# Patient Record
Sex: FEMALE | Race: WHITE | NOT HISPANIC OR LATINO | Employment: OTHER | URBAN - METROPOLITAN AREA
[De-identification: names, ages, dates, MRNs, and addresses within clinical notes are randomized per-mention and may not be internally consistent; named-entity substitution may affect disease eponyms.]

---

## 2017-02-07 ENCOUNTER — HOSPITAL ENCOUNTER (OUTPATIENT)
Dept: LAB | Facility: MEDICAL CENTER | Age: 72
End: 2017-02-07
Attending: INTERNAL MEDICINE
Payer: MEDICARE

## 2017-02-07 ENCOUNTER — OFFICE VISIT (OUTPATIENT)
Dept: MEDICAL GROUP | Facility: MEDICAL CENTER | Age: 72
End: 2017-02-07
Payer: MEDICARE

## 2017-02-07 VITALS
OXYGEN SATURATION: 96 % | TEMPERATURE: 98.4 F | HEIGHT: 65 IN | WEIGHT: 128 LBS | HEART RATE: 73 BPM | SYSTOLIC BLOOD PRESSURE: 126 MMHG | DIASTOLIC BLOOD PRESSURE: 72 MMHG | BODY MASS INDEX: 21.33 KG/M2

## 2017-02-07 DIAGNOSIS — E78.5 DYSLIPIDEMIA: Chronic | ICD-10-CM

## 2017-02-07 DIAGNOSIS — E89.2 S/P PARATHYROIDECTOMY (HCC): Chronic | ICD-10-CM

## 2017-02-07 DIAGNOSIS — G25.81 RESTLESS LEG SYNDROME: ICD-10-CM

## 2017-02-07 DIAGNOSIS — Z23 NEED FOR PNEUMOCOCCAL VACCINATION: ICD-10-CM

## 2017-02-07 DIAGNOSIS — E61.1 IRON DEFICIENCY: ICD-10-CM

## 2017-02-07 DIAGNOSIS — M25.559 ARTHRALGIA OF HIP, UNSPECIFIED LATERALITY: ICD-10-CM

## 2017-02-07 DIAGNOSIS — M79.606 PAIN OF LOWER EXTREMITY, UNSPECIFIED LATERALITY: ICD-10-CM

## 2017-02-07 DIAGNOSIS — Z00.00 PREVENTATIVE HEALTH CARE: Chronic | ICD-10-CM

## 2017-02-07 LAB
25(OH)D3 SERPL-MCNC: 65 NG/ML (ref 30–100)
ALBUMIN SERPL BCP-MCNC: 4.8 G/DL (ref 3.2–4.9)
ALBUMIN/GLOB SERPL: 2.1 G/DL
ALP SERPL-CCNC: 52 U/L (ref 30–99)
ALT SERPL-CCNC: 24 U/L (ref 2–50)
ANION GAP SERPL CALC-SCNC: 5 MMOL/L (ref 0–11.9)
AST SERPL-CCNC: 24 U/L (ref 12–45)
BASOPHILS # BLD AUTO: 0.04 K/UL (ref 0–0.12)
BASOPHILS NFR BLD AUTO: 0.9 % (ref 0–1.8)
BILIRUB SERPL-MCNC: 0.6 MG/DL (ref 0.1–1.5)
BUN SERPL-MCNC: 12 MG/DL (ref 8–22)
CALCIUM SERPL-MCNC: 10.5 MG/DL (ref 8.5–10.5)
CHLORIDE SERPL-SCNC: 99 MMOL/L (ref 96–112)
CHOLEST SERPL-MCNC: 203 MG/DL (ref 100–199)
CO2 SERPL-SCNC: 30 MMOL/L (ref 20–33)
CREAT SERPL-MCNC: 0.7 MG/DL (ref 0.5–1.4)
EOSINOPHIL # BLD: 0.08 K/UL (ref 0–0.51)
EOSINOPHIL NFR BLD AUTO: 1.8 % (ref 0–6.9)
ERYTHROCYTE [DISTWIDTH] IN BLOOD BY AUTOMATED COUNT: 45.5 FL (ref 35.9–50)
FERRITIN SERPL-MCNC: 103 NG/ML (ref 10–291)
GLOBULIN SER CALC-MCNC: 2.3 G/DL (ref 1.9–3.5)
GLUCOSE SERPL-MCNC: 98 MG/DL (ref 65–99)
HCT VFR BLD AUTO: 47.4 % (ref 37–47)
HDLC SERPL-MCNC: 105 MG/DL
HGB BLD-MCNC: 16 G/DL (ref 12–16)
IMM GRANULOCYTES # BLD AUTO: 0 K/UL (ref 0–0.11)
IMM GRANULOCYTES NFR BLD AUTO: 0 % (ref 0–0.9)
IRON SATN MFR SERPL: 22 % (ref 15–55)
IRON SERPL-MCNC: 80 UG/DL (ref 40–170)
LDLC SERPL CALC-MCNC: 85 MG/DL
LYMPHOCYTES # BLD: 1.07 K/UL (ref 1–4.8)
LYMPHOCYTES NFR BLD AUTO: 24.3 % (ref 22–41)
MCH RBC QN AUTO: 31.7 PG (ref 27–33)
MCHC RBC AUTO-ENTMCNC: 33.8 G/DL (ref 33.6–35)
MCV RBC AUTO: 94 FL (ref 81.4–97.8)
MONOCYTES # BLD: 0.31 K/UL (ref 0–0.85)
MONOCYTES NFR BLD AUTO: 7 % (ref 0–13.4)
NEUTROPHILS # BLD: 2.91 K/UL (ref 2–7.15)
NEUTROPHILS NFR BLD AUTO: 66 % (ref 44–72)
NRBC # BLD AUTO: 0 K/UL
NRBC BLD-RTO: 0 /100 WBC
PLATELET # BLD AUTO: 270 K/UL (ref 164–446)
PMV BLD AUTO: 10.4 FL (ref 9–12.9)
POTASSIUM SERPL-SCNC: 4.9 MMOL/L (ref 3.6–5.5)
PROT SERPL-MCNC: 7.1 G/DL (ref 6–8.2)
RBC # BLD AUTO: 5.04 M/UL (ref 4.2–5.4)
SODIUM SERPL-SCNC: 134 MMOL/L (ref 135–145)
TIBC SERPL-MCNC: 357 UG/DL (ref 250–450)
TRIGL SERPL-MCNC: 66 MG/DL (ref 0–149)
TSH SERPL DL<=0.005 MIU/L-ACNC: 2.58 UIU/ML (ref 0.3–3.7)
WBC # BLD AUTO: 4.4 K/UL (ref 4.8–10.8)

## 2017-02-07 PROCEDURE — 85025 COMPLETE CBC W/AUTO DIFF WBC: CPT

## 2017-02-07 PROCEDURE — 80053 COMPREHEN METABOLIC PANEL: CPT

## 2017-02-07 PROCEDURE — 80061 LIPID PANEL: CPT

## 2017-02-07 PROCEDURE — 84443 ASSAY THYROID STIM HORMONE: CPT

## 2017-02-07 PROCEDURE — 82728 ASSAY OF FERRITIN: CPT

## 2017-02-07 PROCEDURE — 36415 COLL VENOUS BLD VENIPUNCTURE: CPT

## 2017-02-07 PROCEDURE — 83550 IRON BINDING TEST: CPT

## 2017-02-07 PROCEDURE — 82306 VITAMIN D 25 HYDROXY: CPT

## 2017-02-07 PROCEDURE — 83540 ASSAY OF IRON: CPT

## 2017-02-07 PROCEDURE — 99213 OFFICE O/P EST LOW 20 MIN: CPT | Performed by: INTERNAL MEDICINE

## 2017-02-07 RX ORDER — SIMVASTATIN 20 MG
20 TABLET ORAL EVERY EVENING
Qty: 90 TAB | Refills: 3 | Status: SHIPPED | OUTPATIENT
Start: 2017-02-07 | End: 2018-03-13 | Stop reason: SDUPTHER

## 2017-02-07 ASSESSMENT — PATIENT HEALTH QUESTIONNAIRE - PHQ9: CLINICAL INTERPRETATION OF PHQ2 SCORE: 0

## 2017-02-07 NOTE — MR AVS SNAPSHOT
"        Cristobal Stephan Paul   2017 7:40 AM   Office Visit   MRN: [id number]    Department:  [facility]   Dept Phone:  [phone]    Description:  Female : 1945   Provider:  Eron Flowers M.D.           Allergies as of 2017     Allergen Noted Reactions    Asa [Aspirin] 2009       Epistaxis      Nsaids 2009       Epistaxis        You were diagnosed with     Need for pneumococcal vaccination   [515609]       Arthralgia of hip, unspecified laterality   [195380]       Pain of lower extremity, unspecified laterality   [3542032]       S/P parathyroidectomy (CMS-HCC)   [850153]       Dyslipidemia   [201739]       Restless leg syndrome   [522182]       Iron deficiency   [722968]       Preventative health care   [306527]         Vital Signs     Blood Pressure Pulse Temperature Height Weight Body Mass Index    126/72 mmHg 73 36.9 °C (98.4 °F) 1.651 m (5' 5\") 58.06 kg (128 lb) 21.30 kg/m2    Oxygen Saturation Smoking Status                96% Never Smoker           Basic Information     Date Of Birth Sex Race Ethnicity Preferred Language    1945 Female White Non- English      Problem List              ICD-10-CM Priority Class Noted - Resolved    Dyslipidemia (Chronic) E78.5   2009 - Present    S/P parathyroidectomy (CMS-HCC) (Chronic) E89.2   2009 - Present    Allergic rhinitis (Chronic) J30.9   2009 - Present    Preventative health care (Chronic) Z00.00   2009 - Present    Vitiligo L80   2010 - Present    S/P laparoscopic cholecystectomy Z90.49   2011 - Present    Adenomatous polyp of colon (Chronic) D12.6   2011 - Present    Vasovagal syncope R55   2012 - Present    Wrist fracture S62.109A   2013 - Present      Health Maintenance        Date Due Completion Dates    IMM ZOSTER VACCINE 2005 ---    IMM DTaP/Tdap/Td Vaccine (1 - Tdap) 2012    MAMMOGRAM 2016, 3/28/2014, 3/12/2013, 2011, " 12/11/2006, 5/23/2005    IMM INFLUENZA (1) 9/1/2016 1/19/2016    IMM PNEUMOCOCCAL 65+ (ADULT) LOW/MEDIUM RISK SERIES (2 of 2 - PCV13) 1/19/2017 1/19/2016    BONE DENSITY 4/8/2020 4/8/2015, 3/12/2013, 12/13/2005    COLONOSCOPY 3/16/2021 3/16/2016            Current Immunizations     Influenza Vaccine Adult HD 1/19/2016  9:10 AM    Pneumococcal polysaccharide vaccine (PPSV-23) 1/19/2016  9:11 AM    TD Vaccine 2/7/2012      Below and/or attached are the medications your provider expects you to take. Review all of your home medications and newly ordered medications with your provider and/or pharmacist. Follow medication instructions as directed by your provider and/or pharmacist. Please keep your medication list with you and share with your provider. Update the information when medications are discontinued, doses are changed, or new medications (including over-the-counter products) are added; and carry medication information at all times in the event of emergency situations     Allergies:  ASA - (reactions not documented)     NSAIDS - (reactions not documented)               Medications  Valid as of: February 07, 2017 -  8:29 AM    Generic Name Brand Name Tablet Size Instructions for use    Pneumococcal 13-Alondra Conj Vacc (Suspension) PREVNAR 13  0.5 mL by Intramuscular route Once for 1 dose.        Simvastatin (Tab) ZOCOR 20 MG Take 1 Tab by mouth every evening.        .                 Medicines prescribed today were sent to:     [facility] [address] [address]    Phone: [phone] Fax: [phone]    Open 24 Hours?: No      Medication refill instructions:       If your prescription bottle indicates you have medication refills left, it is not necessary to call your provider’s office. Please contact your pharmacy and they will refill your medication.    If your prescription bottle indicates you do not have any refills left, you may request  refills at any time through one of the following ways: The online [employer] system (except Urgent Care), by calling your provider’s office, or by asking your pharmacy to contact your provider’s office with a refill request. Medication refills are processed only during regular business hours and may not be available until the next business day. Your provider may request additional information or to have a follow-up visit with you prior to refilling your medication.   *Please Note: Medication refills are assigned a new Rx number when refilled electronically. Your pharmacy may indicate that no refills were authorized even though a new prescription for the same medication is available at the pharmacy. Please request the medicine by name with the pharmacy before contacting your provider for a refill.        Your To Do List     Future Labs/Procedures Complete By Expires    CBC WITH DIFFERENTIAL  As directed 2/8/2018    COMP METABOLIC PANEL  As directed 2/8/2018    FERRITIN  As directed 2/8/2018    IRON/TOTAL IRON BIND  As directed 2/8/2018    LIPID PROFILE  As directed 2/8/2018    TSH  As directed 2/8/2018    VITAMIN D,25 HYDROXY  As directed 2/8/2018      Referral     A referral request has been sent to our patient care coordination department. Please allow 3-5 business days for us to process this request and contact you either by phone or mail. If you do not hear from us by the 5th business day, please call us at [phone].        Other Notes About Your Plan     Goes by Cordelia  Needs mammogram,all labs,prevnar,zoster  5/25/16 monitor calcium levels               [employer] Access Code: Activation code not generated  Current [employer] Status: Active

## 2017-02-07 NOTE — PROGRESS NOTES
Subjective:      Cristobal Paul is a 71 y.o. female who presents with follow up chol       HPI      Here for follow up cholesterol on zocor trying to keep active with exercise, does have muscle cramps usually at night will wake up with camps in right thigh and calves bilateral only when laying down, will improve with stretching, mild intensity, no associated sensory changes, no edema, no symptoms during the day, no low back pain, no pain during the day, no knee pain or hip pain.  Not related to stress, no incontinence bowel or bladder, not related to exertion during the day.  Will occur during the week on occasion not every night, has tried tonic water previously.  No difficulty with gait or balance.  Has seen physical therapy previously [facility] for hip pain.  No difficulty with ambulation.  No current sciatica.  No difficulty with balance, does golfing and skiing regularly without falls, uses helmet, uses sunscreen  Dyslipidemia on Zocor no muscle pain or muscle ache usually associated with statin therapy  Eating healthy diet, avoid sweets and candies  No tobacco, occasional wine  Status post parathyroidectomy, no flank pain, no kidney stones, no tremors or tetany  Occasional right hip discomfort, no radiation, typically with activity, has seen physical therapy      Current Outpatient Prescriptions   Medication Sig Dispense Refill   • simvastatin (ZOCOR) 20 MG Tab Take 1 Tab by mouth every evening. 90 Tab 3   • azithromycin (ZITHROMAX) 250 MG Tab Take zpack as directed 6 Tab 0     No current facility-administered medications for this visit.     Patient Active Problem List    Diagnosis Date Noted   • Wrist fracture 02/12/2013   • Vasovagal syncope 05/02/2012   • Adenomatous polyp of colon 02/11/2011   • S/P laparoscopic cholecystectomy 01/20/2011   • Vitiligo 02/08/2010   • Dyslipidemia 06/17/2009   • S/P parathyroidectomy (CMS-[facility]) 06/17/2009   • Allergic rhinitis 06/17/2009   • Preventative  "[facility] care 06/17/2009      Adenoma  3/16/16 colon per [facility] tortuous colon, melanosis in colon, repeat colonoscopy 5 years for surveillance    Allergic rhinitis    Dyslipidemia  11/08 chol 210,trig 45,hdl 95,ldl 97  6/09 chol 271,trig 113,hdl 74,ldl 163 restart on zocor 40 (1/2)  5/05 aorta abd u/s negative  11/08 p.thallium no ischemia EF 87%  2/09 ERIC negative  2/10 chol 205,trig 55,hdl 100,ldl 94  1/11 chol 197,trig 66,hdl 100,ldl 84 on zocor 20 mg  2/12 chol 205,trig 43,hdl 106,ldl 90 on zocor 20 mg  2/12/13 chol 216,trig 58,hdl 114,ldl 90 on zocor 20 mg  2/13/14 chol 195,trig 30,hdl 106,ldl 83 on zocor 20 mg  3/12/15 chol 205,trig 41,hdl 99,ldl 98 on zocor 20 mg  3/15/16 chol 260,trig 77,hdl 102,ldl 143 on zocor 20 mg repeat lipid panel 2 months  5/25/16 chol 243,trig 51,hdl 104,ldl 129 on zocor 20 mg    Preventative health  2/10 pap  2/12 td  2/19/14 FIT neg  4/8/15 mammogram  4/8/15 dexa LS +1.7,hip +0.1  1/19/16 declines shingles vaccine  1/19/16 pneumovax   3/15/16 vit d 55  3/16/16 colon per [facility] tortuous colon, melanosis in colon, repeat colonoscopy 5 years for surveillance    Status post laparoscopic cholecystectomy    Status post parathyroidectomy  2005 no old records  11/08 calcium 10.3  2/10 calcium 9.9  1/11 calcium 10.1  2/12 calcium 10, vit d 64  2/13 calcium 10.0  2/13/14 calcium 9.9  3/12/15 calcium 10.3  3/15/16 calcium 10.7  5/25/16 calcium 10.5,ionized 1.3,PTH 26    Wrist fracture  12/30/12 left wrist fracture had splint for 6 weeks, no surgery    Depression Screening    Little interest or pleasure in doing things?  0 - not at all  Feeling down, depressed , or hopeless? 0 - not at all  Patient Health Questionnaire Score: 0     If depressive symptoms identified deferred to follow up visit unless specifically addressed in assesment and plan.      ROS       Objective:     /72 mmHg  Pulse 73  Temp(Src) 36.9 °C (98.4 °F)  Ht 1.651 m (5' 5\")  Wt 58.06 kg (128 lb)  BMI 21.30 kg/m2  SpO2 96% "     Physical Exam   Constitutional: She appears well-developed and well-nourished. No distress.   HENT:   Head: Normocephalic and atraumatic.   Right Ear: External ear normal.   Left Ear: External ear normal.   Mouth/Throat: Oropharynx is clear and moist.   Eyes: Right eye exhibits no discharge. Left eye exhibits no discharge. No scleral icterus.   Neck: Neck supple. No JVD present. No thyromegaly present.   Cardiovascular: Normal rate, regular rhythm and normal heart sounds.    No murmur heard.  Pulmonary/Chest: Effort normal and breath sounds normal. No respiratory distress. She has no wheezes.   Abdominal: She exhibits no distension.   Musculoskeletal: She exhibits no edema.   Neurological: She is alert.   Skin: Skin is warm. She is not diaphoretic.   Psychiatric: She has a normal mood and affect. Her behavior is normal.   Nursing note and vitals reviewed.    No spinal tenderness  Normal strength lower extremity bilateral  No lower extremity edema            Assessment/Plan:     Assessment  #!  Restless leg syndrome symptoms just at night, do not believe related to sciatica or statin therapy, does not happen on a nightly basis, no history of diabetes mellitus, no history of iron deficiency although those would be in the differential as potential causes of restless leg syndrome    #2 dyslipidemia on Zocor lipid panel has been well controlled, does eat a healthy diet and exercise regularly    #3 preventative health has declined mammogram, shingles vaccine    #4 history of parathyroidectomy stable with normal potassium levels previously on labs    #5 occasional right hip discomfort      Plan  #! Declines mammogram     #2 declines zoster vaccine    #3 labs    #4 prescription pneumococcal 13 vaccination provided to get at pharmacy since we are out of    #5 lifestyle modification, diet, exercise discussed    #6 follow up 6 months    #7 referral to physical therapy for occasional hip pain

## 2017-02-08 ENCOUNTER — TELEPHONE (OUTPATIENT)
Dept: MEDICAL GROUP | Facility: MEDICAL CENTER | Age: 72
End: 2017-02-08

## 2017-02-09 NOTE — TELEPHONE ENCOUNTER
Notified with labs normal iron and blood sugar for possible restless leg  Vitamin D normal  Lipid panel improved significantly, continue good work with nutrition, exercise, no change medications

## 2017-02-20 PROBLEM — M54.50 LOW BACK PAIN: Status: ACTIVE | Noted: 2017-02-20

## 2017-08-04 ENCOUNTER — PATIENT OUTREACH (OUTPATIENT)
Dept: HEALTH INFORMATION MANAGEMENT | Facility: OTHER | Age: 72
End: 2017-08-04

## 2017-08-04 NOTE — PROGRESS NOTES
Outcome: Requested Call Back Later Today     WebIZ Checked & Epic Updated:  yes    HealthConnect Verified: no    Attempt # 1

## 2017-08-04 NOTE — PROGRESS NOTES
Verify PCP: yes    Communication Preference Obtained: yes     Review Care Team: yes    Annual Wellness Visit Scheduling  1. Scheduling Status:Scheduled      Care Gap Scheduling (Attempt to Schedule EACH Overdue Care Gap!)     Health Maintenance Due   Topic Date Due   • IMM ZOSTER VACCINE  Declined   • IMM DTaP/Tdap/Td Vaccine (1 - Tdap) Scheduled   • MAMMOGRAM  Scheduled   • Annual Wellness Visit  Scheduled   • IMM PNEUMOCOCCAL 65+ (ADULT) LOW/MEDIUM RISK SERIES (2 of 2 - PCV13) Updated         Virtual 3-D Display for Smartphones Activation: already active  Virtual 3-D Display for Smartphones Elana: yes  Virtual Visits: yes  Opt In to Text Messages: yes

## 2017-09-01 ENCOUNTER — TELEPHONE (OUTPATIENT)
Dept: MEDICAL GROUP | Facility: MEDICAL CENTER | Age: 72
End: 2017-09-01

## 2017-09-01 RX ORDER — UBIDECARENONE 75 MG
100 CAPSULE ORAL DAILY
COMMUNITY
End: 2019-10-09

## 2017-09-01 NOTE — TELEPHONE ENCOUNTER
ANNUAL WELLNESS VISIT PRE-VISIT PLANNING     1.  Reviewed note from last office visit with PCP: YES    2.  If any orders were placed at last visit, do we have Results/Consult Notes?        •  Labs - Labs were not ordered at last office visit.       •  Imaging - Imaging ordered, NOT completed. Patient advised to complete prior to next appointment.       •  Referrals - No referrals were ordered at last office visit.    3.  Immunizations were updated in [facility] using WebIZ?: Yes       •  WebIZ Recommendations: FLU, TDAP and ZOSTAVAX (Shingles)       •  Is patient due for Tdap? YES. Patient was notified of copay.       •  Is patient due for Shingles? NO     4.  Patient is due for the following Health Maintenance Topics:   Health Maintenance Due   Topic Date Due   • IMM DTaP/Tdap/Td Vaccine (1 - Tdap) 02/08/2012   • MAMMOGRAM  04/08/2016   • Annual Wellness Visit  04/08/2016   • IMM INFLUENZA (1) 09/01/2017       - Patient is up-to-date on all Health Maintenance topics. No records have been requested at this time.    5.  Reviewed/Updated the following with patient:       •   Preferred Pharmacy? YES       •   Preferred Lab? YES       •   Medications? YES. Was Abstract Encounter opened and chart updated? YES       •   Social History? YES. Was Abstract Encounter opened and chart updated? YES       •   Family History? YES. Was Abstract Encounter opened and chart updated? YES    6.  Care Team Updated:       •   DME Company (gait device, O2, CPAP, etc.): YES       •   Other Specialists (eye doctor, derm, GYN, cardiology, endo, etc): YES    7.  Patient has the following Care Path diagnoses on Problem List:  NONE    8.  Specialty Comments was updated with diagnosis information provided by [facility]: YES    9.  Patient was advised: “This is a free wellness visit. The provider will screen for medical conditions to help you stay healthy. If you have other concerns to address you may be asked to discuss these at a separate visit or there may  be an additional fee.”     6.  Patient was informed to arrive 15 min prior to their scheduled appointment and bring in their medication bottles.

## 2017-09-07 ENCOUNTER — OFFICE VISIT (OUTPATIENT)
Dept: MEDICAL GROUP | Facility: MEDICAL CENTER | Age: 72
End: 2017-09-07
Payer: MEDICARE

## 2017-09-07 ENCOUNTER — HOSPITAL ENCOUNTER (OUTPATIENT)
Dept: RADIOLOGY | Facility: MEDICAL CENTER | Age: 72
End: 2017-09-07
Attending: INTERNAL MEDICINE
Payer: MEDICARE

## 2017-09-07 ENCOUNTER — TELEPHONE (OUTPATIENT)
Dept: MEDICAL GROUP | Facility: MEDICAL CENTER | Age: 72
End: 2017-09-07

## 2017-09-07 VITALS
BODY MASS INDEX: 21.92 KG/M2 | HEART RATE: 68 BPM | DIASTOLIC BLOOD PRESSURE: 70 MMHG | SYSTOLIC BLOOD PRESSURE: 110 MMHG | HEIGHT: 65 IN | TEMPERATURE: 97.4 F | WEIGHT: 131.6 LBS | OXYGEN SATURATION: 95 %

## 2017-09-07 DIAGNOSIS — M81.0 POSTMENOPAUSAL BONE LOSS: ICD-10-CM

## 2017-09-07 DIAGNOSIS — E55.9 HYPOVITAMINOSIS D: ICD-10-CM

## 2017-09-07 DIAGNOSIS — Z12.31 VISIT FOR SCREENING MAMMOGRAM: ICD-10-CM

## 2017-09-07 DIAGNOSIS — E89.2 S/P PARATHYROIDECTOMY (HCC): Chronic | ICD-10-CM

## 2017-09-07 DIAGNOSIS — Z23 NEED FOR TDAP VACCINATION: ICD-10-CM

## 2017-09-07 DIAGNOSIS — Z00.00 MEDICARE ANNUAL WELLNESS VISIT, SUBSEQUENT: ICD-10-CM

## 2017-09-07 DIAGNOSIS — E78.5 DYSLIPIDEMIA: Chronic | ICD-10-CM

## 2017-09-07 PROCEDURE — G0439 PPPS, SUBSEQ VISIT: HCPCS | Mod: 25 | Performed by: INTERNAL MEDICINE

## 2017-09-07 PROCEDURE — 90715 TDAP VACCINE 7 YRS/> IM: CPT | Performed by: INTERNAL MEDICINE

## 2017-09-07 PROCEDURE — 90471 IMMUNIZATION ADMIN: CPT | Performed by: INTERNAL MEDICINE

## 2017-09-07 PROCEDURE — G0202 SCR MAMMO BI INCL CAD: HCPCS

## 2017-09-07 RX ORDER — PRAMIPEXOLE DIHYDROCHLORIDE 0.12 MG/1
0.12 TABLET ORAL EVERY EVENING
Qty: 30 TAB | Refills: 1 | Status: SHIPPED | OUTPATIENT
Start: 2017-09-07 | End: 2018-09-10

## 2017-09-07 ASSESSMENT — PATIENT HEALTH QUESTIONNAIRE - PHQ9: CLINICAL INTERPRETATION OF PHQ2 SCORE: 0

## 2017-09-07 NOTE — PROGRESS NOTES
Chief Complaint   Patient presents with   • Annual Wellness Visit         HPI:  Cristobal is a 72 y.o. here for Medicare Annual Wellness Visit  Medications, allergies, medical history, surgical history, social history, family history reviewed and updated  Occasional leg cramping at night, none during the day, golf regularly, no history peripheral vascular disease, no claudication, no low back pain, no sensory changes, no falls  No memory loss, anxiety or depression  Social history , retired, no tobacco         Patient Active Problem List    Diagnosis Date Noted   • Low back pain 02/20/2017   • Wrist fracture 02/12/2013   • Vasovagal syncope 05/02/2012   • Adenomatous polyp of colon 02/11/2011   • S/P laparoscopic cholecystectomy 01/20/2011   • Vitiligo 02/08/2010   • Dyslipidemia 06/17/2009   • S/P parathyroidectomy 06/17/2009   • Allergic rhinitis 06/17/2009   • Preventative health care 06/17/2009       Current Outpatient Prescriptions   Medication Sig Dispense Refill   • Multiple Vitamins-Minerals (MULTI COMPLETE PO) Take  by mouth.     • Ascorbic Acid (JELANI-C PO) Take  by mouth.     • vitamin D (CHOLECALCIFEROL) 1000 UNIT Tab Take 1,000 Units by mouth every day.     • cyanocobalamin (VITAMIN B-12) 100 MCG Tab Take 100 mcg by mouth every day.     • simvastatin (ZOCOR) 20 MG Tab Take 1 Tab by mouth every evening. 90 Tab 3     No current facility-administered medications for this visit.         Patient is taking medications as noted in medication list.  Current supplements as per medication list.     Allergies: Asa [aspirin] and Nsaids    Current social contact/activities: Pt keeps herself busy with daily activities.       Is patient current with immunizations? tdap  She  reports that she has never smoked. She has never used smokeless tobacco. She reports that she drinks alcohol.  Counseling given: Not Answered        DPA/Advanced directive: Patient has Advanced Directive, but it is not on file. Instructed  to bring in a copy to scan into their chart.    ROS:    Gait: Uses no assistive device    Ostomy: no   Other tubes: no   Amputations: no    Chronic oxygen use no    Last eye exam 4/2017    Wears hearing aids: no    : Denies incontinence.           Depression Screening    Little interest or pleasure in doing things?  0 - not at all  Feeling down, depressed, or hopeless? 0 - not at all  Patient Health Questionnaire Score: 0    If depressive symptoms identified deferred to follow up visit unless specifically addressed in assessment and plan.    Interpretation of PHQ-9 Total Score   Score Severity   1-4 No Depression   5-9 Mild Depression   10-14 Moderate Depression   15-19 Moderately Severe Depression   20-27 Severe Depression    Screening for Cognitive Impairment    Three Minute Recall (apple, watch, brian)  3/3    Draw clock face with all 12 numbers set to the hand to show 10 minutes past 11 o'clock  1 5/5  If cognitive concerns identified, deferred for follow up unless specifically addressed in assessment and plan.    Fall Risk Assessment    Has the patient had two or more falls in the last year or any fall with injury in the last year?  No  If fall risk identified, deferred for follow up unless specifically addressed in assessment and plan.    Safety Assessment    Throw rugs on floor.  Yes  Handrails on all stairs.  Yes  Good lighting in all hallways.  Yes  Difficulty hearing.  No  Patient counseled about all safety risks that were identified.    Functional Assessment ADLs    Are there any barriers preventing you from cooking for yourself or meeting nutritional needs?  No.    Are there any barriers preventing you from driving safely or obtaining transportation?  No.    Are there any barriers preventing you from using a telephone or calling for help?  No.    Are there any barriers preventing you from shopping?  No.    Are there any barriers preventing you from taking care of your own finances?  No.    Are there  any barriers preventing you from managing your medications?  No.    Are you currently engaging any exercise or physical activity?  Yes.  Pt plays golf 3-4 times a week and walks all other days.     Health Maintenance Summary                IMM DTaP/Tdap/Td Vaccine Overdue 2/8/2012      Done 2/7/2012 Imm Admin: TD Vaccine    MAMMOGRAM Overdue 4/8/2016      Done 4/8/2015 MA-SCREENING MAMMOGRAM W/ CAD     Patient has more history with this topic...    Annual Wellness Visit Overdue 4/8/2016      Done 4/8/2015 Visit Dx: Medicare annual wellness visit, subsequent     Patient has more history with this topic...    IMM INFLUENZA Overdue 9/1/2017      Done 1/19/2016 Imm Admin: Influenza Vaccine Adult HD    IMM ZOSTER VACCINE Postponed 2/4/2018 Originally 8/11/2005. Patient Refused    BONE DENSITY Next Due 4/8/2020      Done 4/8/2015 DS-BONE DENSITY STUDY (DEXA)     Patient has more history with this topic...    COLONOSCOPY Next Due 3/16/2021      Done 3/16/2016 AMB REFERRAL TO GI FOR COLONOSCOPY          Patient Care Team:  Eron Flowers M.D. as PCP - General  Ramin Meadows M.D. as Consulting Physician (Ophthalmology)    Social History   Substance Use Topics   • Smoking status: Never Smoker   • Smokeless tobacco: Never Used   • Alcohol use 0.0 oz/week      Comment: occ wine      Family History   Problem Relation Age of Onset   • Heart Disease Mother      She  has a past medical history of Allergic rhinitis (6/17/2009); Dyslipidemia (6/17/2009); Preventative health care (6/17/2009); and S/P parathyroidectomy (6/17/2009).   No past surgical history on file.      Adenoma  3/16/16 colon per [facility] tortuous colon, melanosis in colon, repeat colonoscopy 5 years for surveillance     Allergic rhinitis     Dyslipidemia  11/08 chol 210,trig 45,hdl 95,ldl 97  6/09 chol 271,trig 113,hdl 74,ldl 163 restart on zocor 40 (1/2)  5/05 aorta abd u/s negative  11/08 p.thallium no ischemia EF 87%  2/09 ERIC negative  2/10 chol 205,trig 55,hdl  100,ldl 94  1/11 chol 197,trig 66,hdl 100,ldl 84 on zocor 20 mg  2/12 chol 205,trig 43,hdl 106,ldl 90 on zocor 20 mg  2/12/13 chol 216,trig 58,hdl 114,ldl 90 on zocor 20 mg  2/13/14 chol 195,trig 30,hdl 106,ldl 83 on zocor 20 mg  3/12/15 chol 205,trig 41,hdl 99,ldl 98 on zocor 20 mg  3/15/16 chol 260,trig 77,hdl 102,ldl 143 on zocor 20 mg repeat lipid panel 2 months  5/25/16 chol 243,trig 51,hdl 104,ldl 129 on zocor 20 mg  2/7/17 chol 203,trig 66,hdl 105,ldl 85 on zocor 20 mg     Preventative health  2/10 pap  2/12 td  4/8/15 mammogram  4/8/15 dexa LS +1.7,hip +0.1  1/19/16 declines shingles vaccine  1/19/16 pneumovax   3/15/16 vit d 55  3/16/16 colon per [facility] tortuous colon, melanosis in colon, repeat colonoscopy 5 years for surveillance  2/7/17 declines mammogram  2/7/17 declines zoster vaccine  prescription pneumococcal 13 vaccination provided to get at pharmacy      Status post laparoscopic cholecystectomy     Status post parathyroidectomy  2005 no old records  11/08 calcium 10.3  2/10 calcium 9.9  1/11 calcium 10.1  2/12 calcium 10, vit d 64  2/13 calcium 10.0  2/13/14 calcium 9.9  3/12/15 calcium 10.3  3/15/16 calcium 10.7  5/25/16 calcium 10.5,ionized 1.3,PTH 26  2/7/17 calcium 10.5     Wrist fracture  12/30/12 left wrist fracture had splint for 6 weeks, no surgery         Exam:        Hearing fair  Dentition fair  Alert, oriented in no acute distress.  Eye contact is good, speech goal directed, affect calm  Lungs clear  Cardiovascular S1-S2  Extremities normal pulses, no edema       Assessment and Plan. The following treatment and monitoring plan is recommended:     Assessment  #! wellness assessment    #2 Adenoma recent colonoscopy 3/16/16 colon per [facility] tortuous colon, melanosis in colon, repeat colonoscopy 5 years for surveillance     #3 Dyslipidemia most recent lipid panel done 2/7/17 chol 203,trig 66,hdl 105,ldl 85 on zocor 20 mg with no side effects of muscle pain during the day     #4 Preventative  health  2/10 pap  2/12 td  4/8/15 mammogram  4/8/15 dexa LS +1.7,hip +0.1  1/19/16 declines shingles vaccine  1/19/16 pneumovax   3/15/16 vit d 55  3/16/16 colon per [facility] tortuous colon, melanosis in colon, repeat colonoscopy 5 years for surveillance  2/7/17 declines mammogram  2/7/17 declines zoster vaccine  prescription pneumococcal 13 vaccination provided to get at pharmacy      #5 Status post parathyroidectomy 2/7/17 calcium 10.5     #6 question restless leg, no evidence of peripheral vascular disease, do not suspect sciatica or side effect of statin therapy    #7 low vitamin D    Services suggested:    Health Care Screening recommendations as per orders if indicated.  Referrals offered: PT/OT/Nutrition counseling/Behavioral Health/Smoking cessation as per orders if indicated.    Discussion today about general wellness and lifestyle habits:    · Prevent falls and reduce trip hazards; Cautioned about securing or removing rugs.  · Have a working fire alarm and carbon monoxide detector;   · Engage in regular physical activity and social activities       Follow-up:         Plan  #! tdap ABN understands insurance will not cover this    #2 declines influenza vaccine, declines shingles vaccine, I recommend she have both of those    #3 obtain old records of pneumococcal administration    #4 lifestyle modification, diet, exercise discussed    #5 repeat colonoscopy 4 years    #6 diet, exercise discussed    #7 labs    #8 declines mammogram    #9 bone density ordered    #10 follow-up in February

## 2017-09-20 ENCOUNTER — HOSPITAL ENCOUNTER (OUTPATIENT)
Dept: LAB | Facility: MEDICAL CENTER | Age: 72
End: 2017-09-20
Attending: INTERNAL MEDICINE
Payer: MEDICARE

## 2017-09-20 ENCOUNTER — TELEPHONE (OUTPATIENT)
Dept: MEDICAL GROUP | Facility: MEDICAL CENTER | Age: 72
End: 2017-09-20

## 2017-09-20 DIAGNOSIS — E55.9 HYPOVITAMINOSIS D: ICD-10-CM

## 2017-09-20 DIAGNOSIS — E78.5 DYSLIPIDEMIA: Chronic | ICD-10-CM

## 2017-09-20 LAB
25(OH)D3 SERPL-MCNC: 47 NG/ML (ref 30–100)
ALBUMIN SERPL BCP-MCNC: 4.7 G/DL (ref 3.2–4.9)
ALBUMIN/GLOB SERPL: 1.9 G/DL
ALP SERPL-CCNC: 49 U/L (ref 30–99)
ALT SERPL-CCNC: 26 U/L (ref 2–50)
ANION GAP SERPL CALC-SCNC: 8 MMOL/L (ref 0–11.9)
AST SERPL-CCNC: 24 U/L (ref 12–45)
BASOPHILS # BLD AUTO: 0.7 % (ref 0–1.8)
BASOPHILS # BLD: 0.03 K/UL (ref 0–0.12)
BILIRUB SERPL-MCNC: 0.9 MG/DL (ref 0.1–1.5)
BUN SERPL-MCNC: 15 MG/DL (ref 8–22)
CALCIUM SERPL-MCNC: 10.1 MG/DL (ref 8.5–10.5)
CHLORIDE SERPL-SCNC: 99 MMOL/L (ref 96–112)
CHOLEST SERPL-MCNC: 211 MG/DL (ref 100–199)
CO2 SERPL-SCNC: 27 MMOL/L (ref 20–33)
CREAT SERPL-MCNC: 0.86 MG/DL (ref 0.5–1.4)
EOSINOPHIL # BLD AUTO: 0.09 K/UL (ref 0–0.51)
EOSINOPHIL NFR BLD: 2.1 % (ref 0–6.9)
ERYTHROCYTE [DISTWIDTH] IN BLOOD BY AUTOMATED COUNT: 44.6 FL (ref 35.9–50)
GFR SERPL CREATININE-BSD FRML MDRD: >60 ML/MIN/1.73 M 2
GLOBULIN SER CALC-MCNC: 2.5 G/DL (ref 1.9–3.5)
GLUCOSE SERPL-MCNC: 88 MG/DL (ref 65–99)
HCT VFR BLD AUTO: 47.9 % (ref 37–47)
HDLC SERPL-MCNC: 104 MG/DL
HGB BLD-MCNC: 15.9 G/DL (ref 12–16)
IMM GRANULOCYTES # BLD AUTO: 0 K/UL (ref 0–0.11)
IMM GRANULOCYTES NFR BLD AUTO: 0 % (ref 0–0.9)
LDLC SERPL CALC-MCNC: 93 MG/DL
LYMPHOCYTES # BLD AUTO: 1.22 K/UL (ref 1–4.8)
LYMPHOCYTES NFR BLD: 28.4 % (ref 22–41)
MCH RBC QN AUTO: 30.8 PG (ref 27–33)
MCHC RBC AUTO-ENTMCNC: 33.2 G/DL (ref 33.6–35)
MCV RBC AUTO: 92.6 FL (ref 81.4–97.8)
MONOCYTES # BLD AUTO: 0.3 K/UL (ref 0–0.85)
MONOCYTES NFR BLD AUTO: 7 % (ref 0–13.4)
NEUTROPHILS # BLD AUTO: 2.65 K/UL (ref 2–7.15)
NEUTROPHILS NFR BLD: 61.8 % (ref 44–72)
NRBC # BLD AUTO: 0 K/UL
NRBC BLD AUTO-RTO: 0 /100 WBC
PLATELET # BLD AUTO: 257 K/UL (ref 164–446)
PMV BLD AUTO: 10.4 FL (ref 9–12.9)
POTASSIUM SERPL-SCNC: 4.2 MMOL/L (ref 3.6–5.5)
PROT SERPL-MCNC: 7.2 G/DL (ref 6–8.2)
RBC # BLD AUTO: 5.17 M/UL (ref 4.2–5.4)
SODIUM SERPL-SCNC: 134 MMOL/L (ref 135–145)
TRIGL SERPL-MCNC: 71 MG/DL (ref 0–149)
TSH SERPL DL<=0.005 MIU/L-ACNC: 3.77 UIU/ML (ref 0.3–3.7)
WBC # BLD AUTO: 4.3 K/UL (ref 4.8–10.8)

## 2017-09-20 PROCEDURE — 84443 ASSAY THYROID STIM HORMONE: CPT

## 2017-09-20 PROCEDURE — 82306 VITAMIN D 25 HYDROXY: CPT

## 2017-09-20 PROCEDURE — 85025 COMPLETE CBC W/AUTO DIFF WBC: CPT

## 2017-09-20 PROCEDURE — 36415 COLL VENOUS BLD VENIPUNCTURE: CPT

## 2017-09-20 PROCEDURE — 80061 LIPID PANEL: CPT

## 2017-09-20 PROCEDURE — 80053 COMPREHEN METABOLIC PANEL: CPT

## 2018-09-10 ENCOUNTER — TELEPHONE (OUTPATIENT)
Dept: MEDICAL GROUP | Facility: MEDICAL CENTER | Age: 73
End: 2018-09-10

## 2018-09-10 ENCOUNTER — HOSPITAL ENCOUNTER (OUTPATIENT)
Dept: LAB | Facility: MEDICAL CENTER | Age: 73
End: 2018-09-10
Attending: INTERNAL MEDICINE
Payer: MEDICARE

## 2018-09-10 ENCOUNTER — OFFICE VISIT (OUTPATIENT)
Dept: MEDICAL GROUP | Facility: MEDICAL CENTER | Age: 73
End: 2018-09-10
Payer: MEDICARE

## 2018-09-10 VITALS
OXYGEN SATURATION: 97 % | HEIGHT: 65 IN | WEIGHT: 131 LBS | SYSTOLIC BLOOD PRESSURE: 110 MMHG | HEART RATE: 67 BPM | BODY MASS INDEX: 21.83 KG/M2 | TEMPERATURE: 97.2 F | DIASTOLIC BLOOD PRESSURE: 70 MMHG

## 2018-09-10 DIAGNOSIS — E89.2 S/P PARATHYROIDECTOMY (HCC): ICD-10-CM

## 2018-09-10 DIAGNOSIS — G25.81 RESTLESS LEG SYNDROME: ICD-10-CM

## 2018-09-10 DIAGNOSIS — Z00.00 PREVENTATIVE HEALTH CARE: Chronic | ICD-10-CM

## 2018-09-10 DIAGNOSIS — Z00.00 MEDICARE ANNUAL WELLNESS VISIT, SUBSEQUENT: Primary | ICD-10-CM

## 2018-09-10 DIAGNOSIS — M81.0 POSTMENOPAUSAL BONE LOSS: ICD-10-CM

## 2018-09-10 DIAGNOSIS — E78.5 DYSLIPIDEMIA: Chronic | ICD-10-CM

## 2018-09-10 DIAGNOSIS — R79.89 LOW VITAMIN D LEVEL: ICD-10-CM

## 2018-09-10 DIAGNOSIS — Z12.31 ENCOUNTER FOR SCREENING MAMMOGRAM FOR BREAST CANCER: ICD-10-CM

## 2018-09-10 DIAGNOSIS — G25.81 RLS (RESTLESS LEGS SYNDROME): ICD-10-CM

## 2018-09-10 DIAGNOSIS — Z23 NEED FOR ZOSTER VACCINE: ICD-10-CM

## 2018-09-10 LAB
25(OH)D3 SERPL-MCNC: 58 NG/ML (ref 30–100)
ALBUMIN SERPL BCP-MCNC: 4.3 G/DL (ref 3.2–4.9)
ALBUMIN/GLOB SERPL: 1.7 G/DL
ALP SERPL-CCNC: 45 U/L (ref 30–99)
ALT SERPL-CCNC: 24 U/L (ref 2–50)
ANION GAP SERPL CALC-SCNC: 4 MMOL/L (ref 0–11.9)
AST SERPL-CCNC: 21 U/L (ref 12–45)
BASOPHILS # BLD AUTO: 1.2 % (ref 0–1.8)
BASOPHILS # BLD: 0.06 K/UL (ref 0–0.12)
BILIRUB SERPL-MCNC: 0.7 MG/DL (ref 0.1–1.5)
BUN SERPL-MCNC: 13 MG/DL (ref 8–22)
CALCIUM SERPL-MCNC: 9.4 MG/DL (ref 8.5–10.5)
CHLORIDE SERPL-SCNC: 98 MMOL/L (ref 96–112)
CHOLEST SERPL-MCNC: 218 MG/DL (ref 100–199)
CO2 SERPL-SCNC: 29 MMOL/L (ref 20–33)
CREAT SERPL-MCNC: 0.7 MG/DL (ref 0.5–1.4)
EOSINOPHIL # BLD AUTO: 0.03 K/UL (ref 0–0.51)
EOSINOPHIL NFR BLD: 0.6 % (ref 0–6.9)
ERYTHROCYTE [DISTWIDTH] IN BLOOD BY AUTOMATED COUNT: 45.4 FL (ref 35.9–50)
FASTING STATUS PATIENT QL REPORTED: NORMAL
GLOBULIN SER CALC-MCNC: 2.5 G/DL (ref 1.9–3.5)
GLUCOSE SERPL-MCNC: 94 MG/DL (ref 65–99)
HCT VFR BLD AUTO: 46.2 % (ref 37–47)
HDLC SERPL-MCNC: 106 MG/DL
HGB BLD-MCNC: 15.4 G/DL (ref 12–16)
IMM GRANULOCYTES # BLD AUTO: 0.01 K/UL (ref 0–0.11)
IMM GRANULOCYTES NFR BLD AUTO: 0.2 % (ref 0–0.9)
LDLC SERPL CALC-MCNC: 102 MG/DL
LYMPHOCYTES # BLD AUTO: 0.86 K/UL (ref 1–4.8)
LYMPHOCYTES NFR BLD: 17.7 % (ref 22–41)
MCH RBC QN AUTO: 30.9 PG (ref 27–33)
MCHC RBC AUTO-ENTMCNC: 33.3 G/DL (ref 33.6–35)
MCV RBC AUTO: 92.6 FL (ref 81.4–97.8)
MONOCYTES # BLD AUTO: 0.28 K/UL (ref 0–0.85)
MONOCYTES NFR BLD AUTO: 5.8 % (ref 0–13.4)
NEUTROPHILS # BLD AUTO: 3.61 K/UL (ref 2–7.15)
NEUTROPHILS NFR BLD: 74.5 % (ref 44–72)
NRBC # BLD AUTO: 0 K/UL
NRBC BLD-RTO: 0 /100 WBC
PLATELET # BLD AUTO: 233 K/UL (ref 164–446)
PMV BLD AUTO: 10.2 FL (ref 9–12.9)
POTASSIUM SERPL-SCNC: 4.4 MMOL/L (ref 3.6–5.5)
PROT SERPL-MCNC: 6.8 G/DL (ref 6–8.2)
RBC # BLD AUTO: 4.99 M/UL (ref 4.2–5.4)
SODIUM SERPL-SCNC: 131 MMOL/L (ref 135–145)
TRIGL SERPL-MCNC: 48 MG/DL (ref 0–149)
TSH SERPL DL<=0.005 MIU/L-ACNC: 2.18 UIU/ML (ref 0.38–5.33)
WBC # BLD AUTO: 4.9 K/UL (ref 4.8–10.8)

## 2018-09-10 PROCEDURE — 84443 ASSAY THYROID STIM HORMONE: CPT

## 2018-09-10 PROCEDURE — 85025 COMPLETE CBC W/AUTO DIFF WBC: CPT

## 2018-09-10 PROCEDURE — 36415 COLL VENOUS BLD VENIPUNCTURE: CPT

## 2018-09-10 PROCEDURE — 82306 VITAMIN D 25 HYDROXY: CPT

## 2018-09-10 PROCEDURE — 80053 COMPREHEN METABOLIC PANEL: CPT

## 2018-09-10 PROCEDURE — G0439 PPPS, SUBSEQ VISIT: HCPCS | Performed by: INTERNAL MEDICINE

## 2018-09-10 PROCEDURE — 80061 LIPID PANEL: CPT

## 2018-09-10 RX ORDER — SIMVASTATIN 20 MG
20 TABLET ORAL EVERY EVENING
Qty: 90 TAB | Refills: 3 | Status: SHIPPED | OUTPATIENT
Start: 2018-09-10 | End: 2019-09-09 | Stop reason: SDUPTHER

## 2018-09-10 RX ORDER — GABAPENTIN 100 MG/1
100 CAPSULE ORAL EVERY EVENING
Qty: 30 CAP | Refills: 3 | Status: SHIPPED | OUTPATIENT
Start: 2018-09-10 | End: 2019-02-07 | Stop reason: SDUPTHER

## 2018-09-10 ASSESSMENT — ACTIVITIES OF DAILY LIVING (ADL): BATHING_REQUIRES_ASSISTANCE: 0

## 2018-09-10 ASSESSMENT — ENCOUNTER SYMPTOMS
COUGH: 0
CLAUDICATION: 0
GENERAL WELL-BEING: EXCELLENT
BLURRED VISION: 0
HEARTBURN: 0
DOUBLE VISION: 0
INSOMNIA: 0
BACK PAIN: 0
NERVOUS/ANXIOUS: 0
DEPRESSION: 0
SHORTNESS OF BREATH: 0

## 2018-09-10 ASSESSMENT — PATIENT HEALTH QUESTIONNAIRE - PHQ9: CLINICAL INTERPRETATION OF PHQ2 SCORE: 0

## 2018-09-10 NOTE — PROGRESS NOTES
Subjective:      Cristobal Paul is a 73 y.o. female who presents wellness          HPI     Here for wellness  CC:  Health Risk Assessment Exam.    HPI: wellness  Cristobal is a 73 y.o. here for Health Risk Assessment.     PCP: Eron Flowers M.D.  Annual eye exam  Medication, allergies, medical history, surgical history, social history, family history reviewed and updated  Annual eye exam  No hearing changes, no falls  No memory loss, no anxiety or depression    Patient Active Problem List    Diagnosis Date Noted   • Low back pain 02/20/2017   • Wrist fracture 02/12/2013   • Vasovagal syncope 05/02/2012   • Adenomatous polyp of colon 02/11/2011   • S/P laparoscopic cholecystectomy 01/20/2011   • Vitiligo 02/08/2010   • Dyslipidemia 06/17/2009   • S/P parathyroidectomy (HCC) 06/17/2009   • Allergic rhinitis 06/17/2009   • Preventative health care 06/17/2009     Current Outpatient Prescriptions   Medication Sig Dispense Refill   • simvastatin (ZOCOR) 20 MG Tab Take 1 Tab by mouth every evening. 90 Tab 1   • pramipexole (MIRAPEX) 0.125 MG Tab Take 1 Tab by mouth every evening. 30 Tab 1   • Multiple Vitamins-Minerals (MULTI COMPLETE PO) Take  by mouth.     • Ascorbic Acid (JELNAI-C PO) Take  by mouth.     • vitamin D (CHOLECALCIFEROL) 1000 UNIT Tab Take 1,000 Units by mouth every day.     • cyanocobalamin (VITAMIN B-12) 100 MCG Tab Take 100 mcg by mouth every day.       No current facility-administered medications for this visit.       Current supplements: reviewed  Chronic narcotic pain medicines: no  Allergies: Asa [aspirin] and Nsaids    Screening: reviewed  Depressive Symptoms: Denies feeling down, depressed or hopeless. Denies loss of interest or pleasure in doing things   ADLs: Denies needing help with using telephone, transportation, shopping, preparing meals, housework, laundry, or managing medication or money.    Independent with bathing, hygiene, feeding, toileting, dressing   Memory concerns:  Denies difficulty remembering details of conversations, events and upcoming appointments.  Hearing problems: Denies.   Recent falls reviewed    Current social contact/activities: reviewed    Social History   Substance Use Topics   • Smoking status: Never Smoker   • Smokeless tobacco: Never Used   • Alcohol use 0.0 oz/week      Comment: occ wine        Family History   Problem Relation Age of Onset   • Heart Disease Mother      History reviewed. No pertinent surgical history.       Ostomy or other tubes or amputations no  Chronic oxygen use no       Depression Screening    Little interest or pleasure in doing things?  0 - not at all  Feeling down, depressed , or hopeless? 0 - not at all  Patient Health Questionnaire Score: 0     If depressive symptoms identified deferred to follow up visit unless specifically addressed in assessment and plan.    Interpretation of PHQ-9 Total Score   Score Severity   1-4 No Depression   5-9 Mild Depression   10-14 Moderate Depression   15-19 Moderately Severe Depression   20-27 Severe Depression    Screening for Cognitive Impairment    Three Minute Recall (leader, season, table) 3/3    Madi clock face with all 12 numbers and set the hands to show 10 past 11.  No    Cognitive concerns identified deferred for follow up unless specifically addressed in assessment and plan.    Fall Risk Assessment    Has the patient had two or more falls in the last year or any fall with injury in the last year?  No    Safety Assessment    Throw rugs on floor.  No  Handrails on all stairs.  Yes  Good lighting in all hallways.  Yes  Difficulty hearing.     Patient counseled about all safety risks that were identified.    Functional Assessment ADLs    Are there any barriers preventing you from cooking for yourself or meeting nutritional needs?  No.    Are there any barriers preventing you from driving safely or obtaining transportation?  No.    Are there any barriers preventing you from using a telephone or  calling for help?  No.    Are there any barriers preventing you from shopping?  No.    Are there any barriers preventing you from taking care of your own finances?  No.    Are there any barriers preventing you from managing your medications?  No.    Are there any barriers preventing you from showering, bathing or dressing yourself?  No.    Are you currently engaging in any exercise or physical activity?  Yes.     What is your perception of your health?  Excellent.      Health Maintenance Summary                IMM ZOSTER VACCINES Overdue 8/11/1995     IMM INFLUENZA Overdue 9/1/2018      Done 1/19/2016 Imm Admin: Influenza Vaccine Adult HD    MAMMOGRAM Overdue 9/7/2018      Done 9/7/2017 MA-MAMMO SCREENING BILAT W/TOMOSYNTHESIS W/CAD     Patient has more history with this topic...    Annual Wellness Visit Overdue 9/8/2018      Done 9/7/2017 Visit Dx: Medicare annual wellness visit, subsequent     Patient has more history with this topic...    BONE DENSITY Next Due 4/8/2020      Done 4/8/2015 DS-BONE DENSITY STUDY (DEXA)     Patient has more history with this topic...    COLONOSCOPY Next Due 3/16/2021      Done 3/16/2016 AMB REFERRAL TO GI FOR COLONOSCOPY    IMM DTaP/Tdap/Td Vaccine Next Due 9/7/2027      Done 9/7/2017 Imm Admin: Tdap Vaccine     Patient has more history with this topic...          Patient Care Team:  Eron Flowers M.D. as PCP - General  Ramin Meadows M.D. as Consulting Physician (Ophthalmology)      Occasional leg cramping at night, no swelling, no smoking, no leg pain with ambulation, no back pain, no radiation down the legs    Health Maintenance Summary                IMM ZOSTER VACCINES Overdue 8/11/1995     IMM INFLUENZA Overdue 9/1/2018      Done 1/19/2016 Imm Admin: Influenza Vaccine Adult HD    MAMMOGRAM Overdue 9/7/2018      Done 9/7/2017 MA-MAMMO SCREENING BILAT W/TOMOSYNTHESIS W/CAD     Patient has more history with this topic...    Annual Wellness Visit Overdue 9/8/2018      Done  "9/7/2017 Visit Dx: Medicare annual wellness visit, subsequent     Patient has more history with this topic...    BONE DENSITY Next Due 4/8/2020      Done 4/8/2015 DS-BONE DENSITY STUDY (DEXA)     Patient has more history with this topic...    COLONOSCOPY Next Due 3/16/2021      Done 3/16/2016 AMB REFERRAL TO GI FOR COLONOSCOPY    IMM DTaP/Tdap/Td Vaccine Next Due 9/7/2027      Done 9/7/2017 Imm Admin: Tdap Vaccine     Patient has more history with this topic...          Patient Care Team:  Eron Flowers M.D. as PCP - General  Ramin Meadows M.D. as Consulting Physician (Ophthalmology)      /70   Pulse 67   Temp 36.2 °C (97.2 °F)   Ht 1.651 m (5' 5\")   Wt 59.4 kg (131 lb)   SpO2 97%   Breastfeeding? No   BMI 21.80 kg/m²  Body mass index is 21.8 kg/m².       Gait: normal. Uses assistive device :no           Health Care Screening recommendations reviewed with patient today and updated or ordered.  DPA/Advanced directive: Completed/Information provided.    Referrals for PT/OT/Nutrition counseling/Behavioral Health/Smoking cessation as above if indicated  Discussion today about general wellness and lifestyle habits:    · Prevent falls and reduce trip hazards;   · Have a working fire alarm and carbon monoxide detector;   · Engage in regular physical activity and social activities;   · Use sun protection when outdoors.        Current Outpatient Prescriptions   Medication Sig Dispense Refill   • simvastatin (ZOCOR) 20 MG Tab Take 1 Tab by mouth every evening. 90 Tab 1   • pramipexole (MIRAPEX) 0.125 MG Tab Take 1 Tab by mouth every evening. 30 Tab 1   • Multiple Vitamins-Minerals (MULTI COMPLETE PO) Take  by mouth.     • Ascorbic Acid (JELANI-C PO) Take  by mouth.     • vitamin D (CHOLECALCIFEROL) 1000 UNIT Tab Take 1,000 Units by mouth every day.     • cyanocobalamin (VITAMIN B-12) 100 MCG Tab Take 100 mcg by mouth every day.       No current facility-administered medications for this visit.  "       Adenoma  2/11 colon per [facility] adenoma   3/16/16 colon per [facility] tortuous colon, melanosis in colon, repeat colonoscopy 5 years for surveillance     Allergic rhinitis     Dyslipidemia  11/08 chol 210,trig 45,hdl 95,ldl 97  6/09 chol 271,trig 113,hdl 74,ldl 163 restart on zocor 40 (1/2)  5/05 aorta abd u/s negative  11/08 p.thallium no ischemia EF 87%  2/09 ERIC negative  2/10 chol 205,trig 55,hdl 100,ldl 94  1/11 chol 197,trig 66,hdl 100,ldl 84 on zocor 20 mg  2/12 chol 205,trig 43,hdl 106,ldl 90 on zocor 20 mg  2/12/13 chol 216,trig 58,hdl 114,ldl 90 on zocor 20 mg  2/13/14 chol 195,trig 30,hdl 106,ldl 83 on zocor 20 mg  3/12/15 chol 205,trig 41,hdl 99,ldl 98 on zocor 20 mg  3/15/16 chol 260,trig 77,hdl 102,ldl 143 on zocor 20 mg repeat lipid panel 2 months  5/25/16 chol 243,trig 51,hdl 104,ldl 129 on zocor 20 mg  2/7/17 chol 203,trig 66,hdl 105,ldl 85 on zocor 20 mg  9/20/17 chol 211,trig 71,hdl 104,ldl 93 on zocor 20 mg    low back pain  2/14/17 [employer] note      Preventative health  2/10 pap  2/12 td  4/8/15 dexa LS +1.7,hip +0.1  1/19/16 declines shingles vaccine  1/19/16 pneumovax   3/15/16 vit d 55  3/16/16 colon per [facility] tortuous colon, melanosis in colon, repeat colonoscopy 5 years for surveillance  2/7/17 declines zoster vaccine  9/7/17 mammogram  prescription pneumococcal 13 vaccination provided to get at pharmacy      Status post laparoscopic cholecystectomy     Status post parathyroidectomy  2005 no old records  11/08 calcium 10.3  2/10 calcium 9.9  1/11 calcium 10.1  2/12 calcium 10, vit d 64  2/13 calcium 10.0  2/13/14 calcium 9.9  3/12/15 calcium 10.3  3/15/16 calcium 10.7  5/25/16 calcium 10.5,ionized 1.3,PTH 26  2/7/17 calcium 10.5     Wrist fracture  12/30/12 left wrist fracture had splint for 6 weeks, no surgery             Patient Active Problem List   Diagnosis   • Dyslipidemia   • S/P parathyroidectomy (HCC)   • Allergic rhinitis   • Preventative health care   • Vitiligo   • S/P  laparoscopic cholecystectomy   • Adenomatous polyp of colon   • Vasovagal syncope   • Wrist fracture   • Low back pain          Review of Systems   HENT: Negative for hearing loss.    Eyes: Negative for blurred vision and double vision.   Respiratory: Negative for cough and shortness of breath.    Cardiovascular: Negative for chest pain and claudication.   Gastrointestinal: Negative for heartburn.   Genitourinary: Negative for frequency.   Musculoskeletal: Negative for back pain and joint pain.   Skin: Negative for rash.   Psychiatric/Behavioral: Negative for depression. The patient is not nervous/anxious and does not have insomnia.      Occasional leg cramping at night tried Mirapex no benefit     Objective:        Physical Exam   Constitutional: She appears well-developed and well-nourished. No distress.   HENT:   Head: Normocephalic and atraumatic.   Right Ear: External ear normal.   Left Ear: External ear normal.   Mouth/Throat: Oropharynx is clear and moist.   Eyes: Conjunctivae are normal. Right eye exhibits no discharge. Left eye exhibits no discharge. No scleral icterus.   Neck: Neck supple. No JVD present.   Cardiovascular: Normal rate, regular rhythm and normal heart sounds.    Pulmonary/Chest: Effort normal and breath sounds normal. No respiratory distress. She has no wheezes.   Abdominal: She exhibits no distension.   Musculoskeletal: She exhibits no edema.   Neurological: She is alert.   Skin: Skin is warm. She is not diaphoretic.   Psychiatric: She has a normal mood and affect. Her behavior is normal.   Nursing note and vitals reviewed.    No leg swelling, normal peripheral pulses dorsalis pedis bilateral          Assessment/Plan:     Assessment  #!  Wellness assessment     #2 dyslipidemia 9/20/17 chol 211,trig 71,hdl 104,ldl 93 on zocor 20 mg  stable and controlled no muscle pain or muscle aches    #3 status post parathyroidectomy stable, no recurrent symptoms, normal calcium levels, no  medications  2/7/17 calcium 10.5     #4 restless leg tried mirapex 0.125 mg no benefit last year, no pain with ambulation, symptoms occur only at night no evidence of sciatica or peripheral vascular disease    #6 low vitamin D    #5 adenoma 3/16/16 colon per [facility] tortuous colon, melanosis in colon, repeat colonoscopy 5 years for surveillance    #6 preventative health  2/10 pap  2/12 td  4/8/15 dexa LS +1.7,hip +0.1  1/19/16 declines shingles vaccine  1/19/16 pneumovax   3/15/16 vit d 55  3/16/16 colon per [facility] tortuous colon, melanosis in colon, repeat colonoscopy 5 years for surveillance  2/7/17 declines zoster vaccine  9/7/17 mammogram  prescription pneumococcal 13 vaccination provided to get at pharmacy       Plan   #! Flu vaccine declines     #2 mammogram and dexa    #3 shingrix prescription provided to get at pharmacy    #4 Neurontin 100 mg can cause drowsiness, lightheadedness, dizziness, sedation, call us in 1-2 weeks with update, can uptitrate    #5 previously has had 2/10/17 prevnar at [facility] pharmacy    #6 next colon will be due 3 years    #7 labs    #8 has had pneumococcal 13, pneumococcal 23    #9 follow-up 1 year     #10 declines hearing test, declines physical therapy

## 2018-09-26 ENCOUNTER — TELEPHONE (OUTPATIENT)
Dept: MEDICAL GROUP | Facility: MEDICAL CENTER | Age: 73
End: 2018-09-26

## 2018-09-26 ENCOUNTER — HOSPITAL ENCOUNTER (OUTPATIENT)
Dept: RADIOLOGY | Facility: MEDICAL CENTER | Age: 73
End: 2018-09-26
Attending: INTERNAL MEDICINE
Payer: MEDICARE

## 2018-09-26 DIAGNOSIS — M81.0 POSTMENOPAUSAL BONE LOSS: ICD-10-CM

## 2018-09-26 DIAGNOSIS — Z12.31 ENCOUNTER FOR SCREENING MAMMOGRAM FOR BREAST CANCER: ICD-10-CM

## 2018-09-26 PROCEDURE — 77080 DXA BONE DENSITY AXIAL: CPT

## 2018-09-26 PROCEDURE — 77067 SCR MAMMO BI INCL CAD: CPT

## 2018-09-26 NOTE — TELEPHONE ENCOUNTER
(1) Please notify patient that her mammogram is negative, we can repeat that in one year  (2) Please notify patient that her bone density test is normal, have her continue vitamin D, regular weightbearing exercise, repeat bone density test in 2 years

## 2018-09-27 ENCOUNTER — TELEPHONE (OUTPATIENT)
Dept: MEDICAL GROUP | Facility: MEDICAL CENTER | Age: 73
End: 2018-09-27

## 2018-09-27 NOTE — TELEPHONE ENCOUNTER
----- Message from Eron Flowers M.D. sent at 9/26/2018  3:16 PM PDT -----  (1) Please notify patient that her mammogram is negative, we can repeat that in one year  (2) Please notify patient that her bone density test is normal, have her continue vitamin D, regular weightbearing exercise, repeat bone density test in 2 years

## 2019-09-30 ENCOUNTER — OFFICE VISIT (OUTPATIENT)
Dept: URGENT CARE | Facility: MEDICAL CENTER | Age: 74
End: 2019-09-30
Payer: MEDICARE

## 2019-09-30 VITALS
BODY MASS INDEX: 21.8 KG/M2 | TEMPERATURE: 97.3 F | SYSTOLIC BLOOD PRESSURE: 122 MMHG | DIASTOLIC BLOOD PRESSURE: 68 MMHG | HEIGHT: 65 IN | OXYGEN SATURATION: 97 % | HEART RATE: 75 BPM

## 2019-09-30 DIAGNOSIS — J06.9 VIRAL URI: ICD-10-CM

## 2019-09-30 LAB
FLUAV+FLUBV AG SPEC QL IA: NEGATIVE
INT CON NEG: NORMAL
INT CON POS: NORMAL

## 2019-09-30 PROCEDURE — 87804 INFLUENZA ASSAY W/OPTIC: CPT | Performed by: NURSE PRACTITIONER

## 2019-09-30 PROCEDURE — 99214 OFFICE O/P EST MOD 30 MIN: CPT | Performed by: NURSE PRACTITIONER

## 2019-09-30 ASSESSMENT — ENCOUNTER SYMPTOMS
CHILLS: 0
DIZZINESS: 0
SORE THROAT: 0
COUGH: 0
FEVER: 0
BLURRED VISION: 0
PALPITATIONS: 0
ABDOMINAL PAIN: 0
DOUBLE VISION: 0
MUSCULOSKELETAL NEGATIVE: 1
DIARRHEA: 0
HEADACHES: 1
CONSTIPATION: 0
NAUSEA: 1
VOMITING: 0
STRIDOR: 0
WHEEZING: 0

## 2019-09-30 NOTE — PROGRESS NOTES
"Subjective:   Cristobal Paul is a 74 y.o. female who presents for Congestion (head cngestion, nausea, x2days, )        HPI   Patient with new onset headaches, congestion and nausea that started 2 days ago. Symptoms have been intermittent and waxing/waning. Rates symptoms as moderate in severity. Unsure if fevers. Took OTC Tylenol with mild relief. Denies alleviating or aggravating factors.    Review of Systems   Constitutional: Negative for chills and fever.   HENT: Positive for congestion. Negative for ear discharge, ear pain and sore throat.    Eyes: Negative for blurred vision and double vision.   Respiratory: Negative for cough, wheezing and stridor.    Cardiovascular: Negative for chest pain and palpitations.   Gastrointestinal: Positive for nausea. Negative for abdominal pain, constipation, diarrhea and vomiting.   Musculoskeletal: Negative.    Skin: Negative.  Negative for itching and rash.   Neurological: Positive for headaches. Negative for dizziness.   All other systems reviewed and are negative.    Patient's PMH, SocHx, SurgHx, FamHx, Drug allergies and medications reviewed.     Objective:   /68   Pulse 75   Temp 36.3 °C (97.3 °F) (Temporal)   Ht 1.651 m (5' 5\")   SpO2 97%   BMI 21.80 kg/m²   Physical Exam   Constitutional: She is oriented to person, place, and time. She appears well-developed and well-nourished. No distress.   HENT:   Head: Normocephalic.   Right Ear: Hearing, tympanic membrane and ear canal normal. Tympanic membrane is not erythematous. No middle ear effusion.   Left Ear: Hearing and ear canal normal. Tympanic membrane is bulging. Tympanic membrane is not erythematous.  No middle ear effusion.   Nose: Mucosal edema present. No rhinorrhea. Right sinus exhibits no maxillary sinus tenderness and no frontal sinus tenderness. Left sinus exhibits no maxillary sinus tenderness and no frontal sinus tenderness.   Mouth/Throat: Uvula is midline and mucous membranes are " normal. Oropharyngeal exudate (PND) present.   Eyes: Pupils are equal, round, and reactive to light. Conjunctivae, EOM and lids are normal.   Neck: Normal range of motion. No thyromegaly present.   Cardiovascular: Normal rate, regular rhythm and normal heart sounds.   Pulmonary/Chest: Effort normal and breath sounds normal. No accessory muscle usage or stridor. No apnea, no tachypnea and no bradypnea. No respiratory distress. She has no decreased breath sounds. She has no wheezes.   Lymphadenopathy:        Head (right side): Submandibular adenopathy present. No submental and no tonsillar adenopathy present.        Head (left side): Submandibular adenopathy present. No tonsillar adenopathy present.   Neurological: She is alert and oriented to person, place, and time.   Skin: Skin is warm and dry. No rash noted. She is not diaphoretic.   Psychiatric: She has a normal mood and affect. Her speech is normal and behavior is normal. Judgment and thought content normal.   Vitals reviewed.        Assessment/Plan:   Assessment    1. Viral URI  - POCT Influenza A/B    Flu negative  It was explained to the patient today that due to the viral nature of the patient's illness, we will treat symptomatically. Encouraged OTC supportive meds PRN. Humidification and increase fluids.   Use over-the-counter Flonase daily, one spray each nostril in the morning.      Differential diagnosis, natural history, supportive care, and indications for immediate follow-up discussed.     **Please note that all invasive procedures during this visit were performed by myself and/or the Medical Assistant under the supervision of the PA or MD in office**

## 2019-10-02 ENCOUNTER — APPOINTMENT (OUTPATIENT)
Dept: RADIOLOGY | Facility: MEDICAL CENTER | Age: 74
End: 2019-10-02
Attending: EMERGENCY MEDICINE
Payer: MEDICARE

## 2019-10-02 ENCOUNTER — TELEPHONE (OUTPATIENT)
Dept: MEDICAL GROUP | Facility: MEDICAL CENTER | Age: 74
End: 2019-10-02

## 2019-10-02 ENCOUNTER — OFFICE VISIT (OUTPATIENT)
Dept: URGENT CARE | Facility: MEDICAL CENTER | Age: 74
End: 2019-10-02
Payer: MEDICARE

## 2019-10-02 ENCOUNTER — HOSPITAL ENCOUNTER (EMERGENCY)
Facility: MEDICAL CENTER | Age: 74
End: 2019-10-02
Attending: EMERGENCY MEDICINE
Payer: MEDICARE

## 2019-10-02 VITALS
WEIGHT: 136 LBS | DIASTOLIC BLOOD PRESSURE: 80 MMHG | HEART RATE: 72 BPM | OXYGEN SATURATION: 98 % | BODY MASS INDEX: 22.63 KG/M2 | SYSTOLIC BLOOD PRESSURE: 132 MMHG | TEMPERATURE: 97.6 F

## 2019-10-02 VITALS
DIASTOLIC BLOOD PRESSURE: 89 MMHG | TEMPERATURE: 98.6 F | WEIGHT: 136.02 LBS | HEART RATE: 71 BPM | HEIGHT: 65 IN | OXYGEN SATURATION: 98 % | RESPIRATION RATE: 18 BRPM | SYSTOLIC BLOOD PRESSURE: 134 MMHG | BODY MASS INDEX: 22.66 KG/M2

## 2019-10-02 DIAGNOSIS — R25.1 TREMORS OF NERVOUS SYSTEM: ICD-10-CM

## 2019-10-02 DIAGNOSIS — R20.2 PARESTHESIA: ICD-10-CM

## 2019-10-02 DIAGNOSIS — E87.1 HYPONATREMIA: ICD-10-CM

## 2019-10-02 DIAGNOSIS — R68.84 JAW PAIN: ICD-10-CM

## 2019-10-02 DIAGNOSIS — R68.2 DRY MOUTH, UNSPECIFIED: ICD-10-CM

## 2019-10-02 LAB
ALBUMIN SERPL BCP-MCNC: 4.6 G/DL (ref 3.2–4.9)
ALBUMIN/GLOB SERPL: 1.8 G/DL
ALP SERPL-CCNC: 53 U/L (ref 30–99)
ALT SERPL-CCNC: 34 U/L (ref 2–50)
ANION GAP SERPL CALC-SCNC: 14 MMOL/L (ref 0–11.9)
AST SERPL-CCNC: 23 U/L (ref 12–45)
BASOPHILS # BLD AUTO: 0.3 % (ref 0–1.8)
BASOPHILS # BLD: 0.02 K/UL (ref 0–0.12)
BILIRUB SERPL-MCNC: 0.6 MG/DL (ref 0.1–1.5)
BUN SERPL-MCNC: 11 MG/DL (ref 8–22)
CALCIUM SERPL-MCNC: 10.1 MG/DL (ref 8.4–10.2)
CHLORIDE SERPL-SCNC: 91 MMOL/L (ref 96–112)
CO2 SERPL-SCNC: 24 MMOL/L (ref 20–33)
CREAT SERPL-MCNC: 0.67 MG/DL (ref 0.5–1.4)
CRP SERPL HS-MCNC: 0.5 MG/L (ref 0–7.5)
EOSINOPHIL # BLD AUTO: 0.03 K/UL (ref 0–0.51)
EOSINOPHIL NFR BLD: 0.5 % (ref 0–6.9)
ERYTHROCYTE [DISTWIDTH] IN BLOOD BY AUTOMATED COUNT: 42.9 FL (ref 35.9–50)
GLOBULIN SER CALC-MCNC: 2.6 G/DL (ref 1.9–3.5)
GLUCOSE BLD-MCNC: 99 MG/DL (ref 65–99)
GLUCOSE SERPL-MCNC: 106 MG/DL (ref 65–99)
HCT VFR BLD AUTO: 41.3 % (ref 37–47)
HGB BLD-MCNC: 14.2 G/DL (ref 12–16)
IMM GRANULOCYTES # BLD AUTO: 0.01 K/UL (ref 0–0.11)
IMM GRANULOCYTES NFR BLD AUTO: 0.2 % (ref 0–0.9)
LYMPHOCYTES # BLD AUTO: 0.91 K/UL (ref 1–4.8)
LYMPHOCYTES NFR BLD: 15.3 % (ref 22–41)
MCH RBC QN AUTO: 31.3 PG (ref 27–33)
MCHC RBC AUTO-ENTMCNC: 34.4 G/DL (ref 33.6–35)
MCV RBC AUTO: 91.2 FL (ref 81.4–97.8)
MONOCYTES # BLD AUTO: 0.38 K/UL (ref 0–0.85)
MONOCYTES NFR BLD AUTO: 6.4 % (ref 0–13.4)
NEUTROPHILS # BLD AUTO: 4.58 K/UL (ref 2–7.15)
NEUTROPHILS NFR BLD: 77.3 % (ref 44–72)
NRBC # BLD AUTO: 0 K/UL
NRBC BLD-RTO: 0 /100 WBC
PLATELET # BLD AUTO: 221 K/UL (ref 164–446)
PMV BLD AUTO: 9.7 FL (ref 9–12.9)
POTASSIUM SERPL-SCNC: 4.8 MMOL/L (ref 3.6–5.5)
PROT SERPL-MCNC: 7.2 G/DL (ref 6–8.2)
RBC # BLD AUTO: 4.53 M/UL (ref 4.2–5.4)
SODIUM SERPL-SCNC: 129 MMOL/L (ref 135–145)
WBC # BLD AUTO: 5.9 K/UL (ref 4.8–10.8)

## 2019-10-02 PROCEDURE — 700105 HCHG RX REV CODE 258: Performed by: EMERGENCY MEDICINE

## 2019-10-02 PROCEDURE — 80053 COMPREHEN METABOLIC PANEL: CPT

## 2019-10-02 PROCEDURE — 99214 OFFICE O/P EST MOD 30 MIN: CPT | Performed by: PHYSICIAN ASSISTANT

## 2019-10-02 PROCEDURE — 70450 CT HEAD/BRAIN W/O DYE: CPT

## 2019-10-02 PROCEDURE — 36415 COLL VENOUS BLD VENIPUNCTURE: CPT

## 2019-10-02 PROCEDURE — 85025 COMPLETE CBC W/AUTO DIFF WBC: CPT

## 2019-10-02 PROCEDURE — 86141 C-REACTIVE PROTEIN HS: CPT

## 2019-10-02 PROCEDURE — 99284 EMERGENCY DEPT VISIT MOD MDM: CPT

## 2019-10-02 PROCEDURE — 82962 GLUCOSE BLOOD TEST: CPT

## 2019-10-02 RX ORDER — ATORVASTATIN CALCIUM 10 MG/1
TABLET, FILM COATED ORAL
COMMUNITY
Start: 2004-05-17 | End: 2019-10-03

## 2019-10-02 RX ORDER — SODIUM CHLORIDE 9 MG/ML
1000 INJECTION, SOLUTION INTRAVENOUS ONCE
Status: COMPLETED | OUTPATIENT
Start: 2019-10-02 | End: 2019-10-02

## 2019-10-02 RX ADMIN — SODIUM CHLORIDE 1000 ML: 9 INJECTION, SOLUTION INTRAVENOUS at 21:13

## 2019-10-03 ENCOUNTER — OFFICE VISIT (OUTPATIENT)
Dept: MEDICAL GROUP | Facility: MEDICAL CENTER | Age: 74
End: 2019-10-03
Payer: MEDICARE

## 2019-10-03 VITALS
BODY MASS INDEX: 23.22 KG/M2 | SYSTOLIC BLOOD PRESSURE: 116 MMHG | HEIGHT: 64 IN | WEIGHT: 136 LBS | OXYGEN SATURATION: 95 % | DIASTOLIC BLOOD PRESSURE: 74 MMHG | HEART RATE: 68 BPM | TEMPERATURE: 97.4 F

## 2019-10-03 DIAGNOSIS — E89.2 S/P PARATHYROIDECTOMY (HCC): ICD-10-CM

## 2019-10-03 DIAGNOSIS — Z12.31 ENCOUNTER FOR SCREENING MAMMOGRAM FOR BREAST CANCER: ICD-10-CM

## 2019-10-03 DIAGNOSIS — Z00.00 MEDICARE ANNUAL WELLNESS VISIT, SUBSEQUENT: ICD-10-CM

## 2019-10-03 DIAGNOSIS — E78.5 DYSLIPIDEMIA: ICD-10-CM

## 2019-10-03 DIAGNOSIS — Z23 NEED FOR ZOSTER VACCINE: ICD-10-CM

## 2019-10-03 DIAGNOSIS — E87.1 HYPONATREMIA: ICD-10-CM

## 2019-10-03 DIAGNOSIS — R35.1 NOCTURIA: ICD-10-CM

## 2019-10-03 DIAGNOSIS — E55.9 HYPOVITAMINOSIS D: ICD-10-CM

## 2019-10-03 PROCEDURE — G0439 PPPS, SUBSEQ VISIT: HCPCS | Performed by: INTERNAL MEDICINE

## 2019-10-03 ASSESSMENT — ACTIVITIES OF DAILY LIVING (ADL): BATHING_REQUIRES_ASSISTANCE: 0

## 2019-10-03 ASSESSMENT — ENCOUNTER SYMPTOMS
HEADACHES: 0
ABDOMINAL PAIN: 0
SINUS PAIN: 0
TINGLING: 0
GENERAL WELL-BEING: GOOD
MYALGIAS: 0
CHILLS: 0
SHORTNESS OF BREATH: 0
SPUTUM PRODUCTION: 0
FOCAL WEAKNESS: 0
VOMITING: 0
DIZZINESS: 0
LOSS OF CONSCIOUSNESS: 0
NAUSEA: 0
FEVER: 0
COUGH: 0
WEAKNESS: 0
DIARRHEA: 0
TREMORS: 1
WHEEZING: 0
SENSORY CHANGE: 0
PALPITATIONS: 0
SORE THROAT: 0
NERVOUS/ANXIOUS: 0

## 2019-10-03 ASSESSMENT — PATIENT HEALTH QUESTIONNAIRE - PHQ9: CLINICAL INTERPRETATION OF PHQ2 SCORE: 0

## 2019-10-03 NOTE — PROGRESS NOTES
Chief Complaint   Patient presents with   • Annual Wellness Visit         HPI:  Cordelia is a 74 y.o. here for Medicare Annual Wellness Visit  Medication, allergies, medical history, surgical history, social history, family history reviewed and updated  Recent uri seen ER yesterday having been seen in urgent care 9/30/19 and 10/2/19, no antibiotics, sodium 129, CT had negative, hydrated with saline, currently no fevers or chills, no sore throat, no chest congestion, some fatigue  Still keeps active and golfs and skiis with  or goes walking        Patient Active Problem List    Diagnosis Date Noted   • Restless leg syndrome 09/10/2018   • Low back pain 02/20/2017   • History wrist fracture 02/12/2013   • Vasovagal syncope 05/02/2012   • Adenomatous polyp of colon 02/11/2011   • S/P laparoscopic cholecystectomy 01/20/2011   • Dyslipidemia 06/17/2009   • S/P parathyroidectomy (HCC) 06/17/2009   • Allergic rhinitis 06/17/2009   • Preventative health care 06/17/2009       Adenoma  2/11 colon per [facility] adenoma   3/16/16 colon per [facility] tortuous colon, melanosis in colon, repeat colonoscopy 5 years for surveillance     Allergic rhinitis     Dyslipidemia  11/08 chol 210,trig 45,hdl 95,ldl 97  6/09 chol 271,trig 113,hdl 74,ldl 163 restart on zocor 40 (1/2)  5/05 aorta abd u/s negative  11/08 p.thallium no ischemia EF 87%  2/09 ERIC negative  2/10 chol 205,trig 55,hdl 100,ldl 94  1/11 chol 197,trig 66,hdl 100,ldl 84 on zocor 20 mg  2/12 chol 205,trig 43,hdl 106,ldl 90 on zocor 20 mg  2/12/13 chol 216,trig 58,hdl 114,ldl 90 on zocor 20 mg  2/13/14 chol 195,trig 30,hdl 106,ldl 83 on zocor 20 mg  3/12/15 chol 205,trig 41,hdl 99,ldl 98 on zocor 20 mg  3/15/16 chol 260,trig 77,hdl 102,ldl 143 on zocor 20 mg repeat lipid panel 2 months  5/25/16 chol 243,trig 51,hdl 104,ldl 129 on zocor 20 mg  2/7/17 chol 203,trig 66,hdl 105,ldl 85 on zocor 20 mg  9/20/17 chol 211,trig 71,hdl 104,ldl 93 on zocor 20 mg  9/10/18 chol 218,trig 48,hdl  106,ldl 102 on zocor 20 mg    history wrist fracture  12/30/12 left wrist fracture had splint for 6 weeks, no surgery     low back pain  2/14/17 [employer] note      Preventative health  2/10 pap  2/12 td  1/19/16 declines shingles vaccine  1/19/16 pneumovax   3/16/16 colon per [facility] tortuous colon, melanosis in colon, repeat colonoscopy 5 years for surveillance  2/10/17 prevnar at [facility] pharmacy  9/10/18 vit d 58  9/26/18 mammogram  9/26/18 dexa  9/26/18 LS +1.4, hip -0.2    restless leg  9/27/18 restless leg tried mirapex 0.125 mg no benefit  9/11/18 trial neurontin     Status post laparoscopic cholecystectomy     Status post parathyroidectomy  2005 no old records  11/08 calcium 10.3  2/10 calcium 9.9  1/11 calcium 10.1  2/12 calcium 10, vit d 64  2/13 calcium 10.0  2/13/14 calcium 9.9  3/12/15 calcium 10.3  3/15/16 calcium 10.7  5/25/16 calcium 10.5,ionized 1.3,PTH 26  2/7/17 calcium 10.5  9/10/18 calcium 9.4,vit d 58               Current Outpatient Medications   Medication Sig Dispense Refill   • Probiotic Product (PROBIOTIC + TURMERIC EXTRACT) 400 MG Cap      • Omega-3 Fatty Acids (FISH OIL PO)      • atorvastatin (LIPITOR) 10 MG Tab 1 tablet at bedtime for lowering Cholesterol (without regard to meals).     • simvastatin (ZOCOR) 20 MG Tab Take 1 Tab by mouth every evening. 90 Tab 0   • gabapentin (NEURONTIN) 100 MG Cap Take 1 Cap by mouth every evening. 90 Cap 0   • Multiple Vitamins-Minerals (MULTI COMPLETE PO) Take  by mouth.     • Ascorbic Acid (JELANI-C PO) Take  by mouth.     • vitamin D (CHOLECALCIFEROL) 1000 UNIT Tab Take 1,000 Units by mouth every day.     • cyanocobalamin (VITAMIN B-12) 100 MCG Tab Take 100 mcg by mouth every day.       No current facility-administered medications for this visit.         Patient is taking medications as noted in medication list.  Current supplements as per medication list.     Allergies: Asa [aspirin] and Nsaids    Current social contact/activities: Patient  reports skiing, playing golf and pickle ball, visiting with family      Is patient current with immunizations? No, due for FLU and SHINGRIX (Shingles). Patient is interested in receiving NONE today.    She  reports that she has never smoked. She has never used smokeless tobacco. She reports that she drinks alcohol. She reports that she does not use drugs.  Counseling given: Not Answered        DPA/Advanced directive: Patient has Advanced Directive, but it is not on file. Instructed to bring in a copy to scan into their chart.    ROS:    Gait: Uses no assistive device   Ostomy: No *   Other tubes: No    Amputations: No    Chronic oxygen use No    Last eye exam Patient reports March 2019    Wears hearing aids: No    : Denies any urinary leakage during the last 6 months           Depression Screening    Little interest or pleasure in doing things?  0 - not at all  Feeling down, depressed, or hopeless? 0 - not at all  Patient Health Questionnaire Score: 0    If depressive symptoms identified deferred to follow up visit unless specifically addressed in assessment and plan.    Interpretation of PHQ-9 Total Score   Score Severity   1-4 No Depression   5-9 Mild Depression   10-14 Moderate Depression   15-19 Moderately Severe Depression   20-27 Severe Depression    Screening for Cognitive Impairment    Three Minute Recall (village, kitchen, baby)  3/3    Madi clock face with all 12 numbers and set the hands to show 10 past 10.  Yes 5/5  If cognitive concerns identified, deferred for follow up unless specifically addressed in assessment and plan.    Fall Risk Assessment    Has the patient had two or more falls in the last year or any fall with injury in the last year?  No  If fall risk identified, deferred for follow up unless specifically addressed in assessment and plan.    Safety Assessment    Throw rugs on floor.  Yes  Handrails on all stairs.  Yes  Good lighting in all hallways.  Yes  Difficulty hearing.  No  Patient  counseled about all safety risks that were identified.    Functional Assessment ADLs    Are there any barriers preventing you from cooking for yourself or meeting nutritional needs?  No.    Are there any barriers preventing you from driving safely or obtaining transportation?  No.    Are there any barriers preventing you from using a telephone or calling for help?  No.    Are there any barriers preventing you from shopping?  No.    Are there any barriers preventing you from taking care of your own finances?  No.    Are there any barriers preventing you from managing your medications?  No.    Are there any barriers preventing you from showering, bathing or dressing yourself?  No.    Are you currently engaging in any exercise or physical activity?  Yes.  Patient reports playing gold and pickleball and skiis during the winter  What is your perception of your health?  Good.    Health Maintenance Summary                HEPATITIS C SCREENING Overdue 1945     IMM INFLUENZA Overdue 9/1/2019      Done 12/7/2018 Imm Admin: Influenza Vaccine Quad Inj (Preserved)     Patient has more history with this topic...    Annual Wellness Visit Overdue 9/11/2019      Done 9/10/2018 Visit Dx: Medicare annual wellness visit, subsequent     Patient has more history with this topic...    MAMMOGRAM Overdue 9/26/2019      Done 9/26/2018 MA-SCREENING MAMMO BILAT W/TOMOSYNTHESIS W/CAD     Patient has more history with this topic...    IMM ZOSTER VACCINES Postponed 2/27/2020 Originally 8/11/1995. System: vaccine not available, other system reasons    COLONOSCOPY Next Due 3/16/2021      Done 3/16/2016 AMB REFERRAL TO GI FOR COLONOSCOPY    BONE DENSITY Next Due 9/26/2023      Done 9/26/2018 DS-BONE DENSITY STUDY (DEXA)     Patient has more history with this topic...    IMM DTaP/Tdap/Td Vaccine Next Due 9/7/2027      Done 9/7/2017 Imm Admin: Tdap Vaccine     Patient has more history with this topic...          Patient Care Team:  Eron BARNES  "FLORINDA Flowers as PCP - General  Ramin Meadows M.D. as Consulting Physician (Ophthalmology)    Social History     Tobacco Use   • Smoking status: Never Smoker   • Smokeless tobacco: Never Used   Substance Use Topics   • Alcohol use: Yes     Alcohol/week: 0.0 oz     Comment: occ wine    • Drug use: Never     Family History   Problem Relation Age of Onset   • Heart Disease Mother      She  has a past medical history of Allergic rhinitis (6/17/2009), Dyslipidemia (6/17/2009), Preventative health care (6/17/2009), and S/P parathyroidectomy (6/17/2009).   History reviewed. No pertinent surgical history.        Exam:     /74   Pulse 68   Temp 36.3 °C (97.4 °F) (Temporal)   Ht 1.626 m (5' 4\")   Wt 61.7 kg (136 lb)   SpO2 95%  Body mass index is 23.34 kg/m².    Hearing and dentition fair  Alert, oriented in no acute distress.  Eye contact is good, speech goal directed, affect calm  Lungs clear  Cardiovascular S1-S2 regular    Assessment and Plan. The following treatment and monitoring plan is recommended:    Assessment  #1 wellness assessment    #2 Adenoma 3/16/16 colon per [facility] tortuous colon, melanosis in colon, repeat colonoscopy 5 years for surveillance      #3 Dyslipidemia 9/10/18 chol 218,trig 48,hdl 106,ldl 102 on zocor 20 mg stable on statin    #4 Preventative health  2/10 pap  2/12 td  1/19/16 declines shingles vaccine  1/19/16 pneumovax   3/16/16 colon per [facility] tortuous colon, melanosis in colon, repeat colonoscopy 5 years for surveillance  2/10/17 prevnar at [facility] pharmacy  9/10/18 vit d 58  9/26/18 mammogram  9/26/18 dexa  9/26/18 LS +1.4, hip -0.2    #5 restless leg on gabapentin 100 mg    #6 Status post parathyroidectomy 9/10/18 calcium 9.4,vit d 58, calcium levels have been normal     #7 URI seen urgent care and emergency room yesterday with hyponatremia sodium 129 likely related to the acute viral illness, currently afebrile, no evidence of bronchitis, sinusitis, pneumonia on " examination    Services suggested:   Health Care Screening recommendations as per orders if indicated.  Referrals offered: PT/OT/Nutrition counseling/Behavioral Health/Smoking cessation as per orders if indicated.    Discussion today about general wellness and lifestyle habits:    · Prevent falls and reduce trip hazards; Cautioned about securing or removing rugs.  · Have a working fire alarm and carbon monoxide detector;   · Engage in regular physical activity and social activities       Follow-up:    #! Health maintenance reviewed and updated    #2 we are out of shingles and high dose flu, she will check with pharmacy    #3 mammogram     #4 old records  ophthalmology    #5 declines hearing test     #6 labs including work-up for hyponatremia, weight at least 2 weeks before repeating labs, has follow-up appointment on October 14    #7 no need for physical therapy    #8 Next colonoscopy 2021    #9 has had pneumococcal 13, pneumococcal 23, tdap vaccination    #10 repeat bone density next year    #11 no antibiotics necessary for URI, should continue to improve, follow-up test for hyponatremia ordered

## 2019-10-03 NOTE — ED NOTES
Patient verbalized understanding to plan of care and discharge information. Patient in stable condition. No signs of distress. Patient pain free. Patient ambulatory out of ED to personal vehicle with stable gait with family.

## 2019-10-03 NOTE — TELEPHONE ENCOUNTER
Future Appointments       Provider Department Center    10/3/2019 8:20 AM Eron CAMERON Flowers M.D.; [facility]          ANNUAL WELLNESS VISIT PRE-VISIT PLANNING WITHOUT OUTREACH    1.  Reviewed note from last office visit with PCP: YES    2.  If any orders were placed at last visit, do we have Results/Consult Notes?        •  Labs - Labs ordered, completed on 9/10/18 and results are in chart.  Note: If patient appointment is for lab review and patient did not complete labs, check with provider if OK to reschedule patient until labs completed.       •  Imaging - Imaging ordered, completed and results are in chart.       •  Referrals - No referrals were ordered at last office visit.    3.  Immunizations were updated in [employer] using WebIZ?: Yes       •  WebIZ Recommendations: FLU        •  Is patient due for Tdap? NO       •  Is patient due for Shingrix? NO     4.  Patient is due for the following Health Maintenance Topics:   Health Maintenance Due   Topic Date Due   • HEPATITIS C SCREENING  1945   • IMM INFLUENZA (1) 09/01/2019   • Annual Wellness Visit  09/11/2019   • MAMMOGRAM  09/26/2019       - Patient already has appointment scheduled for Annual Wellness Visit (AWV).    5.  Reviewed/Updated the following with patient:       •   Preferred Pharmacy? NO       •   Preferred Lab? NO       •   Preferred Communication? NO       •   Allergies? NO       •   Medications? NO       •   Social History? NO       •   Family History (document living status of immediate family members and if + hx of  cancer, diabetes, hypertension, hyperlipidemia, heart attack, stroke) NO    6.  Care Team Updated:       •   DME Company (gait device, O2, CPAP, etc.): NO       •   Other Specialists (eye doctor, derm, GYN, cardiology, endo, etc): NO    7. Orders for overdue Health Maintenance topics pended in Pre-Charting? NO    8.  Patient has the following Care Path diagnoses on  Problem List:  NONE    9.  Patient was advised: “This is a free wellness visit. The provider will screen for medical conditions to help you stay healthy. If you have other concerns to address you may be asked to discuss these at a separate visit or there may be an additional fee.”     10.  Patient was informed to arrive 15 min prior to their scheduled appointment and bring in their medication bottles.

## 2019-10-03 NOTE — ED PROVIDER NOTES
"ED Provider Note    CHIEF COMPLAINT  Chief Complaint   Patient presents with   • Dry Mouth   • Shaking       HPI  Cristobal Paul is a 74 y.o. female who presents stating that she had a feeling of flushness in the face for about 10 to 15 minutes earlier today about a couple hours prior to arrival.  She says that she felt shaky but denies any unilateral weakness or numbness.  She had a low-grade headache a couple of days ago but this resolved.  She apparently was diagnosed 3 days ago with a URI by urgent care but is never had any cough or congestion.  Denies any difficulty breathing, chest pain, dysuria or frequency and denies any other complaints at this time.  She says that she feels better at this time but still somewhat confused about the way her face feels    REVIEW OF SYSTEMS  See HPI for further details. All other systems are negative.     PAST MEDICAL HISTORY  Past Medical History:   Diagnosis Date   • Allergic rhinitis 6/17/2009   • Dyslipidemia 6/17/2009   • Preventative health care 6/17/2009   • S/P parathyroidectomy (HCC) 6/17/2009       FAMILY HISTORY  Family History   Problem Relation Age of Onset   • Heart Disease Mother        SOCIAL HISTORY   reports that she has never smoked. She has never used smokeless tobacco. She reports that she drinks alcohol.    SURGICAL HISTORY  No past surgical history on file.    CURRENT MEDICATIONS  Home Medications    **Home medications have not yet been reviewed for this encounter**         ALLERGIES  Allergies   Allergen Reactions   • Asa [Aspirin]      Epistaxis     • Nsaids      Epistaxis         PHYSICAL EXAM  VITAL SIGNS: Pulse 70   Temp 36.6 °C (97.8 °F) (Temporal)   Resp 18   Ht 1.651 m (5' 5\")   Wt 61.7 kg (136 lb 0.4 oz)   SpO2 97%   BMI 22.64 kg/m²    Constitutional: Well developed, Well nourished, No acute distress, Non-toxic appearance.   HENT: Normocephalic, Atraumatic, Bilateral external ears normal, Oropharynx is clear mucous membranes " are moist. No oral exudates or nasal discharge.   Eyes: Pupils are equal round and reactive, EOMI, Conjunctiva normal, No discharge.   Neck: Normal range of motion, No tenderness, Supple, No stridor. No meningismus.  Lymphatic: No lymphadenopathy noted.   Cardiovascular: Regular rate and rhythm without murmur rub or gallop.  Thorax & Lungs: Clear breath sounds bilaterally without wheezes, rhonchi or rales. There is no chest wall tenderness.   Abdomen: Soft non-tender non-distended. There is no rebound or guarding. No organomegaly is appreciated. Bowel sounds are normal.  Skin: Normal without rash.   Back: No CVA or spinal tenderness.   Extremities: Intact distal pulses, No edema, No tenderness, No cyanosis, No clubbing. Capillary refill is less than 2 seconds.  Musculoskeletal: Good range of motion in all major joints. No tenderness to palpation or major deformities noted.   Neurologic: Alert & oriented x 3, Normal motor function, Normal sensory function, No focal deficits noted. Reflexes are normal.  Psychiatric: Affect normal, Judgment normal, Mood normal. There is no suicidal ideation or patient reported hallucinations.       RADIOLOGY/PROCEDURES  CT-HEAD W/O   Final Result         1.  No acute intracranial abnormality is identified, there are nonspecific white matter changes, commonly associated with small vessel ischemic disease.  Associated mild cerebral atrophy is noted.   2.  Atherosclerosis.            COURSE & MEDICAL DECISION MAKING  Pertinent Labs & Imaging studies reviewed. (See chart for details)  The patient's NIH stroke score was 0 and I do not think she has a stroke syndrome.  I was concerned about electrolyte derangements such as low sodium or calcium.  Did not seem to have a hyperventilatory history    Laboratory evaluation reveals no leukocytosis, shift, anemia of significance.  There is hyponatremia at 129 with a mildly elevated anion gap but no evidence of significant acidemia.  C-reactive  protein is normal and I doubt an inflammatory process such as multiple sclerosis    CT scan of the head shows no evidence of mass, bleed, hydrocephalus.    Given her low sodium 129 and her symptoms I gave her normal saline x1 L for correction.  She gradually improved in the emergency department and I feel given that she is following up with Dr. Cynthia Capps tomorrow morning that she can be discharged with instructions to salt her food.  She may have early SIADH and may need an endocrine work-up as an outpatient.  She is feeling much better and discharged in stable condition    FINAL IMPRESSION  1. Hyponatremia    2. Paresthesia             Electronically signed by: Abilio Amaro, 10/2/2019 7:43 PM

## 2019-10-03 NOTE — ED TRIAGE NOTES
Patient c/o of sudden dry mouth and shakiness 2 hours ago. Patient was at UC 3 days ago and diagnosed with viral URI. Patient denies SOB, CP, HA, dysuria, abd pain, n/v/d, or any other symptoms. No previous Hx of DM.

## 2019-10-09 ENCOUNTER — TELEPHONE (OUTPATIENT)
Dept: MEDICAL GROUP | Facility: MEDICAL CENTER | Age: 74
End: 2019-10-09

## 2019-10-09 ENCOUNTER — HOSPITAL ENCOUNTER (EMERGENCY)
Facility: MEDICAL CENTER | Age: 74
End: 2019-10-09
Attending: EMERGENCY MEDICINE
Payer: MEDICARE

## 2019-10-09 ENCOUNTER — APPOINTMENT (OUTPATIENT)
Dept: RADIOLOGY | Facility: MEDICAL CENTER | Age: 74
End: 2019-10-09
Attending: EMERGENCY MEDICINE
Payer: MEDICARE

## 2019-10-09 VITALS
WEIGHT: 137.35 LBS | HEIGHT: 65 IN | SYSTOLIC BLOOD PRESSURE: 156 MMHG | TEMPERATURE: 98.5 F | HEART RATE: 60 BPM | OXYGEN SATURATION: 98 % | RESPIRATION RATE: 12 BRPM | DIASTOLIC BLOOD PRESSURE: 79 MMHG | BODY MASS INDEX: 22.88 KG/M2

## 2019-10-09 DIAGNOSIS — R53.83 OTHER FATIGUE: ICD-10-CM

## 2019-10-09 DIAGNOSIS — E87.1 HYPONATREMIA: ICD-10-CM

## 2019-10-09 DIAGNOSIS — R68.2 DRY MOUTH: ICD-10-CM

## 2019-10-09 DIAGNOSIS — R53.1 WEAKNESS: ICD-10-CM

## 2019-10-09 LAB
ALBUMIN SERPL BCP-MCNC: 4.6 G/DL (ref 3.2–4.9)
ALBUMIN/GLOB SERPL: 1.8 G/DL
ALP SERPL-CCNC: 52 U/L (ref 30–99)
ALT SERPL-CCNC: 29 U/L (ref 2–50)
ANION GAP SERPL CALC-SCNC: 14 MMOL/L (ref 0–11.9)
AST SERPL-CCNC: 23 U/L (ref 12–45)
BASOPHILS # BLD AUTO: 0.6 % (ref 0–1.8)
BASOPHILS # BLD: 0.03 K/UL (ref 0–0.12)
BILIRUB SERPL-MCNC: 0.6 MG/DL (ref 0.1–1.5)
BUN SERPL-MCNC: 12 MG/DL (ref 8–22)
CALCIUM SERPL-MCNC: 10.3 MG/DL (ref 8.4–10.2)
CHLORIDE SERPL-SCNC: 97 MMOL/L (ref 96–112)
CO2 SERPL-SCNC: 23 MMOL/L (ref 20–33)
CREAT SERPL-MCNC: 0.67 MG/DL (ref 0.5–1.4)
EKG IMPRESSION: NORMAL
EOSINOPHIL # BLD AUTO: 0.01 K/UL (ref 0–0.51)
EOSINOPHIL NFR BLD: 0.2 % (ref 0–6.9)
ERYTHROCYTE [DISTWIDTH] IN BLOOD BY AUTOMATED COUNT: 42.7 FL (ref 35.9–50)
GLOBULIN SER CALC-MCNC: 2.6 G/DL (ref 1.9–3.5)
GLUCOSE SERPL-MCNC: 104 MG/DL (ref 65–99)
HCT VFR BLD AUTO: 42.2 % (ref 37–47)
HGB BLD-MCNC: 14.5 G/DL (ref 12–16)
IMM GRANULOCYTES # BLD AUTO: 0.01 K/UL (ref 0–0.11)
IMM GRANULOCYTES NFR BLD AUTO: 0.2 % (ref 0–0.9)
LYMPHOCYTES # BLD AUTO: 0.65 K/UL (ref 1–4.8)
LYMPHOCYTES NFR BLD: 13.9 % (ref 22–41)
MCH RBC QN AUTO: 30.7 PG (ref 27–33)
MCHC RBC AUTO-ENTMCNC: 34.4 G/DL (ref 33.6–35)
MCV RBC AUTO: 89.4 FL (ref 81.4–97.8)
MONOCYTES # BLD AUTO: 0.26 K/UL (ref 0–0.85)
MONOCYTES NFR BLD AUTO: 5.5 % (ref 0–13.4)
NEUTROPHILS # BLD AUTO: 3.73 K/UL (ref 2–7.15)
NEUTROPHILS NFR BLD: 79.6 % (ref 44–72)
NRBC # BLD AUTO: 0 K/UL
NRBC BLD-RTO: 0 /100 WBC
PLATELET # BLD AUTO: 222 K/UL (ref 164–446)
PMV BLD AUTO: 9.8 FL (ref 9–12.9)
POTASSIUM SERPL-SCNC: 4.1 MMOL/L (ref 3.6–5.5)
PROT SERPL-MCNC: 7.2 G/DL (ref 6–8.2)
RBC # BLD AUTO: 4.72 M/UL (ref 4.2–5.4)
SODIUM SERPL-SCNC: 134 MMOL/L (ref 135–145)
TROPONIN T SERPL-MCNC: <6 NG/L (ref 6–19)
TSH SERPL DL<=0.005 MIU/L-ACNC: 3.24 UIU/ML (ref 0.38–5.33)
WBC # BLD AUTO: 4.7 K/UL (ref 4.8–10.8)

## 2019-10-09 PROCEDURE — 84484 ASSAY OF TROPONIN QUANT: CPT

## 2019-10-09 PROCEDURE — 71045 X-RAY EXAM CHEST 1 VIEW: CPT

## 2019-10-09 PROCEDURE — 80053 COMPREHEN METABOLIC PANEL: CPT

## 2019-10-09 PROCEDURE — 36415 COLL VENOUS BLD VENIPUNCTURE: CPT

## 2019-10-09 PROCEDURE — 99284 EMERGENCY DEPT VISIT MOD MDM: CPT

## 2019-10-09 PROCEDURE — 85025 COMPLETE CBC W/AUTO DIFF WBC: CPT

## 2019-10-09 PROCEDURE — 700105 HCHG RX REV CODE 258: Performed by: EMERGENCY MEDICINE

## 2019-10-09 PROCEDURE — 93005 ELECTROCARDIOGRAM TRACING: CPT | Performed by: EMERGENCY MEDICINE

## 2019-10-09 PROCEDURE — 84443 ASSAY THYROID STIM HORMONE: CPT

## 2019-10-09 RX ORDER — SODIUM CHLORIDE 9 MG/ML
1000 INJECTION, SOLUTION INTRAVENOUS ONCE
Status: COMPLETED | OUTPATIENT
Start: 2019-10-09 | End: 2019-10-09

## 2019-10-09 RX ORDER — ACETAMINOPHEN 325 MG/1
650 TABLET ORAL EVERY 4 HOURS PRN
Status: SHIPPED | COMMUNITY
End: 2019-11-15

## 2019-10-09 RX ADMIN — SODIUM CHLORIDE 1000 ML: 9 INJECTION, SOLUTION INTRAVENOUS at 15:24

## 2019-10-09 SDOH — HEALTH STABILITY: MENTAL HEALTH: HOW OFTEN DO YOU HAVE A DRINK CONTAINING ALCOHOL?: MONTHLY OR LESS

## 2019-10-09 NOTE — ED NOTES
"1512:  PIV placed in RAC, blood drawn and sent to lab.  1524:  IVF infusing as ordered.  Radiology at bedside.  Pt thanked RN for placing PIV in \"one shot.\"    "

## 2019-10-09 NOTE — ED PROVIDER NOTES
ED Provider Note  CHIEF COMPLAINT  Chief Complaint   Patient presents with   • Weakness     weakness Here one week ago for the same   Dr stated her sodium is low       HPI  Cristobal Paul is a 74 y.o. female who presents with weakness.  Patient was here last week for the same and found to be hyponatremic.  She states over the course of the week she initially felt quite well.  Infection felt so well that she went on a hike.  However after the hike she started feeling weak and fatigued again.  Patient states that she followed up with her primary care doctor after being here in the emergency department.  However he continued to think that she had a viral illness that was causing her weakness and therefore did not want to run any further tests until she recovered from that.  However patient states that she does not think she has a viral illness.  She has not had any runny nose or cough chills or fevers.  She denies any vomiting or diarrhea.  Patient states earlier today she tried Pedialyte to try to help with possible dehydration but not make her feel better.  She complains of a persistent cottonmouth.  She denies any focal weakness or numbness.  She denies any headache.  She denies any leg swelling.  No history of cardiac problems or diabetes.  Patient denies any dysuria or frequency.      REVIEW OF SYSTEMS  See HPI for further details. All other systems are negative.     PAST MEDICAL HISTORY  Past Medical History:   Diagnosis Date   • Dyslipidemia 6/17/2009   • S/P parathyroidectomy 6/17/2009   • Allergic rhinitis 6/17/2009   • Preventative health care 6/17/2009       FAMILY HISTORY  [unfilled]    SOCIAL HISTORY  Social History     Socioeconomic History   • Marital status:      Spouse name: Not on file   • Number of children: Not on file   • Years of education: Not on file   • Highest education level: Not on file   Occupational History   • Not on file   Social Needs   • Financial resource strain: Not  "on file   • Food insecurity:     Worry: Not on file     Inability: Not on file   • Transportation needs:     Medical: Not on file     Non-medical: Not on file   Tobacco Use   • Smoking status: Never Smoker   • Smokeless tobacco: Never Used   Substance and Sexual Activity   • Alcohol use: Yes     Alcohol/week: 0.0 oz     Frequency: Monthly or less     Comment: occ wine    • Drug use: Never   • Sexual activity: Yes     Partners: Male   Lifestyle   • Physical activity:     Days per week: Not on file     Minutes per session: Not on file   • Stress: Not on file   Relationships   • Social connections:     Talks on phone: Not on file     Gets together: Not on file     Attends Yazidism service: Not on file     Active member of club or organization: Not on file     Attends meetings of clubs or organizations: Not on file     Relationship status: Not on file   • Intimate partner violence:     Fear of current or ex partner: Not on file     Emotionally abused: Not on file     Physically abused: Not on file     Forced sexual activity: Not on file   Other Topics Concern   • Not on file   Social History Narrative   • Not on file       SURGICAL HISTORY  No past surgical history on file.    CURRENT MEDICATIONS  Home Medications    **Home medications have not yet been reviewed for this encounter**         ALLERGIES  Allergies   Allergen Reactions   • Asa [Aspirin]      Epistaxis     • Nsaids      Epistaxis         PHYSICAL EXAM  VITAL SIGNS: /85   Pulse 83   Temp 36.9 °C (98.5 °F) (Temporal)   Resp 16   Ht 1.651 m (5' 5\")   Wt 62.3 kg (137 lb 5.6 oz)   SpO2 98%   BMI 22.86 kg/m²  Room air O2: 98    Constitutional: Well developed, No acute distress, Non-toxic appearance.   HENT: Normocephalic, Atraumatic, Bilateral external ears normal, Oropharynx dry, No oral exudates, Nose normal.   Eyes: PERRL, EOMI, Conjunctiva normal   Neck: Normal range of motion, No tenderness, Supple  Cardiovascular: Normal heart rate, Normal " rhythm   Thorax & Lungs: Normal breath sounds, No respiratory distress  Abdomen: Benign abdominal exam, no guarding no rebound, no pulsatile mass, no tenderness, no distention  Skin: Warm, Dry, No erythema, varying pigment changes in skin diffusely  Back: No tenderness, No CVA tenderness.   Extremities: Intact distal pulses, No edema, No tenderness   Neurologic: Alert & oriented x 3, Normal motor function, Normal sensory function, No focal deficits noted.   Psychiatric: appropriate      Labs  Results for orders placed or performed during the hospital encounter of 10/09/19   CBC WITH DIFFERENTIAL   Result Value Ref Range    WBC 4.7 (L) 4.8 - 10.8 K/uL    RBC 4.72 4.20 - 5.40 M/uL    Hemoglobin 14.5 12.0 - 16.0 g/dL    Hematocrit 42.2 37.0 - 47.0 %    MCV 89.4 81.4 - 97.8 fL    MCH 30.7 27.0 - 33.0 pg    MCHC 34.4 33.6 - 35.0 g/dL    RDW 42.7 35.9 - 50.0 fL    Platelet Count 222 164 - 446 K/uL    MPV 9.8 9.0 - 12.9 fL    Neutrophils-Polys 79.60 (H) 44.00 - 72.00 %    Lymphocytes 13.90 (L) 22.00 - 41.00 %    Monocytes 5.50 0.00 - 13.40 %    Eosinophils 0.20 0.00 - 6.90 %    Basophils 0.60 0.00 - 1.80 %    Immature Granulocytes 0.20 0.00 - 0.90 %    Nucleated RBC 0.00 /100 WBC    Neutrophils (Absolute) 3.73 2.00 - 7.15 K/uL    Lymphs (Absolute) 0.65 (L) 1.00 - 4.80 K/uL    Monos (Absolute) 0.26 0.00 - 0.85 K/uL    Eos (Absolute) 0.01 0.00 - 0.51 K/uL    Baso (Absolute) 0.03 0.00 - 0.12 K/uL    Immature Granulocytes (abs) 0.01 0.00 - 0.11 K/uL    NRBC (Absolute) 0.00 K/uL       RADIOLOGY/PROCEDURES  DX-CHEST-PORTABLE (1 VIEW)   Final Result         1. No acute cardiopulmonary abnormalities are identified.          COURSE & MEDICAL DECISION MAKING  Pertinent Labs & Imaging studies reviewed. (See chart for details)  Patient presents with complaints of weakness.  Patient has no focal weakness on exam.  I do not suspect stroke as etiology of her weakness.  She complains of a dry mouth and feeling fatigued.  She has not had  any dizziness or syncope.  She denies any chest pain in fact went on a hike yesterday and did not have any chest pain or shortness of breath.  However after her walk she did feel fatigued.  She has not had any fevers and therefore I doubt an infectious etiology for her symptoms.  Plan is to give fluids, check laboratory studies and monitor closely.  I doubt a cardiac etiology of her symptoms we will obtain a troponin.  EKG shows nonspecific changes but no evidence of acute MI.    Laboratory studies have returned and show a sodium of 134.  Her troponin was normal.  Chest x-ray was unremarkable.  TSH was also normal.  There is no elevated white count and I doubt an infectious etiology for her symptoms.  Patient really felt no change with fluids.  Patient has a appointment scheduled with the primary care doctor tomorrow.  At this point it is unclear to me what is causing her weakness and dry mouth.  This could possibly be an autoimmune disease causing the dry mouth.  Patient was given strict return precautions.  Patient understands the plan.      FINAL IMPRESSION     1. Weakness    2. Dry mouth             Electronically signed by: Karen Hutchinson, 10/9/2019 3:12 PM

## 2019-10-09 NOTE — ED NOTES
"Pt reports has been weak and tired s/p previous ED visit.  Pt reports having \"good and bad\" days since previous visit.  Pt denies all c/o pain or N/V.    "

## 2019-10-10 ENCOUNTER — HOSPITAL ENCOUNTER (OUTPATIENT)
Dept: LAB | Facility: MEDICAL CENTER | Age: 74
End: 2019-10-10
Attending: INTERNAL MEDICINE
Payer: MEDICARE

## 2019-10-10 ENCOUNTER — OFFICE VISIT (OUTPATIENT)
Dept: MEDICAL GROUP | Facility: MEDICAL CENTER | Age: 74
End: 2019-10-10
Payer: MEDICARE

## 2019-10-10 VITALS
BODY MASS INDEX: 22.66 KG/M2 | SYSTOLIC BLOOD PRESSURE: 122 MMHG | TEMPERATURE: 98.9 F | HEIGHT: 65 IN | DIASTOLIC BLOOD PRESSURE: 70 MMHG | OXYGEN SATURATION: 98 % | HEART RATE: 71 BPM | WEIGHT: 136 LBS

## 2019-10-10 DIAGNOSIS — R01.1 MURMUR: ICD-10-CM

## 2019-10-10 DIAGNOSIS — E87.1 HYPONATREMIA: ICD-10-CM

## 2019-10-10 DIAGNOSIS — R68.2 DRY MOUTH: ICD-10-CM

## 2019-10-10 DIAGNOSIS — E61.1 IRON DEFICIENCY: ICD-10-CM

## 2019-10-10 DIAGNOSIS — E60 ZINC DEFICIENCY: ICD-10-CM

## 2019-10-10 DIAGNOSIS — E53.8 B12 DEFICIENCY: ICD-10-CM

## 2019-10-10 DIAGNOSIS — F41.9 ANXIETY: ICD-10-CM

## 2019-10-10 DIAGNOSIS — E55.9 VITAMIN D DEFICIENCY: ICD-10-CM

## 2019-10-10 DIAGNOSIS — R53.83 OTHER FATIGUE: ICD-10-CM

## 2019-10-10 DIAGNOSIS — E89.2 S/P PARATHYROIDECTOMY (HCC): Chronic | ICD-10-CM

## 2019-10-10 LAB
25(OH)D3 SERPL-MCNC: 60 NG/ML (ref 30–100)
BASOPHILS # BLD AUTO: 0.7 % (ref 0–1.8)
BASOPHILS # BLD: 0.04 K/UL (ref 0–0.12)
CORTIS SERPL-MCNC: 9.8 UG/DL (ref 0–23)
EOSINOPHIL # BLD AUTO: 0.01 K/UL (ref 0–0.51)
EOSINOPHIL NFR BLD: 0.2 % (ref 0–6.9)
ERYTHROCYTE [DISTWIDTH] IN BLOOD BY AUTOMATED COUNT: 44.9 FL (ref 35.9–50)
HCT VFR BLD AUTO: 44.7 % (ref 37–47)
HGB BLD-MCNC: 14.5 G/DL (ref 12–16)
HIV 1+2 AB+HIV1 P24 AG SERPL QL IA: NON REACTIVE
IMM GRANULOCYTES # BLD AUTO: 0.01 K/UL (ref 0–0.11)
IMM GRANULOCYTES NFR BLD AUTO: 0.2 % (ref 0–0.9)
IRON SERPL-MCNC: 67 UG/DL (ref 40–170)
LYMPHOCYTES # BLD AUTO: 0.73 K/UL (ref 1–4.8)
LYMPHOCYTES NFR BLD: 12.8 % (ref 22–41)
MCH RBC QN AUTO: 30.2 PG (ref 27–33)
MCHC RBC AUTO-ENTMCNC: 32.4 G/DL (ref 33.6–35)
MCV RBC AUTO: 93.1 FL (ref 81.4–97.8)
MONOCYTES # BLD AUTO: 0.32 K/UL (ref 0–0.85)
MONOCYTES NFR BLD AUTO: 5.6 % (ref 0–13.4)
NEUTROPHILS # BLD AUTO: 4.61 K/UL (ref 2–7.15)
NEUTROPHILS NFR BLD: 80.5 % (ref 44–72)
NRBC # BLD AUTO: 0 K/UL
NRBC BLD-RTO: 0 /100 WBC
OSMOLALITY UR: 128 MOSM/KG H2O (ref 300–900)
PLATELET # BLD AUTO: 235 K/UL (ref 164–446)
PMV BLD AUTO: 10.3 FL (ref 9–12.9)
RBC # BLD AUTO: 4.8 M/UL (ref 4.2–5.4)
SODIUM UR-SCNC: 14 MMOL/L
VIT B12 SERPL-MCNC: 1103 PG/ML (ref 211–911)
WBC # BLD AUTO: 5.7 K/UL (ref 4.8–10.8)

## 2019-10-10 PROCEDURE — 82306 VITAMIN D 25 HYDROXY: CPT

## 2019-10-10 PROCEDURE — 84630 ASSAY OF ZINC: CPT

## 2019-10-10 PROCEDURE — 82533 TOTAL CORTISOL: CPT

## 2019-10-10 PROCEDURE — 85025 COMPLETE CBC W/AUTO DIFF WBC: CPT

## 2019-10-10 PROCEDURE — 36415 COLL VENOUS BLD VENIPUNCTURE: CPT

## 2019-10-10 PROCEDURE — G0475 HIV COMBINATION ASSAY: HCPCS | Mod: GA

## 2019-10-10 PROCEDURE — 82607 VITAMIN B-12: CPT

## 2019-10-10 PROCEDURE — 84300 ASSAY OF URINE SODIUM: CPT

## 2019-10-10 PROCEDURE — 82024 ASSAY OF ACTH: CPT

## 2019-10-10 PROCEDURE — 99214 OFFICE O/P EST MOD 30 MIN: CPT | Performed by: INTERNAL MEDICINE

## 2019-10-10 PROCEDURE — 86235 NUCLEAR ANTIGEN ANTIBODY: CPT | Mod: 91

## 2019-10-10 PROCEDURE — 86038 ANTINUCLEAR ANTIBODIES: CPT

## 2019-10-10 PROCEDURE — 83935 ASSAY OF URINE OSMOLALITY: CPT

## 2019-10-10 PROCEDURE — 83540 ASSAY OF IRON: CPT

## 2019-10-10 PROCEDURE — 83930 ASSAY OF BLOOD OSMOLALITY: CPT

## 2019-10-10 RX ORDER — LORAZEPAM 0.5 MG/1
0.5 TABLET ORAL
Qty: 30 TAB | Refills: 0 | Status: SHIPPED | OUTPATIENT
Start: 2019-10-10 | End: 2019-11-09

## 2019-10-10 NOTE — TELEPHONE ENCOUNTER
----- Message from Jenna Gilbert sent at 10/9/2019 11:43 AM PDT -----  Regarding: FW: Referral Request  Contact: [phone]      ----- Message -----  From: Major Anand  Sent: 10/9/2019  10:46 AM PDT  To: Adeola Masterson Fm Mas  Subject: FW: Referral Request                                 ----- Message -----  From: Cristobal Paul  Sent: 10/9/2019  10:14 AM PDT  To: Leisa All Mas  Subject: Referral Request                                 Cordelia Paul would like to request a referral.  Reason: Hyponatremia  Requested provider: Dr. Flowers  Comment:  Since my office visit with you 10/03, I continue to feel weak and tired.  I had been at ER the night before where Dr. Amaro diagnosed my condition hyponatremia - low sodium - and proceeded to treat me with the saline intraveneously   On 09/30 Urgent Care diagnosed me with Viral Infection but I have never had a fever and it is now 9 days since then with no improvement felt.  Yesterday I took a small walk and then did some clipping in the yard.  I was exhausted after that.  I am drinking pedialyte on a daily basis along with salted water just to feel some stability and it does not always work    Please refer me to an endocrinologist or somewhere for more detailed blood work to find out what is going on in my system.  I am a very energetic person

## 2019-10-10 NOTE — PROGRESS NOTES
Subjective:      Cordelia Paul is a 74 y.o. female who presents with fatigue Follow-Up            HPI   Patient here with   Has had more dry mouth recently and went to ER yesterday, and on October 2, complaints of starting onset body shaking in legs, triggered by dry eyes with no dry mouth, no history of Sjogren's, no dry mouth.  She has had 2 of these episodes of dry mouth and shaking that has been severe, but does notice her mouth is dry most of the time.  More fatigue at times with activity, has been drinking more liquid and pedialyte because sodium has been lower previously, sleeping at night 9 to 10 hours, no interrupted sleep, no difficulty falling asleep, no history of sleep apnea, has been taking naps during the day, no joint pains or muscle aches, no fevers, no sore throat or cough, no diarrhea, abdominal pain, no blood in stools, no burning with urine or blood in urine, no nausea or emesis, no dysphagia, no odynophagia, no facial rash, no skin changes.  No history of autoimmune disorder.  No history of lupus or rheumatoid arthritis, no history of psoriasis.  No anxiety, no depression, no mood changes.  No regular alcohol intake changes recently.  No easy bruising or bleeding.  No weight gain or weight loss.  No fevers, chills, night sweats.  On neurotin and zocor but no new medications otherwise.  On gabapentin for restless leg, states like shaking symptoms do not occur at night  10/9/19 cxr negative   Seen emergency room yesterday, fatigue, sodium 134, calcium 10.3, CMP normal renal function, kidney function, WBC 4.7, 79% neutrophils, 13% lymphs  Seen October 3 for Medicare wellness visit at that time had sodium 129, had been complaining of fatigue at that point, no weight loss  History of parathyroidectomy most recent calcium 10.3  2/12/13 Na 136  2/13/14 Na 131  3/15/16 Na 137  5/25/16 Na 134  9/20/17 Na 131  10/2/19 Na 129  10/9/19 Na 134    Current Outpatient Medications   Medication Sig  Dispense Refill   • Ascorbic Acid (VITAMIN C PO) Take 1 Tab by mouth every day.     • Cyanocobalamin (B-12 PO) Take 1 Tab by mouth every day.     • TURMERIC PO Take 1 Cap by mouth every day.     • Cholecalciferol (VITAMIN D PO) Take 1 Cap by mouth every day.     • CALCIUM PO Take 1 Tab by mouth 2 Times a Day.     • acetaminophen (TYLENOL) 325 MG Tab Take 650 mg by mouth every four hours as needed for Moderate Pain.     • Omega-3 Fatty Acids (FISH OIL PO) Take 2 Caps by mouth every evening.     • simvastatin (ZOCOR) 20 MG Tab Take 1 Tab by mouth every evening. 90 Tab 0   • gabapentin (NEURONTIN) 100 MG Cap Take 1 Cap by mouth every evening. 90 Cap 0   • Multiple Vitamins-Minerals (MULTI COMPLETE PO) Take 1 Tab by mouth every day.     • vitamin D (CHOLECALCIFEROL) 1000 UNIT Tab Take 1,000 Units by mouth every day.       No current facility-administered medications for this visit.      Patient Active Problem List   Diagnosis   • Dyslipidemia   • S/P parathyroidectomy (HCC)   • Allergic rhinitis   • Preventative health care   • S/P laparoscopic cholecystectomy   • Adenomatous polyp of colon   • Vasovagal syncope   • History wrist fracture   • Low back pain   • Restless leg syndrome            Health Maintenance Summary                HEPATITIS C SCREENING Overdue 1945     IMM INFLUENZA Overdue 9/1/2019      Done 12/7/2018 Imm Admin: Influenza Vaccine Quad Inj (Preserved)     Patient has more history with this topic...    MAMMOGRAM Overdue 9/26/2019      Done 9/26/2018 MA-SCREENING MAMMO BILAT W/TOMOSYNTHESIS W/CAD     Patient has more history with this topic...    IMM ZOSTER VACCINES Postponed 2/27/2020 Originally 8/11/1995. System: vaccine not available, other system reasons    Annual Wellness Visit Next Due 10/3/2020      Done 10/3/2019 Visit Dx: Medicare annual wellness visit, subsequent     Patient has more history with this topic...    COLONOSCOPY Next Due 3/16/2021      Done 3/16/2016 AMB REFERRAL TO GI FOR  "COLONOSCOPY    BONE DENSITY Next Due 9/26/2023      Done 9/26/2018 DS-BONE DENSITY STUDY (DEXA)     Patient has more history with this topic...    IMM DTaP/Tdap/Td Vaccine Next Due 9/7/2027      Done 9/7/2017 Imm Admin: Tdap Vaccine     Patient has more history with this topic...          Patient Care Team:  Eron Flowers M.D. as PCP - General  Ramin Meadows M.D. as Consulting Physician (Ophthalmology)      ROS       Objective:     /70 (BP Location: Right arm, Patient Position: Sitting, BP Cuff Size: Adult)   Pulse 71   Temp 37.2 °C (98.9 °F) (Temporal)   Ht 1.651 m (5' 5\")   Wt 61.7 kg (136 lb)   SpO2 98%   BMI 22.63 kg/m²      Physical Exam   Constitutional: No distress.   HENT:   Head: Normocephalic and atraumatic.   Mouth/Throat: Oropharynx is clear and moist. No oropharyngeal exudate.   Eyes: Conjunctivae are normal. Right eye exhibits no discharge. Left eye exhibits no discharge. No scleral icterus.   Neck: Neck supple. No JVD present. No thyromegaly present.   Cardiovascular: Normal rate and regular rhythm.   Pulmonary/Chest: Effort normal and breath sounds normal. No respiratory distress.   Abdominal: Soft. She exhibits no distension and no mass. There is no tenderness. There is no guarding.   Musculoskeletal: She exhibits no edema.   Skin: She is not diaphoretic.   Psychiatric: She has a normal mood and affect. Her behavior is normal.   Nursing note and vitals reviewed.  +1/6 cardiac murmur on auscultation  No hepatosplenomegaly   oropharynx moist mucosa  Skin no petechia purpura  Normal affect, insight, judgment     No tremors on exam     Assessment/Plan:     Assessment  #!  Recent episodes of dry mouth with leg shaking, does have restless leg syndrome on gabapentin but symptoms not occurring at night, no history of Sjogren's, oral mucosa moist on exam consider Sjogren's, autoimmune disease, augmentation with gabapentin, vitamin deficiencies including iron, zinc, B12    #2 fatigue no " obvious infectious etiology, recent CBC normal white blood cell count, no anemia, TSH has been negative, CMP no significant abnormal eyes, chest x-ray negative, no evidence of sleep apnea, anxiety, depression, no history of malignancy, no known history of autoimmune disease    #3 restless leg on gabapentin    #4 dyslipidemia on Zocor no muscle pain or muscle aches    #5 mild hyponatremia most recent sodium 134 no diuretics    #6 cardiac murmur on auscultation    #7 low vitamin D    #8 anxiety with symptomology     Plan  #! Echo     #2 labs ordered to evaluate dry mouth, fatigue    #3 Ativan as needed for panic attacks    #4 continue gabapentin for now consider trial of medication if symptoms persist    #5 Ativan can be habit-forming, only take as needed, do not drive with medication, no alcohol with medication, can cause sedation, drowsiness, memory loss, confusion, depression    #6 no alcohol with medication    #7 follow-up 1 month    #8 endocrinology referral placed yesterday

## 2019-10-10 NOTE — ED NOTES
Reviewed discharge instructions w/ pt, verbalized understanding to information provided.  Pt reports has a follow up appointment tomorrow w/ PCP, denied questions/concerns, reports feeling better after receiving IVF.  Pt ambulated from ED w/ spouse.

## 2019-10-11 ENCOUNTER — TELEPHONE (OUTPATIENT)
Dept: MEDICAL GROUP | Facility: MEDICAL CENTER | Age: 74
End: 2019-10-11

## 2019-10-11 PROBLEM — E87.1 HYPONATREMIA: Status: ACTIVE | Noted: 2019-10-11

## 2019-10-11 LAB — OSMOLALITY SERPL: 277 MOSM/KG H2O (ref 278–298)

## 2019-10-11 NOTE — TELEPHONE ENCOUNTER
We made a referral for endocrinology to see her regarding hyponatremia and fatigue, she has an appointment in January, please see if they can see her sooner for evaluation.

## 2019-10-12 LAB — ACTH PLAS-MCNC: 14.8 PG/ML (ref 7.2–63.3)

## 2019-10-13 LAB — ZINC SERPL-MCNC: 77.2 UG/DL (ref 60–120)

## 2019-10-16 NOTE — TELEPHONE ENCOUNTER
Spoke to Chary at [facility], Patient has earliest appointment and was placed on the cancellation list

## 2019-10-17 LAB
ENA SS-B IGG SER IA-ACNC: 0 AU/ML (ref 0–40)
NUCLEAR IGG SER QL IA: NORMAL
SSA52 R0ENA AB IGG Q0420: 1 AU/ML (ref 0–40)
SSA60 R0ENA AB IGG Q0419: 1 AU/ML (ref 0–40)

## 2019-10-29 ENCOUNTER — OFFICE VISIT (OUTPATIENT)
Dept: MEDICAL GROUP | Facility: MEDICAL CENTER | Age: 74
End: 2019-10-29
Payer: MEDICARE

## 2019-10-29 VITALS
HEIGHT: 65 IN | OXYGEN SATURATION: 97 % | DIASTOLIC BLOOD PRESSURE: 80 MMHG | SYSTOLIC BLOOD PRESSURE: 128 MMHG | WEIGHT: 132.6 LBS | TEMPERATURE: 97 F | HEART RATE: 77 BPM | BODY MASS INDEX: 22.09 KG/M2

## 2019-10-29 DIAGNOSIS — R53.83 OTHER FATIGUE: ICD-10-CM

## 2019-10-29 DIAGNOSIS — E83.52 SERUM CALCIUM ELEVATED: ICD-10-CM

## 2019-10-29 DIAGNOSIS — Z11.59 NEED FOR HEPATITIS C SCREENING TEST: ICD-10-CM

## 2019-10-29 DIAGNOSIS — E78.5 DYSLIPIDEMIA: Chronic | ICD-10-CM

## 2019-10-29 DIAGNOSIS — R07.9 CHEST PAIN, UNSPECIFIED TYPE: ICD-10-CM

## 2019-10-29 PROCEDURE — 99214 OFFICE O/P EST MOD 30 MIN: CPT | Performed by: INTERNAL MEDICINE

## 2019-10-29 NOTE — PROGRESS NOTES
Subjective:      Cordelia Paul is a 74 y.o. female who presents with here for follow up fatigue           HPI   Patient follow-up fatigue  Has still had more fatigue present over the last 4 weeks, will have good days and then have more symptoms of fatigue and then had sunday burning sensation in chest in the middle of the night, no radiation, would come and go, no sob, no cough, no fever or chills associated with that, no nausea or emesis, may also occur during the day. No history of cardiac disease.  No history of reflux.  Symptoms have only happened 2 nights, but not associate with activity, no regular NSAIDs  Has had dry mouth symptoms improved off neurontin x 2 weeks   Still with episodic fatigue  Not taking lorazepam, dry mouth improved off gabapentin, still remains on simvastatin for dyslipidemia.  Still drinks water during the day, previous work-up for dry mouth and fatigue normal WBC but mild neutropenia, no bands, CMP, TSH normal, sodium has been borderline low 134, serum osmoles 277, urine osmoles 128, urine sodium 14, cortisol 9.8, ACTH 15, B12, iron, zinc, HIV, REGGIE, SSA and SSB negative, mild calcium increase 10.3  No joint aches or pains, some days able to perform activity and on monday able to work for two hours and then later in the afternoon felt tired and weak, last week tuesday golfed and felt fine but more weak and tired three days and no energy. Denies depression, no anxiety, has not used lorazepam.  No fevers, chills, night sweats.  Sleeps on average 7 hours per night, no daytime naps   Good social support with       Current Outpatient Medications   Medication Sig Dispense Refill   • LORazepam (ATIVAN) 0.5 MG Tab Take 1 Tab by mouth 1 time daily as needed (leg shaking) for up to 30 days. 30 Tab 0   • Ascorbic Acid (VITAMIN C PO) Take 1 Tab by mouth every day.     • Cyanocobalamin (B-12 PO) Take 1 Tab by mouth every day.     • TURMERIC PO Take 1 Cap by mouth every day.     •  Cholecalciferol (VITAMIN D PO) Take 1 Cap by mouth every day.     • CALCIUM PO Take 1 Tab by mouth 2 Times a Day.     • acetaminophen (TYLENOL) 325 MG Tab Take 650 mg by mouth every four hours as needed for Moderate Pain.     • Omega-3 Fatty Acids (FISH OIL PO) Take 2 Caps by mouth every evening.     • simvastatin (ZOCOR) 20 MG Tab Take 1 Tab by mouth every evening. 90 Tab 0   • gabapentin (NEURONTIN) 100 MG Cap Take 1 Cap by mouth every evening. 90 Cap 0   • Multiple Vitamins-Minerals (MULTI COMPLETE PO) Take 1 Tab by mouth every day.     • vitamin D (CHOLECALCIFEROL) 1000 UNIT Tab Take 1,000 Units by mouth every day.       No current facility-administered medications for this visit.      Patient Active Problem List   Diagnosis   • Dyslipidemia   • S/P parathyroidectomy   • Allergic rhinitis   • Preventative health care   • S/P laparoscopic cholecystectomy   • Adenomatous polyp of colon   • Vasovagal syncope   • History wrist fracture   • Low back pain   • Restless leg syndrome   • Hyponatremia            Health Maintenance Summary                HEPATITIS C SCREENING Overdue 1945     IMM INFLUENZA Overdue 9/1/2019      Done 12/7/2018 Imm Admin: Influenza Vaccine Quad Inj (Preserved)     Patient has more history with this topic...    MAMMOGRAM Overdue 9/26/2019      Done 9/26/2018 MA-SCREENING MAMMO BILAT W/TOMOSYNTHESIS W/CAD     Patient has more history with this topic...    IMM ZOSTER VACCINES Postponed 2/27/2020 Originally 8/11/1995. System: vaccine not available, other system reasons    Annual Wellness Visit Next Due 10/3/2020      Done 10/3/2019 Visit Dx: Medicare annual wellness visit, subsequent     Patient has more history with this topic...    COLONOSCOPY Next Due 3/16/2021      Done 3/16/2016 AMB REFERRAL TO GI FOR COLONOSCOPY    BONE DENSITY Next Due 9/26/2023      Done 9/26/2018 DS-BONE DENSITY STUDY (DEXA)     Patient has more history with this topic...    IMM DTaP/Tdap/Td Vaccine Next Due  "9/7/2027      Done 9/7/2017 Imm Admin: Tdap Vaccine     Patient has more history with this topic...          Patient Care Team:  Eron Flowers M.D. as PCP - General  Ramin Meadows M.D. as Consulting Physician (Ophthalmology)         ROS       Objective:     /80 (BP Location: Right arm, Patient Position: Sitting, BP Cuff Size: Adult)   Pulse 77   Temp 36.1 °C (97 °F) (Temporal)   Ht 1.651 m (5' 5\")   Wt 60.1 kg (132 lb 9.6 oz)   SpO2 97%   BMI 22.07 kg/m²      Physical Exam   Constitutional: No distress.   HENT:   Head: Normocephalic and atraumatic.   Mouth/Throat: Oropharynx is clear and moist.   Eyes: Conjunctivae are normal. Right eye exhibits no discharge. Left eye exhibits no discharge.   Cardiovascular: Normal rate and regular rhythm.   Pulmonary/Chest: Effort normal and breath sounds normal.   Abdominal: She exhibits no distension and no mass. There is no tenderness. There is no rebound and no guarding.   Lymphadenopathy:     She has no cervical adenopathy.   Neurological: She is alert.   Skin: Skin is warm. No rash noted. She is not diaphoretic.   Psychiatric: She has a normal mood and affect. Her behavior is normal.   Nursing note and vitals reviewed.              Assessment/Plan:     Assessment  #!  Chest burning sensation the past few nights consider reflux, consider atypical presentation of cardiac etiology, lungs are clear, normal saturation, no evidence of pneumonia, do not suspect pulmonary embolism, possible atypical GI process such as reflux or esophageal spasm    #2 fatigue present for last 4 weeks, work-up to date negative    #3 dry mouth improved off gabapentin    #4 dyslipidemia on statin Zocor 20 mg    #5 minimal hypercalcemia 10.3 on last lab      Plan  #!  EKG today    #2 EKG reviewed, sinus, no acute changes, given atypical presentation, fatigue, need to evaluate for underlying coronary disease, will schedule Persantine thallium since unable to exercise on treadmill    #3 " continue off gabapentin    #4 discontinue simvastatin to see if this makes a difference with her symptoms    #5 recheck labs, recheck CBC, CMP, also check ESR, CRP, CPK, also hepatitis C screening, repeat lipid panel fasting, she will have these done in 2 weeks off statin    #6 can use over-the-counter Pepcid or Mylanta for burning sensation, avoid eating too close to bedtime    #7 given recent presentation of symptoms, fatigue, atypical chest pain, recent visits to urgent care and emergency room, I recommend she not travel, patient states she has upcoming trip to [place].  Given the symptomology, the need for cardiac stress testing, the change of medications, episodic fatigue, it is my medical recommendation that she not travel to [place] or anywhere at this time until further studies are conducted and the evaluation is complete.    #8 thus I recommend that she cancel her trip to [place]    #9 recommend that she call the airline or travel company and ask if they need forms completed, I can fill those out, because as of today 10/29/19 it is my recommendation that she not travel    #10 I can also write a letter if necessary, she will let me know    #11 follow-up after above-mentioned tests have been completed, notify me for worsening symptoms, continue off gabapentin and discontinue simvastatin as discussed

## 2019-10-30 ENCOUNTER — TELEPHONE (OUTPATIENT)
Dept: MEDICAL GROUP | Facility: MEDICAL CENTER | Age: 74
End: 2019-10-30

## 2019-10-30 ENCOUNTER — HOSPITAL ENCOUNTER (OUTPATIENT)
Dept: CARDIOLOGY | Facility: MEDICAL CENTER | Age: 74
End: 2019-10-30
Attending: INTERNAL MEDICINE
Payer: MEDICARE

## 2019-10-30 DIAGNOSIS — R01.1 MURMUR: ICD-10-CM

## 2019-10-30 LAB
LV EJECT FRACT  99904: 60
LV EJECT FRACT MOD 2C 99903: 60.18
LV EJECT FRACT MOD 4C 99902: 72.92
LV EJECT FRACT MOD BP 99901: 67.22

## 2019-10-30 PROCEDURE — 93306 TTE W/DOPPLER COMPLETE: CPT

## 2019-10-30 PROCEDURE — 93306 TTE W/DOPPLER COMPLETE: CPT | Mod: 26 | Performed by: INTERNAL MEDICINE

## 2019-11-11 ENCOUNTER — HOSPITAL ENCOUNTER (OUTPATIENT)
Dept: RADIOLOGY | Facility: MEDICAL CENTER | Age: 74
End: 2019-11-11
Attending: INTERNAL MEDICINE
Payer: MEDICARE

## 2019-11-11 DIAGNOSIS — R07.9 CHEST PAIN, UNSPECIFIED TYPE: ICD-10-CM

## 2019-11-11 PROCEDURE — 700111 HCHG RX REV CODE 636 W/ 250 OVERRIDE (IP)

## 2019-11-11 PROCEDURE — 93018 CV STRESS TEST I&R ONLY: CPT | Performed by: INTERNAL MEDICINE

## 2019-11-11 PROCEDURE — A9502 TC99M TETROFOSMIN: HCPCS

## 2019-11-11 PROCEDURE — 78452 HT MUSCLE IMAGE SPECT MULT: CPT | Mod: 26 | Performed by: INTERNAL MEDICINE

## 2019-11-11 RX ORDER — REGADENOSON 0.08 MG/ML
INJECTION, SOLUTION INTRAVENOUS
Status: COMPLETED
Start: 2019-11-11 | End: 2019-11-11

## 2019-11-11 RX ADMIN — REGADENOSON 0.4 MG: 0.08 INJECTION, SOLUTION INTRAVENOUS at 09:34

## 2019-11-11 NOTE — PROGRESS NOTES
"          Nursing care plan includes knowledge deficit, potential for discomfort, potential for compromised cardiac output.  POC includes teaching, comfort measures and reassurance, and access to code cart, cardiology stand by and availability of rapid response team.  Pt verbalizes good understanding of benefits and risks of pharmacological cardiac stress test.  Informed consent obtained.  Lexiscan given, pt developed the following signs/symptoms:fatigue.   Fatigue, flushed feels \"weird\" VS stable, symptoms resolved.  To waiting room, fluids and/or snack given, awaiting second scan.  Nursing goals met.   "

## 2019-11-12 ENCOUNTER — TELEPHONE (OUTPATIENT)
Dept: MEDICAL GROUP | Facility: MEDICAL CENTER | Age: 74
End: 2019-11-12

## 2019-11-15 ENCOUNTER — ANESTHESIA EVENT (OUTPATIENT)
Dept: SURGERY | Facility: MEDICAL CENTER | Age: 74
DRG: 486 | End: 2019-11-15
Payer: MEDICARE

## 2019-11-15 ENCOUNTER — ANESTHESIA (OUTPATIENT)
Dept: SURGERY | Facility: MEDICAL CENTER | Age: 74
DRG: 486 | End: 2019-11-15
Payer: MEDICARE

## 2019-11-15 ENCOUNTER — HOSPITAL ENCOUNTER (INPATIENT)
Facility: MEDICAL CENTER | Age: 74
LOS: 7 days | DRG: 486 | End: 2019-11-22
Attending: EMERGENCY MEDICINE | Admitting: INTERNAL MEDICINE
Payer: MEDICARE

## 2019-11-15 ENCOUNTER — APPOINTMENT (OUTPATIENT)
Dept: RADIOLOGY | Facility: MEDICAL CENTER | Age: 74
DRG: 486 | End: 2019-11-15
Attending: EMERGENCY MEDICINE
Payer: MEDICARE

## 2019-11-15 DIAGNOSIS — M00.061 STAPHYLOCOCCAL ARTHRITIS OF RIGHT KNEE (HCC): ICD-10-CM

## 2019-11-15 DIAGNOSIS — D72.829 LEUKOCYTOSIS, UNSPECIFIED TYPE: ICD-10-CM

## 2019-11-15 DIAGNOSIS — E87.1 HYPONATREMIA: ICD-10-CM

## 2019-11-15 DIAGNOSIS — M25.561 ACUTE PAIN OF RIGHT KNEE: ICD-10-CM

## 2019-11-15 PROBLEM — M00.9 PYOGENIC ARTHRITIS OF RIGHT KNEE JOINT (HCC): Status: ACTIVE | Noted: 2019-11-15

## 2019-11-15 LAB
ALBUMIN SERPL BCP-MCNC: 4.1 G/DL (ref 3.2–4.9)
ALBUMIN/GLOB SERPL: 1.3 G/DL
ALP SERPL-CCNC: 57 U/L (ref 30–99)
ALT SERPL-CCNC: 22 U/L (ref 2–50)
ANION GAP SERPL CALC-SCNC: 8 MMOL/L (ref 0–11.9)
AST SERPL-CCNC: 17 U/L (ref 12–45)
BASOPHILS # BLD AUTO: 0.1 % (ref 0–1.8)
BASOPHILS # BLD: 0.02 K/UL (ref 0–0.12)
BILIRUB SERPL-MCNC: 0.6 MG/DL (ref 0.1–1.5)
BUN SERPL-MCNC: 12 MG/DL (ref 8–22)
CALCIUM SERPL-MCNC: 10.2 MG/DL (ref 8.5–10.5)
CHLORIDE SERPL-SCNC: 94 MMOL/L (ref 96–112)
CO2 SERPL-SCNC: 26 MMOL/L (ref 20–33)
CREAT SERPL-MCNC: 0.6 MG/DL (ref 0.5–1.4)
CRP SERPL HS-MCNC: 11.27 MG/DL (ref 0–0.75)
EOSINOPHIL # BLD AUTO: 0 K/UL (ref 0–0.51)
EOSINOPHIL NFR BLD: 0 % (ref 0–6.9)
ERYTHROCYTE [DISTWIDTH] IN BLOOD BY AUTOMATED COUNT: 42.9 FL (ref 35.9–50)
ERYTHROCYTE [SEDIMENTATION RATE] IN BLOOD BY WESTERGREN METHOD: 44 MM/HOUR (ref 0–30)
GLOBULIN SER CALC-MCNC: 3.1 G/DL (ref 1.9–3.5)
GLUCOSE SERPL-MCNC: 119 MG/DL (ref 65–99)
HCT VFR BLD AUTO: 39.6 % (ref 37–47)
HGB BLD-MCNC: 13.7 G/DL (ref 12–16)
IMM GRANULOCYTES # BLD AUTO: 0.09 K/UL (ref 0–0.11)
IMM GRANULOCYTES NFR BLD AUTO: 0.5 % (ref 0–0.9)
LACTATE BLD-SCNC: 1.9 MMOL/L (ref 0.5–2)
LYMPHOCYTES # BLD AUTO: 0.4 K/UL (ref 1–4.8)
LYMPHOCYTES NFR BLD: 2.2 % (ref 22–41)
MCH RBC QN AUTO: 31.5 PG (ref 27–33)
MCHC RBC AUTO-ENTMCNC: 34.6 G/DL (ref 33.6–35)
MCV RBC AUTO: 91 FL (ref 81.4–97.8)
MONOCYTES # BLD AUTO: 0.45 K/UL (ref 0–0.85)
MONOCYTES NFR BLD AUTO: 2.5 % (ref 0–13.4)
NEUTROPHILS # BLD AUTO: 16.86 K/UL (ref 2–7.15)
NEUTROPHILS NFR BLD: 94.7 % (ref 44–72)
NRBC # BLD AUTO: 0 K/UL
NRBC BLD-RTO: 0 /100 WBC
PLATELET # BLD AUTO: 274 K/UL (ref 164–446)
PMV BLD AUTO: 10.1 FL (ref 9–12.9)
POTASSIUM SERPL-SCNC: 4.1 MMOL/L (ref 3.6–5.5)
PROCALCITONIN SERPL-MCNC: <0.05 NG/ML
PROT SERPL-MCNC: 7.2 G/DL (ref 6–8.2)
RBC # BLD AUTO: 4.35 M/UL (ref 4.2–5.4)
SODIUM SERPL-SCNC: 128 MMOL/L (ref 135–145)
WBC # BLD AUTO: 17.8 K/UL (ref 4.8–10.8)

## 2019-11-15 PROCEDURE — 87186 SC STD MICRODIL/AGAR DIL: CPT | Mod: 91

## 2019-11-15 PROCEDURE — 700105 HCHG RX REV CODE 258: Performed by: ORTHOPAEDIC SURGERY

## 2019-11-15 PROCEDURE — 700102 HCHG RX REV CODE 250 W/ 637 OVERRIDE(OP): Performed by: INTERNAL MEDICINE

## 2019-11-15 PROCEDURE — 700111 HCHG RX REV CODE 636 W/ 250 OVERRIDE (IP): Performed by: ANESTHESIOLOGY

## 2019-11-15 PROCEDURE — 160035 HCHG PACU - 1ST 60 MINS PHASE I: Performed by: ORTHOPAEDIC SURGERY

## 2019-11-15 PROCEDURE — 700105 HCHG RX REV CODE 258: Performed by: ANESTHESIOLOGY

## 2019-11-15 PROCEDURE — 700111 HCHG RX REV CODE 636 W/ 250 OVERRIDE (IP): Performed by: INTERNAL MEDICINE

## 2019-11-15 PROCEDURE — 85025 COMPLETE CBC W/AUTO DIFF WBC: CPT

## 2019-11-15 PROCEDURE — 87102 FUNGUS ISOLATION CULTURE: CPT

## 2019-11-15 PROCEDURE — 87040 BLOOD CULTURE FOR BACTERIA: CPT | Mod: 91

## 2019-11-15 PROCEDURE — 160027 HCHG SURGERY MINUTES - 1ST 30 MINS LEVEL 2: Performed by: ORTHOPAEDIC SURGERY

## 2019-11-15 PROCEDURE — A9270 NON-COVERED ITEM OR SERVICE: HCPCS | Performed by: INTERNAL MEDICINE

## 2019-11-15 PROCEDURE — 501838 HCHG SUTURE GENERAL: Performed by: ORTHOPAEDIC SURGERY

## 2019-11-15 PROCEDURE — 99291 CRITICAL CARE FIRST HOUR: CPT

## 2019-11-15 PROCEDURE — 87116 MYCOBACTERIA CULTURE: CPT

## 2019-11-15 PROCEDURE — A9270 NON-COVERED ITEM OR SERVICE: HCPCS | Performed by: ORTHOPAEDIC SURGERY

## 2019-11-15 PROCEDURE — 86140 C-REACTIVE PROTEIN: CPT

## 2019-11-15 PROCEDURE — 160038 HCHG SURGERY MINUTES - EA ADDL 1 MIN LEVEL 2: Performed by: ORTHOPAEDIC SURGERY

## 2019-11-15 PROCEDURE — 160048 HCHG OR STATISTICAL LEVEL 1-5: Performed by: ORTHOPAEDIC SURGERY

## 2019-11-15 PROCEDURE — 87015 SPECIMEN INFECT AGNT CONCNTJ: CPT

## 2019-11-15 PROCEDURE — 500891 HCHG PACK, ORTHO MAJOR: Performed by: ORTHOPAEDIC SURGERY

## 2019-11-15 PROCEDURE — 501445 HCHG STAPLER, SKIN DISP: Performed by: ORTHOPAEDIC SURGERY

## 2019-11-15 PROCEDURE — 99223 1ST HOSP IP/OBS HIGH 75: CPT | Mod: AI | Performed by: INTERNAL MEDICINE

## 2019-11-15 PROCEDURE — 80053 COMPREHEN METABOLIC PANEL: CPT

## 2019-11-15 PROCEDURE — 160009 HCHG ANES TIME/MIN: Performed by: ORTHOPAEDIC SURGERY

## 2019-11-15 PROCEDURE — 83605 ASSAY OF LACTIC ACID: CPT

## 2019-11-15 PROCEDURE — 87205 SMEAR GRAM STAIN: CPT

## 2019-11-15 PROCEDURE — 36415 COLL VENOUS BLD VENIPUNCTURE: CPT

## 2019-11-15 PROCEDURE — 0S9C0ZZ DRAINAGE OF RIGHT KNEE JOINT, OPEN APPROACH: ICD-10-PCS | Performed by: ORTHOPAEDIC SURGERY

## 2019-11-15 PROCEDURE — 87070 CULTURE OTHR SPECIMN AEROBIC: CPT

## 2019-11-15 PROCEDURE — 160036 HCHG PACU - EA ADDL 30 MINS PHASE I: Performed by: ORTHOPAEDIC SURGERY

## 2019-11-15 PROCEDURE — 770006 HCHG ROOM/CARE - MED/SURG/GYN SEMI*

## 2019-11-15 PROCEDURE — 84145 PROCALCITONIN (PCT): CPT

## 2019-11-15 PROCEDURE — 93971 EXTREMITY STUDY: CPT | Mod: RT

## 2019-11-15 PROCEDURE — 700112 HCHG RX REV CODE 229: Performed by: ORTHOPAEDIC SURGERY

## 2019-11-15 PROCEDURE — 87077 CULTURE AEROBIC IDENTIFY: CPT | Mod: 91

## 2019-11-15 PROCEDURE — 700105 HCHG RX REV CODE 258: Performed by: INTERNAL MEDICINE

## 2019-11-15 PROCEDURE — 87206 SMEAR FLUORESCENT/ACID STAI: CPT

## 2019-11-15 PROCEDURE — 700111 HCHG RX REV CODE 636 W/ 250 OVERRIDE (IP): Performed by: ORTHOPAEDIC SURGERY

## 2019-11-15 PROCEDURE — 87075 CULTR BACTERIA EXCEPT BLOOD: CPT

## 2019-11-15 PROCEDURE — 93971 EXTREMITY STUDY: CPT | Mod: 26,RT | Performed by: INTERNAL MEDICINE

## 2019-11-15 PROCEDURE — 87150 DNA/RNA AMPLIFIED PROBE: CPT

## 2019-11-15 PROCEDURE — 160002 HCHG RECOVERY MINUTES (STAT): Performed by: ORTHOPAEDIC SURGERY

## 2019-11-15 PROCEDURE — 85652 RBC SED RATE AUTOMATED: CPT

## 2019-11-15 RX ORDER — ONDANSETRON 4 MG/1
4 TABLET, ORALLY DISINTEGRATING ORAL EVERY 4 HOURS PRN
Status: DISCONTINUED | OUTPATIENT
Start: 2019-11-15 | End: 2019-11-22 | Stop reason: HOSPADM

## 2019-11-15 RX ORDER — OXYCODONE HCL 5 MG/5 ML
5 SOLUTION, ORAL ORAL
Status: DISCONTINUED | OUTPATIENT
Start: 2019-11-15 | End: 2019-11-15 | Stop reason: HOSPADM

## 2019-11-15 RX ORDER — DIPHENHYDRAMINE HYDROCHLORIDE 50 MG/ML
12.5 INJECTION INTRAMUSCULAR; INTRAVENOUS
Status: DISCONTINUED | OUTPATIENT
Start: 2019-11-15 | End: 2019-11-15 | Stop reason: HOSPADM

## 2019-11-15 RX ORDER — DOCUSATE SODIUM 100 MG/1
100 CAPSULE, LIQUID FILLED ORAL 2 TIMES DAILY
Status: DISCONTINUED | OUTPATIENT
Start: 2019-11-15 | End: 2019-11-22 | Stop reason: HOSPADM

## 2019-11-15 RX ORDER — MORPHINE SULFATE 4 MG/ML
2 INJECTION, SOLUTION INTRAMUSCULAR; INTRAVENOUS EVERY 4 HOURS PRN
Status: DISCONTINUED | OUTPATIENT
Start: 2019-11-15 | End: 2019-11-22 | Stop reason: HOSPADM

## 2019-11-15 RX ORDER — SODIUM CHLORIDE, SODIUM LACTATE, POTASSIUM CHLORIDE, CALCIUM CHLORIDE 600; 310; 30; 20 MG/100ML; MG/100ML; MG/100ML; MG/100ML
INJECTION, SOLUTION INTRAVENOUS CONTINUOUS
Status: DISCONTINUED | OUTPATIENT
Start: 2019-11-15 | End: 2019-11-15 | Stop reason: HOSPADM

## 2019-11-15 RX ORDER — SODIUM CHLORIDE, SODIUM LACTATE, POTASSIUM CHLORIDE, CALCIUM CHLORIDE 600; 310; 30; 20 MG/100ML; MG/100ML; MG/100ML; MG/100ML
INJECTION, SOLUTION INTRAVENOUS
Status: DISCONTINUED | OUTPATIENT
Start: 2019-11-15 | End: 2019-11-15 | Stop reason: SURG

## 2019-11-15 RX ORDER — SCOLOPAMINE TRANSDERMAL SYSTEM 1 MG/1
1 PATCH, EXTENDED RELEASE TRANSDERMAL
Status: DISCONTINUED | OUTPATIENT
Start: 2019-11-15 | End: 2019-11-22 | Stop reason: HOSPADM

## 2019-11-15 RX ORDER — ONDANSETRON 2 MG/ML
4 INJECTION INTRAMUSCULAR; INTRAVENOUS EVERY 4 HOURS PRN
Status: DISCONTINUED | OUTPATIENT
Start: 2019-11-15 | End: 2019-11-15

## 2019-11-15 RX ORDER — HYDROMORPHONE HYDROCHLORIDE 1 MG/ML
0.5 INJECTION, SOLUTION INTRAMUSCULAR; INTRAVENOUS; SUBCUTANEOUS
Status: DISCONTINUED | OUTPATIENT
Start: 2019-11-15 | End: 2019-11-22 | Stop reason: HOSPADM

## 2019-11-15 RX ORDER — DEXAMETHASONE SODIUM PHOSPHATE 4 MG/ML
4 INJECTION, SOLUTION INTRA-ARTICULAR; INTRALESIONAL; INTRAMUSCULAR; INTRAVENOUS; SOFT TISSUE
Status: DISCONTINUED | OUTPATIENT
Start: 2019-11-15 | End: 2019-11-22 | Stop reason: HOSPADM

## 2019-11-15 RX ORDER — AMOXICILLIN 250 MG
1 CAPSULE ORAL NIGHTLY
Status: DISCONTINUED | OUTPATIENT
Start: 2019-11-15 | End: 2019-11-15

## 2019-11-15 RX ORDER — KETOROLAC TROMETHAMINE 30 MG/ML
15 INJECTION, SOLUTION INTRAMUSCULAR; INTRAVENOUS EVERY 6 HOURS
Status: COMPLETED | OUTPATIENT
Start: 2019-11-15 | End: 2019-11-18

## 2019-11-15 RX ORDER — BISACODYL 10 MG
10 SUPPOSITORY, RECTAL RECTAL
Status: DISCONTINUED | OUTPATIENT
Start: 2019-11-15 | End: 2019-11-22 | Stop reason: HOSPADM

## 2019-11-15 RX ORDER — ENEMA 19; 7 G/133ML; G/133ML
1 ENEMA RECTAL
Status: DISCONTINUED | OUTPATIENT
Start: 2019-11-15 | End: 2019-11-22 | Stop reason: HOSPADM

## 2019-11-15 RX ORDER — SIMVASTATIN 40 MG
20 TABLET ORAL EVERY EVENING
Status: DISCONTINUED | OUTPATIENT
Start: 2019-11-15 | End: 2019-11-15

## 2019-11-15 RX ORDER — ONDANSETRON 2 MG/ML
4 INJECTION INTRAMUSCULAR; INTRAVENOUS EVERY 4 HOURS PRN
Status: DISCONTINUED | OUTPATIENT
Start: 2019-11-15 | End: 2019-11-22 | Stop reason: HOSPADM

## 2019-11-15 RX ORDER — AMOXICILLIN 250 MG
1 CAPSULE ORAL
Status: DISCONTINUED | OUTPATIENT
Start: 2019-11-15 | End: 2019-11-15

## 2019-11-15 RX ORDER — BISACODYL 10 MG
10 SUPPOSITORY, RECTAL RECTAL
Status: DISCONTINUED | OUTPATIENT
Start: 2019-11-15 | End: 2019-11-15

## 2019-11-15 RX ORDER — OXYCODONE HYDROCHLORIDE 5 MG/1
5 TABLET ORAL
Status: DISCONTINUED | OUTPATIENT
Start: 2019-11-15 | End: 2019-11-22 | Stop reason: HOSPADM

## 2019-11-15 RX ORDER — SODIUM CHLORIDE 9 MG/ML
INJECTION, SOLUTION INTRAVENOUS CONTINUOUS
Status: DISCONTINUED | OUTPATIENT
Start: 2019-11-15 | End: 2019-11-19 | Stop reason: ALTCHOICE

## 2019-11-15 RX ORDER — OXYCODONE HYDROCHLORIDE 10 MG/1
10 TABLET ORAL
Status: DISCONTINUED | OUTPATIENT
Start: 2019-11-15 | End: 2019-11-22 | Stop reason: HOSPADM

## 2019-11-15 RX ORDER — ACETAMINOPHEN 325 MG/1
650 TABLET ORAL EVERY 6 HOURS PRN
Status: DISCONTINUED | OUTPATIENT
Start: 2019-11-15 | End: 2019-11-22 | Stop reason: HOSPADM

## 2019-11-15 RX ORDER — KETOROLAC TROMETHAMINE 30 MG/ML
INJECTION, SOLUTION INTRAMUSCULAR; INTRAVENOUS PRN
Status: DISCONTINUED | OUTPATIENT
Start: 2019-11-15 | End: 2019-11-15 | Stop reason: SURG

## 2019-11-15 RX ORDER — DIPHENHYDRAMINE HYDROCHLORIDE 50 MG/ML
25 INJECTION INTRAMUSCULAR; INTRAVENOUS EVERY 6 HOURS PRN
Status: DISCONTINUED | OUTPATIENT
Start: 2019-11-15 | End: 2019-11-22 | Stop reason: HOSPADM

## 2019-11-15 RX ORDER — POLYETHYLENE GLYCOL 3350 17 G/17G
1 POWDER, FOR SOLUTION ORAL 2 TIMES DAILY PRN
Status: DISCONTINUED | OUTPATIENT
Start: 2019-11-15 | End: 2019-11-22 | Stop reason: HOSPADM

## 2019-11-15 RX ORDER — HEPARIN SODIUM 5000 [USP'U]/ML
5000 INJECTION, SOLUTION INTRAVENOUS; SUBCUTANEOUS EVERY 8 HOURS
Status: DISCONTINUED | OUTPATIENT
Start: 2019-11-15 | End: 2019-11-22 | Stop reason: HOSPADM

## 2019-11-15 RX ORDER — M-VIT,TX,IRON,MINS/CALC/FOLIC 27MG-0.4MG
1 TABLET ORAL DAILY
COMMUNITY
End: 2019-12-13

## 2019-11-15 RX ORDER — OXYCODONE HCL 5 MG/5 ML
10 SOLUTION, ORAL ORAL
Status: DISCONTINUED | OUTPATIENT
Start: 2019-11-15 | End: 2019-11-15 | Stop reason: HOSPADM

## 2019-11-15 RX ORDER — ONDANSETRON 2 MG/ML
4 INJECTION INTRAMUSCULAR; INTRAVENOUS
Status: DISCONTINUED | OUTPATIENT
Start: 2019-11-15 | End: 2019-11-15 | Stop reason: HOSPADM

## 2019-11-15 RX ORDER — HALOPERIDOL 5 MG/ML
1 INJECTION INTRAMUSCULAR EVERY 6 HOURS PRN
Status: DISCONTINUED | OUTPATIENT
Start: 2019-11-15 | End: 2019-11-22 | Stop reason: HOSPADM

## 2019-11-15 RX ORDER — CEFAZOLIN SODIUM 1 G/3ML
INJECTION, POWDER, FOR SOLUTION INTRAMUSCULAR; INTRAVENOUS PRN
Status: DISCONTINUED | OUTPATIENT
Start: 2019-11-15 | End: 2019-11-15 | Stop reason: SURG

## 2019-11-15 RX ORDER — AMOXICILLIN 250 MG
2 CAPSULE ORAL 2 TIMES DAILY
Status: DISCONTINUED | OUTPATIENT
Start: 2019-11-15 | End: 2019-11-22 | Stop reason: HOSPADM

## 2019-11-15 RX ORDER — ACETAMINOPHEN 325 MG/1
650 TABLET ORAL EVERY 6 HOURS
Status: DISPENSED | OUTPATIENT
Start: 2019-11-16 | End: 2019-11-20

## 2019-11-15 RX ORDER — POLYETHYLENE GLYCOL 3350 17 G/17G
1 POWDER, FOR SOLUTION ORAL
Status: DISCONTINUED | OUTPATIENT
Start: 2019-11-15 | End: 2019-11-15

## 2019-11-15 RX ORDER — DEXAMETHASONE SODIUM PHOSPHATE 4 MG/ML
INJECTION, SOLUTION INTRA-ARTICULAR; INTRALESIONAL; INTRAMUSCULAR; INTRAVENOUS; SOFT TISSUE PRN
Status: DISCONTINUED | OUTPATIENT
Start: 2019-11-15 | End: 2019-11-15 | Stop reason: SURG

## 2019-11-15 RX ORDER — HALOPERIDOL 5 MG/ML
1 INJECTION INTRAMUSCULAR
Status: DISCONTINUED | OUTPATIENT
Start: 2019-11-15 | End: 2019-11-15 | Stop reason: HOSPADM

## 2019-11-15 RX ADMIN — PROPOFOL 100 MG: 10 INJECTION, EMULSION INTRAVENOUS at 19:15

## 2019-11-15 RX ADMIN — CEFAZOLIN 2 G: 330 INJECTION, POWDER, FOR SOLUTION INTRAMUSCULAR; INTRAVENOUS at 19:26

## 2019-11-15 RX ADMIN — DEXAMETHASONE SODIUM PHOSPHATE 4 MG: 4 INJECTION, SOLUTION INTRA-ARTICULAR; INTRALESIONAL; INTRAMUSCULAR; INTRAVENOUS; SOFT TISSUE at 19:26

## 2019-11-15 RX ADMIN — FENTANYL CITRATE 150 MCG: 50 INJECTION, SOLUTION INTRAMUSCULAR; INTRAVENOUS at 19:15

## 2019-11-15 RX ADMIN — FENTANYL CITRATE 50 MCG: 50 INJECTION, SOLUTION INTRAMUSCULAR; INTRAVENOUS at 20:23

## 2019-11-15 RX ADMIN — FENTANYL CITRATE 100 MCG: 50 INJECTION, SOLUTION INTRAMUSCULAR; INTRAVENOUS at 19:28

## 2019-11-15 RX ADMIN — ONDANSETRON 4 MG: 2 INJECTION INTRAMUSCULAR; INTRAVENOUS at 19:15

## 2019-11-15 RX ADMIN — SODIUM CHLORIDE, POTASSIUM CHLORIDE, SODIUM LACTATE AND CALCIUM CHLORIDE: 600; 310; 30; 20 INJECTION, SOLUTION INTRAVENOUS at 19:00

## 2019-11-15 RX ADMIN — MIDAZOLAM HYDROCHLORIDE 2 MG: 1 INJECTION, SOLUTION INTRAMUSCULAR; INTRAVENOUS at 19:17

## 2019-11-15 RX ADMIN — KETOROLAC TROMETHAMINE 30 MG: 30 INJECTION, SOLUTION INTRAMUSCULAR at 19:17

## 2019-11-15 RX ADMIN — HEPARIN SODIUM 5000 UNITS: 5000 INJECTION, SOLUTION INTRAVENOUS; SUBCUTANEOUS at 23:13

## 2019-11-15 RX ADMIN — AMPICILLIN SODIUM AND SULBACTAM SODIUM 3 G: 2; 1 INJECTION, POWDER, FOR SOLUTION INTRAMUSCULAR; INTRAVENOUS at 23:13

## 2019-11-15 RX ADMIN — ACETAMINOPHEN 650 MG: 325 TABLET, FILM COATED ORAL at 23:12

## 2019-11-15 RX ADMIN — SODIUM CHLORIDE: 9 INJECTION, SOLUTION INTRAVENOUS at 23:17

## 2019-11-15 RX ADMIN — KETOROLAC TROMETHAMINE 15 MG: 30 INJECTION, SOLUTION INTRAMUSCULAR at 23:13

## 2019-11-15 RX ADMIN — DOCUSATE SODIUM 100 MG: 100 CAPSULE, LIQUID FILLED ORAL at 23:12

## 2019-11-15 ASSESSMENT — LIFESTYLE VARIABLES
EVER HAD A DRINK FIRST THING IN THE MORNING TO STEADY YOUR NERVES TO GET RID OF A HANGOVER: NO
TOTAL SCORE: 0
ON A TYPICAL DAY WHEN YOU DRINK ALCOHOL HOW MANY DRINKS DO YOU HAVE: 0
EVER FELT BAD OR GUILTY ABOUT YOUR DRINKING: NO
CONSUMPTION TOTAL: NEGATIVE
DOES PATIENT WANT TO STOP DRINKING: NO
HAVE YOU EVER FELT YOU SHOULD CUT DOWN ON YOUR DRINKING: NO
TOTAL SCORE: 0
TOTAL SCORE: 0
ALCOHOL_USE: NO
AVERAGE NUMBER OF DAYS PER WEEK YOU HAVE A DRINK CONTAINING ALCOHOL: 0
HOW MANY TIMES IN THE PAST YEAR HAVE YOU HAD 5 OR MORE DRINKS IN A DAY: 0
HAVE PEOPLE ANNOYED YOU BY CRITICIZING YOUR DRINKING: NO

## 2019-11-15 ASSESSMENT — ENCOUNTER SYMPTOMS
COUGH: 0
SPUTUM PRODUCTION: 0
CHILLS: 0
ORTHOPNEA: 0
HEADACHES: 0
HEARTBURN: 0
FEVER: 0
DEPRESSION: 0
EYE DISCHARGE: 0
NAUSEA: 0
SEIZURES: 0
VOMITING: 0
NERVOUS/ANXIOUS: 0
EYE PAIN: 0
DIZZINESS: 0
SHORTNESS OF BREATH: 0
NECK PAIN: 0
INSOMNIA: 0
ABDOMINAL PAIN: 0
STRIDOR: 0
BLURRED VISION: 0
DIARRHEA: 0
WEIGHT LOSS: 0
BACK PAIN: 0
EYE REDNESS: 0
MYALGIAS: 0
PALPITATIONS: 0
FOCAL WEAKNESS: 0

## 2019-11-15 ASSESSMENT — PATIENT HEALTH QUESTIONNAIRE - PHQ9
SUM OF ALL RESPONSES TO PHQ9 QUESTIONS 1 AND 2: 0
2. FEELING DOWN, DEPRESSED, IRRITABLE, OR HOPELESS: NOT AT ALL
1. LITTLE INTEREST OR PLEASURE IN DOING THINGS: NOT AT ALL

## 2019-11-15 ASSESSMENT — PAIN SCALES - GENERAL: PAIN_LEVEL: 0

## 2019-11-15 NOTE — ED NOTES
Assist RN: pt ambulated with mild limp but steady gait to ED room Y55. Pt changed into gown. R knee swelling noted- per pt much improved. No redness, warmth, discoloration to site. Pt denies fevers/chills, no pain at this time. Denies any other s/s. IV/labs done. Pt provided warm blanket, bed in low position, call light within reach.  at bedside. Chart readied, waiting for ERP to see.

## 2019-11-15 NOTE — CONSULTS
11/15/2019    Bernadette Paul is a 74 y.o. female who presents with a septic right knee and is here for operative management. Patient denies numbness, parasthesias, loss of concousness or other trauma    Past Medical History:   Diagnosis Date   • Allergic rhinitis 6/17/2009   • Dyslipidemia 6/17/2009   • Preventative health care 6/17/2009   • S/P parathyroidectomy 6/17/2009       History reviewed. No pertinent surgical history.    Medications  No current facility-administered medications on file prior to encounter.      Current Outpatient Medications on File Prior to Encounter   Medication Sig Dispense Refill   • Ascorbic Acid (VITAMIN C PO) Take 1 Tab by mouth every day.     • Cyanocobalamin (B-12 PO) Take 1 Tab by mouth every day.     • TURMERIC PO Take 1 Cap by mouth every day.     • Cholecalciferol (VITAMIN D PO) Take 1 Cap by mouth every day.     • CALCIUM PO Take 1 Tab by mouth 2 Times a Day.     • acetaminophen (TYLENOL) 325 MG Tab Take 650 mg by mouth every four hours as needed for Moderate Pain.     • Omega-3 Fatty Acids (FISH OIL PO) Take 2 Caps by mouth every evening.     • simvastatin (ZOCOR) 20 MG Tab Take 1 Tab by mouth every evening. 90 Tab 0   • Multiple Vitamins-Minerals (MULTI COMPLETE PO) Take 1 Tab by mouth every day.     • vitamin D (CHOLECALCIFEROL) 1000 UNIT Tab Take 1,000 Units by mouth every day.         Allergies  Asa [aspirin] and Nsaids    ROS  Right septic knee. All other systems were reviewed and found to be negative    Family History   Problem Relation Age of Onset   • Heart Disease Mother        Social History     Socioeconomic History   • Marital status:      Spouse name: Not on file   • Number of children: Not on file   • Years of education: Not on file   • Highest education level: Not on file   Occupational History   • Not on file   Social Needs   • Financial resource strain: Not on file   • Food insecurity:     Worry: Not on file     Inability: Not on file   •  "Transportation needs:     Medical: Not on file     Non-medical: Not on file   Tobacco Use   • Smoking status: Never Smoker   • Smokeless tobacco: Never Used   Substance and Sexual Activity   • Alcohol use: Yes     Alcohol/week: 0.0 oz     Frequency: Monthly or less     Comment: occ wine    • Drug use: Never   • Sexual activity: Yes     Partners: Male   Lifestyle   • Physical activity:     Days per week: Not on file     Minutes per session: Not on file   • Stress: Not on file   Relationships   • Social connections:     Talks on phone: Not on file     Gets together: Not on file     Attends Sikhism service: Not on file     Active member of club or organization: Not on file     Attends meetings of clubs or organizations: Not on file     Relationship status: Not on file   • Intimate partner violence:     Fear of current or ex partner: Not on file     Emotionally abused: Not on file     Physically abused: Not on file     Forced sexual activity: Not on file   Other Topics Concern   • Not on file   Social History Narrative   • Not on file       Physical Exam  Vitals  /77   Pulse 79   Temp 36.7 °C (98 °F) (Temporal)   Resp 16   Ht 1.651 m (5' 5\")   Wt 62 kg (136 lb 11 oz)   SpO2 96%   General: Well Developed, Well Nourished, no acute distress  HEENT: Normocephalic, atraumatic  Eyes: Anicteric, PERRLA, EOMI  Neck: Supple, nontender, no masses  Lungs: CTA, no wheezes or crackles  Heart: RRR, no murmurs, rubs or gallops  Abdomen: Soft, NT, ND  Pelvis: Stable to AP and Lateral Compression  Skin: Intact, no open wounds  Extremities: right knee pain  Neuro: NVI  Vascular: 2+DP/PT, Capillary refill <2 seconds    Radiographs:  No orders to display       Laboratory Values  Recent Labs     11/15/19  1520   WBC 17.8*   RBC 4.35   HEMOGLOBIN 13.7   HEMATOCRIT 39.6   MCV 91.0   MCH 31.5   MCHC 34.6   RDW 42.9   PLATELETCT 274   MPV 10.1     No results for input(s): SODIUM, POTASSIUM, CHLORIDE, CO2, GLUCOSE, BUN, CPKTOTAL in " the last 72 hours.          Impression: Right septic knee    Plan:Operative intervention. Risks and benefits of surgery were discussed which include but are not limited to bleeding, infection, neurovascular damage, malunion, nonunion, instability, limb length discrepancy, DVT, PE, MI, Stroke and death. They understand these risks and wish to proceed.

## 2019-11-15 NOTE — ED TRIAGE NOTES
Chief Complaint   Patient presents with   • Knee Pain     Pt sent from [facility]. pt found to have bacteria in fluid drained from right knee. sent to [facility] for further care.      Explained to pt triage process, made pt aware to tell this RN/staff of any changes/concerns, pt verbalized understanding of process and instructions given. Pt to ER lobby.

## 2019-11-15 NOTE — ED PROVIDER NOTES
ED Provider Note    Scribed for Sesar Jim D.O. by Anabel Carrizales. 11/15/2019  3:29 PM    Primary care provider: Eron Flowers M.D.   History obtained from: Patient and   History limited by: None       CHIEF COMPLAINT  Chief Complaint   Patient presents with   • Knee Pain     Pt sent from [facility]. pt found to have bacteria in fluid drained from right knee. sent to [facility] for further care.         HPI    Bernadette Paul is a 74 y.o. female who presents to the ED for evaluation of right knee pain onset 4 days ago after completing a cardiac stress test at [facility]. The patient states that she noticed swelling to her right knee after the test, but denies any trauma to cause it. The patient then visited Urgent Care 3 days ago and had fluid drained from the knee and sent in for testing. Today the patient had received a call from her Urgent Care, informing her bacteria was discovered in the tested sample, and was referred to the ED.     The patient denies any fever, chills, chest pain, trouble breathing, nausea, or dysuria. The patient notes that she frequently has abnormal bowel movements, however states that it is baseline for her. She denies any previous knee surgeries, knee injuries, or history of blood clots. She notes that, at present, her knee is the most improved it has been since its onset.     REVIEW OF SYSTEMS  Please see HPI for pertinent positives/negatives.  All other systems reviewed and are negative.     PAST MEDICAL HISTORY  Past Medical History:   Diagnosis Date   • Dyslipidemia 6/17/2009   • S/P parathyroidectomy 6/17/2009   • Allergic rhinitis 6/17/2009   • Preventative health care 6/17/2009        SURGICAL HISTORY  History reviewed. No pertinent surgical history.     SOCIAL HISTORY  Social History     Tobacco Use   • Smoking status: Never Smoker   • Smokeless tobacco: Never Used   Substance and Sexual Activity   • Alcohol use: Yes     Alcohol/week: 0.0 oz      "Frequency: Monthly or less     Comment: occ wine    • Drug use: Never   • Sexual activity: Yes     Partners: Male        FAMILY HISTORY  Family History   Problem Relation Age of Onset   • Heart Disease Mother         CURRENT MEDICATIONS  Home Medications     Reviewed by Samuel Hopson (Pharmacy Tech) on 11/15/19 at 1620  Med List Status: Complete   Medication Last Dose Status   Ascorbic Acid (VITAMIN C PO) 11/15/2019 Active   CALCIUM PO 11/15/2019 Active   Cholecalciferol (VITAMIN D PO) 11/15/2019 Active   Omega-3 Fatty Acids (FISH OIL PO) 11/14/2019 Active   therapeutic multivitamin-minerals (THERAGRAN-M) Tab 11/15/2019 Active                 ALLERGIES  Allergies   Allergen Reactions   • Asa [Aspirin]      Epistaxis     • Nsaids      Epistaxis          PHYSICAL EXAM  VITAL SIGNS: /77   Pulse 79   Temp 36.7 °C (98 °F) (Temporal)   Resp 16   Ht 1.651 m (5' 5\")   Wt 62 kg (136 lb 11 oz)   SpO2 96%   BMI 22.75 kg/m²  @MIRELLA[[id number]::@     Pulse ox interpretation: 96% I interpret this pulse ox as normal     Constitutional: Well developed, well nourished, alert in no apparent distress, nontoxic appearance    HENT: No external signs of trauma, normocephalic, oropharynx moist and clear, nose normal    Eyes: PERRL, conjunctiva without erythema, no discharge, no icterus    Neck: Soft and supple, trachea midline, no stridor, no tenderness, no LAD, no JVD, good ROM    Cardiovascular: Regular rate and rhythm, 2/6 SM, strong distal pulses and good perfusion    Thorax & Lungs: No respiratory distress, CTAB   Abdomen: Soft, nontender, nondistended, no guarding, no rebound, normal BS    Back: No CVAT    Extremities: No cyanosis, mild diffuse swelling right lower extremity, no gross deformity, good ROM of right knee without apparent discomfort or restriction, no tenderness/warmth/crepitus/fluctuance/palpable cords, intact distal pulses with brisk cap refill    Skin: Warm, dry, no pallor/cyanosis, no rash noted  "   Lymphatic: No lymphadenopathy noted    Neuro: A/O times 3, no focal deficits noted    Psychiatric: Cooperative, normal mood and affect, normal judgement, appropriate for clinical situation        DIAGNOSTIC STUDIES / PROCEDURES        LABS  All labs reviewed by me.     Results for orders placed or performed during the hospital encounter of 11/15/19   CBC WITH DIFFERENTIAL   Result Value Ref Range    WBC 17.8 (H) 4.8 - 10.8 K/uL    RBC 4.35 4.20 - 5.40 M/uL    Hemoglobin 13.7 12.0 - 16.0 g/dL    Hematocrit 39.6 37.0 - 47.0 %    MCV 91.0 81.4 - 97.8 fL    MCH 31.5 27.0 - 33.0 pg    MCHC 34.6 33.6 - 35.0 g/dL    RDW 42.9 35.9 - 50.0 fL    Platelet Count 274 164 - 446 K/uL    MPV 10.1 9.0 - 12.9 fL    Neutrophils-Polys 94.70 (H) 44.00 - 72.00 %    Lymphocytes 2.20 (L) 22.00 - 41.00 %    Monocytes 2.50 0.00 - 13.40 %    Eosinophils 0.00 0.00 - 6.90 %    Basophils 0.10 0.00 - 1.80 %    Immature Granulocytes 0.50 0.00 - 0.90 %    Nucleated RBC 0.00 /100 WBC    Neutrophils (Absolute) 16.86 (H) 2.00 - 7.15 K/uL    Lymphs (Absolute) 0.40 (L) 1.00 - 4.80 K/uL    Monos (Absolute) 0.45 0.00 - 0.85 K/uL    Eos (Absolute) 0.00 0.00 - 0.51 K/uL    Baso (Absolute) 0.02 0.00 - 0.12 K/uL    Immature Granulocytes (abs) 0.09 0.00 - 0.11 K/uL    NRBC (Absolute) 0.00 K/uL   COMP METABOLIC PANEL   Result Value Ref Range    Sodium 128 (L) 135 - 145 mmol/L    Potassium 4.1 3.6 - 5.5 mmol/L    Chloride 94 (L) 96 - 112 mmol/L    Co2 26 20 - 33 mmol/L    Anion Gap 8.0 0.0 - 11.9    Glucose 119 (H) 65 - 99 mg/dL    Bun 12 8 - 22 mg/dL    Creatinine 0.60 0.50 - 1.40 mg/dL    Calcium 10.2 8.5 - 10.5 mg/dL    AST(SGOT) 17 12 - 45 U/L    ALT(SGPT) 22 2 - 50 U/L    Alkaline Phosphatase 57 30 - 99 U/L    Total Bilirubin 0.6 0.1 - 1.5 mg/dL    Albumin 4.1 3.2 - 4.9 g/dL    Total Protein 7.2 6.0 - 8.2 g/dL    Globulin 3.1 1.9 - 3.5 g/dL    A-G Ratio 1.3 g/dL   LACTIC ACID   Result Value Ref Range    Lactic Acid 1.9 0.5 - 2.0 mmol/L   CRP QUANTITIVE  (NON-CARDIAC)   Result Value Ref Range    Stat C-Reactive Protein 11.27 (H) 0.00 - 0.75 mg/dL   WESTERGREN SED RATE   Result Value Ref Range    Sed Rate Westergren 44 (H) 0 - 30 mm/hour   PROCALCITONIN   Result Value Ref Range    Procalcitonin <0.05 <0.25 ng/mL   ESTIMATED GFR   Result Value Ref Range    GFR If African American >60 >60 mL/min/1.73 m 2    GFR If Non African American >60 >60 mL/min/1.73 m 2        RADIOLOGY  The radiologist's interpretation of all radiological studies have been reviewed by me.     US-EXTREMITY VENOUS LOWER UNILAT RIGHT   Final Result             COURSE & MEDICAL DECISION MAKING  Nursing notes, VS, PMSFHx reviewed in chart.       Differential diagnoses considered include but are not limited to: Arthritis, bursitis, cellulitis, tendonitis, DVT/vascular occlusion, strain/sprain       3:48 PM I discussed the patient's case and the above findings with Dr. Navas (orthopedics) who advises to admit the patient to the hospitalist.     0400: D/W Dr. Gonsalez, hospitalist, who will admit patient      History and physical exam as above.  Patient had arthrocentesis of her right knee yesterday and reportedly has positive bacteria and sent to the ED in anticipation for going to the OR.  Labs showed leukocytosis along with elevated CRP and sed rate.  Findings are suspicious for right knee septic joint.  Right lower extremity ultrasound without evidence of DVT.  Dr. Navas was notified and plan to take patient to the OR today.  Discussed with Dr. Gonsalez who graciously agreed to admit patient for further care.  Patient updated on the plan and she is agreeable.      FINAL IMPRESSION  1. Acute pain of right knee Acute   2. Leukocytosis, unspecified type Active   3. Hyponatremia Active          DISPOSITION  Patient will be admitted by hospitalist for further care       IAnabel (Ari), am scribing for, and in the presence of, Sesar Jim D.O..      Electronically signed by: Anabel Carrizales  (Scribe), 11/15/2019    Sesar FLORENTINO D.O. personally performed the services described in this documentation, as scribed by Anabel Carrizales in my presence, and it is both accurate and complete. C      Portions of this record were made with voice recognition software and by scribes.  Despite my review, spelling/grammar/context errors may still remain.  Interpretation of this chart should be taken in this context.

## 2019-11-16 PROBLEM — R53.82 CHRONIC FATIGUE: Status: ACTIVE | Noted: 2019-11-16

## 2019-11-16 LAB
ALBUMIN SERPL BCP-MCNC: 3.5 G/DL (ref 3.2–4.9)
ALBUMIN/GLOB SERPL: 1.5 G/DL
ALP SERPL-CCNC: 102 U/L (ref 30–99)
ALT SERPL-CCNC: 201 U/L (ref 2–50)
ANION GAP SERPL CALC-SCNC: 6 MMOL/L (ref 0–11.9)
AST SERPL-CCNC: 159 U/L (ref 12–45)
BILIRUB SERPL-MCNC: 0.6 MG/DL (ref 0.1–1.5)
BUN SERPL-MCNC: 14 MG/DL (ref 8–22)
CALCIUM SERPL-MCNC: 9.1 MG/DL (ref 8.5–10.5)
CHLORIDE SERPL-SCNC: 99 MMOL/L (ref 96–112)
CO2 SERPL-SCNC: 24 MMOL/L (ref 20–33)
CREAT SERPL-MCNC: 0.64 MG/DL (ref 0.5–1.4)
ERYTHROCYTE [DISTWIDTH] IN BLOOD BY AUTOMATED COUNT: 43.4 FL (ref 35.9–50)
GLOBULIN SER CALC-MCNC: 2.4 G/DL (ref 1.9–3.5)
GLUCOSE SERPL-MCNC: 123 MG/DL (ref 65–99)
GRAM STN SPEC: NORMAL
HCT VFR BLD AUTO: 35.3 % (ref 37–47)
HGB BLD-MCNC: 12 G/DL (ref 12–16)
MCH RBC QN AUTO: 31 PG (ref 27–33)
MCHC RBC AUTO-ENTMCNC: 34 G/DL (ref 33.6–35)
MCV RBC AUTO: 91.2 FL (ref 81.4–97.8)
PLATELET # BLD AUTO: 245 K/UL (ref 164–446)
PMV BLD AUTO: 9.9 FL (ref 9–12.9)
POTASSIUM SERPL-SCNC: 4.5 MMOL/L (ref 3.6–5.5)
PROT SERPL-MCNC: 5.9 G/DL (ref 6–8.2)
RBC # BLD AUTO: 3.87 M/UL (ref 4.2–5.4)
SIGNIFICANT IND 70042: NORMAL
SITE SITE: NORMAL
SODIUM SERPL-SCNC: 129 MMOL/L (ref 135–145)
SOURCE SOURCE: NORMAL
WBC # BLD AUTO: 12.1 K/UL (ref 4.8–10.8)

## 2019-11-16 PROCEDURE — 700105 HCHG RX REV CODE 258: Performed by: ORTHOPAEDIC SURGERY

## 2019-11-16 PROCEDURE — 770006 HCHG ROOM/CARE - MED/SURG/GYN SEMI*

## 2019-11-16 PROCEDURE — 85027 COMPLETE CBC AUTOMATED: CPT

## 2019-11-16 PROCEDURE — 80053 COMPREHEN METABOLIC PANEL: CPT

## 2019-11-16 PROCEDURE — 700111 HCHG RX REV CODE 636 W/ 250 OVERRIDE (IP): Performed by: ORTHOPAEDIC SURGERY

## 2019-11-16 PROCEDURE — 99358 PROLONG SERVICE W/O CONTACT: CPT | Performed by: HOSPITALIST

## 2019-11-16 PROCEDURE — 700102 HCHG RX REV CODE 250 W/ 637 OVERRIDE(OP): Performed by: ORTHOPAEDIC SURGERY

## 2019-11-16 PROCEDURE — 36415 COLL VENOUS BLD VENIPUNCTURE: CPT

## 2019-11-16 PROCEDURE — A9270 NON-COVERED ITEM OR SERVICE: HCPCS | Performed by: ORTHOPAEDIC SURGERY

## 2019-11-16 PROCEDURE — 700111 HCHG RX REV CODE 636 W/ 250 OVERRIDE (IP): Performed by: INTERNAL MEDICINE

## 2019-11-16 PROCEDURE — A9270 NON-COVERED ITEM OR SERVICE: HCPCS | Performed by: INTERNAL MEDICINE

## 2019-11-16 PROCEDURE — 700112 HCHG RX REV CODE 229: Performed by: ORTHOPAEDIC SURGERY

## 2019-11-16 PROCEDURE — 700105 HCHG RX REV CODE 258: Performed by: INTERNAL MEDICINE

## 2019-11-16 PROCEDURE — 700102 HCHG RX REV CODE 250 W/ 637 OVERRIDE(OP): Performed by: INTERNAL MEDICINE

## 2019-11-16 PROCEDURE — 97161 PT EVAL LOW COMPLEX 20 MIN: CPT

## 2019-11-16 PROCEDURE — 99233 SBSQ HOSP IP/OBS HIGH 50: CPT | Performed by: HOSPITALIST

## 2019-11-16 RX ORDER — CEFAZOLIN SODIUM 2 G/100ML
2 INJECTION, SOLUTION INTRAVENOUS EVERY 8 HOURS
Status: DISCONTINUED | OUTPATIENT
Start: 2019-11-16 | End: 2019-11-21

## 2019-11-16 RX ADMIN — ACETAMINOPHEN 650 MG: 325 TABLET, FILM COATED ORAL at 05:43

## 2019-11-16 RX ADMIN — KETOROLAC TROMETHAMINE 15 MG: 30 INJECTION, SOLUTION INTRAMUSCULAR at 05:43

## 2019-11-16 RX ADMIN — KETOROLAC TROMETHAMINE 15 MG: 30 INJECTION, SOLUTION INTRAMUSCULAR at 12:56

## 2019-11-16 RX ADMIN — DOCUSATE SODIUM 100 MG: 100 CAPSULE, LIQUID FILLED ORAL at 05:43

## 2019-11-16 RX ADMIN — DOCUSATE SODIUM 100 MG: 100 CAPSULE, LIQUID FILLED ORAL at 17:58

## 2019-11-16 RX ADMIN — SODIUM CHLORIDE: 9 INJECTION, SOLUTION INTRAVENOUS at 13:01

## 2019-11-16 RX ADMIN — CEFAZOLIN SODIUM 2 G: 2 INJECTION, SOLUTION INTRAVENOUS at 19:45

## 2019-11-16 RX ADMIN — KETOROLAC TROMETHAMINE 15 MG: 30 INJECTION, SOLUTION INTRAMUSCULAR at 23:21

## 2019-11-16 RX ADMIN — KETOROLAC TROMETHAMINE 15 MG: 30 INJECTION, SOLUTION INTRAMUSCULAR at 17:58

## 2019-11-16 RX ADMIN — VANCOMYCIN HYDROCHLORIDE 1600 MG: 500 INJECTION, POWDER, LYOPHILIZED, FOR SOLUTION INTRAVENOUS at 01:00

## 2019-11-16 RX ADMIN — SENNOSIDES AND DOCUSATE SODIUM 2 TABLET: 8.6; 5 TABLET ORAL at 17:58

## 2019-11-16 RX ADMIN — HEPARIN SODIUM 5000 UNITS: 5000 INJECTION, SOLUTION INTRAVENOUS; SUBCUTANEOUS at 05:44

## 2019-11-16 RX ADMIN — HEPARIN SODIUM 5000 UNITS: 5000 INJECTION, SOLUTION INTRAVENOUS; SUBCUTANEOUS at 12:56

## 2019-11-16 RX ADMIN — HEPARIN SODIUM 5000 UNITS: 5000 INJECTION, SOLUTION INTRAVENOUS; SUBCUTANEOUS at 23:21

## 2019-11-16 ASSESSMENT — GAIT ASSESSMENTS
DISTANCE (FEET): 150
ASSISTIVE DEVICE: FRONT WHEEL WALKER
GAIT LEVEL OF ASSIST: CONTACT GUARD ASSIST

## 2019-11-16 ASSESSMENT — COGNITIVE AND FUNCTIONAL STATUS - GENERAL
TURNING FROM BACK TO SIDE WHILE IN FLAT BAD: A LITTLE
STANDING UP FROM CHAIR USING ARMS: A LITTLE
MOVING TO AND FROM BED TO CHAIR: A LITTLE
HELP NEEDED FOR BATHING: A LITTLE
DRESSING REGULAR LOWER BODY CLOTHING: A LITTLE
TURNING FROM BACK TO SIDE WHILE IN FLAT BAD: A LITTLE
MOVING FROM LYING ON BACK TO SITTING ON SIDE OF FLAT BED: A LITTLE
WALKING IN HOSPITAL ROOM: A LITTLE
STANDING UP FROM CHAIR USING ARMS: A LITTLE
MOVING FROM LYING ON BACK TO SITTING ON SIDE OF FLAT BED: A LITTLE
SUGGESTED CMS G CODE MODIFIER MOBILITY: CK
TOILETING: A LITTLE
WALKING IN HOSPITAL ROOM: A LOT
MOBILITY SCORE: 17
CLIMB 3 TO 5 STEPS WITH RAILING: A LITTLE
SUGGESTED CMS G CODE MODIFIER DAILY ACTIVITY: CJ
DAILY ACTIVITIY SCORE: 21
MOBILITY SCORE: 18
CLIMB 3 TO 5 STEPS WITH RAILING: A LITTLE
MOVING TO AND FROM BED TO CHAIR: A LITTLE
SUGGESTED CMS G CODE MODIFIER MOBILITY: CK

## 2019-11-16 ASSESSMENT — LIFESTYLE VARIABLES: EVER_SMOKED: NEVER

## 2019-11-16 NOTE — OP REPORT
DATE OF SERVICE:  11/15/2019    PREOPERATIVE DIAGNOSIS:  Right septic knee.    POSTOPERATIVE DIAGNOSIS:  Right septic knee.    PROCEDURE:  Irrigation and debridement, right septic knee.    SURGEON:  Ramsey Asbhy MD    ASSISTANT:  None.    ESTIMATED BLOOD LOSS:  None.    INDICATIONS:  A 74-year-old female who has had a history of fatigue for the   last several weeks and a knee aspirate returned positive today for Staph   aureus.  As a result, she consented for irrigation and debridement.  Risks and   benefits were discussed at length, which include but not limited to bleeding,   infection, neurovascular damage, pain, stiffness, and need for the surgery.    She understands all these risks and wishes to proceed.    DESCRIPTION OF PROCEDURE:  The patient was sedated with LMA anesthesia and   perioperative antibiotics were held until cultures.  Her right leg was prepped   and draped in usual sterile fashion.  Standard anterior approach to the knee   was performed.  A medial parapatellar incision was made.  A large amount of   purulent fluid was encountered.  This was sent for culture and sensitivity.    The knee was then irrigated with 3 liters of pulsatile lavage and closed in   layers.  Sterile dressings were applied.  Patient tolerated the procedure   well.    POSTOPERATIVE PLAN:  The patient to be weightbearing as tolerated, on   perioperative antibiotics, DVT prophylaxis, and pain control.       ____________________________________     RAMSEY ASHBY MD    [facility] / N[facility]    DD:  11/15/2019 19:39:26  DT:  11/15/2019 21:18:33    D#:  [id number]  Job#:  [id number]

## 2019-11-16 NOTE — ANESTHESIA TIME REPORT
Anesthesia Start and Stop Event Times     Date Time Event    11/15/2019 1854 Ready for Procedure     1911 Anesthesia Start     1945 Anesthesia Stop        Responsible Staff  11/15/19    Name Role Begin End    Diogenes Hansen M.D. Anesth 1911 1945        Preop Diagnosis (Free Text):  Pre-op Diagnosis     septic right knee        Preop Diagnosis (Codes):    Post op Diagnosis  Septic joint of right knee joint (HCC)      Premium Reason  A. 3PM - 7AM    Comments:

## 2019-11-16 NOTE — OR NURSING
Attempted to contact family member using contact number provided.  No answer.  Called out to [facility] reception/waiting area.  No family or friends available for update at this time.

## 2019-11-16 NOTE — ASSESSMENT & PLAN NOTE
I&D 11/15/2019  Staph aureus on aspirate, MSSA  Was on vancomycin, switch to cefazolin  Positive blood cultures  TTE neg  ID following appreciate rec.

## 2019-11-16 NOTE — ANESTHESIA QCDR
2019 [facility] Clinical Data Registry (for Quality Improvement)     Postoperative nausea/vomiting risk protocol (Adult = 18 yrs and Pediatric 3-17 yrs)- (430 and 463)  General inhalation anesthetic (NOT TIVA) with PONV risk factors: Yes  Provision of anti-emetic therapy with at least 2 different classes of agents: Yes   Patient DID NOT receive anti-emetic therapy and reason is documented in Medical Record:  N/A    Multimodal Pain Management- (AQI59)  Patient undergoing Elective Surgery (i.e. Outpatient, or ASC, or Prescheduled Surgery prior to Hospital Admission): Yes  Use of Multimodal Pain Management, two or more drugs and/or interventions, NOT including systemic opioids: Yes   Exception: Documented allergy to multiple classes of analgesics:  N/A    PACU assessment of acute postoperative pain prior to Anesthesia Care End- Applies to Patients Age = 18- (ABG7)  Initial PACU pain score is which of the following: < 7/10  Patient unable to report pain score: N/A    Post-anesthetic transfer of care checklist/protocol to PACU/ICU- (426 and 427)  Upon conclusion of case, patient transferred to which of the following locations: PACU/Non-ICU  Use of transfer checklist/protocol: Yes  Exclusion: Service Performed in Patient Hospital Room (and thus did not require transfer): N/A    PACU Reintubation- (AQI31)  General anesthesia requiring endotracheal intubation (ETT) along with subsequent extubation in OR or PACU: No  Required reintubation in the PACU: N/A  Extubation was a planned trial documented in the medical record prior to removal of the original airway device: N/A    Unplanned admission to ICU related to anesthesia service up through end of PACU care- (MD51)  Unplanned admission to ICU (not initially anticipated at anesthesia start time): No

## 2019-11-16 NOTE — PROGRESS NOTES
HOSPITAL MEDICINE PROGRESS NOTE    Date of service  11/16/2019    Chief Complaint  74 y.o. year old female here with Knee Pain (Pt sent from [facility]. pt found to have bacteria in fluid drained from right knee. sent to [facility] for further care. )      Assessment/Plan  Interval History   Active Hospital Problems    Diagnosis   • Pyogenic arthritis of right knee joint (HCC) [M00.9]     Priority: High   • Chronic fatigue [R53.82]   • Leukocytosis [D72.829]   • Hyponatremia [E87.1]     2/12/13 Na 136  2/13/14 Na 131  3/15/16 Na 137  5/25/16 Na 134  9/20/17 Na 131  10/2/19 Na 129  10/9/19 Na 134  10/15/19 serum osm 277 (278-298), urine osm 128 (300-900), urine Na 14, cortisol 9.8, ACTH 15       Seen and examined.  Admitted with septic right knee, successful I&D on 11/15/2019.  Aspirate cultures positive for staph aureus.  Awaiting final cultures.    Feeling good today, no abdominal pain.  No nausea or vomiting.  Unexpected elevation in transaminases, patient denies history of liver disease, denies taking any medications at home, received 4 Tylenol yesterday.  Sodium 129, potassium normal.  Renal function intact.     , alk phos 102    Leukocytosis improving, 17k --> 12k    Complains of chronic fatigue over the last few weeks, affecting her golf game.  Has had extensive work-up as an outpatient, all labs so far unremarkable.  Recently had cortisol checked a.m. and p.m. and work-up for [place]'s, cortisol normal, ACTH normal, REGGIE negative, Sjogren's work-up negative, vitamin B12 normal 1100, vitamin D normal, zinc normal, iron normal.    Disposition  Inpatient  Likely needs rehab and long-term antibiotics     Pyogenic arthritis of right knee joint (HCC)  I&D 11/15/2019  Staph aureus on aspirate  Await final cultures  ID consult  On vancomycin  Monitoring closely for signs of toxicity    Leukocytosis  Related to above    Hyponatremia  On IV fluid, improving  Follow CMP in the morning    Chronic fatigue  Has  vitiligo, outpatient providers thought may be autoimmune  Reviewed chart, all autoimmune work-up so far negative  Cortisol normal, ACTH normal no need to pursue [place]'s work-up  Follow a.m. labs      Consultants/Specialty  Orthopaedics    I discussed this patient's plan of care with Ulices URBINA    Hospital Course:    Admitted with septic right knee, successful I&D on 11/15/2019.  Aspirate cultures positive for staph aureus.  Awaiting final cultures.          Review of Systems  Physical Exam   ROS  Vitals:    11/15/19 2315 11/16/19 0040 11/16/19 0449 11/16/19 0808   BP: 128/78 114/66 128/70 132/74   Pulse: 61 (!) 58 60 65   Resp: 16 16 16 16   Temp: 36.8 °C (98.2 °F) 36 °C (96.8 °F) 36 °C (96.8 °F) 36.9 °C (98.4 °F)   TempSrc: Temporal Temporal Temporal Temporal   SpO2: 99% 99% 99% 96%   Weight:       Height:         Physical Exam     Hematology Chemistry   Lab Results   Component Value Date/Time    WBC 12.1 (H) 11/16/2019 04:13 AM    HEMOGLOBIN 12.0 11/16/2019 04:13 AM    HEMATOCRIT 35.3 (L) 11/16/2019 04:13 AM    PLATELETCT 245 11/16/2019 04:13 AM     Lab Results   Component Value Date/Time    SODIUM 129 (L) 11/16/2019 04:13 AM    POTASSIUM 4.5 11/16/2019 04:13 AM    CHLORIDE 99 11/16/2019 04:13 AM    CO2 24 11/16/2019 04:13 AM    GLUCOSE 123 (H) 11/16/2019 04:13 AM    BUN 14 11/16/2019 04:13 AM    CREATININE 0.64 11/16/2019 04:13 AM    CREATININE 0.9 06/01/2005 08:44 AM         Labs not explicitly included in this progress note were reviewed by the author.   Radiology/imaging not explicitly included in this progress note was reviewed by the author.     Medications:  • [START ON 11/17/2019] vancomycin  1,000 mg Q24HR   • senna-docusate  2 Tab BID    And   • magnesium hydroxide  30 mL QDAY PRN    And   • bisacodyl  10 mg QDAY PRN   • NS   Continuous   • heparin  5,000 Units Q8HRS   • acetaminophen  650 mg Q6HRS PRN   • ondansetron  4 mg Q4HRS PRN   • morphine injection  2 mg Q4HRS PRN   • Pharmacy Consult Request   1 Each PHARMACY TO DOSE   • ondansetron  4 mg Q4HRS PRN   • dexamethasone  4 mg Once PRN   • diphenhydrAMINE  25 mg Q6HRS PRN   • haloperidol lactate  1 mg Q6HRS PRN   • scopolamine  1 Patch Q72HRS PRN   • docusate sodium  100 mg BID   • polyethylene glycol/lytes  1 Packet BID PRN   • fleet  1 Each Once PRN   • acetaminophen  650 mg Q6HRS   • ketorolac  15 mg Q6HR   • oxyCODONE immediate-release  5 mg Q3HRS PRN   • oxyCODONE immediate release  10 mg Q3HRS PRN   • HYDROmorphone  0.5 mg Q3HRS PRN       Full Code    I have performed a physical exam and reviewed and updated ROS as of today, 11/16/2019.  In review of yesterday's note dated, 11/15/2019, there are no changes except as documented above.    <<[id number]>>  I spent a total of 35 minutes of non face to face time performing additional research, reviewing medical records and labs and/or discussing plan of care with other healthcare providers.  Extensive chart review from previous providers in work-up of chronic fatigue.    Start time: 9:55 AM  End time: 10:30 AM

## 2019-11-16 NOTE — H&P
Hospital Medicine History & Physical Note    Date of Service  11/15/2019    Primary Care Physician  Eron Flowers M.D.    Consultants  Dr. Navas    Code Status  full    Chief Complaint  Right knee pain    History of Presenting Illness  74 y.o. female with no significant past medical history who presented 11/15/2019 with right knee pain.  The patient stated that it started since this past Tuesday.  She denies any trauma to her knee.  She had stress test done this past Monday.  Because of worsening swelling in her right knee she went to doctor office where she had arthrocentesis done and today she got a call from her doctor office and advised her to go to ER since there is infection in her knee joint.  Orthopedic was consulted in ER and plan to take her to surgery later today.  She will be admitted to orthopedic floor.  The patient denies any fever, chills or complain about pain and swelling of the right knee.  minimal limited range of motion with her right knee.    Review of Systems  Review of Systems   Constitutional: Negative for chills, fever and weight loss.   HENT: Negative for congestion and nosebleeds.    Eyes: Negative for blurred vision, pain, discharge and redness.   Respiratory: Negative for cough, sputum production, shortness of breath and stridor.    Cardiovascular: Negative for chest pain, palpitations and orthopnea.   Gastrointestinal: Negative for abdominal pain, diarrhea, heartburn, nausea and vomiting.   Genitourinary: Negative for dysuria, frequency and urgency.   Musculoskeletal: Positive for joint pain. Negative for back pain, myalgias and neck pain.   Skin: Negative for itching and rash.   Neurological: Negative for dizziness, focal weakness, seizures and headaches.   Psychiatric/Behavioral: Negative for depression. The patient is not nervous/anxious and does not have insomnia.        Past Medical History   has a past medical history of Allergic rhinitis (6/17/2009), Dyslipidemia  (6/17/2009), [facility] health care (6/17/2009), and S/P parathyroidectomy (6/17/2009).    Surgical History  Arthrocentesis    Family History  family history includes Heart Disease in her mother.     Social History   reports that she has never smoked. She has never used smokeless tobacco. She reports current alcohol use. She reports that she does not use drugs.    Allergies  Allergies   Allergen Reactions   • Asa [Aspirin]      Epistaxis     • Nsaids      Epistaxis         Medications  Prior to Admission Medications   Prescriptions Last Dose Informant Patient Reported? Taking?   Ascorbic Acid (VITAMIN C PO)  Patient Yes No   Sig: Take 1 Tab by mouth every day.   CALCIUM PO  Patient Yes No   Sig: Take 1 Tab by mouth 2 Times a Day.   Cholecalciferol (VITAMIN D PO)  Patient Yes No   Sig: Take 1 Cap by mouth every day.   Cyanocobalamin (B-12 PO)  Patient Yes No   Sig: Take 1 Tab by mouth every day.   Multiple Vitamins-Minerals (MULTI COMPLETE PO)  Patient Yes No   Sig: Take 1 Tab by mouth every day.   Omega-3 Fatty Acids (FISH OIL PO)  Patient Yes No   Sig: Take 2 Caps by mouth every evening.   TURMERIC PO  Patient Yes No   Sig: Take 1 Cap by mouth every day.   acetaminophen (TYLENOL) 325 MG Tab  Patient Yes No   Sig: Take 650 mg by mouth every four hours as needed for Moderate Pain.   simvastatin (ZOCOR) 20 MG Tab  Patient No No   Sig: Take 1 Tab by mouth every evening.   vitamin D (CHOLECALCIFEROL) 1000 UNIT Tab  Patient Yes No   Sig: Take 1,000 Units by mouth every day.      Facility-Administered Medications: None       Physical Exam  Temp:  [36.7 °C (98 °F)-36.7 °C (98.1 °F)] 36.7 °C (98 °F)  Pulse:  [75-79] 79  Resp:  [16] 16  BP: (134-137)/(77-85) 134/77  SpO2:  [95 %-96 %] 96 %    Physical Exam  Vitals signs reviewed.   Constitutional:       General: She is not in acute distress.     Appearance: Normal appearance.   HENT:      Head: Normocephalic and atraumatic.      Nose: No congestion or rhinorrhea.   Eyes:       Extraocular Movements: Extraocular movements intact.      Pupils: Pupils are equal, round, and reactive to light.   Neck:      Musculoskeletal: Normal range of motion and neck supple.   Cardiovascular:      Rate and Rhythm: Normal rate and regular rhythm.      Pulses: Normal pulses.   Pulmonary:      Effort: Pulmonary effort is normal. No respiratory distress.      Breath sounds: Normal breath sounds.   Abdominal:      General: Bowel sounds are normal. There is no distension.      Palpations: Abdomen is soft.      Tenderness: There is no tenderness.   Musculoskeletal:         General: Tenderness present. No swelling.      Comments: Right knee pain and swelling  Good range of motion   Skin:     General: Skin is warm.      Findings: No erythema.   Neurological:      General: No focal deficit present.      Mental Status: She is alert and oriented to person, place, and time.         Laboratory:  Recent Labs     11/15/19  1520   WBC 17.8*   RBC 4.35   HEMOGLOBIN 13.7   HEMATOCRIT 39.6   MCV 91.0   MCH 31.5   MCHC 34.6   RDW 42.9   PLATELETCT 274   MPV 10.1         No results for input(s): ALTSGPT, ASTSGOT, ALKPHOSPHAT, TBILIRUBIN, DBILIRUBIN, GAMMAGT, AMYLASE, LIPASE, ALB, PREALBUMIN, GLUCOSE in the last 72 hours.      No results for input(s): NTPROBNP in the last 72 hours.      No results for input(s): TROPONINT in the last 72 hours.    Urinalysis:    No results found     Imaging:  US-EXTREMITY VENOUS LOWER UNILAT RIGHT   Final Result            Assessment/Plan:  I anticipate this patient will require at least two midnights for appropriate medical management, necessitating inpatient admission.    Pyogenic arthritis of right knee joint (HCC)- (present on admission)  Assessment & Plan  Surgery planned for later today  According to surgery not to start antibiotic until after surgery  Pain control  N.p.o.    Leukocytosis- (present on admission)  Assessment & Plan  Related to above    Hyponatremia- (present on  admission)  Assessment & Plan  On IV fluid  Follow CMP in the morning      VTE prophylaxis: heparin

## 2019-11-16 NOTE — ASSESSMENT & PLAN NOTE
Has vitiligo, outpatient providers thought may be autoimmune  Reviewed chart, all autoimmune work-up so far negative  Cortisol normal, ACTH normal no need to pursue Blaine's work-up

## 2019-11-16 NOTE — ANESTHESIA PROCEDURE NOTES
Airway  Date/Time: 11/15/2019 7:16 PM  Performed by: Diogenes Hansen M.D.  Authorized by: Diogenes Hansen M.D.     Location:  OR  Urgency:  Elective  Difficult Airway: No    Indications for Airway Management:  Anesthesia  Spontaneous Ventilation: absent    Sedation Level:  Deep  Preoxygenated: Yes    Mask Difficulty Assessment:  1 - vent by mask  Final Airway Type:  Supraglottic airway  Final Supraglottic Airway:  Standard LMA  SGA Size:  4  Number of Attempts at Approach:  1

## 2019-11-16 NOTE — CARE PLAN
Problem: Discharge Barriers/Planning  Goal: Patient's continuum of care needs will be met  Outcome: PROGRESSING AS EXPECTED  Note:   Patient receptive to educations and involved in her own care. Progressing well towards discharge.     Problem: Communication  Goal: The ability to communicate needs accurately and effectively will improve  Note:   Call light in reach and patient uses appropriately to make needs known.     Problem: Knowledge Deficit  Goal: Knowledge of disease process/condition, treatment plan, diagnostic tests, and medications will improve  Note:   Patient is involved in teaching and receptive to education about plan of care.

## 2019-11-16 NOTE — ANESTHESIA PREPROCEDURE EVALUATION
Relevant Problems   No relevant active problems       Physical Exam    Airway   Mallampati: II  TM distance: >3 FB  Neck ROM: full       Cardiovascular - normal exam  Rhythm: regular  Rate: normal  (-) murmur     Dental - normal exam         Pulmonary - normal exam  Breath sounds clear to auscultation     Abdominal    Neurological - normal exam                 Anesthesia Plan    ASA 2- EMERGENT   ASA physical status emergent criteria: acutely contaminated wound or identified infection source    Plan - general       Airway plan will be LMA        Induction: intravenous    Postoperative Plan: Postoperative administration of opioids is intended.    Pertinent diagnostic labs and testing reviewed    Informed Consent:    Anesthetic plan and risks discussed with patient.    Use of blood products discussed with: patient whom consented to blood products.

## 2019-11-16 NOTE — PROGRESS NOTES
POD#1  S/P Knee I&D  Culture from aspirate positive for SA  Await OR culture results  ID Consult  WBAT  Case coordication

## 2019-11-16 NOTE — ANESTHESIA POSTPROCEDURE EVALUATION
Patient: Bernadette Storey Humberto    Procedure Summary     Date:  11/15/19 Room / Location:  [place] [address] / SURGERY [facility]    Anesthesia Start:  1911 Anesthesia Stop:  1945    Procedure:  INCISION AND DRAINAGE, WOUND, BY ORTHOPEDICS (Right Knee) Diagnosis:  (septic right knee)    Surgeon:  Ramsey Navas M.D. Responsible Provider:  Diogenes Hansen M.D.    Anesthesia Type:  general ASA Status:  2 - Emergent          Final Anesthesia Type: general  Last vitals  BP   Blood Pressure : 141/83    Temp   36.8 °C (98.2 °F)    Pulse   Pulse: 69   Resp   16    SpO2   98 %      Anesthesia Post Evaluation    Patient location during evaluation: PACU  Patient participation: complete - patient participated  Level of consciousness: awake and alert  Pain score: 0    Airway patency: patent  Anesthetic complications: no  Cardiovascular status: hemodynamically stable  Respiratory status: acceptable  Hydration status: euvolemic    PONV: none           Nurse Pain Score: 0 (NPRS)

## 2019-11-16 NOTE — PROGRESS NOTES
"Pharmacy Kinetics 74 y.o. female on vancomycin day # 1  2019    Currently on Vancomycin 1000 mg iv q24hr  Provider specified end date: TBD, ID to consult    Indication for Treatment: Pyogenic arthritis of right knee joint     Pertinent history per medical record: Admitted on 11/15/2019 for septic right knee, was sent by outside facility due to Staph aureus found in aspirate. I+D of right knee performed 11/15. ID to consult.    Other antibiotics: none    Allergies: Asa [aspirin] and Nsaids     List concerns for renal function: age    Pertinent cultures to date:   -- Staph aureus from right knee aspirate (outside facility)  11/15/19: PBC x 2 = NGTD  11/15/19: Right knee = Few WBCs. No organisms seen    MRSA nares swab if pneumonia is a concern (ordered/positive/negative/n-a): n/a    Recent Labs     11/15/19  1520 19  0413   WBC 17.8* 12.1*   NEUTSPOLYS 94.70*  --      Recent Labs     11/15/19  1520 19  0413   BUN 12 14   CREATININE 0.60 0.64   ALBUMIN 4.1 3.5     No results for input(s): VANCOTROUGH, VANCOPEAK, VANCORANDOM in the last 72 hours.    Intake/Output Summary (Last 24 hours) at 2019 1141  Last data filed at 2019 0900  Gross per 24 hour   Intake 520 ml   Output --   Net 520 ml      /74   Pulse 65   Temp 36.9 °C (98.4 °F) (Temporal)   Resp 16   Ht 1.651 m (5' 5\")   Wt 62 kg (136 lb 11 oz)   SpO2 96%  Temp (24hrs), Av.5 °C (97.7 °F), Min:36 °C (96.8 °F), Max:37.2 °C (99 °F)      A/P   1. Vancomycin dose change: no  2. Next vancomycin level: prior to 4th total dose (not ordered)  3. Goal trough: ~16 mcg/ml (joint penetration)  4. Comments: Pt had vanco loading dose early this AM and will start vanco 15 mg/kg (1000 mg) iv q 24 hrs tomorrow. Will check a vanco trough level prior to the 4th total dose (or sooner if renal fxn changes). ID to consult    Estrada Maza PharmD    "

## 2019-11-16 NOTE — PROGRESS NOTES
"Pharmacy Kinetics 74 y.o. female on vancomycin day # 1 2019    Loading dose: Vancomycin 1600 mg iv once  Provider specified end date: none    Indication for Treatment: right knee abscess    Pertinent history per medical record: Admitted on 11/15/2019 for septic right knee, was sent by outside facility due to Staph aureus found in aspirate.     Other antibiotics: ampicillin-sulbactam 3g once (complete)    Allergies: Asa [aspirin] and Nsaids     Concerns for renal function: none    Pertinent cultures to date:   11/15: Staph aureus from right knee aspirate (outside facility)  11/15: blood cultures x2 (in process)  11/15: wound cultures w/ gram stain (in process)    Recent Labs     11/15/19  1520   WBC 17.8*   NEUTSPOLYS 94.70*     Recent Labs     11/15/19  1520   BUN 12   CREATININE 0.60   ALBUMIN 4.1        /66   Pulse (!) 58   Temp 36 °C (96.8 °F) (Temporal)   Resp 16   Ht 1.651 m (5' 5\")   Wt 62 kg (136 lb 11 oz)   SpO2 99%  Temp (24hrs), Av.5 °C (97.7 °F), Min:36 °C (96.8 °F), Max:37.2 °C (99 °F)    A/P   1. Maintenance dose: vancomycin 1000 mg q24h (15 mg/kg)  2. Next vancomycin level: trough prior to the fourth dose (not yet ordered)  3. Goal trough: 12-16 mcg/mL  4. Comments: Will check trough and adjust dose if needed. Continue to follow for culture results and de-escalation as appropriate.    Liana Quarles, PharmD      "

## 2019-11-16 NOTE — CONSULTS
11/15/2019    Bernadette Paul is a 74 y.o. female who presents with a septic right knee and is here for operative management. Patient denies numbness, parasthesias, loss of concousness or other trauma. Aspirate was positive for staph aureus.    Past Medical History:   Diagnosis Date   • Allergic rhinitis 6/17/2009   • Dyslipidemia 6/17/2009   • Preventative health care 6/17/2009   • S/P parathyroidectomy 6/17/2009       History reviewed. No pertinent surgical history.    Medications  No current facility-administered medications on file prior to encounter.      Current Outpatient Medications on File Prior to Encounter   Medication Sig Dispense Refill   • therapeutic multivitamin-minerals (THERAGRAN-M) Tab Take 1 Tab by mouth every day.     • Ascorbic Acid (VITAMIN C PO) Take 1 Dose by mouth every day. Unknown OTC Strength.     • Cholecalciferol (VITAMIN D PO) Take 1 Dose by mouth every day. Unknown OTC Strength.     • CALCIUM PO Take 1 Dose by mouth 2 Times a Day. Unknown OTC Strength.     • Omega-3 Fatty Acids (FISH OIL PO) Take 2 Doses by mouth every evening. Unknown OTC Strength.         Allergies  Asa [aspirin] and Nsaids    ROS  Right kne pain. All other systems were reviewed and found to be negative    Family History   Problem Relation Age of Onset   • Heart Disease Mother        Social History     Socioeconomic History   • Marital status:      Spouse name: Not on file   • Number of children: Not on file   • Years of education: Not on file   • Highest education level: Not on file   Occupational History   • Not on file   Social Needs   • Financial resource strain: Not on file   • Food insecurity:     Worry: Not on file     Inability: Not on file   • Transportation needs:     Medical: Not on file     Non-medical: Not on file   Tobacco Use   • Smoking status: Never Smoker   • Smokeless tobacco: Never Used   Substance and Sexual Activity   • Alcohol use: Yes     Alcohol/week: 0.0 oz     Frequency:  "Monthly or less     Comment: occ wine    • Drug use: Never   • Sexual activity: Yes     Partners: Male   Lifestyle   • Physical activity:     Days per week: Not on file     Minutes per session: Not on file   • Stress: Not on file   Relationships   • Social connections:     Talks on phone: Not on file     Gets together: Not on file     Attends [employer] service: Not on file     Active member of club or organization: Not on file     Attends meetings of clubs or organizations: Not on file     Relationship status: Not on file   • Intimate partner violence:     Fear of current or ex partner: Not on file     Emotionally abused: Not on file     Physically abused: Not on file     Forced sexual activity: Not on file   Other Topics Concern   • Not on file   Social History Narrative   • Not on file       Physical Exam  Vitals  /83   Pulse 69   Temp 36.8 °C (98.2 °F) (Temporal)   Resp 16   Ht 1.651 m (5' 5\")   Wt 62 kg (136 lb 11 oz)   SpO2 98%   General: Well Developed, Well Nourished, no acute distress  HEENT: Normocephalic, atraumatic  Eyes: Anicteric, PERRLA, EOMI  Neck: Supple, nontender, no masses  Lungs: CTA, no wheezes or crackles  Heart: RRR, no murmurs, rubs or gallops  Abdomen: Soft, NT, ND  Pelvis: Stable to AP and Lateral Compression  Skin: Intact, no open wounds  Extremities: Right knee pain  Neuro: NVI  Vascular: 2+DP/PT, Capillary refill <2 seconds    Radiographs:  US-EXTREMITY VENOUS LOWER UNILAT RIGHT   Final Result          Laboratory Values  Recent Labs     11/15/19  1520   WBC 17.8*   RBC 4.35   HEMOGLOBIN 13.7   HEMATOCRIT 39.6   MCV 91.0   MCH 31.5   MCHC 34.6   RDW 42.9   PLATELETCT 274   MPV 10.1     Recent Labs     11/15/19  1520   SODIUM 128*   POTASSIUM 4.1   CHLORIDE 94*   CO2 26   GLUCOSE 119*   BUN 12             Impression: Septic right knee    Plan:Operative intervention. Risks and benefits of surgery were discussed which include but are not limited to bleeding, infection, " neurovascular damage, malunion, nonunion, instability, limb length discrepancy, DVT, PE, MI, Stroke and death. They understand these risks and wish to proceed.

## 2019-11-16 NOTE — ED NOTES
"Med Rec Updated and Complete per Pt at bedside  Allergies Reviewed  No PO ABX last 14 days.    Pt denies RX medication at this time.     Pt states she has been off of RX medication for about 3-7 weeks due to her participation in a \"fatigue study\".      "

## 2019-11-16 NOTE — PROGRESS NOTES
AO x4. Moves all extremities and sensation is intact. Right lower ext ROM is limitied from recent surgery but patient is up ambulating and ROM is progressively improving as the day goes on. Up out of bed with 1x min assist. Ambulates with walker. Gait is steady.  at bedside and involved with cares. Right knee dressing is CDI. Continues on IV maint fluids at 75ml/hr. Pain is well managed with scheduled toradol. O2 sat stable on room air. No SOB or cough noted. SQ heparin for DVT proph. Call light in reach and makes needs known.

## 2019-11-17 PROBLEM — R74.01 TRANSAMINITIS: Status: ACTIVE | Noted: 2019-11-17

## 2019-11-17 LAB
ALBUMIN SERPL BCP-MCNC: 3.7 G/DL (ref 3.2–4.9)
ALBUMIN/GLOB SERPL: 1.4 G/DL
ALP SERPL-CCNC: 86 U/L (ref 30–99)
ALT SERPL-CCNC: 126 U/L (ref 2–50)
ANION GAP SERPL CALC-SCNC: 7 MMOL/L (ref 0–11.9)
AST SERPL-CCNC: 52 U/L (ref 12–45)
BACTERIA WND AEROBE CULT: ABNORMAL
BACTERIA WND AEROBE CULT: ABNORMAL
BASOPHILS # BLD AUTO: 0.1 % (ref 0–1.8)
BASOPHILS # BLD: 0.01 K/UL (ref 0–0.12)
BILIRUB SERPL-MCNC: 0.4 MG/DL (ref 0.1–1.5)
BUN SERPL-MCNC: 16 MG/DL (ref 8–22)
CALCIUM SERPL-MCNC: 9 MG/DL (ref 8.5–10.5)
CHLORIDE SERPL-SCNC: 97 MMOL/L (ref 96–112)
CO2 SERPL-SCNC: 25 MMOL/L (ref 20–33)
CREAT SERPL-MCNC: 0.73 MG/DL (ref 0.5–1.4)
EOSINOPHIL # BLD AUTO: 0 K/UL (ref 0–0.51)
EOSINOPHIL NFR BLD: 0 % (ref 0–6.9)
ERYTHROCYTE [DISTWIDTH] IN BLOOD BY AUTOMATED COUNT: 46.1 FL (ref 35.9–50)
GLOBULIN SER CALC-MCNC: 2.6 G/DL (ref 1.9–3.5)
GLUCOSE SERPL-MCNC: 143 MG/DL (ref 65–99)
GRAM STN SPEC: ABNORMAL
HCT VFR BLD AUTO: 39.8 % (ref 37–47)
HGB BLD-MCNC: 13 G/DL (ref 12–16)
IMM GRANULOCYTES # BLD AUTO: 0.06 K/UL (ref 0–0.11)
IMM GRANULOCYTES NFR BLD AUTO: 0.7 % (ref 0–0.9)
LYMPHOCYTES # BLD AUTO: 0.57 K/UL (ref 1–4.8)
LYMPHOCYTES NFR BLD: 6.5 % (ref 22–41)
MCH RBC QN AUTO: 31 PG (ref 27–33)
MCHC RBC AUTO-ENTMCNC: 32.7 G/DL (ref 33.6–35)
MCV RBC AUTO: 95 FL (ref 81.4–97.8)
MONOCYTES # BLD AUTO: 0.29 K/UL (ref 0–0.85)
MONOCYTES NFR BLD AUTO: 3.3 % (ref 0–13.4)
NEUTROPHILS # BLD AUTO: 7.82 K/UL (ref 2–7.15)
NEUTROPHILS NFR BLD: 89.4 % (ref 44–72)
NRBC # BLD AUTO: 0 K/UL
NRBC BLD-RTO: 0 /100 WBC
PLATELET # BLD AUTO: 289 K/UL (ref 164–446)
PMV BLD AUTO: 9.6 FL (ref 9–12.9)
POTASSIUM SERPL-SCNC: 4 MMOL/L (ref 3.6–5.5)
PROT SERPL-MCNC: 6.3 G/DL (ref 6–8.2)
RBC # BLD AUTO: 4.19 M/UL (ref 4.2–5.4)
RHODAMINE-AURAMINE STN SPEC: NORMAL
SIGNIFICANT IND 70042: ABNORMAL
SIGNIFICANT IND 70042: NORMAL
SITE SITE: ABNORMAL
SITE SITE: NORMAL
SODIUM SERPL-SCNC: 129 MMOL/L (ref 135–145)
SOURCE SOURCE: ABNORMAL
SOURCE SOURCE: NORMAL
WBC # BLD AUTO: 8.8 K/UL (ref 4.8–10.8)

## 2019-11-17 PROCEDURE — 700102 HCHG RX REV CODE 250 W/ 637 OVERRIDE(OP): Performed by: INTERNAL MEDICINE

## 2019-11-17 PROCEDURE — 700111 HCHG RX REV CODE 636 W/ 250 OVERRIDE (IP): Performed by: INTERNAL MEDICINE

## 2019-11-17 PROCEDURE — 700112 HCHG RX REV CODE 229: Performed by: ORTHOPAEDIC SURGERY

## 2019-11-17 PROCEDURE — 99233 SBSQ HOSP IP/OBS HIGH 50: CPT | Performed by: HOSPITALIST

## 2019-11-17 PROCEDURE — 770006 HCHG ROOM/CARE - MED/SURG/GYN SEMI*

## 2019-11-17 PROCEDURE — 85025 COMPLETE CBC W/AUTO DIFF WBC: CPT

## 2019-11-17 PROCEDURE — 99223 1ST HOSP IP/OBS HIGH 75: CPT | Performed by: INTERNAL MEDICINE

## 2019-11-17 PROCEDURE — 80053 COMPREHEN METABOLIC PANEL: CPT

## 2019-11-17 PROCEDURE — 36415 COLL VENOUS BLD VENIPUNCTURE: CPT

## 2019-11-17 PROCEDURE — 700105 HCHG RX REV CODE 258: Performed by: INTERNAL MEDICINE

## 2019-11-17 PROCEDURE — 700111 HCHG RX REV CODE 636 W/ 250 OVERRIDE (IP): Performed by: ORTHOPAEDIC SURGERY

## 2019-11-17 PROCEDURE — A9270 NON-COVERED ITEM OR SERVICE: HCPCS | Performed by: INTERNAL MEDICINE

## 2019-11-17 PROCEDURE — 87040 BLOOD CULTURE FOR BACTERIA: CPT

## 2019-11-17 PROCEDURE — A9270 NON-COVERED ITEM OR SERVICE: HCPCS | Performed by: ORTHOPAEDIC SURGERY

## 2019-11-17 PROCEDURE — 97166 OT EVAL MOD COMPLEX 45 MIN: CPT

## 2019-11-17 RX ADMIN — HEPARIN SODIUM 5000 UNITS: 5000 INJECTION, SOLUTION INTRAVENOUS; SUBCUTANEOUS at 06:24

## 2019-11-17 RX ADMIN — CEFAZOLIN SODIUM 2 G: 2 INJECTION, SOLUTION INTRAVENOUS at 13:34

## 2019-11-17 RX ADMIN — SODIUM CHLORIDE: 9 INJECTION, SOLUTION INTRAVENOUS at 01:33

## 2019-11-17 RX ADMIN — HEPARIN SODIUM 5000 UNITS: 5000 INJECTION, SOLUTION INTRAVENOUS; SUBCUTANEOUS at 13:34

## 2019-11-17 RX ADMIN — CEFAZOLIN SODIUM 2 G: 2 INJECTION, SOLUTION INTRAVENOUS at 23:38

## 2019-11-17 RX ADMIN — CEFAZOLIN SODIUM 2 G: 2 INJECTION, SOLUTION INTRAVENOUS at 06:17

## 2019-11-17 RX ADMIN — KETOROLAC TROMETHAMINE 15 MG: 30 INJECTION, SOLUTION INTRAMUSCULAR at 11:53

## 2019-11-17 RX ADMIN — SODIUM CHLORIDE: 9 INJECTION, SOLUTION INTRAVENOUS at 19:46

## 2019-11-17 RX ADMIN — KETOROLAC TROMETHAMINE 15 MG: 30 INJECTION, SOLUTION INTRAMUSCULAR at 06:24

## 2019-11-17 RX ADMIN — KETOROLAC TROMETHAMINE 15 MG: 30 INJECTION, SOLUTION INTRAMUSCULAR at 17:55

## 2019-11-17 RX ADMIN — KETOROLAC TROMETHAMINE 15 MG: 30 INJECTION, SOLUTION INTRAMUSCULAR at 23:39

## 2019-11-17 RX ADMIN — MAGNESIUM HYDROXIDE 30 ML: 400 SUSPENSION ORAL at 06:31

## 2019-11-17 RX ADMIN — HEPARIN SODIUM 5000 UNITS: 5000 INJECTION, SOLUTION INTRAVENOUS; SUBCUTANEOUS at 23:40

## 2019-11-17 ASSESSMENT — COGNITIVE AND FUNCTIONAL STATUS - GENERAL
DAILY ACTIVITIY SCORE: 22
DRESSING REGULAR LOWER BODY CLOTHING: A LOT
SUGGESTED CMS G CODE MODIFIER DAILY ACTIVITY: CJ

## 2019-11-17 ASSESSMENT — ENCOUNTER SYMPTOMS
COUGH: 0
DIARRHEA: 0
FEVER: 0
NERVOUS/ANXIOUS: 0
CHILLS: 0
DOUBLE VISION: 0
FOCAL WEAKNESS: 0
SHORTNESS OF BREATH: 0
MYALGIAS: 1
SPUTUM PRODUCTION: 0
NAUSEA: 0
VOMITING: 0
BLURRED VISION: 0
CONSTIPATION: 0

## 2019-11-17 ASSESSMENT — ACTIVITIES OF DAILY LIVING (ADL): TOILETING: INDEPENDENT

## 2019-11-17 NOTE — DISCHARGE PLANNING
Anticipated Discharge Disposition: Residence home in [place]    Action: Met with pt and pt spouse at bedside. Confirmed information on facesheet. Pt states that there are many stairs at her home in [place]. Pt plan is to stay at one [facility] in [place] at [address] till ambulation improves. Pt now uses a FWW, not baseline. Per pt she has a living will. Pt spouse will be her transportation upon DC.      Barriers to Discharge: medical clearance, FWW     Plan: follow up on pt DC needs including FWW and possible IV abx     Length of Stay: 3    Care Transition Team Assessment    Information Source  Orientation : Oriented x 4  Information Given By: Patient  Informant's Name: Cordelia colorado  Who is responsible for making decisions for patient? : Patient    Readmission Evaluation  Is this a readmission?: No    Elopement Risk  Legal Hold: No  Ambulatory or Self Mobile in Wheelchair: No-Not an Elopement Risk  Disoriented: No  Psychiatric Symptoms: None  History of Wandering: No  Elopement this Admit: No  Vocalizing Wanting to Leave: No  Displays Behaviors, Body Language Wanting to Leave: No-Not at Risk for Elopement  Elopement Risk: Not at Risk for Elopement    Interdisciplinary Discharge Planning  Lives with - Patient's Self Care Capacity: Spouse  Patient or legal guardian wants to designate a caregiver (see row info): No  [facility]  Prior Services: None    Discharge Preparedness  What is your plan after discharge?: Home with help  What are your discharge supports?: Spouse  Prior Functional Level: Ambulatory  Difficulity with ADLs: None  Difficulity with IADLs: None    Functional Assesment  Prior Functional Level: Ambulatory    Finances  Financial Barriers to Discharge: No  Prescription Coverage: Yes    Vision / Hearing Impairment  Vision Impairment : Yes  Hearing Impairment : No         Advance Directive  Advance Directive?: Living Will    Domestic Abuse  Have you ever been the victim  of abuse or violence?: No  Physical Abuse or Sexual Abuse: No  Verbal Abuse or Emotional Abuse: No  Possible Abuse Reported to:: Not Applicable    Psychological Assessment  History of Substance Abuse: None  History of Psychiatric Problems: No  Non-compliant with Treatment: No  Newly Diagnosed Illness: No    Discharge Risks or Barriers  Discharge risks or barriers?: No    Anticipated Discharge Information  Anticipated discharge disposition: Home  Discharge Address: [facility]  Discharge Contact Phone Number: [phone] candido Montes

## 2019-11-17 NOTE — THERAPY
"Physical Therapy Evaluation completed.   Bed Mobility:  Supine to Sit: Supervised  Transfers: Sit to Stand: Supervised  Gait: Level Of Assist: Contact Guard Assist with Front-Wheel Walker       Plan of Care: Will benefit from Physical Therapy 3 times per week  Discharge Recommendations: Equipment: May require front wheeled walker at time of discharge depending on safety during functional mobility. She would prefer to continue using her cane.     See \"Rehab Therapy-Acute\" Patient Summary Report for complete documentation.     Ms. Paul presents s/p I&D with decreased functional mobility largely due to fear of weighting her right lower extremity. She also does have decreased range of motion. She was able to improve gait with cues and practice. She states that she is a fast  and would like to return to playing golf and pickleball as quickly as possible. She is limited from participating in these activities due to the above impairments. She would benefit from acute skilled physical therapy to help her return to baseline function and to normalize her gait. At this time I recommended that she utilize a walker to help her confidence level and decrese pain in her knee and to improve stability while her gait is abnormal, but she strongly prefers to use the cane. With persistent education she reluctantly accepted that she may need to use a walker however this will be determined closer to discharge pending her improvement in functional mobility and safety.   "

## 2019-11-17 NOTE — PROGRESS NOTES
Brief ID on call note    Was called and notified that pt's right knee culture + MSSA.   Pt is on vancomycin currently  Hemodynamically stable.     Pt was admitted on 11/15 for right knee septic arthritis s/p I&D of right knee. Intraop found to have large amount of purulent fluid. Cx + MSSA. BCx 11/15 negative to date.     Plan:  Discontinue vancomycin  Start cefazolin 2gr IV Q8H  Follow up blood cultures    D/w CIARA Mariscal    Full note to follow tomorrow.     Ace Bran, DO  ID

## 2019-11-17 NOTE — THERAPY
"Occupational Therapy Evaluation completed.   Functional Status:  Pt able to complete toilet transfer and toileting tasks supervised/mod independent.  Pt refusing any further OOB activity as she reports she suffers high fatigue with activity.  Discussed adaptive ADL strategies, however pt may benefit from further practice prior to DC home.  Plan of Care: Will benefit from Occupational Therapy 3 times per week  Discharge Recommendations:  Equipment: Will Continue to Assess for Equipment Needs.  Pt appears able to DC home with support from spouse at this time.    Pt is 75 y/o F seen for OT evaluation.  Pt admitted with septic arthritis of R knee and is s/p I&D.  Pt able to complete toileting tasks and toilet transfer without assistance, however pt refusing further activity.  Pt reports she experiences very high fatigue with activity and she does not want to have this fatigue when her family visits later.  Pt provided education on ADL's and adaptive strategies.  Pt will continue to benefit from acute OT services.    See \"Rehab Therapy-Acute\" Patient Summary Report for complete documentation.    "

## 2019-11-17 NOTE — CONSULTS
Consults   INFECTIOUS DISEASES INPATIENT CONSULT NOTE     Date of Service: 11/17/2019    Consult Requested By: Howard Ralph M.D.    Reason for Consultation:     History of Present Illness:   Bernadette Paul is a 74 y.o.  admitted 11/15/2019. Pt has no significant past medical history who presented complaining of right knee pain.  She reports several weeks of fatigue.  She then had sudden right knee pain ongoing for several days with progressive also with swelling and erythema.  She had arthrocentesis done on 11/15 and due to cell count was instructed to go into the ED.    Hospital Course:   She has been afebrile.  Leukocytosis on arrival of 17.8, now improved.  ESR on arrival of 44.  Orthopedics was consulted and she went to the OR on 11/15 for I&D of right septic knee.  A large amount of purulent fluid was encountered per op note.  Cultures were sent.  Right knee cultures now positive for MSSA.  Blood cultures were also obtained and are no growth to date.  She was initially started on cefazolin and vancomycin.       Review Of Systems:  Review of Systems   Constitutional: Positive for malaise/fatigue. Negative for chills and fever.   HENT: Negative for hearing loss.    Eyes: Negative for blurred vision and double vision.   Respiratory: Negative for cough, sputum production and shortness of breath.    Cardiovascular: Negative for chest pain and leg swelling.   Gastrointestinal: Negative for constipation, diarrhea, nausea and vomiting.   Genitourinary: Negative for dysuria.   Musculoskeletal: Positive for joint pain and myalgias.   Skin: Negative for rash.   Neurological: Negative for focal weakness.   Psychiatric/Behavioral: The patient is not nervous/anxious.          PMH:   Past Medical History:   Diagnosis Date   • Allergic rhinitis 6/17/2009   • Dyslipidemia 6/17/2009   • Preventative health care 6/17/2009   • S/P parathyroidectomy 6/17/2009       PSH:  Past Surgical History:   Procedure Laterality Date    • INCISION AND DRAINAGE ORTHOPEDIC Right 11/15/2019    Procedure: INCISION AND DRAINAGE, RIGHT KNEE  BY ORTHOPEDICS;  Surgeon: Ramsey Navas M.D.;  Location: SURGERY [facility];  Service: Orthopedics       FAMILY HX:  Family History   Problem Relation Age of Onset   • Heart Disease Mother        SOCIAL HX:  Social History     Socioeconomic History   • Marital status:      Spouse name: Not on file   • Number of children: Not on file   • Years of education: Not on file   • Highest education level: Not on file   Occupational History   • Not on file   Social Needs   • Financial resource strain: Not on file   • Food insecurity:     Worry: Not on file     Inability: Not on file   • Transportation needs:     Medical: Not on file     Non-medical: Not on file   Tobacco Use   • Smoking status: Never Smoker   • Smokeless tobacco: Never Used   Substance and Sexual Activity   • Alcohol use: Yes     Alcohol/week: 0.0 oz     Frequency: Monthly or less     Comment: occ wine    • Drug use: Never   • Sexual activity: Yes     Partners: Male   Lifestyle   • Physical activity:     Days per week: Not on file     Minutes per session: Not on file   • Stress: Not on file   Relationships   • Social connections:     Talks on phone: Not on file     Gets together: Not on file     Attends Restorationism service: Not on file     Active member of club or organization: Not on file     Attends meetings of clubs or organizations: Not on file     Relationship status: Not on file   • Intimate partner violence:     Fear of current or ex partner: Not on file     Emotionally abused: Not on file     Physically abused: Not on file     Forced sexual activity: Not on file   Other Topics Concern   • Not on file   Social History Narrative   • Not on file     Social History     Tobacco Use   Smoking Status Never Smoker   Smokeless Tobacco Never Used     Social History     Substance and Sexual Activity   Alcohol Use Yes   • Alcohol/week: 0.0 oz   •  Frequency: Monthly or less    Comment: occ wine        Allergies/Intolerances:  Allergies   Allergen Reactions   • Asa [Aspirin]      Epistaxis     • Nsaids      Epistaxis         History reviewed with the patient     Other Current Medications:    Current Facility-Administered Medications:   •  ceFAZolin in dextrose (ANCEF) IVPB premix 2 g, 2 g, Intravenous, Q8HRS, Ace Bran D.O., Stopped at 11/17/19 0647  •  senna-docusate (PERICOLACE or SENOKOT S) 8.6-50 MG per tablet 2 Tab, 2 Tab, Oral, BID, 2 Tab at 11/16/19 1758 **AND** [DISCONTINUED] polyethylene glycol/lytes (MIRALAX) PACKET 1 Packet, 1 Packet, Oral, QDAY PRN **AND** magnesium hydroxide (MILK OF MAGNESIA) suspension 30 mL, 30 mL, Oral, QDAY PRN, 30 mL at 11/17/19 0631 **AND** bisacodyl (DULCOLAX) suppository 10 mg, 10 mg, Rectal, QDAY PRN, Carter Gonsalez M.D.  •  NS infusion, , Intravenous, Continuous, Carter Gonsalez M.D., Last Rate: 75 mL/hr at 11/17/19 0133  •  heparin injection 5,000 Units, 5,000 Units, Subcutaneous, Q8HRS, Carter Gonsalez M.D., 5,000 Units at 11/17/19 0624  •  acetaminophen (TYLENOL) tablet 650 mg, 650 mg, Oral, Q6HRS PRN, Carter Gonsalez M.D., 650 mg at 11/15/19 2312  •  ondansetron (ZOFRAN ODT) dispertab 4 mg, 4 mg, Oral, Q4HRS PRN, Carter Gonsalez M.D.  •  morphine (pf) 4 MG/ML injection 2 mg, 2 mg, Intravenous, Q4HRS PRN, Carter Gonsalez M.D.  •  Pharmacy Consult Request ...Pain Management Review 1 Each, 1 Each, Other, PHARMACY TO DOSE, Ramsey Navas M.D.  •  ondansetron (ZOFRAN) syringe/vial injection 4 mg, 4 mg, Intravenous, Q4HRS PRN, Ramsey Navas M.D.  •  dexamethasone (DECADRON) injection 4 mg, 4 mg, Intravenous, Once PRN, Ramsey Navas M.D.  •  diphenhydrAMINE (BENADRYL) injection 25 mg, 25 mg, Intravenous, Q6HRS PRN, Ramsey Navas M.D.  •  haloperidol lactate (HALDOL) injection 1 mg, 1 mg, Intravenous, Q6HRS PRN, Ramsey Navas M.D.  •  scopolamine (TRANSDERM-SCOP) patch 1 Patch, 1 Patch, Transdermal, Q72HRS PRN, Ramsey THORPE  "FLORINDA Navas  •  docusate sodium (COLACE) capsule 100 mg, 100 mg, Oral, BID, Ramsey Navas M.D., 100 mg at 19 1758  •  polyethylene glycol/lytes (MIRALAX) PACKET 1 Packet, 1 Packet, Oral, BID PRN, Ramsey Navas M.D.  •  fleet enema 133 mL, 1 Each, Rectal, Once PRN, Ramsey Navas M.D.  •  acetaminophen (TYLENOL) tablet 650 mg, 650 mg, Oral, Q6HRS, Ramsey Navas M.D., 650 mg at 19 0543  •  ketorolac (TORADOL) injection 15 mg, 15 mg, Intravenous, Q6HR, Ramsey Navas M.D., 15 mg at 19 1153  •  oxyCODONE immediate-release (ROXICODONE) tablet 5 mg, 5 mg, Oral, Q3HRS PRN, Ramsey Navas M.D.  •  oxyCODONE immediate release (ROXICODONE) tablet 10 mg, 10 mg, Oral, Q3HRS PRN, Ramsey Navas M.D.  •  HYDROmorphone pf (DILAUDID) injection 0.5 mg, 0.5 mg, Intravenous, Q3HRS PRN, Ramsey Navas M.D.  [unfilled]    Most Recent Vital Signs:  /72   Pulse (!) 51 Comment: RN notified  Temp 36.8 °C (98.3 °F) (Temporal)   Resp 15   Ht 1.651 m (5' 5\")   Wt 62 kg (136 lb 11 oz)   SpO2 93%   BMI 22.75 kg/m²   Temp  Av.7 °C (98.1 °F)  Min: 36 °C (96.8 °F)  Max: 37.5 °C (99.5 °F)    Physical Exam:  Physical Exam   Constitutional: She is oriented to person, place, and time and well-developed, well-nourished, and in no distress.   HENT:   Head: Normocephalic and atraumatic.   Nose: Nose normal.   Mouth/Throat: Oropharynx is clear and moist.   Eyes: Pupils are equal, round, and reactive to light. Conjunctivae and EOM are normal.   Cardiovascular: Normal rate, regular rhythm and normal heart sounds.   Pulmonary/Chest: Effort normal and breath sounds normal.   Abdominal: Soft. Bowel sounds are normal.   Musculoskeletal: Normal range of motion.      Comments: Right knee in surgical dressings, some edema, no significant tenderness   Neurological: She is alert and oriented to person, place, and time.   Skin: Skin is warm.   Psychiatric: Affect and judgment normal. "           Pertinent Lab Results:  Recent Labs     11/15/19  1520 11/16/19  0413 11/17/19 0918   WBC 17.8* 12.1* 8.8      Recent Labs     11/15/19  1520 11/16/19  0413 11/17/19 0918   HEMOGLOBIN 13.7 12.0 13.0   HEMATOCRIT 39.6 35.3* 39.8   MCV 91.0 91.2 95.0   MCH 31.5 31.0 31.0   PLATELETCT 274 245 289         Recent Labs     11/15/19  1520 11/16/19  0413 11/17/19 0918   SODIUM 128* 129* 129*   POTASSIUM 4.1 4.5 4.0   CHLORIDE 94* 99 97   CO2 26 24 25   CREATININE 0.60 0.64 0.73        Recent Labs     11/15/19  1520 11/16/19  0413 11/17/19 0918   ALBUMIN 4.1 3.5 3.7        Pertinent Micro:  Results     Procedure Component Value Units Date/Time    Anaerobic Culture [[id number]] Collected:  11/15/19 1925    Order Status:  Completed Specimen:  Wound Updated:  11/17/19 1258     Significant Indicator NEG     Source WND     Site Right Knee     Culture Result Culture in progress.    Narrative:       Surgery - swabs received    Fungal Culture [[id number]] Collected:  11/15/19 1925    Order Status:  Completed Specimen:  Wound Updated:  11/17/19 1258     Significant Indicator NEG     Source WND     Site Right Knee     Culture Result No fungal growth to date.    Narrative:       Surgery - swabs received    AFB Culture [[id number]] Collected:  11/15/19 1925    Order Status:  Completed Specimen:  Wound Updated:  11/17/19 1258     Significant Indicator NEG     Source WND     Site Right Knee     Culture Result NOTE:  Swabs are not recommended for the isolation of  Mycobacteria, since they provide limited material.  They  are acceptable only if a specimen cannot be collected by  any other means.  Negative results obtained from these  specimens submitted on swabs are not reliable.  Culture in progress.       AFB Smear Results No acid fast bacilli seen.    Narrative:       Surgery - swabs received    CULTURE WOUND W/ GRAM STAIN [[id number]]  (Abnormal)  (Susceptibility) Collected:  11/15/19 1925    Order Status:  Completed Specimen:  " Wound Updated:  11/17/19 1258     Significant Indicator POS     Source WND     Site Right Knee     Culture Result -     Gram Stain Result Few WBCs.  No organisms seen.       Culture Result Staphylococcus aureus  Rare growth      Narrative:       Surgery - swabs received    Susceptibility     Staphylococcus aureus (1)     Antibiotic Interpretation Microscan Method Status    Clindamycin Sensitive <=0.5 mcg/mL SHAWN Final    Daptomycin Sensitive <=0.5 mcg/mL SHAWN Final    Erythromycin Sensitive <=0.5 mcg/mL SHAWN Final    Ampicillin/sulbactam Sensitive <=8/4 mcg/mL SHAWN Final    Moxifloxacin Sensitive <=0.5 mcg/mL SHAWN Final    Vancomycin Sensitive 1 mcg/mL SHAWN Final    Oxacillin Sensitive <=0.25 mcg/mL SHAWN Final    Trimeth/Sulfa Sensitive <=0.5/9.5 mcg/mL SHAWN Final    Tetracycline Sensitive <=4 mcg/mL SHAWN Final                   BLOOD CULTURE [[id number]]  (Abnormal) Collected:  11/15/19 1520    Order Status:  Completed Specimen:  Blood from Peripheral Updated:  11/17/19 1139     Significant Indicator POS     Source BLD     Site PERIPHERAL     Culture Result Growth detected by Bactec instrument. 11/16/2019  20:43  Staphylococcus aureus (methicillin sensitive)  detected by PCR.  Susceptibility to follow.        Staphylococcus aureus    Narrative:       CALL  Gross  131 tel. [phone],  CALLED  131 tel. [phone] 11/16/2019, 22:14, RB PERF. RESULTS CALLED  TO:[phone]  Per Hospital Policy: Only change Specimen Src: to \"Line\" if  specified by physician order.  No site indicated    GRAM STAIN [[id number]] Collected:  11/15/19 1925    Order Status:  Completed Specimen:  Wound Updated:  11/17/19 0028     Significant Indicator .     Source WND     Site Right Knee     Gram Stain Result Few WBCs.  No organisms seen.      Narrative:       Surgery - swabs received    Acid Fast Stain [[id number]] Collected:  11/15/19 1925    Order Status:  Completed Specimen:  Wound Updated:  11/17/19 0028     Significant Indicator NEG     Source WND     " "Site Right Knee     AFB Smear Results No acid fast bacilli seen.    Narrative:       Surgery - swabs received    BLOOD CULTURE [[id number]] Collected:  11/15/19 1549    Order Status:  Completed Specimen:  Blood from Peripheral Updated:  19 0650     Significant Indicator NEG     Source BLD     Site PERIPHERAL     Culture Result No Growth  Note: Blood cultures are incubated for 5 days and  are monitored continuously.Positive blood cultures  are called to the RN and reported as soon as  they are identified.      Narrative:       Per Hospital Policy: Only change Specimen Src: to \"Line\" if  specified by physician order.  Right AC        No results found for: BLOODCULTU, BLDCULT, BCHOLD     Studies:  Us-extremity Venous Lower Unilat Right    Result Date: 11/15/2019   Vascular Laboratory  CONCLUSIONS  Normal right lower extremity superficial and deep venous examination.  No prior  BLAYNE, SAMARA  Exam Date:     11/15/2019 16:33  Room #:     Inpatient  Priority:     Routine  Ht (in):             Wt (lb):  Ordering Physician:        YUMI MAYFIELD  Referring Physician:       TRAN Sol  Sonographer:               Brice Tovar RDCS,                             RVT  Study Type:                Complete Unilateral  Technical Quality:         Adequate  Age:    74    Gender:     F  MRN:    [id number]  :    1945      BSA:  Indications:     Swelling of Limb, Pain in Limb  CPT Codes:       24134  ICD Codes:       729.81  729.5  History:  Limitations:  PROCEDURES:  Right lower extremity venous duplex imaging.  The following venous structures were evaluated: common femoral, profunda  femoral, femoral, popliteal , peroneal and posterior tibial veins.  Serial compression, augmentation maneuvers,  color and spectral Doppler  flow evaluations were performed.  FINDINGS:  Right lower extremity:    Complete color filling and compressibility with normal venous flow dynamics  including spontaneous flow, response to augmentation " "maneuvers, and  respiratory phasicity.  Flow was evaluated in the contralateral common femoral vein and normal  venous flow dynamics including spontaneous flow, respiratory phasic  variation and augmentation were demonstrated.  No superficial or deep venous thrombosis.  Gen King MD  (Electronically Signed)  Final Date:      15 November 2019                   17:20    Nm-cardiac Stress Test    Result Date: 2019   Myocardial Perfusion  Report  NUCLEAR IMAGING INTERPRETATION  No evidence of significant jeopardized viable myocardium or prior myocardial  infarction.  Normal left ventricular size, ejection fraction, and wall motion.  ECG INTERPRETATION  Negative stress ECG for ischemia.  SAMARA CISNEROS  MRN:    [id number]         Gender:    F  Exam Date: 2019 08:54  Exam Location:      Out Patient  Ordering Phys:     OLEGARIO GASCA  NucMed Tech:       RT Gwendolyn,                     CRISTY  Age:    74    :    1945        Ht (in):     65  Wt (lb):     135    BMI:    22.49       Cardiologis                                          t  Risk Factors:             Family history of coronary disease  Indications:              Fatigue, Chest Pain  ICD Codes:                780.79  786.5  Cardiac History:          Positive risk factors, Chest Pain; Heart Murmur  Cardiac Meds:  Meds Past 24 hrs:  Pretest Chest Pain:       No symptoms  STRESS TEST      Pharmacologi                   c  Nolvia   Lexiscan w/    Dose: 0.4 mg  ol:      Exer  Post-Injection Exercise:        An additional 1 minutes of exercise followed the                                  intravenous injection  Resting HR (bpm):      66  Peak HR (bpm):         113  Resting BP (mmHg):       112    /   59  Peak BP (mmHg):       132   /   66  MaxPHR:     146     Target HR (bpm):       124  % MaxPHR:     77  Double Product:       21904  BP Response:  Stress Termination:       Protocol completed  Stress Symptoms:  FEELS \"WEIRD\",FLUSHED  ECG  " Resting ECG:     Sinus rhythm.  Stress ECG:      No ischemic changes with Regadenoson.  IMAGE PROTOCOL      Rest/Stress 1                      Day          RadiopharmaceuticalDose (mCi)   Imaging  Date      Imaging  Time         Inj to Img Time (min)  Rest:   Tc-99m             7.71         11-Nov-2019        09:26          Tetrofosmin  Stress: Tc-99m             28.1         11-Nov-2019        10:21          Tetrofosmin  Rest:  Administration Site:       Left antecubital                             fossa  Administered by:      RT Snow CNMT  Stress:  Administration Site:       Left antecubital                             fossa  Administered by:      RT Snow CNMT  % Percent HR Achieved:  SPECT RESULTS  Technical Quality:       Good  Raw Data Analysis:  Summed Stress Score:    1  Summed Rest Score:    0  Summed Difference Score:        1  PERFUSION:  Normal myocardial perfusion with no ischemia.  FUNCTIONAL RESULTS (calculated via Gated SPECT)  Stress Image LV EF:        79     %  Upper Normal Limit  Stress EDV:      61     ml   EDVI:    36      ml/m???  Stress ESV:      13     ml   ESVI:    8       ml/m???  TID:    1      TID - 1.19      TID (ed) - 1.23  LV Function:  Normal left ventricular wall motion.  LV ejection fraction = 79%.  Sergo Moses MD  (Electronically Signed)  Final Date:      11 November 2019                   11:17    Ec-echocardiogram Complete W/o Cont    Result Date: 10/30/2019  Transthoracic Echo Report Echocardiography Laboratory CONCLUSIONS No prior study is available for comparison. Normal left ventricular size and systolic function. Left ventricular ejection fraction is visually estimated to be 60%. Normal diastolic function. Normal right ventricular size and systolic function. Mild aortic insufficiency. SAMARA CISNEROS Exam Date:         10/30/2019                    10:17 Exam Location:     Out Patient Priority:          Routine Ordering Physician:        ELO  OLEGARIO BARNES Referring Physician:       [id number] Sonographer:               Leeanna Montelongo ZURI Age:    74     Gender:    F MRN:    [id number] :    1945 BSA:    1.66   Ht (in):    65     Wt (lb):    132 Exam Type:     Complete Indications:     Murmur ICD Codes:       785.2 CPT Codes:       51519 BP:   128    /   80     HR:   60 Technical Quality:       Fair MEASUREMENTS  (Male / Female) Normal Values 2D ECHO LV Diastolic Diameter PLAX        3.3 cm                4.2 - 5.9 / 3.9 - 5.3 cm LV Systolic Diameter PLAX         1.7 cm                2.1 - 4.0 cm IVS Diastolic Thickness           1.3 cm                LVPW Diastolic Thickness          0.98 cm               LVOT Diameter                     1.9 cm                Estimated LV Ejection Fraction    60 %                  LV Ejection Fraction MOD BP       67.2 %                >= 55  % LV Ejection Fraction MOD 4C       72.9 %                LV Ejection Fraction MOD 2C       60.2 %                IVC Diameter                      1.4 cm                DOPPLER AV Peak Velocity                  1.5 m/s               AV Peak Gradient                  8.6 mmHg              AV Mean Gradient                  4.8 mmHg              AI Pressure Half Time             559 ms                LVOT Peak Velocity                1 m/s                 AV Area Cont Eq vti               1.9 cm???               Mitral E Point Velocity           0.75 m/s              Mitral E to A Ratio               0.72                  MV Pressure Half Time             58.5 ms               MV Area PHT                       3.8 cm???               MV Deceleration Time              202 ms                TR Peak Velocity                  235 cm/s              PV Peak Velocity                  0.78 m/s              PV Peak Gradient                  2.4 mmHg              RVOT Peak Velocity                0.7 m/s               * Indicates values subject to auto-interpretation LV EF:  60    %  FINDINGS Left Ventricle Normal left ventricular size and systolic function. Mild concentric left ventricular hypertrophy. Normal regional wall motion. Left ventricular ejection fraction is visually estimated to be 60%. Normal diastolic function. Right Ventricle Normal right ventricular size and systolic function. Right Atrium Normal right atrial size. Normal inferior vena cava size and inspiratory collapse. Left Atrium Normal left atrial size. Left atrial volume index is  14 mL/sq m. Mitral Valve Structurally normal mitral valve without significant stenosis or regurgitation. Aortic Valve Tricuspid aortic valve. Aortic sclerosis without stenosis. Mild aortic insufficiency. Pressure half time is    571 msec. Tricuspid Valve Structurally normal tricuspid valve. Mild tricuspid regurgitation. Right atrial pressure is estimated to be 3 mmHg. Estimated right ventricular systolic pressure  is 25 mmHg. Pulmonic Valve Structurally normal pulmonic valve without significant stenosis or regurgitation. Pericardium Normal pericardium without effusion. Aorta The aortic root is normal.  Ascending aorta diameter is 2.6  cm. Olvin Weldon MD (Electronically Signed) Final Date:     30 October 2019                 15:07      ASSESSMENT/PLAN:      Bernadette Paul is a 74 y.o.  admitted 11/15/2019. Pt has no significant past medical history who presented complaining of right knee pain.   She had arthrocentesis done on 11/15 and due to cell count was instructed to go into the ED.    Hospital Course:   She has been afebrile.  Leukocytosis on arrival of 17.8, now improved.  ESR on arrival of 44.  Orthopedics was consulted and she went to the OR on 11/15 for I&D of right septic knee.  A large amount of purulent fluid was encountered per op note.  Cultures were sent.  Right knee cultures now positive for MSSA.  Blood cultures were also obtained have just returned is positive for MSSA. She was initially started on cefazolin and  vancomycin.     Bacteremia, MSSA, etiology unclear.  May have had bacteremia which seeded joint or the other way around.  No recent procedures or trauma.  She has no foreign material in her body.  -Cultures from 11/15 1/2+  Right septic native joint, MSSA on culture  Leukocytosis, improved     --- Continue cefazolin  --- Repeat blood cultures, ordered  --- Will need echocardiogram  --- No PICC line until blood cultures are clear for least 48 hours          Plan of care discussed with YARY Ralph M.D.. Will continue to follow    Carlota Richards M.D.

## 2019-11-17 NOTE — PROGRESS NOTES
No issues during the night. Dressing clean/dry/intact. Walked to the bathroom with minimal assist using walker. Leg elevated. + fluid cultures.

## 2019-11-17 NOTE — PROGRESS NOTES
HOSPITAL MEDICINE PROGRESS NOTE    Date of service  11/17/2019    Chief Complaint  74 y.o. year old female here with Knee Pain (Pt sent from [facility]. pt found to have bacteria in fluid drained from right knee. sent to [facility] for further care. )      Assessment/Plan  Interval History   Active Hospital Problems    Diagnosis   • Pyogenic arthritis of right knee joint (HCC) [M00.9]     Priority: High   • Transaminitis [R74.0]   • Chronic fatigue [R53.82]   • Leukocytosis [D72.829]   • Hyponatremia [E87.1]     2/12/13 Na 136  2/13/14 Na 131  3/15/16 Na 137  5/25/16 Na 134  9/20/17 Na 131  10/2/19 Na 129  10/9/19 Na 134  10/15/19 serum osm 277 (278-298), urine osm 128 (300-900), urine Na 14, cortisol 9.8, ACTH 15       Seen and examined.  Admitted with septic right knee, successful I&D on 11/15/2019.  Aspirate cultures positive for staph aureus, MSSA. Switched to cefaz, dc vanc.     Complains of chronic fatigue over the last few weeks, affecting her golf game.  Has had extensive work-up as an outpatient, all labs so far unremarkable.  Recently had cortisol checked a.m. and p.m. and work-up for Sledge's, cortisol normal, ACTH normal, REGGIE negative, Sjogren's work-up negative, vitamin B12 normal 1100, vitamin D normal, zinc normal, iron normal.  Reviewed again with her and  on 11/17/2019, updated that there is no evidence of low cortisol and unlikely to be Sledge's.     , alk phos 102.  Unclear etiology for transaminitis although now resolving.    Leukocytosis improving, 17k --> 12k    MSSA from wound site, switch to cefazolin    Disposition  Inpatient  Likely needs rehab and long-term antibiotics     Pyogenic arthritis of right knee joint (HCC)  I&D 11/15/2019  Staph aureus on aspirate, MSSA  ID follow-up  Was on vancomycin, switch to cefazolin  Await ID insight into length of antibiotics    Leukocytosis  Related to above    Hyponatremia  On IV fluid, improving  Follow CMP in the  morning    Chronic fatigue  Has vitiligo, outpatient providers thought may be autoimmune  Reviewed chart, all autoimmune work-up so far negative  Cortisol normal, ACTH normal no need to pursue Hitchcock's work-up  Follow a.m. labs    Transaminitis  New  Unclear etiology  Improving  Follow a.m. labs      Consultants/Specialty  Orthopaedics    I discussed this patient's plan of care with Ulices URBINA    Hospital Course:      Admitted with septic right knee, successful I&D on 11/15/2019.  Aspirate cultures positive for staph aureus.  Awaiting final cultures.            Review of Systems  Physical Exam   ROS  Vitals:    11/16/19 1628 11/16/19 2057 11/17/19 0419 11/17/19 0748   BP: 115/65 113/56 134/62 130/72   Pulse: (!) 59 68 (!) 59 (!) 51   Resp: 16 16 16 15   Temp: 36.9 °C (98.4 °F) 37.4 °C (99.3 °F) 37.2 °C (98.9 °F) 36.8 °C (98.3 °F)   TempSrc: Temporal Temporal Temporal Temporal   SpO2: 96% 93% 95% 93%   Weight:       Height:         Physical Exam     Hematology Chemistry   Lab Results   Component Value Date/Time    WBC 8.8 11/17/2019 09:18 AM    HEMOGLOBIN 13.0 11/17/2019 09:18 AM    HEMATOCRIT 39.8 11/17/2019 09:18 AM    PLATELETCT 289 11/17/2019 09:18 AM     Lab Results   Component Value Date/Time    SODIUM 129 (L) 11/17/2019 09:18 AM    POTASSIUM 4.0 11/17/2019 09:18 AM    CHLORIDE 97 11/17/2019 09:18 AM    CO2 25 11/17/2019 09:18 AM    GLUCOSE 143 (H) 11/17/2019 09:18 AM    BUN 16 11/17/2019 09:18 AM    CREATININE 0.73 11/17/2019 09:18 AM    CREATININE 0.9 06/01/2005 08:44 AM         Labs not explicitly included in this progress note were reviewed by the author.   Radiology/imaging not explicitly included in this progress note was reviewed by the author.     Medications:  • ceFAZolin  2 g Q8HRS   • senna-docusate  2 Tab BID    And   • magnesium hydroxide  30 mL QDAY PRN    And   • bisacodyl  10 mg QDAY PRN   • NS   Continuous   • heparin  5,000 Units Q8HRS   • acetaminophen  650 mg Q6HRS PRN   • ondansetron  4  mg Q4HRS PRN   • morphine injection  2 mg Q4HRS PRN   • Pharmacy Consult Request  1 Each PHARMACY TO DOSE   • ondansetron  4 mg Q4HRS PRN   • dexamethasone  4 mg Once PRN   • diphenhydrAMINE  25 mg Q6HRS PRN   • haloperidol lactate  1 mg Q6HRS PRN   • scopolamine  1 Patch Q72HRS PRN   • docusate sodium  100 mg BID   • polyethylene glycol/lytes  1 Packet BID PRN   • fleet  1 Each Once PRN   • acetaminophen  650 mg Q6HRS   • ketorolac  15 mg Q6HR   • oxyCODONE immediate-release  5 mg Q3HRS PRN   • oxyCODONE immediate release  10 mg Q3HRS PRN   • HYDROmorphone  0.5 mg Q3HRS PRN       Full Code    I have performed a physical exam and reviewed and updated ROS as of today, 11/17/2019.  In review of yesterday's note dated, 11/16/2019, there are no changes except as documented above.

## 2019-11-18 ENCOUNTER — APPOINTMENT (OUTPATIENT)
Dept: CARDIOLOGY | Facility: MEDICAL CENTER | Age: 74
DRG: 486 | End: 2019-11-18
Attending: HOSPITALIST
Payer: MEDICARE

## 2019-11-18 PROBLEM — R78.81 BACTEREMIA DUE TO GRAM-POSITIVE BACTERIA: Status: ACTIVE | Noted: 2019-11-18

## 2019-11-18 LAB
BACTERIA BLD CULT: ABNORMAL
BACTERIA BLD CULT: ABNORMAL
BACTERIA SPEC ANAEROBE CULT: NORMAL
LV EJECT FRACT  99904: 60
SIGNIFICANT IND 70042: ABNORMAL
SIGNIFICANT IND 70042: NORMAL
SITE SITE: ABNORMAL
SITE SITE: NORMAL
SOURCE SOURCE: ABNORMAL
SOURCE SOURCE: NORMAL

## 2019-11-18 PROCEDURE — 700102 HCHG RX REV CODE 250 W/ 637 OVERRIDE(OP): Performed by: INTERNAL MEDICINE

## 2019-11-18 PROCEDURE — 700111 HCHG RX REV CODE 636 W/ 250 OVERRIDE (IP): Performed by: ORTHOPAEDIC SURGERY

## 2019-11-18 PROCEDURE — 770006 HCHG ROOM/CARE - MED/SURG/GYN SEMI*

## 2019-11-18 PROCEDURE — 700111 HCHG RX REV CODE 636 W/ 250 OVERRIDE (IP): Performed by: INTERNAL MEDICINE

## 2019-11-18 PROCEDURE — A9270 NON-COVERED ITEM OR SERVICE: HCPCS | Performed by: INTERNAL MEDICINE

## 2019-11-18 PROCEDURE — 93308 TTE F-UP OR LMTD: CPT | Mod: 26 | Performed by: INTERNAL MEDICINE

## 2019-11-18 PROCEDURE — 93308 TTE F-UP OR LMTD: CPT

## 2019-11-18 PROCEDURE — 99233 SBSQ HOSP IP/OBS HIGH 50: CPT | Performed by: HOSPITALIST

## 2019-11-18 PROCEDURE — 99232 SBSQ HOSP IP/OBS MODERATE 35: CPT | Performed by: INTERNAL MEDICINE

## 2019-11-18 RX ADMIN — HEPARIN SODIUM 5000 UNITS: 5000 INJECTION, SOLUTION INTRAVENOUS; SUBCUTANEOUS at 21:14

## 2019-11-18 RX ADMIN — KETOROLAC TROMETHAMINE 15 MG: 30 INJECTION, SOLUTION INTRAMUSCULAR at 06:19

## 2019-11-18 RX ADMIN — KETOROLAC TROMETHAMINE 15 MG: 30 INJECTION, SOLUTION INTRAMUSCULAR at 18:02

## 2019-11-18 RX ADMIN — HEPARIN SODIUM 5000 UNITS: 5000 INJECTION, SOLUTION INTRAVENOUS; SUBCUTANEOUS at 06:20

## 2019-11-18 RX ADMIN — CEFAZOLIN SODIUM 2 G: 2 INJECTION, SOLUTION INTRAVENOUS at 13:59

## 2019-11-18 RX ADMIN — CEFAZOLIN SODIUM 2 G: 2 INJECTION, SOLUTION INTRAVENOUS at 06:17

## 2019-11-18 RX ADMIN — KETOROLAC TROMETHAMINE 15 MG: 30 INJECTION, SOLUTION INTRAMUSCULAR at 12:04

## 2019-11-18 RX ADMIN — HEPARIN SODIUM 5000 UNITS: 5000 INJECTION, SOLUTION INTRAVENOUS; SUBCUTANEOUS at 13:58

## 2019-11-18 RX ADMIN — MAGNESIUM HYDROXIDE 30 ML: 400 SUSPENSION ORAL at 06:18

## 2019-11-18 RX ADMIN — CEFAZOLIN SODIUM 2 G: 2 INJECTION, SOLUTION INTRAVENOUS at 21:14

## 2019-11-18 ASSESSMENT — ENCOUNTER SYMPTOMS
NAUSEA: 0
SENSORY CHANGE: 0
SHORTNESS OF BREATH: 0
TINGLING: 0
FEVER: 0
ABDOMINAL PAIN: 0
COUGH: 0
CHILLS: 0
DIARRHEA: 0
MYALGIAS: 1
HEADACHES: 0
VOMITING: 0
HEARTBURN: 0
DIZZINESS: 0
TREMORS: 0

## 2019-11-18 NOTE — PROGRESS NOTES
POD#3  S/P I&D knee  MSSA bacteremia  WBAT  ABX per ID  PICC line when Blood cultures negative  Case coordination

## 2019-11-18 NOTE — ASSESSMENT & PLAN NOTE
New  1 of 2 blood cultures + for MSSA  Continue cefazolin  Repeat blood cultures 11/17/2019 neg to date   TTE neg  ID following appreciate rec.

## 2019-11-18 NOTE — PROGRESS NOTES
POD#2  S/P I&D knee  MSSA bacteremia  ID consult appreciated  ABX per ID  PICC line when Blood cultures negative

## 2019-11-18 NOTE — PROGRESS NOTES
Infectious Disease Progress Note    Author: Natalya Anderson M.D. Date & Time of service: 2019  11:59 AM    Chief Complaint:  FU Right knee septic arthritis, MSSA bacteremia    Interval History:  75 yo otherwise healthy woman admitted for acute R knee pain. S/p arthrocentesis on 11/15. Found to have R knee SA s/p I&D on 11/15. Also with MSSA bacteremia     AF no CBC done today. Has more knee flexion each day. Pain controlled. Plan for TTE today. Tolerating abx without issues. Would like to do home IV abx if possible. Repeat Bcx NGTD    Labs Reviewed and Wound Reviewed.    Review of Systems:  Review of Systems   Constitutional: Negative for chills and fever.   Respiratory: Negative for cough and shortness of breath.    Gastrointestinal: Negative for abdominal pain, diarrhea, nausea and vomiting.   Musculoskeletal: Positive for joint pain.        R knee   Neurological: Negative for dizziness and headaches.   All other systems reviewed and are negative.      Hemodynamics:  Temp (24hrs), Av.7 °C (98.1 °F), Min:36.3 °C (97.3 °F), Max:37 °C (98.6 °F)  Temperature: 36.9 °C (98.5 °F)  Pulse  Av.3  Min: 51  Max: 81   Blood Pressure : 133/70       Physical Exam:  Physical Exam  Constitutional:       Appearance: Normal appearance.   HENT:      Head: Normocephalic and atraumatic.      Nose: Nose normal.      Mouth/Throat:      Mouth: Mucous membranes are moist.      Pharynx: No oropharyngeal exudate.   Eyes:      Extraocular Movements: Extraocular movements intact.      Conjunctiva/sclera: Conjunctivae normal.      Pupils: Pupils are equal, round, and reactive to light.   Neck:      Musculoskeletal: Normal range of motion and neck supple.   Cardiovascular:      Rate and Rhythm: Normal rate and regular rhythm.   Pulmonary:      Effort: Pulmonary effort is normal.      Breath sounds: Normal breath sounds.   Abdominal:      Palpations: Abdomen is soft.      Tenderness: There is no tenderness.    Musculoskeletal:         General: Swelling and tenderness present.      Comments: R knee surgical site well approximated with staples in place. No active drainage or surrounding erythema. +Edema   Neurological:      General: No focal deficit present.      Mental Status: She is alert and oriented to person, place, and time.   Psychiatric:         Mood and Affect: Mood normal.         Behavior: Behavior normal.      Comments: Very pleasant         Meds:    Current Facility-Administered Medications:   •  ceFAZolin  •  senna-docusate **AND** [DISCONTINUED] polyethylene glycol/lytes **AND** magnesium hydroxide **AND** bisacodyl  •  NS  •  heparin  •  acetaminophen  •  ondansetron  •  morphine injection  •  Pharmacy Consult Request  •  ondansetron  •  dexamethasone  •  diphenhydrAMINE  •  haloperidol lactate  •  scopolamine  •  docusate sodium  •  polyethylene glycol/lytes  •  fleet  •  acetaminophen  •  ketorolac  •  oxyCODONE immediate-release  •  oxyCODONE immediate release  •  HYDROmorphone    Labs:  Recent Labs     11/15/19  1520 11/16/19  0413 11/17/19 0918   WBC 17.8* 12.1* 8.8   RBC 4.35 3.87* 4.19*   HEMOGLOBIN 13.7 12.0 13.0   HEMATOCRIT 39.6 35.3* 39.8   MCV 91.0 91.2 95.0   MCH 31.5 31.0 31.0   RDW 42.9 43.4 46.1   PLATELETCT 274 245 289   MPV 10.1 9.9 9.6   NEUTSPOLYS 94.70*  --  89.40*   LYMPHOCYTES 2.20*  --  6.50*   MONOCYTES 2.50  --  3.30   EOSINOPHILS 0.00  --  0.00   BASOPHILS 0.10  --  0.10     Recent Labs     11/15/19  1520 11/16/19  0413 11/17/19  0918   SODIUM 128* 129* 129*   POTASSIUM 4.1 4.5 4.0   CHLORIDE 94* 99 97   CO2 26 24 25   GLUCOSE 119* 123* 143*   BUN 12 14 16     Recent Labs     11/15/19  1520 11/16/19  0413 11/17/19  0918   ALBUMIN 4.1 3.5 3.7   TBILIRUBIN 0.6 0.6 0.4   ALKPHOSPHAT 57 102* 86   TOTPROTEIN 7.2 5.9* 6.3   ALTSGPT 22 201* 126*   ASTSGOT 17 159* 52*   CREATININE 0.60 0.64 0.73       Imaging:  Us-extremity Venous Lower Unilat Right    Result Date: 11/15/2019   Vascular  Laboratory  CONCLUSIONS  Normal right lower extremity superficial and deep venous examination.  No prior  SAMARA CISNEROS  Exam Date:     11/15/2019 16:33  Room #:     Inpatient  Priority:     Routine  Ht (in):             Wt (lb):  Ordering Physician:        YUMI MAYFIELD  Referring Physician:       [id number] Jimenez  Sonographer:               Brice Tovar RDCS,                             RVT  Study Type:                Complete Unilateral  Technical Quality:         Adequate  Age:    74    Gender:     F  MRN:    [id number]  :    1945      BSA:  Indications:     Swelling of Limb, Pain in Limb  CPT Codes:       36533  ICD Codes:       729.81  729.5  History:  Limitations:  PROCEDURES:  Right lower extremity venous duplex imaging.  The following venous structures were evaluated: common femoral, profunda  femoral, femoral, popliteal , peroneal and posterior tibial veins.  Serial compression, augmentation maneuvers,  color and spectral Doppler  flow evaluations were performed.  FINDINGS:  Right lower extremity:    Complete color filling and compressibility with normal venous flow dynamics  including spontaneous flow, response to augmentation maneuvers, and  respiratory phasicity.  Flow was evaluated in the contralateral common femoral vein and normal  venous flow dynamics including spontaneous flow, respiratory phasic  variation and augmentation were demonstrated.  No superficial or deep venous thrombosis.  Gen King MD  (Electronically Signed)  Final Date:      15 November 2019                   17:20    Nm-cardiac Stress Test    Result Date: 2019   Myocardial Perfusion  Report  NUCLEAR IMAGING INTERPRETATION  No evidence of significant jeopardized viable myocardium or prior myocardial  infarction.  Normal left ventricular size, ejection fraction, and wall motion.  ECG INTERPRETATION  Negative stress ECG for ischemia.  SAMARA CISNEROS  MRN:    [id number]         Gender:    F  Exam Date: 2019 08:54  Exam  "Location:      Out Patient  Ordering Phys:     OLEGARIO GASCA  NucMed Tech:       RT Snow CNMT  Age:    74    :    1945        Ht (in):     65  Wt (lb):     135    BMI:    22.49       Cardiologis                                          t  Risk Factors:             Family history of coronary disease  Indications:              Fatigue, Chest Pain  ICD Codes:                780.79  786.5  Cardiac History:          Positive risk factors, Chest Pain; Heart Murmur  Cardiac Meds:  Meds Past 24 hrs:  Pretest Chest Pain:       No symptoms  STRESS TEST      Pharmacologi                   jeison De Souza   Lexiscan w/    Dose: 0.4 mg  ol:      Exer  Post-Injection Exercise:        An additional 1 minutes of exercise followed the                                  intravenous injection  Resting HR (bpm):      66  Peak HR (bpm):         113  Resting BP (mmHg):       112    /   59  Peak BP (mmHg):       132   /   66  MaxPHR:     146     Target HR (bpm):       124  % MaxPHR:     77  Double Product:       43226  BP Response:  Stress Termination:       Protocol completed  Stress Symptoms:  FEELS \"WEIRD\",FLUSHED  ECG  Resting ECG:     Sinus rhythm.  Stress ECG:      No ischemic changes with Regadenoson.  IMAGE PROTOCOL      Rest/Stress 1                      Day          RadiopharmaceuticalDose (mCi)   Imaging  Date      Imaging  Time         Inj to Img Time (min)  Rest:   Tc-99m             7.71         2019        09:26          Tetrofosmin  Stress: Tc-99m             28.1         2019        10:21          Tetrofosmin  Rest:  Administration Site:       Left antecubital                             fossa  Administered by:      RT Snow CNMT  Stress:  Administration Site:       Left antecubital                             fossa  Administered by:      RT Snow CNMT  % Percent HR Achieved:  SPECT RESULTS  Technical Quality:       Good  Raw Data Analysis:  Summed Stress " Score:    1  Summed Rest Score:    0  Summed Difference Score:        1  PERFUSION:  Normal myocardial perfusion with no ischemia.  FUNCTIONAL RESULTS (calculated via Gated SPECT)  Stress Image LV EF:        79     %  Upper Normal Limit  Stress EDV:      61     ml   EDVI:    36      ml/m???  Stress ESV:      13     ml   ESVI:    8       ml/m???  TID:    1      TID - 1.19      TID (ed) - 1.23  LV Function:  Normal left ventricular wall motion.  LV ejection fraction = 79%.  Sergo Moses MD  (Electronically Signed)  Final Date:      2019                   11:17    Ec-echocardiogram Complete W/o Cont    Result Date: 10/30/2019  Transthoracic Echo Report Echocardiography Laboratory CONCLUSIONS No prior study is available for comparison. Normal left ventricular size and systolic function. Left ventricular ejection fraction is visually estimated to be 60%. Normal diastolic function. Normal right ventricular size and systolic function. Mild aortic insufficiency. SAMARA CISNEROS Exam Date:         10/30/2019                    10:17 Exam Location:     Out Patient Priority:          Routine Ordering Physician:        OLEGARIO GASCA Referring Physician:       [id number] Walls Sonographer:               Leeanna Montelongo RDCS Age:    74     Gender:    F MRN:    [id number] :    1945 BSA:    1.66   Ht (in):    65     Wt (lb):    132 Exam Type:     Complete Indications:     Murmur ICD Codes:       785.2 CPT Codes:       98750 BP:   128    /   80     HR:   60 Technical Quality:       Fair MEASUREMENTS  (Male / Female) Normal Values 2D ECHO LV Diastolic Diameter PLAX        3.3 cm                4.2 - 5.9 / 3.9 - 5.3 cm LV Systolic Diameter PLAX         1.7 cm                2.1 - 4.0 cm IVS Diastolic Thickness           1.3 cm                LVPW Diastolic Thickness          0.98 cm               LVOT Diameter                     1.9 cm                Estimated LV Ejection Fraction    60 %                  LV  Ejection Fraction MOD BP       67.2 %                >= 55  % LV Ejection Fraction MOD 4C       72.9 %                LV Ejection Fraction MOD 2C       60.2 %                IVC Diameter                      1.4 cm                DOPPLER AV Peak Velocity                  1.5 m/s               AV Peak Gradient                  8.6 mmHg              AV Mean Gradient                  4.8 mmHg              AI Pressure Half Time             559 ms                LVOT Peak Velocity                1 m/s                 AV Area Cont Eq vti               1.9 cm???               Mitral E Point Velocity           0.75 m/s              Mitral E to A Ratio               0.72                  MV Pressure Half Time             58.5 ms               MV Area PHT                       3.8 cm???               MV Deceleration Time              202 ms                TR Peak Velocity                  235 cm/s              PV Peak Velocity                  0.78 m/s              PV Peak Gradient                  2.4 mmHg              RVOT Peak Velocity                0.7 m/s               * Indicates values subject to auto-interpretation LV EF:  60    % FINDINGS Left Ventricle Normal left ventricular size and systolic function. Mild concentric left ventricular hypertrophy. Normal regional wall motion. Left ventricular ejection fraction is visually estimated to be 60%. Normal diastolic function. Right Ventricle Normal right ventricular size and systolic function. Right Atrium Normal right atrial size. Normal inferior vena cava size and inspiratory collapse. Left Atrium Normal left atrial size. Left atrial volume index is  14 mL/sq m. Mitral Valve Structurally normal mitral valve without significant stenosis or regurgitation. Aortic Valve Tricuspid aortic valve. Aortic sclerosis without stenosis. Mild aortic insufficiency. Pressure half time is    571 msec. Tricuspid Valve Structurally normal tricuspid valve. Mild tricuspid regurgitation.  "Right atrial pressure is estimated to be 3 mmHg. Estimated right ventricular systolic pressure  is 25 mmHg. Pulmonic Valve Structurally normal pulmonic valve without significant stenosis or regurgitation. Pericardium Normal pericardium without effusion. Aorta The aortic root is normal.  Ascending aorta diameter is 2.6  cm. Ovlin Weldon MD (Electronically Signed) Final Date:     30 October 2019                 15:07      Micro:  Results     Procedure Component Value Units Date/Time    BLOOD CULTURE [[id number]]  (Abnormal)  (Susceptibility) Collected:  11/15/19 1520    Order Status:  Completed Specimen:  Blood from Peripheral Updated:  11/18/19 0815     Significant Indicator POS     Source BLD     Site PERIPHERAL     Culture Result Growth detected by Bactec instrument. 11/16/2019  20:43  Staphylococcus aureus (methicillin sensitive)  detected by PCR.        Staphylococcus aureus    Narrative:       CALL  Gross  131 tel. [phone],  CALLED  131 tel. [phone] 11/16/2019, 22:14, RB PERF. RESULTS CALLED  TO:[phone]  Per Hospital Policy: Only change Specimen Src: to \"Line\" if  specified by physician order.  No site indicated    Susceptibility     Staphylococcus aureus (1)     Antibiotic Interpretation Microscan Method Status    Clindamycin Sensitive <=0.5 mcg/mL SHAWN Final    Daptomycin Sensitive <=0.5 mcg/mL SHAWN Final    Erythromycin Sensitive <=0.5 mcg/mL SHAWN Final    Ampicillin/sulbactam Sensitive <=8/4 mcg/mL SHAWN Final    Moxifloxacin Sensitive <=0.5 mcg/mL SHAWN Final    Vancomycin Sensitive 1 mcg/mL SHAWN Final    Oxacillin Sensitive <=0.25 mcg/mL SHAWN Final    Trimeth/Sulfa Sensitive <=0.5/9.5 mcg/mL SHAWN Final    Tetracycline Sensitive <=4 mcg/mL SHAWN Final                   BLOOD CULTURE [[id number]] Collected:  11/17/19 1539    Order Status:  Completed Specimen:  Blood from Peripheral Updated:  11/18/19 0741     Significant Indicator NEG     Source BLD     Site PERIPHERAL     Culture Result No Growth  Note: Blood " "cultures are incubated for 5 days and  are monitored continuously.Positive blood cultures  are called to the RN and reported as soon as  they are identified.      Narrative:       Per Hospital Policy: Only change Specimen Src: to \"Line\" if  specified by physician order.  Left Hand    BLOOD CULTURE [[id number]] Collected:  11/17/19 1539    Order Status:  Completed Specimen:  Blood from Peripheral Updated:  11/18/19 0741     Significant Indicator NEG     Source BLD     Site PERIPHERAL     Culture Result No Growth  Note: Blood cultures are incubated for 5 days and  are monitored continuously.Positive blood cultures  are called to the RN and reported as soon as  they are identified.      Narrative:       Per Hospital Policy: Only change Specimen Src: to \"Line\" if  specified by physician order.  Right AC    BLOOD CULTURE [[id number]] Collected:  11/18/19 0000    Order Status:  Canceled Specimen:  Other from Peripheral     BLOOD CULTURE [[id number]] Collected:  11/18/19 0000    Order Status:  Canceled Specimen:  Other from Peripheral     Anaerobic Culture [[id number]] Collected:  11/15/19 1925    Order Status:  Completed Specimen:  Wound Updated:  11/17/19 1258     Significant Indicator NEG     Source WND     Site Right Knee     Culture Result Culture in progress.    Narrative:       Surgery - swabs received    Fungal Culture [[id number]] Collected:  11/15/19 1925    Order Status:  Completed Specimen:  Wound Updated:  11/17/19 1258     Significant Indicator NEG     Source WND     Site Right Knee     Culture Result No fungal growth to date.    Narrative:       Surgery - swabs received    AFB Culture [[id number]] Collected:  11/15/19 1925    Order Status:  Completed Specimen:  Wound Updated:  11/17/19 1258     Significant Indicator NEG     Source WND     Site Right Knee     Culture Result NOTE:  Swabs are not recommended for the isolation of  Mycobacteria, since they provide limited material.  They  are acceptable only if a " specimen cannot be collected by  any other means.  Negative results obtained from these  specimens submitted on swabs are not reliable.  Culture in progress.       AFB Smear Results No acid fast bacilli seen.    Narrative:       Surgery - swabs received    CULTURE WOUND W/ GRAM STAIN [[id number]]  (Abnormal)  (Susceptibility) Collected:  11/15/19 1925    Order Status:  Completed Specimen:  Wound Updated:  11/17/19 1258     Significant Indicator POS     Source WND     Site Right Knee     Culture Result -     Gram Stain Result Few WBCs.  No organisms seen.       Culture Result Staphylococcus aureus  Rare growth      Narrative:       Surgery - swabs received    Susceptibility     Staphylococcus aureus (1)     Antibiotic Interpretation Microscan Method Status    Clindamycin Sensitive <=0.5 mcg/mL SHAWN Final    Daptomycin Sensitive <=0.5 mcg/mL SHAWN Final    Erythromycin Sensitive <=0.5 mcg/mL SHAWN Final    Ampicillin/sulbactam Sensitive <=8/4 mcg/mL SHAWN Final    Moxifloxacin Sensitive <=0.5 mcg/mL SHAWN Final    Vancomycin Sensitive 1 mcg/mL SHAWN Final    Oxacillin Sensitive <=0.25 mcg/mL SHAWN Final    Trimeth/Sulfa Sensitive <=0.5/9.5 mcg/mL SHAWN Final    Tetracycline Sensitive <=4 mcg/mL SHAWN Final                   GRAM STAIN [[id number]] Collected:  11/15/19 1925    Order Status:  Completed Specimen:  Wound Updated:  11/17/19 0028     Significant Indicator .     Source WND     Site Right Knee     Gram Stain Result Few WBCs.  No organisms seen.      Narrative:       Surgery - swabs received    Acid Fast Stain [[id number]] Collected:  11/15/19 1925    Order Status:  Completed Specimen:  Wound Updated:  11/17/19 0028     Significant Indicator NEG     Source WND     Site Right Knee     AFB Smear Results No acid fast bacilli seen.    Narrative:       Surgery - swabs received    BLOOD CULTURE [[id number]] Collected:  11/15/19 1549    Order Status:  Completed Specimen:  Blood from Peripheral Updated:  11/16/19 0650     Significant  "Indicator NEG     Source BLD     Site PERIPHERAL     Culture Result No Growth  Note: Blood cultures are incubated for 5 days and  are monitored continuously.Positive blood cultures  are called to the RN and reported as soon as  they are identified.      Narrative:       Per Hospital Policy: Only change Specimen Src: to \"Line\" if  specified by physician order.  Right AC          Assessment:  Active Hospital Problems    Diagnosis   • Bacteremia due to Gram-positive bacteria [R78.81]   • Pyogenic arthritis of right knee joint (HCC) [M00.9]   • Transaminitis [R74.0]   • Chronic fatigue [R53.82]   • Leukocytosis [D72.829]   • Hyponatremia [E87.1]       Plan:  Right knee septic arthritis  Leukocytosis resolved  S/p I&D on 11/115/19  OR cx +MSSA  Continue cefazolin 2 g Q8hrs    MSSA bacteremia-ongoing workup  Bcx on 11/15 (1/2) +  Repeat Bcx 11/17 - NGTD  Plan for TTE today  On abx above  PICC line once repeat Bcx neg x 48 hours    Dispo: Pt would like to do home IV abx as they live in [place]    Discussed with internal medicine.  "

## 2019-11-18 NOTE — PROGRESS NOTES
"   Orthopaedic PA Progress Note    Interval changes:Doing well. OOB in chair    ROS - Patient denies any new issues. No chest pain, dyspnea, or fever.  Pain well controlled.    /76   Pulse (!) 55   Temp 36.7 °C (98 °F) (Temporal)   Resp 16   Ht 1.651 m (5' 5\")   Wt 62 kg (136 lb 11 oz)   SpO2 97%     Patient seen and examined  No acute distress  Breathing non labored  RRR  Surgical dressing is clean, dry, and intact. Patient clearly fires tibialis anterior, EHL, and gastrocnemius/soleus. Sensation is intact to light touch throughout superficial peroneal, deep peroneal, tibial, saphenous, and sural nerve distributions. Strong and palpable 2+ dorsalis pedis and posterior tibial pulses with capillary refill less than 2 seconds. No lower leg tenderness or discomfort.    Recent Labs     11/15/19  1520 11/16/19  0413 11/17/19  0918   WBC 17.8* 12.1* 8.8   RBC 4.35 3.87* 4.19*   HEMOGLOBIN 13.7 12.0 13.0   HEMATOCRIT 39.6 35.3* 39.8   MCV 91.0 91.2 95.0   MCH 31.5 31.0 31.0   MCHC 34.6 34.0 32.7*   RDW 42.9 43.4 46.1   PLATELETCT 274 245 289   MPV 10.1 9.9 9.6       Active Hospital Problems    Diagnosis   • Pyogenic arthritis of right knee joint (HCC) [M00.9]     Priority: High   • Transaminitis [R74.0]   • Chronic fatigue [R53.82]   • Leukocytosis [D72.829]   • Hyponatremia [E87.1]     2/12/13 Na 136  2/13/14 Na 131  3/15/16 Na 137  5/25/16 Na 134  9/20/17 Na 131  10/2/19 Na 129  10/9/19 Na 134  10/15/19 serum osm 277 (278-298), urine osm 128 (300-900), urine Na 14, cortisol 9.8, ACTH 15         Assessment/Plan:  POD#2 S/P I+D R knee  Wt bearing status - AT  PT/OT-initiated  Wound care:Dressing left in place  Drains - no  Mckeon-no  Sutures/Staples out- 10-14 days post operatively  Antibiotics: per ID  DVT Prophylaxis- TEDS/SCDs/Foot pumps.   Case Coordination for Discharge Planning - Disposition Follow-Up: needs appointment with Dr. Navas at [facility] at 10-14 days post-op for re-evaluation, " staple removal and radiographs.

## 2019-11-18 NOTE — PROGRESS NOTES
HOSPITAL MEDICINE PROGRESS NOTE    Date of service  11/18/2019    Chief Complaint  74 y.o. year old female here with Knee Pain (Pt sent from [facility]. pt found to have bacteria in fluid drained from right knee. sent to [facility] for further care. )      Assessment/Plan  Interval History   Active Hospital Problems    Diagnosis   • Bacteremia due to Gram-positive bacteria [R78.81]     Priority: High   • Pyogenic arthritis of right knee joint (HCC) [M00.9]     Priority: High   • Transaminitis [R74.0]   • Chronic fatigue [R53.82]   • Leukocytosis [D72.829]   • Hyponatremia [E87.1]     2/12/13 Na 136  2/13/14 Na 131  3/15/16 Na 137  5/25/16 Na 134  9/20/17 Na 131  10/2/19 Na 129  10/9/19 Na 134  10/15/19 serum osm 277 (278-298), urine osm 128 (300-900), urine Na 14, cortisol 9.8, ACTH 15       Seen and examined.  Admitted with septic right knee, successful I&D on 11/15/2019.  Aspirate cultures positive for staph aureus, MSSA. Switched to cefaz, dc vanc.      , alk phos 102.  Unclear etiology for transaminitis although now resolving.    Leukocytosis improving, 17k --> 12k    MSSA from wound site, switch to cefazolin 11/17/2019.  1 out of 2 blood cultures positive for MSSA.  Echo pending.    Disposition  Inpatient  Likely needs rehab and long-term antibiotics, disposition pending echo results     Pyogenic arthritis of right knee joint (HCC)  I&D 11/15/2019  Staph aureus on aspirate, MSSA  Was on vancomycin, switch to cefazolin  Positive blood cultures  Check echo    Leukocytosis  Related to above    Hyponatremia  On IV fluid, improving  Follow CMP in the morning    Chronic fatigue  Has vitiligo, outpatient providers thought may be autoimmune  Reviewed chart, all autoimmune work-up so far negative  Cortisol normal, ACTH normal no need to pursue Chris's work-up  Follow a.m. labs    Transaminitis  New  Unclear etiology  Improving  Follow a.m. labs    Bacteremia due to Gram-positive bacteria  New  1 of 2 blood  cultures + for MSSA  Continue cefazolin  Repeat blood cultures 11/18/2019  Check echo      Consultants/Specialty  Orthopaedics   Infectious disease    I discussed this patient's plan of care with Dr. Richards, ID.    Hospital Course:      Admitted with septic right knee, successful I&D on 11/15/2019.  Aspirate cultures positive for staph aureus.  Awaiting final cultures.              Review of Systems  Physical Exam   Review of Systems   Constitutional: Negative for chills and fever.   Respiratory: Negative for cough and shortness of breath.    Cardiovascular: Negative for chest pain.   Gastrointestinal: Negative for heartburn, nausea and vomiting.   Musculoskeletal: Positive for joint pain and myalgias.   Skin: Negative for itching and rash.   Neurological: Negative for tingling, tremors and sensory change.   All other systems reviewed and are negative.    Vitals:    11/17/19 1632 11/17/19 2049 11/18/19 0500 11/18/19 0830   BP: 155/76 115/51 143/74 133/70   Pulse: (!) 55 (!) 54 (!) 59 (!) 59   Resp: 16 18 17 16   Temp: 36.7 °C (98 °F) 37 °C (98.6 °F) 36.3 °C (97.3 °F) 36.9 °C (98.5 °F)   TempSrc: Temporal Temporal Temporal Temporal   SpO2: 97% 95% 93% 96%   Weight:       Height:         Physical Exam   Constitutional: She is oriented to person, place, and time and well-developed, well-nourished, and in no distress. No distress.   HENT:   Head: Normocephalic and atraumatic.   Mouth/Throat: Oropharynx is clear and moist.   Eyes: Conjunctivae and EOM are normal. Right eye exhibits no discharge. Left eye exhibits no discharge. No scleral icterus.   Neck: Normal range of motion. Neck supple.   Cardiovascular: Normal rate and intact distal pulses.   Pulmonary/Chest: Effort normal. No stridor. No respiratory distress. She has no wheezes. She has no rales.   Abdominal: Soft. She exhibits no distension. There is no tenderness. There is no rebound.   Musculoskeletal:         General: Tenderness and edema present.       Comments: Right knee wrapped, swelling improved   Neurological: She is alert and oriented to person, place, and time. No cranial nerve deficit. Coordination normal.   Skin: Skin is warm and dry. She is not diaphoretic. No erythema.   Psychiatric: Memory, affect and judgment normal.   Nursing note and vitals reviewed.       Hematology Chemistry   Lab Results   Component Value Date/Time    WBC 8.8 11/17/2019 09:18 AM    HEMOGLOBIN 13.0 11/17/2019 09:18 AM    HEMATOCRIT 39.8 11/17/2019 09:18 AM    PLATELETCT 289 11/17/2019 09:18 AM     Lab Results   Component Value Date/Time    SODIUM 129 (L) 11/17/2019 09:18 AM    POTASSIUM 4.0 11/17/2019 09:18 AM    CHLORIDE 97 11/17/2019 09:18 AM    CO2 25 11/17/2019 09:18 AM    GLUCOSE 143 (H) 11/17/2019 09:18 AM    BUN 16 11/17/2019 09:18 AM    CREATININE 0.73 11/17/2019 09:18 AM    CREATININE 0.9 06/01/2005 08:44 AM         Labs not explicitly included in this progress note were reviewed by the author.   Radiology/imaging not explicitly included in this progress note was reviewed by the author.     Medications:  • ceFAZolin  2 g Q8HRS   • senna-docusate  2 Tab BID    And   • magnesium hydroxide  30 mL QDAY PRN    And   • bisacodyl  10 mg QDAY PRN   • NS   Continuous   • heparin  5,000 Units Q8HRS   • acetaminophen  650 mg Q6HRS PRN   • ondansetron  4 mg Q4HRS PRN   • morphine injection  2 mg Q4HRS PRN   • Pharmacy Consult Request  1 Each PHARMACY TO DOSE   • ondansetron  4 mg Q4HRS PRN   • dexamethasone  4 mg Once PRN   • diphenhydrAMINE  25 mg Q6HRS PRN   • haloperidol lactate  1 mg Q6HRS PRN   • scopolamine  1 Patch Q72HRS PRN   • docusate sodium  100 mg BID   • polyethylene glycol/lytes  1 Packet BID PRN   • fleet  1 Each Once PRN   • acetaminophen  650 mg Q6HRS   • ketorolac  15 mg Q6HR   • oxyCODONE immediate-release  5 mg Q3HRS PRN   • oxyCODONE immediate release  10 mg Q3HRS PRN   • HYDROmorphone  0.5 mg Q3HRS PRN       Full Code    I have performed a physical exam and  reviewed and updated ROS as of today, 11/18/2019.  In review of yesterday's note dated, 11/17/2019, there are no changes except as documented above.

## 2019-11-19 PROBLEM — B95.61 MSSA BACTEREMIA: Status: ACTIVE | Noted: 2019-11-18

## 2019-11-19 PROCEDURE — 99233 SBSQ HOSP IP/OBS HIGH 50: CPT | Performed by: INTERNAL MEDICINE

## 2019-11-19 PROCEDURE — 770006 HCHG ROOM/CARE - MED/SURG/GYN SEMI*

## 2019-11-19 PROCEDURE — 99232 SBSQ HOSP IP/OBS MODERATE 35: CPT | Performed by: INTERNAL MEDICINE

## 2019-11-19 PROCEDURE — 97116 GAIT TRAINING THERAPY: CPT

## 2019-11-19 PROCEDURE — 97535 SELF CARE MNGMENT TRAINING: CPT

## 2019-11-19 PROCEDURE — 700111 HCHG RX REV CODE 636 W/ 250 OVERRIDE (IP): Performed by: INTERNAL MEDICINE

## 2019-11-19 PROCEDURE — A9270 NON-COVERED ITEM OR SERVICE: HCPCS | Performed by: ORTHOPAEDIC SURGERY

## 2019-11-19 PROCEDURE — 700102 HCHG RX REV CODE 250 W/ 637 OVERRIDE(OP): Performed by: ORTHOPAEDIC SURGERY

## 2019-11-19 RX ADMIN — HEPARIN SODIUM 5000 UNITS: 5000 INJECTION, SOLUTION INTRAVENOUS; SUBCUTANEOUS at 04:15

## 2019-11-19 RX ADMIN — HEPARIN SODIUM 5000 UNITS: 5000 INJECTION, SOLUTION INTRAVENOUS; SUBCUTANEOUS at 22:54

## 2019-11-19 RX ADMIN — CEFAZOLIN SODIUM 2 G: 2 INJECTION, SOLUTION INTRAVENOUS at 04:14

## 2019-11-19 RX ADMIN — HEPARIN SODIUM 5000 UNITS: 5000 INJECTION, SOLUTION INTRAVENOUS; SUBCUTANEOUS at 14:10

## 2019-11-19 RX ADMIN — CEFAZOLIN SODIUM 2 G: 2 INJECTION, SOLUTION INTRAVENOUS at 22:53

## 2019-11-19 RX ADMIN — CEFAZOLIN SODIUM 2 G: 2 INJECTION, SOLUTION INTRAVENOUS at 14:10

## 2019-11-19 ASSESSMENT — ENCOUNTER SYMPTOMS
MYALGIAS: 0
TREMORS: 0
SHORTNESS OF BREATH: 0
NERVOUS/ANXIOUS: 0
COUGH: 0
NAUSEA: 0
MYALGIAS: 1
ABDOMINAL PAIN: 0
CHILLS: 0
TINGLING: 0
SORE THROAT: 0
DIARRHEA: 0
VOMITING: 0
FEVER: 0
CONSTIPATION: 0
HEARTBURN: 0
SENSORY CHANGE: 0
HEADACHES: 0

## 2019-11-19 ASSESSMENT — COGNITIVE AND FUNCTIONAL STATUS - GENERAL
SUGGESTED CMS G CODE MODIFIER MOBILITY: CI
CLIMB 3 TO 5 STEPS WITH RAILING: A LITTLE
MOBILITY SCORE: 23

## 2019-11-19 ASSESSMENT — GAIT ASSESSMENTS
ASSISTIVE DEVICE: FRONT WHEEL WALKER
GAIT LEVEL OF ASSIST: SUPERVISED

## 2019-11-19 NOTE — DISCHARGE PLANNING
Received infusion orders for home infusion. Eliza from [facility] here with price quote for patient. Daptomycin daily $1099.00/ week and Cefozolin continuous $408.00/week.  Info given to patient. Made her aware that she could possibly have home health for daily dressing changes and blood work or [facility] outpatient infusion room once weekly for the dressing changes and blood work. Patient is going to discuss options with her spouse, and let me know. They are thinking of staying at their house in [facility] during this time.

## 2019-11-19 NOTE — PROGRESS NOTES
POD#4  S/P I&D knee  MSSA bacteremia  WBAT  ABX per ID  PICC line when Blood cultures negative  Case coordination

## 2019-11-19 NOTE — DISCHARGE PLANNING
Anticipated Discharge Disposition: Home with IV antibiotics  vs outpatient infusion room     Action: Met with patient to discuss discharge plans, would like to have home infusion and aware she may have co-pay with the IV antibiotics. We are going to get pricing on either dapo or cefazolin ( not both) infusion orders obtained from Liana from [facility]. Choice form for [facility] signed and faxed to [facility] with infusion orders. Patient will either be staying at home in [place] or [facility].     Barriers to Discharge: infusion acceptance/pricing    Plan: Home with iv antibiotics.

## 2019-11-19 NOTE — PROGRESS NOTES
Infectious Disease Progress Note    Author: ASHLEY Burgos Date & Time of service: 2019  11:45 AM    Chief Complaint:  FU Right knee septic arthritis, MSSA bacteremia    Interval History:  75 yo otherwise healthy woman admitted for acute R knee pain. S/p arthrocentesis on 11/15. Found to have R knee SA s/p I&D on 11/15. Also with MSSA bacteremia     AF no CBC done today. Has more knee flexion each day. Pain controlled. Plan for TTE today. Tolerating abx without issues. Would like to do home IV abx if possible. Repeat Bcx NGTD  -AF, no WBC, no issue with antibiotics, /10 right knee stinging soreness that she tolerates, improves with rest and elevation, no longer taking pain medication, would prefer to do home infusion if possible.    Labs Reviewed, Medications Reviewed, Radiology Reviewed and Wound Reviewed.    Review of Systems:  Review of Systems   Constitutional: Negative for chills, fever and malaise/fatigue.   HENT: Negative for sore throat.         Slightly raspy today   Respiratory: Negative for shortness of breath.    Cardiovascular: Positive for leg swelling. Negative for chest pain.   Gastrointestinal: Negative for abdominal pain, constipation, diarrhea, nausea and vomiting.   Genitourinary: Negative for dysuria.   Musculoskeletal: Positive for joint pain. Negative for myalgias.   Skin: Negative for rash.   Neurological: Negative for sensory change and headaches.   Psychiatric/Behavioral: The patient is not nervous/anxious.        Hemodynamics:  Temp (24hrs), Av.6 °C (97.9 °F), Min:36.2 °C (97.2 °F), Max:37.2 °C (98.9 °F)  Temperature: 36.7 °C (98 °F)  Pulse  Av.4  Min: 51  Max: 81   Blood Pressure : 148/81       Physical Exam:  Physical Exam  Vitals signs and nursing note reviewed.   Constitutional:       General: She is not in acute distress.     Appearance: Normal appearance. She is not toxic-appearing.   HENT:      Head: Normocephalic and atraumatic.      Nose:  Nose normal. No congestion.      Mouth/Throat:      Mouth: Mucous membranes are moist.      Pharynx: Oropharynx is clear. No oropharyngeal exudate.   Eyes:      Extraocular Movements: Extraocular movements intact.      Conjunctiva/sclera: Conjunctivae normal.      Pupils: Pupils are equal, round, and reactive to light.   Neck:      Musculoskeletal: Normal range of motion and neck supple. No muscular tenderness.   Cardiovascular:      Rate and Rhythm: Normal rate and regular rhythm.      Pulses: Normal pulses.   Pulmonary:      Effort: Pulmonary effort is normal. No respiratory distress.      Breath sounds: Normal breath sounds.   Abdominal:      General: Abdomen is flat. There is no distension.      Palpations: Abdomen is soft.      Tenderness: There is no tenderness.   Musculoskeletal:         General: Swelling (Right knee) and tenderness present.      Comments: Right knee-surgical dressing in place, no erythema to surrounding tissue, nontender to palpation, no induration or fluctuance, slightly warm to touch.   Skin:     General: Skin is warm and dry.      Coloration: Skin is not jaundiced.      Findings: No erythema.   Neurological:      General: No focal deficit present.      Mental Status: She is alert and oriented to person, place, and time.      Cranial Nerves: No cranial nerve deficit.   Psychiatric:         Mood and Affect: Mood normal.         Behavior: Behavior normal.         Thought Content: Thought content normal.         Judgment: Judgment normal.      Comments: Very pleasant         Meds:    Current Facility-Administered Medications:   •  ceFAZolin  •  senna-docusate **AND** [DISCONTINUED] polyethylene glycol/lytes **AND** magnesium hydroxide **AND** bisacodyl  •  heparin  •  acetaminophen  •  ondansetron  •  morphine injection  •  Pharmacy Consult Request  •  ondansetron  •  dexamethasone  •  diphenhydrAMINE  •  haloperidol lactate  •  scopolamine  •  docusate sodium  •  polyethylene glycol/lytes  •   fleet  •  acetaminophen  •  oxyCODONE immediate-release  •  oxyCODONE immediate release  •  HYDROmorphone    Labs:  Recent Labs     19   WBC 8.8   RBC 4.19*   HEMOGLOBIN 13.0   HEMATOCRIT 39.8   MCV 95.0   MCH 31.0   RDW 46.1   PLATELETCT 289   MPV 9.6   NEUTSPOLYS 89.40*   LYMPHOCYTES 6.50*   MONOCYTES 3.30   EOSINOPHILS 0.00   BASOPHILS 0.10     Recent Labs     19   SODIUM 129*   POTASSIUM 4.0   CHLORIDE 97   CO2 25   GLUCOSE 143*   BUN 16     Recent Labs     19   ALBUMIN 3.7   TBILIRUBIN 0.4   ALKPHOSPHAT 86   TOTPROTEIN 6.3   ALTSGPT 126*   ASTSGOT 52*   CREATININE 0.73       Imagin19   EC-ECHOCARDIOGRAM LTD W/O CONT   CONCLUSIONS  Compared to the images of the study done on 10/30/2019 there has been   no significant change.  No evidence of endocarditis.       Micro:  Results     Procedure Component Value Units Date/Time    AFB Culture [[id number]] Collected:  11/15/19 1925    Order Status:  Completed Specimen:  Wound Updated:  19     Significant Indicator NEG     Source WND     Site Right Knee     Culture Result NOTE:  Swabs are not recommended for the isolation of  Mycobacteria, since they provide limited material.  They  are acceptable only if a specimen cannot be collected by  any other means.  Negative results obtained from these  specimens submitted on swabs are not reliable.  Culture in progress.       AFB Smear Results No acid fast bacilli seen.    Narrative:       Surgery - swabs received    CULTURE WOUND W/ GRAM STAIN [[id number]]  (Abnormal)  (Susceptibility) Collected:  11/15/19 1925    Order Status:  Completed Specimen:  Wound Updated:  19     Significant Indicator POS     Source WND     Site Right Knee     Culture Result -     Gram Stain Result Few WBCs.  No organisms seen.       Culture Result Staphylococcus aureus  Rare growth      Narrative:       Surgery - swabs received    Susceptibility     Staphylococcus aureus (1)      "Antibiotic Interpretation Microscan Method Status    Clindamycin Sensitive <=0.5 mcg/mL SHAWN Final    Daptomycin Sensitive <=0.5 mcg/mL SHAWN Final    Erythromycin Sensitive <=0.5 mcg/mL SHAWN Final    Ampicillin/sulbactam Sensitive <=8/4 mcg/mL SHAWN Final    Moxifloxacin Sensitive <=0.5 mcg/mL SHAWN Final    Vancomycin Sensitive 1 mcg/mL SHAWN Final    Oxacillin Sensitive <=0.25 mcg/mL SHAWN Final    Trimeth/Sulfa Sensitive <=0.5/9.5 mcg/mL SHAWN Final    Tetracycline Sensitive <=4 mcg/mL SHAWN Final                   Anaerobic Culture [[id number]] Collected:  11/15/19 1925    Order Status:  Completed Specimen:  Wound Updated:  11/18/19 1507     Significant Indicator NEG     Source WND     Site Right Knee     Culture Result No Anaerobes isolated.    Narrative:       Surgery - swabs received    Fungal Culture [[id number]] Collected:  11/15/19 1925    Order Status:  Completed Specimen:  Wound Updated:  11/18/19 1507     Significant Indicator NEG     Source WND     Site Right Knee     Culture Result No fungal growth to date.    Narrative:       Surgery - swabs received    BLOOD CULTURE [[id number]]  (Abnormal)  (Susceptibility) Collected:  11/15/19 1520    Order Status:  Completed Specimen:  Blood from Peripheral Updated:  11/18/19 0815     Significant Indicator POS     Source BLD     Site PERIPHERAL     Culture Result Growth detected by Bactec instrument. 11/16/2019  20:43  Staphylococcus aureus (methicillin sensitive)  detected by PCR.        Staphylococcus aureus    Narrative:       CALL  Gross  131 tel. [phone],  CALLED  131 tel. [phone] 11/16/2019, 22:14, RB PERF. RESULTS CALLED  TO:[phone]  Per Hospital Policy: Only change Specimen Src: to \"Line\" if  specified by physician order.  No site indicated    Susceptibility     Staphylococcus aureus (1)     Antibiotic Interpretation Microscan Method Status    Clindamycin Sensitive <=0.5 mcg/mL SHAWN Final    Daptomycin Sensitive <=0.5 mcg/mL SHAWN Final    Erythromycin Sensitive <=0.5 " "mcg/mL SHAWN Final    Ampicillin/sulbactam Sensitive <=8/4 mcg/mL SHAWN Final    Moxifloxacin Sensitive <=0.5 mcg/mL SHAWN Final    Vancomycin Sensitive 1 mcg/mL SHAWN Final    Oxacillin Sensitive <=0.25 mcg/mL SHAWN Final    Trimeth/Sulfa Sensitive <=0.5/9.5 mcg/mL SHAWN Final    Tetracycline Sensitive <=4 mcg/mL SHAWN Final                   BLOOD CULTURE [[id number]] Collected:  11/17/19 1539    Order Status:  Completed Specimen:  Blood from Peripheral Updated:  11/18/19 0741     Significant Indicator NEG     Source BLD     Site PERIPHERAL     Culture Result No Growth  Note: Blood cultures are incubated for 5 days and  are monitored continuously.Positive blood cultures  are called to the RN and reported as soon as  they are identified.      Narrative:       Per Hospital Policy: Only change Specimen Src: to \"Line\" if  specified by physician order.  Left Hand    BLOOD CULTURE [[id number]] Collected:  11/17/19 1539    Order Status:  Completed Specimen:  Blood from Peripheral Updated:  11/18/19 0741     Significant Indicator NEG     Source BLD     Site PERIPHERAL     Culture Result No Growth  Note: Blood cultures are incubated for 5 days and  are monitored continuously.Positive blood cultures  are called to the RN and reported as soon as  they are identified.      Narrative:       Per Hospital Policy: Only change Specimen Src: to \"Line\" if  specified by physician order.  Right AC    BLOOD CULTURE [[id number]] Collected:  11/18/19 0000    Order Status:  Canceled Specimen:  Other from Peripheral     BLOOD CULTURE [[id number]] Collected:  11/18/19 0000    Order Status:  Canceled Specimen:  Other from Peripheral     GRAM STAIN [[id number]] Collected:  11/15/19 1925    Order Status:  Completed Specimen:  Wound Updated:  11/17/19 0028     Significant Indicator .     Source WND     Site Right Knee     Gram Stain Result Few WBCs.  No organisms seen.      Narrative:       Surgery - swabs received    Acid Fast Stain [[id number]] Collected:  " "11/15/19 1925    Order Status:  Completed Specimen:  Wound Updated:  11/17/19 0028     Significant Indicator NEG     Source WND     Site Right Knee     AFB Smear Results No acid fast bacilli seen.    Narrative:       Surgery - swabs received    BLOOD CULTURE [[id number]] Collected:  11/15/19 1549    Order Status:  Completed Specimen:  Blood from Peripheral Updated:  11/16/19 0650     Significant Indicator NEG     Source BLD     Site PERIPHERAL     Culture Result No Growth  Note: Blood cultures are incubated for 5 days and  are monitored continuously.Positive blood cultures  are called to the RN and reported as soon as  they are identified.      Narrative:       Per Hospital Policy: Only change Specimen Src: to \"Line\" if  specified by physician order.  Right AC          Assessment:  Active Hospital Problems    Diagnosis   • Bacteremia due to Gram-positive bacteria [R78.81]   • Pyogenic arthritis of right knee joint (HCC) [M00.9]   • Transaminitis [R74.0]   • Chronic fatigue [R53.82]   • Leukocytosis [D72.829]       Plan:  Right knee septic arthritis  Afebrile  Leukocytosis resolved on last check  S/p I&D on 11/15/19 with Dr. Navas  OR cx +MSSA  Continue IV Cefazolin 2 g Q8hrs    MSSA bacteremia-ongoing workup  Bcx on 11/15 (1/2) +MSSA  Repeat Bcx 11/17 remains NGTD  TTE on 11/18 negative for endocarditis  Plan for PICC line this afternoon if blood culture remains negative x48 hours  Antibiotics as above  Will need 4 weeks of IV antibiotics  Estimated end date 12/15/19    Transaminitis  Slowly trending down on last check  Continue to monitor      Discussed case with NIRMALA Pinto.  She would prefer to do home infusion, but if this cannot be arranged due to cost then would like to try to do infusions at the [facility].    Home infusion orders written today for either: IV daptomycin 8 mg/kg daily OR IV cefazolin 6 g continuous over 23 hours.  End date 12/15/2019.  Two antibiotics being listed due to cost as " patient has Medicare, which does not historically cover for home infusion very well.  Patient will determine if they want to do home infusion once costs have been determined.  Otherwise, will attempt to try to send patient to [facility] for infusions.

## 2019-11-19 NOTE — PROGRESS NOTES
[facility] Medicine Daily Progress Note    Date of Service  11/19/2019    Chief Complaint  74 y.o. female admitted 11/15/2019 with right knee pain.     Hospital Course    This is a 73 y/o F with no significant past medical history who was admitted on 11/15/19 after presenting to ER with right knee pain. In ER she was found to have septic arthritis of her knee and ortho was consulted. Pt had I&D done on 11/15/19. Id was also consulted. Pt also had blood culture growing MSSA also her or cultures grew MSSA.       Interval Problem Update  Pt seen and examined, afebrile, pain controlled, no nausea or vomiting.  No acute events overnight.  ID following abx as per ID, ortho following  Appreciate rec.     Consultants/Specialty  Ortho  ID     Code Status  Full Code    Disposition  Tbd     Review of Systems  Review of Systems   Constitutional: Negative for chills and fever.   HENT: Negative for congestion and ear pain.    Respiratory: Negative for cough and shortness of breath.    Cardiovascular: Negative for chest pain.   Gastrointestinal: Negative for heartburn, nausea and vomiting.   Musculoskeletal: Positive for joint pain and myalgias.   Skin: Negative for itching and rash.   Neurological: Negative for tingling, tremors and sensory change.   All other systems reviewed and are negative.       Physical Exam  Temp:  [36.2 °C (97.2 °F)-37.2 °C (98.9 °F)] 36.7 °C (98 °F)  Pulse:  [63-69] 66  Resp:  [16-17] 17  BP: (139-150)/(72-81) 148/81  SpO2:  [95 %-97 %] 97 %    Physical Exam  Constitutional:       Appearance: Normal appearance.   Eyes:      General: Scleral icterus present.      Conjunctiva/sclera: Conjunctivae normal.   Neck:      Musculoskeletal: Neck supple.   Cardiovascular:      Rate and Rhythm: Normal rate.      Heart sounds: No murmur. No gallop.    Pulmonary:      Effort: Pulmonary effort is normal.      Breath sounds: Normal breath sounds. No wheezing.   Abdominal:      General: There is no distension.       Tenderness: There is no tenderness. There is no guarding.   Musculoskeletal:         General: Swelling and tenderness present.   Neurological:      General: No focal deficit present.      Mental Status: She is alert and oriented to person, place, and time.         Fluids    Intake/Output Summary (Last 24 hours) at 11/19/2019 1405  Last data filed at 11/19/2019 1300  Gross per 24 hour   Intake 5160 ml   Output --   Net 5160 ml       Laboratory  Recent Labs     11/17/19  0918   WBC 8.8   RBC 4.19*   HEMOGLOBIN 13.0   HEMATOCRIT 39.8   MCV 95.0   MCH 31.0   MCHC 32.7*   RDW 46.1   PLATELETCT 289   MPV 9.6     Recent Labs     11/17/19  0918   SODIUM 129*   POTASSIUM 4.0   CHLORIDE 97   CO2 25   GLUCOSE 143*   BUN 16   CREATININE 0.73   CALCIUM 9.0                   Imaging  EC-ECHOCARDIOGRAM LTD W/O CONT   Final Result      US-EXTREMITY VENOUS LOWER UNILAT RIGHT   Final Result           Assessment/Plan  * Pyogenic arthritis of right knee joint (HCC)- (present on admission)  Assessment & Plan  I&D 11/15/2019  Staph aureus on aspirate, MSSA  Was on vancomycin, switch to cefazolin  Positive blood cultures  TTE neg  ID following appreciate rec.     MSSA bacteremia  Assessment & Plan  New  1 of 2 blood cultures + for MSSA  Continue cefazolin  Repeat blood cultures 11/17/2019 neg to date   TTE neg  ID following appreciate rec.     Transaminitis  Assessment & Plan  New  Unclear etiology  Improving  Monitor     Chronic fatigue  Assessment & Plan  Has vitiligo, outpatient providers thought may be autoimmune  Reviewed chart, all autoimmune work-up so far negative  Cortisol normal, ACTH normal no need to pursue [place]'s work-up  Follow a.m. labs    Leukocytosis- (present on admission)  Assessment & Plan  Related to above    Hyponatremia- (present on admission)  Assessment & Plan  On IV fluid, improving  Follow CMP in the morning       VTE prophylaxis: heparin

## 2019-11-19 NOTE — CARE PLAN
Problem: Safety  Goal: Will remain free from injury  Outcome: PROGRESSING AS EXPECTED  Note:   Non-slip socks are on, bed is locked and in lowest position, personal belongings are within reach, room is free of clutter and spills, call light is in place. Patient educated on how to use the call light and how to call for assistance. Patient verbalized understanding.       Problem: Infection  Goal: Will remain free from infection  Outcome: PROGRESSING AS EXPECTED  Note:   Standard precautions in place.

## 2019-11-19 NOTE — CARE PLAN
Problem: Communication  Goal: The ability to communicate needs accurately and effectively will improve  Outcome: PROGRESSING AS EXPECTED  Pt communicates her needs, uses call remote appropriately, review plan of care w/ pt & , understands, will continue to monitor     Problem: Safety  Goal: Will remain free from injury  Outcome: PROGRESSING AS EXPECTED   Pt moves from bed to chair safely, bed low locked positions, socks on, dressing in place, floor free of clutter    Problem: Infection  Goal: Will remain free from infection  Outcome: PROGRESSING AS EXPECTED  Dressing clean, dry & intact, ABX as directed, monitor labs/cultures, review plan for long-term

## 2019-11-20 ENCOUNTER — APPOINTMENT (OUTPATIENT)
Dept: RADIOLOGY | Facility: MEDICAL CENTER | Age: 74
DRG: 486 | End: 2019-11-20
Attending: NURSE PRACTITIONER
Payer: MEDICARE

## 2019-11-20 ENCOUNTER — APPOINTMENT (OUTPATIENT)
Dept: RADIOLOGY | Facility: MEDICAL CENTER | Age: 74
DRG: 486 | End: 2019-11-20
Attending: INTERNAL MEDICINE
Payer: MEDICARE

## 2019-11-20 LAB
ALBUMIN SERPL BCP-MCNC: 3.2 G/DL (ref 3.2–4.9)
ALBUMIN/GLOB SERPL: 1.3 G/DL
ALP SERPL-CCNC: 55 U/L (ref 30–99)
ALT SERPL-CCNC: 42 U/L (ref 2–50)
ANION GAP SERPL CALC-SCNC: 5 MMOL/L (ref 0–11.9)
AST SERPL-CCNC: 29 U/L (ref 12–45)
BACTERIA BLD CULT: NORMAL
BILIRUB SERPL-MCNC: 0.3 MG/DL (ref 0.1–1.5)
BUN SERPL-MCNC: 15 MG/DL (ref 8–22)
CALCIUM SERPL-MCNC: 8.7 MG/DL (ref 8.5–10.5)
CHLORIDE SERPL-SCNC: 98 MMOL/L (ref 96–112)
CK SERPL-CCNC: 34 U/L (ref 0–154)
CO2 SERPL-SCNC: 26 MMOL/L (ref 20–33)
CREAT SERPL-MCNC: 0.68 MG/DL (ref 0.5–1.4)
ERYTHROCYTE [DISTWIDTH] IN BLOOD BY AUTOMATED COUNT: 43 FL (ref 35.9–50)
GLOBULIN SER CALC-MCNC: 2.4 G/DL (ref 1.9–3.5)
GLUCOSE SERPL-MCNC: 102 MG/DL (ref 65–99)
HCT VFR BLD AUTO: 39.3 % (ref 37–47)
HGB BLD-MCNC: 13.2 G/DL (ref 12–16)
MCH RBC QN AUTO: 30.9 PG (ref 27–33)
MCHC RBC AUTO-ENTMCNC: 33.6 G/DL (ref 33.6–35)
MCV RBC AUTO: 92 FL (ref 81.4–97.8)
PLATELET # BLD AUTO: 314 K/UL (ref 164–446)
PMV BLD AUTO: 9.3 FL (ref 9–12.9)
POTASSIUM SERPL-SCNC: 4.5 MMOL/L (ref 3.6–5.5)
PROT SERPL-MCNC: 5.6 G/DL (ref 6–8.2)
RBC # BLD AUTO: 4.27 M/UL (ref 4.2–5.4)
SIGNIFICANT IND 70042: NORMAL
SITE SITE: NORMAL
SODIUM SERPL-SCNC: 129 MMOL/L (ref 135–145)
SOURCE SOURCE: NORMAL
WBC # BLD AUTO: 6.5 K/UL (ref 4.8–10.8)

## 2019-11-20 PROCEDURE — 80053 COMPREHEN METABOLIC PANEL: CPT

## 2019-11-20 PROCEDURE — 770006 HCHG ROOM/CARE - MED/SURG/GYN SEMI*

## 2019-11-20 PROCEDURE — 700111 HCHG RX REV CODE 636 W/ 250 OVERRIDE (IP): Performed by: INTERNAL MEDICINE

## 2019-11-20 PROCEDURE — 85027 COMPLETE CBC AUTOMATED: CPT

## 2019-11-20 PROCEDURE — 99232 SBSQ HOSP IP/OBS MODERATE 35: CPT | Performed by: INTERNAL MEDICINE

## 2019-11-20 PROCEDURE — 82550 ASSAY OF CK (CPK): CPT

## 2019-11-20 PROCEDURE — 36415 COLL VENOUS BLD VENIPUNCTURE: CPT

## 2019-11-20 PROCEDURE — 76937 US GUIDE VASCULAR ACCESS: CPT

## 2019-11-20 RX ORDER — 0.9 % SODIUM CHLORIDE 0.9 %
10-20 VIAL (ML) INJECTION PRN
Status: CANCELLED | OUTPATIENT
Start: 2019-11-20

## 2019-11-20 RX ORDER — SODIUM CHLORIDE 9 MG/ML
INJECTION, SOLUTION INTRAVENOUS
Status: ACTIVE
Start: 2019-11-20 | End: 2019-11-21

## 2019-11-20 RX ORDER — 0.9 % SODIUM CHLORIDE 0.9 %
5 VIAL (ML) INJECTION PRN
Status: CANCELLED | OUTPATIENT
Start: 2019-11-20

## 2019-11-20 RX ADMIN — HEPARIN SODIUM 5000 UNITS: 5000 INJECTION, SOLUTION INTRAVENOUS; SUBCUTANEOUS at 22:26

## 2019-11-20 RX ADMIN — CEFAZOLIN SODIUM 2 G: 2 INJECTION, SOLUTION INTRAVENOUS at 22:26

## 2019-11-20 RX ADMIN — HEPARIN SODIUM 5000 UNITS: 5000 INJECTION, SOLUTION INTRAVENOUS; SUBCUTANEOUS at 15:58

## 2019-11-20 RX ADMIN — HEPARIN SODIUM 5000 UNITS: 5000 INJECTION, SOLUTION INTRAVENOUS; SUBCUTANEOUS at 04:41

## 2019-11-20 RX ADMIN — CEFAZOLIN SODIUM 2 G: 2 INJECTION, SOLUTION INTRAVENOUS at 04:41

## 2019-11-20 RX ADMIN — CEFAZOLIN SODIUM 2 G: 2 INJECTION, SOLUTION INTRAVENOUS at 15:58

## 2019-11-20 ASSESSMENT — ENCOUNTER SYMPTOMS
SENSORY CHANGE: 0
TREMORS: 0
NAUSEA: 0
MYALGIAS: 1
NERVOUS/ANXIOUS: 0
FEVER: 0
VOMITING: 0
SORE THROAT: 0
DIARRHEA: 0
HEADACHES: 0
ABDOMINAL PAIN: 0
TINGLING: 0
MYALGIAS: 0
COUGH: 0
CHILLS: 0
SHORTNESS OF BREATH: 0
HEARTBURN: 0
CONSTIPATION: 0
WEAKNESS: 0

## 2019-11-20 NOTE — PROGRESS NOTES
"Infectious Disease Progress Note    Author: ASHLEY Burgos Date & Time of service: 2019  11:59 AM    Chief Complaint:  FU Right knee septic arthritis, MSSA bacteremia    Interval History:  75 yo otherwise healthy woman admitted for acute R knee pain. S/p arthrocentesis on 11/15. Found to have R knee SA s/p I&D on 11/15. Also with MSSA bacteremia     AF no CBC done today. Has more knee flexion each day. Pain controlled. Plan for TTE today. Tolerating abx without issues. Would like to do home IV abx if possible. Repeat Bcx NGTD  -AF, no WBC, no issue with antibiotics, /10 right knee stinging soreness that she tolerates, improves with rest and elevation, no longer taking pain medication, would prefer to do home infusion if possible.  - Tm 99.2, WBC 6.5, tolerating abx, occasionally has a \"tinge\" of pain to the right knee but overall denies pain, would like to do daptomycin at home and go to [facility] for dressing changes and labs.    Labs Reviewed and Medications Reviewed.    Review of Systems:  Review of Systems   Constitutional: Negative for chills, fever and malaise/fatigue.   HENT: Negative for congestion and sore throat.    Respiratory: Negative for cough and shortness of breath.    Cardiovascular: Positive for leg swelling. Negative for chest pain.   Gastrointestinal: Negative for abdominal pain, constipation, diarrhea, nausea and vomiting.   Genitourinary: Negative for dysuria and hematuria.   Musculoskeletal: Positive for joint pain (Occasional). Negative for myalgias.   Skin: Negative for rash.   Neurological: Negative for sensory change, weakness and headaches.   Psychiatric/Behavioral: The patient is not nervous/anxious.        Hemodynamics:  Temp (24hrs), Av.3 °C (99.1 °F), Min:37.1 °C (98.8 °F), Max:37.4 °C (99.3 °F)  Temperature: 37.4 °C (99.3 °F)  Pulse  Av.5  Min: 51  Max: 81   Blood Pressure : 145/74       Physical Exam:  Physical Exam  Vitals signs and nursing " note reviewed.   Constitutional:       Appearance: Normal appearance. She is not ill-appearing or diaphoretic.   HENT:      Head: Normocephalic and atraumatic.      Right Ear: External ear normal.      Left Ear: External ear normal.      Nose: Nose normal. No congestion or rhinorrhea.      Mouth/Throat:      Mouth: Mucous membranes are moist.      Pharynx: Oropharynx is clear. No oropharyngeal exudate or posterior oropharyngeal erythema.   Eyes:      Extraocular Movements: Extraocular movements intact.      Conjunctiva/sclera: Conjunctivae normal.      Pupils: Pupils are equal, round, and reactive to light.   Neck:      Musculoskeletal: Normal range of motion and neck supple. No neck rigidity or muscular tenderness.   Cardiovascular:      Rate and Rhythm: Normal rate and regular rhythm.      Pulses: Normal pulses.      Heart sounds: Normal heart sounds.   Pulmonary:      Effort: Pulmonary effort is normal. No respiratory distress.      Breath sounds: Normal breath sounds. No wheezing or rales.   Abdominal:      General: Abdomen is flat. There is no distension.      Palpations: Abdomen is soft.      Tenderness: There is no tenderness. There is no guarding.   Musculoskeletal:         General: Swelling (Right knee) and tenderness present.      Comments: Right knee-surgical dressing remains in place, no erythema to surrounding tissue, nontender to palpation, no induration or fluctuance, slightly warm to touch.   Skin:     General: Skin is warm and dry.      Findings: No erythema or rash.   Neurological:      General: No focal deficit present.      Mental Status: She is alert and oriented to person, place, and time.      Cranial Nerves: No cranial nerve deficit.      Coordination: Coordination normal.   Psychiatric:         Mood and Affect: Mood normal.         Behavior: Behavior normal.         Thought Content: Thought content normal.         Judgment: Judgment normal.      Comments: Very pleasant          Meds:    Current Facility-Administered Medications:   •  ceFAZolin  •  senna-docusate **AND** [DISCONTINUED] polyethylene glycol/lytes **AND** magnesium hydroxide **AND** bisacodyl  •  heparin  •  acetaminophen  •  ondansetron  •  morphine injection  •  Pharmacy Consult Request  •  ondansetron  •  dexamethasone  •  diphenhydrAMINE  •  haloperidol lactate  •  scopolamine  •  docusate sodium  •  polyethylene glycol/lytes  •  fleet  •  acetaminophen  •  oxyCODONE immediate-release  •  oxyCODONE immediate release  •  HYDROmorphone    Labs:  Recent Labs     11/20/19  0011   WBC 6.5   RBC 4.27   HEMOGLOBIN 13.2   HEMATOCRIT 39.3   MCV 92.0   MCH 30.9   RDW 43.0   PLATELETCT 314   MPV 9.3     Recent Labs     11/20/19  0011   SODIUM 129*   POTASSIUM 4.5   CHLORIDE 98   CO2 26   GLUCOSE 102*   BUN 15     Recent Labs     11/20/19 0011   ALBUMIN 3.2   TBILIRUBIN 0.3   ALKPHOSPHAT 55   TOTPROTEIN 5.6*   ALTSGPT 42   ASTSGOT 29   CREATININE 0.68       Imaging:  No new imaging within the last 24 hours.      Micro:  Results     Procedure Component Value Units Date/Time    AFB Culture [[id number]] Collected:  11/15/19 1925    Order Status:  Completed Specimen:  Wound Updated:  11/18/19 1507     Significant Indicator NEG     Source WND     Site Right Knee     Culture Result NOTE:  Swabs are not recommended for the isolation of  Mycobacteria, since they provide limited material.  They  are acceptable only if a specimen cannot be collected by  any other means.  Negative results obtained from these  specimens submitted on swabs are not reliable.  Culture in progress.       AFB Smear Results No acid fast bacilli seen.    Narrative:       Surgery - swabs received    CULTURE WOUND W/ GRAM STAIN [[id number]]  (Abnormal)  (Susceptibility) Collected:  11/15/19 1925    Order Status:  Completed Specimen:  Wound Updated:  11/18/19 1507     Significant Indicator POS     Source WND     Site Right Knee     Culture Result -     Gram Stain  "Result Few WBCs.  No organisms seen.       Culture Result Staphylococcus aureus  Rare growth      Narrative:       Surgery - swabs received    Susceptibility     Staphylococcus aureus (1)     Antibiotic Interpretation Microscan Method Status    Clindamycin Sensitive <=0.5 mcg/mL SHAWN Final    Daptomycin Sensitive <=0.5 mcg/mL SHAWN Final    Erythromycin Sensitive <=0.5 mcg/mL SHAWN Final    Ampicillin/sulbactam Sensitive <=8/4 mcg/mL SHAWN Final    Moxifloxacin Sensitive <=0.5 mcg/mL SHAWN Final    Vancomycin Sensitive 1 mcg/mL SHAWN Final    Oxacillin Sensitive <=0.25 mcg/mL SHAWN Final    Trimeth/Sulfa Sensitive <=0.5/9.5 mcg/mL SHAWN Final    Tetracycline Sensitive <=4 mcg/mL SHAWN Final                   Anaerobic Culture [[id number]] Collected:  11/15/19 1925    Order Status:  Completed Specimen:  Wound Updated:  11/18/19 1507     Significant Indicator NEG     Source WND     Site Right Knee     Culture Result No Anaerobes isolated.    Narrative:       Surgery - swabs received    Fungal Culture [[id number]] Collected:  11/15/19 1925    Order Status:  Completed Specimen:  Wound Updated:  11/18/19 1507     Significant Indicator NEG     Source WND     Site Right Knee     Culture Result No fungal growth to date.    Narrative:       Surgery - swabs received    BLOOD CULTURE [[id number]]  (Abnormal)  (Susceptibility) Collected:  11/15/19 1520    Order Status:  Completed Specimen:  Blood from Peripheral Updated:  11/18/19 0815     Significant Indicator POS     Source BLD     Site PERIPHERAL     Culture Result Growth detected by Bactec instrument. 11/16/2019  20:43  Staphylococcus aureus (methicillin sensitive)  detected by PCR.        Staphylococcus aureus    Narrative:       CALL  Gross  131 tel. [phone],  CALLED  131 tel. [phone] 11/16/2019, 22:14, RB PERF. RESULTS CALLED  TO:[phone]  Per Hospital Policy: Only change Specimen Src: to \"Line\" if  specified by physician order.  No site indicated    Susceptibility     Staphylococcus " "aureus (1)     Antibiotic Interpretation Microscan Method Status    Clindamycin Sensitive <=0.5 mcg/mL SHAWN Final    Daptomycin Sensitive <=0.5 mcg/mL SHAWN Final    Erythromycin Sensitive <=0.5 mcg/mL SHAWN Final    Ampicillin/sulbactam Sensitive <=8/4 mcg/mL SHAWN Final    Moxifloxacin Sensitive <=0.5 mcg/mL SHAWN Final    Vancomycin Sensitive 1 mcg/mL SHAWN Final    Oxacillin Sensitive <=0.25 mcg/mL SHAWN Final    Trimeth/Sulfa Sensitive <=0.5/9.5 mcg/mL SHAWN Final    Tetracycline Sensitive <=4 mcg/mL SHAWN Final                   BLOOD CULTURE [[id number]] Collected:  11/17/19 1539    Order Status:  Completed Specimen:  Blood from Peripheral Updated:  11/18/19 0741     Significant Indicator NEG     Source BLD     Site PERIPHERAL     Culture Result No Growth  Note: Blood cultures are incubated for 5 days and  are monitored continuously.Positive blood cultures  are called to the RN and reported as soon as  they are identified.      Narrative:       Per Hospital Policy: Only change Specimen Src: to \"Line\" if  specified by physician order.  Left Hand    BLOOD CULTURE [[id number]] Collected:  11/17/19 1539    Order Status:  Completed Specimen:  Blood from Peripheral Updated:  11/18/19 0741     Significant Indicator NEG     Source BLD     Site PERIPHERAL     Culture Result No Growth  Note: Blood cultures are incubated for 5 days and  are monitored continuously.Positive blood cultures  are called to the RN and reported as soon as  they are identified.      Narrative:       Per Hospital Policy: Only change Specimen Src: to \"Line\" if  specified by physician order.  Right AC    BLOOD CULTURE [[id number]] Collected:  11/18/19 0000    Order Status:  Canceled Specimen:  Other from Peripheral     BLOOD CULTURE [[id number]] Collected:  11/18/19 0000    Order Status:  Canceled Specimen:  Other from Peripheral     GRAM STAIN [[id number]] Collected:  11/15/19 1925    Order Status:  Completed Specimen:  Wound Updated:  11/17/19 0028     Significant " "Indicator .     Source WND     Site Right Knee     Gram Stain Result Few WBCs.  No organisms seen.      Narrative:       Surgery - swabs received    Acid Fast Stain [[id number]] Collected:  11/15/19 1925    Order Status:  Completed Specimen:  Wound Updated:  11/17/19 0028     Significant Indicator NEG     Source WND     Site Right Knee     AFB Smear Results No acid fast bacilli seen.    Narrative:       Surgery - swabs received    BLOOD CULTURE [[id number]] Collected:  11/15/19 1549    Order Status:  Completed Specimen:  Blood from Peripheral Updated:  11/16/19 0650     Significant Indicator NEG     Source BLD     Site PERIPHERAL     Culture Result No Growth  Note: Blood cultures are incubated for 5 days and  are monitored continuously.Positive blood cultures  are called to the RN and reported as soon as  they are identified.      Narrative:       Per Hospital Policy: Only change Specimen Src: to \"Line\" if  specified by physician order.  Right AC          Assessment:  Active Hospital Problems    Diagnosis   • Bacteremia due to Gram-positive bacteria [R78.81]   • Pyogenic arthritis of right knee joint (HCC) [M00.9]   • Transaminitis [R74.0]   • Chronic fatigue [R53.82]   • Leukocytosis [D72.829]       Plan:  Right knee septic arthritis  Afebrile  No leukocytosis  S/p I&D on 11/15/19 with Dr. Navas  OR cx +MSSA  Continue IV Cefazolin 2 g Q8hrs while inpatient    MSSA bacteremia-ongoing workup  Bcx on 11/15 (1/2) +MSSA  Repeat Bcx 11/17 remains NGTD  TTE on 11/18 negative for endocarditis  Midline ordered   Antibiotics as above  Will need 4 weeks of IV antibiotics  Estimated end date 12/15/19  CPK ordered for baseline on daptomycin  Will need first dose of IV daptomycin prior to discharge    Transaminitis  Resolved this a.m.  Continue to monitor      Discussed case with hospitalist, Dr. Bryson and NIRMALA Pinto.  Patient wishing to do home infusion with IV daptomycin, willing to pay the thousand dollars on a weekly " basis.  Will go to [facility] once a week for labs and dressing changes.  Orders for [facility] completed.

## 2019-11-20 NOTE — PROGRESS NOTES
"   Orthopaedic PA Progress Note    Interval changes:Doing well    ROS - Patient denies any new issues. No chest pain, dyspnea, or fever.  Pain well controlled.    /71   Pulse 70   Temp 37.3 °C (99.2 °F) (Temporal)   Resp 17   Ht 1.651 m (5' 5\")   Wt 62 kg (136 lb 11 oz)   SpO2 96%     Patient seen and examined  No acute distress  Breathing non labored  RRR  Surgical dressing is clean, dry, and intact. Patient clearly fires tibialis anterior, EHL, and gastrocnemius/soleus. Sensation is intact to light touch throughout superficial peroneal, deep peroneal, tibial, saphenous, and sural nerve distributions. Strong and palpable 2+ dorsalis pedis and posterior tibial pulses with capillary refill less than 2 seconds. No lower leg tenderness or discomfort.    Recent Labs     11/17/19  0918   WBC 8.8   RBC 4.19*   HEMOGLOBIN 13.0   HEMATOCRIT 39.8   MCV 95.0   MCH 31.0   MCHC 32.7*   RDW 46.1   PLATELETCT 289   MPV 9.6       Active Hospital Problems    Diagnosis   • MSSA bacteremia [R78.81]     Priority: High   • Pyogenic arthritis of right knee joint (HCC) [M00.9]     Priority: High   • Transaminitis [R74.0]   • Chronic fatigue [R53.82]   • Leukocytosis [D72.829]   • Hyponatremia [E87.1]     2/12/13 Na 136  2/13/14 Na 131  3/15/16 Na 137  5/25/16 Na 134  9/20/17 Na 131  10/2/19 Na 129  10/9/19 Na 134  10/15/19 serum osm 277 (278-298), urine osm 128 (300-900), urine Na 14, cortisol 9.8, ACTH 15       Assessment/Plan:  POD#4 S/P I+D R knee  Wt bearing status - AT  PT/OT-initiated  Wound care:Dressing left in place  Drains - no  Mckeon-no  Sutures/Staples out- 10-14 days post operatively  Antibiotics: per ID  DVT Prophylaxis- TEDS/SCDs/Foot pumps.   Case Coordination for Discharge Planning - Disposition Follow-Up: needs appointment with Dr. Navas at [facility] at 10-14 days post-op for re-evaluation, staple removal and radiographs.         "

## 2019-11-20 NOTE — DISCHARGE PLANNING
Anticipated Discharge Disposition: Home with IV antibiotic infusion     Action: Met with patient and her spouse, agreeable to pay for the daptomycin weekly with [facility] and [facility] outpatient infusion room weekly for the dressing changes and blood work. Called outpatient infusion room and made appt for patient starting on 11/26 Tuesday @ 1430.  Patient to get midline today. Eliza SILVESTRE from [facility] to be here tomorrow 11/21 @1100 for teaching to patient and spouse, they are aware of this. Bedside RN aware. Patient will need one dose of Daptomycin today or tomorrow prior to leaving the hospital.     Barriers to Discharge: none    Plan: DC tomorrow after teaching and dose of dapto with midline.

## 2019-11-20 NOTE — THERAPY
Attempted to see pt for Occupational Therapy treatment today. Pt is in the process of getting a PICC line placed and is not available for tx. Will attempt later time permitting. RN informed.

## 2019-11-20 NOTE — THERAPY
"Physical Therapy Treatment completed.   Bed Mobility:  Supine to Sit: Modified Independent  Transfers: Sit to Stand: Supervised  Gait: Level Of Assist: Supervised with Front-Wheel Walker       Plan of Care: Patient with no further skilled PT needs in the acute care setting at this time and Patient demonstrates safety with mobility in this environment at this time.   Discharge Recommendations: Equipment: No Equipment Needed. Post-acute therapy Currently anticipate no further skilled therapy needs once patient is discharged from the inpatient setting.  PT will be available for d/c needs only.  Pt is cleared to be up self around room and nrsg unit w/FWW.      See \"Rehab Therapy-Acute\" Patient Summary Report for complete documentation.       "

## 2019-11-20 NOTE — DISCHARGE PLANNING
Received Choice form at 1000  Agency/Facility Name: [facility]  Referral sent per Choice form at 1010

## 2019-11-20 NOTE — PROCEDURES
Vascular Access Team    Date of Insertion: 11/20/2019   Arm Circumference: 26.5  Internal length: 14  External Length: 0  Vein Occupancy %: 38  Reason for Midline: Antibiotic Therapy  Labs: WBC 6.5, , BUN 15, Cr 0.68, GFR >60, INR NA    Orders confirmed, vessel patency confirmed with ultrasound. Risks and benefits of procedure explained to patient and education regarding line associated bloodstream infections provided. Questions answered.     Power Midline placed in RUE per licensed provider order with ultrasound guidance. 4 Fr, X1 lumen Power Midline placed in Basilic vein after 1 attempt(s). 2 mL of 1% lidocaine injected intradermally, 21 gauge microintroducer needle and modified Seldinger technique used. 14 cm catheter inserted with good blood return. Secured at 0 cm marker. Each lumen flushed without resistance with 10 mL 0.9% normal saline. Midline secured with Biopatch and Tegaderm.     Midline placement is confirmed by nurse using ultrasound and ability to flush and draw blood. Midline is appropriate for use at this time.  Patient experienced some pain, resolved post procedure, without complications.  No X-ray is needed for placement confirmation.  Patient condition relayed to unit RN or ordering physician via this post procedure note in the EMR.     Ultrasound images uploaded to PACS and viewable in the EMR - yes  Ultrasound imaged printed and placed in paper chart - no     BARD Power Midline ref # [id number], Lot # [id number]

## 2019-11-20 NOTE — CARE PLAN
Problem: Communication  Goal: The ability to communicate needs accurately and effectively will improve  Outcome: PROGRESSING AS EXPECTED   Pt call appropriately, can verbalize her needs, call light within reach, review of plan of care with pt & spouse, verbalized understanding, will continue to monitor    Problem: Safety  Goal: Will remain free from injury  Outcome: PROGRESSING AS EXPECTED  Pt using FWW safely, socks on, clutter free floor, bed in low locked position, call light within reach     Problem: Infection  Goal: Will remain free from infection  Outcome: PROGRESSING AS EXPECTED  IV ABX, PICC line to be completed today, monitor labs

## 2019-11-20 NOTE — CARE PLAN
Problem: Safety  Goal: Will remain free from injury  Outcome: PROGRESSING AS EXPECTED  Intervention: Provide assistance with mobility  Note:   Educated on fall risk and ensured fall precautions in place. Bed in lowest position, treaded socks in place, call light in reach, bed alarm in place, room free of clutter.      Problem: Infection  Goal: Will remain free from infection  Outcome: PROGRESSING AS EXPECTED  Intervention: Implement standard precautions and perform hand washing before and after patient contact  Note:   Standard precautions in place.

## 2019-11-20 NOTE — PROGRESS NOTES
[facility] Medicine Daily Progress Note    Date of Service  11/20/2019    Chief Complaint  74 y.o. female admitted 11/15/2019 with right knee pain.     Hospital Course    This is a 75 y/o F with no significant past medical history who was admitted on 11/15/19 after presenting to ER with right knee pain. In ER she was found to have septic arthritis of her knee and ortho was consulted. Pt had I&D done on 11/15/19. Id was also consulted. Pt also had blood culture growing MSSA also her or cultures grew MSSA.       Interval Problem Update  Afebrile, pain controlled, no nausea or vomiting. Midline to be placed today   No acute events overnight.  ID following abx as per ID, ortho following  Appreciate rec.     Consultants/Specialty  Ortho  ID     Code Status  Full Code    Disposition  Tbd     Review of Systems  Review of Systems   Constitutional: Negative for chills and fever.   HENT: Negative for congestion and ear pain.    Respiratory: Negative for cough and shortness of breath.    Cardiovascular: Negative for chest pain.   Gastrointestinal: Negative for heartburn, nausea and vomiting.   Musculoskeletal: Positive for joint pain and myalgias.   Skin: Negative for itching and rash.   Neurological: Negative for tingling, tremors and sensory change.   All other systems reviewed and are negative.       Physical Exam  Temp:  [37.1 °C (98.8 °F)-37.4 °C (99.3 °F)] 37.4 °C (99.3 °F)  Pulse:  [56-73] 60  Resp:  [16-19] 19  BP: (128-145)/(71-74) 145/74  SpO2:  [96 %-98 %] 97 %    Physical Exam  Constitutional:       Appearance: Normal appearance.   Eyes:      General: Scleral icterus present.      Conjunctiva/sclera: Conjunctivae normal.   Neck:      Musculoskeletal: Neck supple.   Cardiovascular:      Rate and Rhythm: Normal rate.      Heart sounds: No murmur. No gallop.    Pulmonary:      Effort: Pulmonary effort is normal.      Breath sounds: Normal breath sounds. No wheezing.   Abdominal:      General: There is no distension.       Tenderness: There is no tenderness. There is no guarding.   Musculoskeletal:         General: Swelling and tenderness present.   Neurological:      General: No focal deficit present.      Mental Status: She is alert and oriented to person, place, and time.         Fluids    Intake/Output Summary (Last 24 hours) at 11/20/2019 1309  Last data filed at 11/20/2019 0800  Gross per 24 hour   Intake 360 ml   Output --   Net 360 ml       Laboratory  Recent Labs     11/20/19  0011   WBC 6.5   RBC 4.27   HEMOGLOBIN 13.2   HEMATOCRIT 39.3   MCV 92.0   MCH 30.9   MCHC 33.6   RDW 43.0   PLATELETCT 314   MPV 9.3     Recent Labs     11/20/19  0011   SODIUM 129*   POTASSIUM 4.5   CHLORIDE 98   CO2 26   GLUCOSE 102*   BUN 15   CREATININE 0.68   CALCIUM 8.7                   Imaging  EC-ECHOCARDIOGRAM LTD W/O CONT   Final Result      US-EXTREMITY VENOUS LOWER UNILAT RIGHT   Final Result      IR-MIDLINE CATHETER INSERTION WO GUIDANCE > AGE 3    (Results Pending)        Assessment/Plan  * Pyogenic arthritis of right knee joint (HCC)- (present on admission)  Assessment & Plan  I&D 11/15/2019  Staph aureus on aspirate, MSSA  Was on vancomycin, switch to cefazolin  Positive blood cultures  TTE neg  ID following appreciate rec.     MSSA bacteremia  Assessment & Plan  New  1 of 2 blood cultures + for MSSA  Continue cefazolin  Repeat blood cultures 11/17/2019 neg to date   TTE neg  ID following appreciate rec.     Transaminitis  Assessment & Plan  New  Unclear etiology  Improving  Monitor     Chronic fatigue  Assessment & Plan  Has vitiligo, outpatient providers thought may be autoimmune  Reviewed chart, all autoimmune work-up so far negative  Cortisol normal, ACTH normal no need to pursue Chris's work-up      Leukocytosis- (present on admission)  Assessment & Plan  Related to above    Hyponatremia- (present on admission)  Assessment & Plan  On IV fluid, improving  Follow CMP in the morning       VTE prophylaxis: heparin

## 2019-11-20 NOTE — PROGRESS NOTES
POD#5  S/P I&D knee  MSSA bacteremia  WBAT  ABX per ID  PICC line when Blood cultures negative  Case coordination

## 2019-11-21 PROCEDURE — 700111 HCHG RX REV CODE 636 W/ 250 OVERRIDE (IP): Performed by: INTERNAL MEDICINE

## 2019-11-21 PROCEDURE — 700105 HCHG RX REV CODE 258: Performed by: NURSE PRACTITIONER

## 2019-11-21 PROCEDURE — 99232 SBSQ HOSP IP/OBS MODERATE 35: CPT | Performed by: INTERNAL MEDICINE

## 2019-11-21 PROCEDURE — 770006 HCHG ROOM/CARE - MED/SURG/GYN SEMI*

## 2019-11-21 PROCEDURE — 700111 HCHG RX REV CODE 636 W/ 250 OVERRIDE (IP): Mod: JG | Performed by: NURSE PRACTITIONER

## 2019-11-21 RX ORDER — OXYCODONE HYDROCHLORIDE 5 MG/1
5 TABLET ORAL EVERY 6 HOURS PRN
Qty: 12 TAB | Refills: 0 | Status: SHIPPED | OUTPATIENT
Start: 2019-11-21 | End: 2019-11-24

## 2019-11-21 RX ADMIN — DAPTOMYCIN 500 MG: 350 INJECTION, POWDER, LYOPHILIZED, FOR SOLUTION INTRAVENOUS at 12:10

## 2019-11-21 RX ADMIN — HEPARIN SODIUM 5000 UNITS: 5000 INJECTION, SOLUTION INTRAVENOUS; SUBCUTANEOUS at 21:25

## 2019-11-21 RX ADMIN — HEPARIN SODIUM 5000 UNITS: 5000 INJECTION, SOLUTION INTRAVENOUS; SUBCUTANEOUS at 05:08

## 2019-11-21 RX ADMIN — CEFAZOLIN SODIUM 2 G: 2 INJECTION, SOLUTION INTRAVENOUS at 05:05

## 2019-11-21 ASSESSMENT — ENCOUNTER SYMPTOMS
CHILLS: 0
HEARTBURN: 0
WEAKNESS: 0
PALPITATIONS: 0
SENSORY CHANGE: 0
TREMORS: 0
DIARRHEA: 0
CONSTIPATION: 0
COUGH: 0
ABDOMINAL PAIN: 0
NAUSEA: 0
MYALGIAS: 1
MYALGIAS: 0
NERVOUS/ANXIOUS: 0
SHORTNESS OF BREATH: 0
FEVER: 0
HEADACHES: 0
SORE THROAT: 0
TINGLING: 0
VOMITING: 0

## 2019-11-21 NOTE — DISCHARGE INSTRUCTIONS
Discharge Instructions    Discharged to home by car with relative. Discharged via wheelchair, hospital escort: Yes.  Special equipment needed: Not Applicable    Be sure to schedule a follow-up appointment with your primary care doctor or any specialists as instructed.     Discharge Plan:   Diet Plan: Discussed  Activity Level: Discussed  Confirmed Follow up Appointment: Patient to Call and Schedule Appointment  Confirmed Symptoms Management: Discussed  Medication Reconciliation Updated: Yes  Influenza Vaccine Indication: Patient Refuses    I understand that a diet low in cholesterol, fat, and sodium is recommended for good health. Unless I have been given specific instructions below for another diet, I accept this instruction as my diet prescription.   Other diet: regular      Midline Catheter  A midline catheter is a thin, flexible tube that is inserted into a vein in the upper arm or at the bend in the elbow. Its tip ends at or near the armpit (axillary) area. A midline catheter is a type of intravenous (IV) access.   WHY ARE MIDLINE CATHETERS USED?  A midline catheter is used to give IV fluids, blood products, and medicines. One type of midline catheter may also be used to inject a contrast medium for a CT scan (power injection). Advantages of a midline catheter are:  · It can be used if a patient needs IV access for more than 5-7 days.  · It can stay in place for up to 1-4 weeks.  · You will not need to be stuck multiple times for IV restarts.  · The risk of vein inflammation (phlebitis) is lower than with a short peripheral catheter.  MIDLINE CATHETER COMPLICATIONS  · A clot can form in the midline catheter or at its tip.  · Phlebitis. The vein becomes warm, swollen, and tender. A red streak along the vein may develop where the midline catheter is.  · Leakage (infiltration) of IV fluids or medicine into the surrounding tissue of the vein. This can cause swelling, pain, and tissue injury in the arm with the  midline catheter.  · Infection.  · Nerve or tendon injury or irritation during midline catheter insertion.  CARING FOR YOUR MIDLINE CATHETER  · Wash your hands before and after caring for and using your midline catheter.  · Scrub the cap of your midline catheter with a new alcohol prep for 14 seconds. Allow it to completely dry each time you connect the syringe or tubing to your midline.  · Do not get the midline catheter dressing wet. Your caregiver can wrap the midline catheter if you want to take a shower.  · Do not pull on the midline catheter or tubing. This can dislodge the midline catheter from the vein. If the midline catheter is dislodged, the IV fluids or medication you are getting can leak into the surrounding tissue.  · Do not allow your blood pressure to be taken in the arm with the midline catheter.  · Do not allow your arm with the midline catheter to be used for other IV sticks.  · Blood draws from the midline catheter should be limited or avoided.  TELL YOUR HEALTH CARE PROVIDER RIGHT AWAY IF:  · The dressing is loose and the midline catheter insertion site is exposed.  · There is drainage, redness, swelling, discomfort, or warmth in the arm with the midline catheter.  · The midline catheter is partially or completely pulled out.  · You are unable to flush your midline catheter.  · Your catheter is broken or leaking.  · You have chills or fever.  This information is not intended to replace advice given to you by your health care provider. Make sure you discuss any questions you have with your health care provider.  Document Released: 05/21/2012 Document Revised: 10/08/2014 Document Reviewed: 05/27/2014  [employer] Interactive Patient Education © 2017 [employer]      Depression / Suicide Risk    As you are discharged from this Community [facility] facility, it is important to learn how to keep safe from harming yourself.    Recognize the warning signs:  · Abrupt changes in personality, positive or  negative- including increase in energy   · Giving away possessions  · Change in eating patterns- significant weight changes-  positive or negative  · Change in sleeping patterns- unable to sleep or sleeping all the time   · Unwillingness or inability to communicate  · Depression  · Unusual sadness, discouragement and loneliness  · Talk of wanting to die  · Neglect of personal appearance   · Rebelliousness- reckless behavior  · Withdrawal from people/activities they love  · Confusion- inability to concentrate     If you or a loved one observes any of these behaviors or has concerns about self-harm, here's what you can do:  · Talk about it- your feelings and reasons for harming yourself  · Remove any means that you might use to hurt yourself (examples: pills, rope, extension cords, firearm)  · Get professional help from the community (Mental Health, Substance Abuse, psychological counseling)  · Do not be alone:Call your Safe Contact- someone whom you trust who will be there for you.  · Call your local CRISIS HOTLINE 187-3126 or 668-547-2824  · Call your local Children's Mobile Crisis Response Team [place] (685) 733-7714 or www.JobSlot  · Call the toll free National Suicide Prevention Hotlines   · National Suicide Prevention Lifeline 641-238-TWRD (6930)  · National Hope Line Network 800-SUICIDE (671-6911)

## 2019-11-21 NOTE — PROGRESS NOTES
Pt c/o overwhelming fatigue and inability to hold her jaw up after daptomycin dose, VSS. Hospitalist MD and ID informed. Pt staying over night for monitoring. F/U with ID in the am.

## 2019-11-21 NOTE — CARE PLAN
Problem: Safety  Goal: Will remain free from falls  Outcome: PROGRESSING AS EXPECTED  Intervention: Implement fall precautions  Flowsheets (Taken 11/20/2019 2050)  Environmental Precautions: Treaded Slipper Socks on Patient;Personal Belongings, Wastebasket, Call Bell etc. in Easy Reach;Transferred to Stronger Side;Report Given to Other Health Care Providers Regarding Fall Risk;Bed in Low Position;Communication Sign for Patients & Families;Mobility Assessed & Appropriate Sign Placed  Note:   Educated on fall risk and ensured fall precautions in place. Bed in lowest position, treaded socks in place, call light in reach, bed alarm in place, room free of clutter.      Problem: Infection  Goal: Will remain free from infection  Outcome: PROGRESSING AS EXPECTED  Intervention: Implement standard precautions and perform hand washing before and after patient contact  Note:   Standard precautions in place.

## 2019-11-21 NOTE — DISCHARGE PLANNING
Met with patient and her daughter at bedside, aware that Eliza from [facility] will be here around [id number] for teaching of the IV antibiotic infusion. Patient has schedule of her weekly dressing changes and blood draws on her my chart.

## 2019-11-21 NOTE — PROGRESS NOTES
"Infectious Disease Progress Note    Author: ASHLEY Burgos Date & Time of service: 11/21/2019  12:08 PM    Chief Complaint:  FU Right knee septic arthritis, MSSA bacteremia    Interval History:  73 yo otherwise healthy woman admitted for acute R knee pain. S/p arthrocentesis on 11/15. Found to have R knee SA s/p I&D on 11/15. Also with MSSA bacteremia    11/18 AF no CBC done today. Has more knee flexion each day. Pain controlled. Plan for TTE today. Tolerating abx without issues. Would like to do home IV abx if possible. Repeat Bcx NGTD  11/19-AF, no WBC, no issue with antibiotics, 2/10 right knee stinging soreness that she tolerates, improves with rest and elevation, no longer taking pain medication, would prefer to do home infusion if possible.  11/20- Tm 99.2, WBC 6.5, tolerating abx, occasionally has a \"tinge\" of pain to the right knee but overall denies pain, would like to do daptomycin at home and go to [facility] for dressing changes and labs.  7/21- Tm 99.6, no WBC, doing well on the antibiotics without issue, should be receiving training from [facility] today around 1030, no issue with the midline, occasional twinges of pain to right knee but this is improving, able to ambulate on her own without FWW.    Labs Reviewed and Medications Reviewed.    Review of Systems:  Review of Systems   Constitutional: Negative for chills, fever and malaise/fatigue.   HENT: Negative for congestion and sore throat.    Respiratory: Negative for cough and shortness of breath.    Cardiovascular: Positive for leg swelling (Nearly resolved). Negative for chest pain and palpitations.   Gastrointestinal: Negative for abdominal pain, constipation, diarrhea, nausea and vomiting.   Genitourinary: Negative for dysuria and hematuria.   Musculoskeletal: Positive for joint pain (Occasional). Negative for myalgias.   Skin: Negative for itching and rash.   Neurological: Negative for tingling, sensory change, weakness and headaches. "   Psychiatric/Behavioral: The patient is not nervous/anxious.        Hemodynamics:  Temp (24hrs), Av.2 °C (98.9 °F), Min:36.7 °C (98.1 °F), Max:37.6 °C (99.6 °F)  Temperature: 37.1 °C (98.8 °F)  Pulse  Av.2  Min: 51  Max: 81   Blood Pressure : 148/80       Physical Exam:  Physical Exam  Vitals signs and nursing note reviewed.   Constitutional:       General: She is not in acute distress.     Appearance: Normal appearance. She is not toxic-appearing.   HENT:      Head: Normocephalic and atraumatic.      Nose: Nose normal. No congestion.      Mouth/Throat:      Mouth: Mucous membranes are moist.      Pharynx: Oropharynx is clear. No oropharyngeal exudate.   Eyes:      Extraocular Movements: Extraocular movements intact.      Conjunctiva/sclera: Conjunctivae normal.      Pupils: Pupils are equal, round, and reactive to light.   Neck:      Musculoskeletal: Normal range of motion and neck supple. No neck rigidity or muscular tenderness.   Cardiovascular:      Rate and Rhythm: Normal rate and regular rhythm.      Pulses: Normal pulses.      Heart sounds: Normal heart sounds. No murmur.   Pulmonary:      Effort: Pulmonary effort is normal. No respiratory distress.      Breath sounds: Normal breath sounds. No wheezing, rhonchi or rales.   Abdominal:      General: Abdomen is flat. There is no distension.      Palpations: Abdomen is soft.      Tenderness: There is no tenderness. There is no guarding or rebound.   Musculoskeletal:         General: Swelling (Trace right knee) present. No tenderness.      Comments: Right knee-surgical dressing remains in place, no erythema to surrounding tissue, nontender to palpation, no induration or fluctuance, slightly warm to touch.    RUE midline- CDI, non tender, no erythema.   Skin:     General: Skin is warm and dry.      Coloration: Skin is not pale.      Findings: No erythema or rash.   Neurological:      General: No focal deficit present.      Mental Status: She is alert and  "oriented to person, place, and time.      Cranial Nerves: No cranial nerve deficit.      Sensory: No sensory deficit.      Coordination: Coordination normal.   Psychiatric:         Mood and Affect: Mood normal.         Behavior: Behavior normal.         Thought Content: Thought content normal.         Judgment: Judgment normal.      Comments: Very pleasant         Meds:    Current Facility-Administered Medications:   •  DAPTOmycin  •  senna-docusate **AND** [DISCONTINUED] polyethylene glycol/lytes **AND** magnesium hydroxide **AND** bisacodyl  •  heparin  •  acetaminophen  •  ondansetron  •  morphine injection  •  Pharmacy Consult Request  •  ondansetron  •  dexamethasone  •  diphenhydrAMINE  •  haloperidol lactate  •  scopolamine  •  docusate sodium  •  polyethylene glycol/lytes  •  fleet  •  oxyCODONE immediate-release  •  oxyCODONE immediate release  •  HYDROmorphone    Labs:  Recent Labs     11/20/19  0011   WBC 6.5   RBC 4.27   HEMOGLOBIN 13.2   HEMATOCRIT 39.3   MCV 92.0   MCH 30.9   RDW 43.0   PLATELETCT 314   MPV 9.3     Recent Labs     11/20/19  0011   SODIUM 129*   POTASSIUM 4.5   CHLORIDE 98   CO2 26   GLUCOSE 102*   BUN 15   CPKTOTAL 34     Recent Labs     11/20/19  0011   ALBUMIN 3.2   TBILIRUBIN 0.3   ALKPHOSPHAT 55   TOTPROTEIN 5.6*   ALTSGPT 42   ASTSGOT 29   CREATININE 0.68       Imaging:  No new imaging within the last 24 hours.      Micro:  Results     Procedure Component Value Units Date/Time    BLOOD CULTURE [[id number]] Collected:  11/15/19 1549    Order Status:  Completed Specimen:  Blood from Peripheral Updated:  11/20/19 1901     Significant Indicator NEG     Source BLD     Site PERIPHERAL     Culture Result No growth after 5 days of incubation.    Narrative:       Per Hospital Policy: Only change Specimen Src: to \"Line\" if  specified by physician order.  Right AC    AFB Culture [[id number]] Collected:  11/15/19 1925    Order Status:  Completed Specimen:  Wound Updated:  11/18/19 1507     " Significant Indicator NEG     Source WND     Site Right Knee     Culture Result NOTE:  Swabs are not recommended for the isolation of  Mycobacteria, since they provide limited material.  They  are acceptable only if a specimen cannot be collected by  any other means.  Negative results obtained from these  specimens submitted on swabs are not reliable.  Culture in progress.       AFB Smear Results No acid fast bacilli seen.    Narrative:       Surgery - swabs received    CULTURE WOUND W/ GRAM STAIN [[id number]]  (Abnormal)  (Susceptibility) Collected:  11/15/19 1925    Order Status:  Completed Specimen:  Wound Updated:  11/18/19 1507     Significant Indicator POS     Source WND     Site Right Knee     Culture Result -     Gram Stain Result Few WBCs.  No organisms seen.       Culture Result Staphylococcus aureus  Rare growth      Narrative:       Surgery - swabs received    Susceptibility     Staphylococcus aureus (1)     Antibiotic Interpretation Microscan Method Status    Clindamycin Sensitive <=0.5 mcg/mL SHAWN Final    Daptomycin Sensitive <=0.5 mcg/mL SHAWN Final    Erythromycin Sensitive <=0.5 mcg/mL SHAWN Final    Ampicillin/sulbactam Sensitive <=8/4 mcg/mL SHAWN Final    Moxifloxacin Sensitive <=0.5 mcg/mL SHAWN Final    Vancomycin Sensitive 1 mcg/mL SHAWN Final    Oxacillin Sensitive <=0.25 mcg/mL SHAWN Final    Trimeth/Sulfa Sensitive <=0.5/9.5 mcg/mL SHAWN Final    Tetracycline Sensitive <=4 mcg/mL SHAWN Final                   Anaerobic Culture [[id number]] Collected:  11/15/19 1925    Order Status:  Completed Specimen:  Wound Updated:  11/18/19 1507     Significant Indicator NEG     Source WND     Site Right Knee     Culture Result No Anaerobes isolated.    Narrative:       Surgery - swabs received    Fungal Culture [[id number]] Collected:  11/15/19 1925    Order Status:  Completed Specimen:  Wound Updated:  11/18/19 1507     Significant Indicator NEG     Source WND     Site Right Knee     Culture Result No fungal growth to  "date.    Narrative:       Surgery - swabs received    BLOOD CULTURE [[id number]]  (Abnormal)  (Susceptibility) Collected:  11/15/19 1520    Order Status:  Completed Specimen:  Blood from Peripheral Updated:  11/18/19 0815     Significant Indicator POS     Source BLD     Site PERIPHERAL     Culture Result Growth detected by Bactec instrument. 11/16/2019  20:43  Staphylococcus aureus (methicillin sensitive)  detected by PCR.        Staphylococcus aureus    Narrative:       CALL  Gross  131 tel. [phone],  CALLED  131 tel. [phone] 11/16/2019, 22:14, RB PERF. RESULTS CALLED  TO:[phone]  Per Hospital Policy: Only change Specimen Src: to \"Line\" if  specified by physician order.  No site indicated    Susceptibility     Staphylococcus aureus (1)     Antibiotic Interpretation Microscan Method Status    Clindamycin Sensitive <=0.5 mcg/mL SHAWN Final    Daptomycin Sensitive <=0.5 mcg/mL SHAWN Final    Erythromycin Sensitive <=0.5 mcg/mL SHAWN Final    Ampicillin/sulbactam Sensitive <=8/4 mcg/mL SHAWN Final    Moxifloxacin Sensitive <=0.5 mcg/mL SHAWN Final    Vancomycin Sensitive 1 mcg/mL SHAWN Final    Oxacillin Sensitive <=0.25 mcg/mL SHAWN Final    Trimeth/Sulfa Sensitive <=0.5/9.5 mcg/mL SHAWN Final    Tetracycline Sensitive <=4 mcg/mL SHAWN Final                   BLOOD CULTURE [[id number]] Collected:  11/17/19 1539    Order Status:  Completed Specimen:  Blood from Peripheral Updated:  11/18/19 0741     Significant Indicator NEG     Source BLD     Site PERIPHERAL     Culture Result No Growth  Note: Blood cultures are incubated for 5 days and  are monitored continuously.Positive blood cultures  are called to the RN and reported as soon as  they are identified.      Narrative:       Per Hospital Policy: Only change Specimen Src: to \"Line\" if  specified by physician order.  Left Hand    BLOOD CULTURE [[id number]] Collected:  11/17/19 1539    Order Status:  Completed Specimen:  Blood from Peripheral Updated:  11/18/19 0741     Significant " "Indicator NEG     Source BLD     Site PERIPHERAL     Culture Result No Growth  Note: Blood cultures are incubated for 5 days and  are monitored continuously.Positive blood cultures  are called to the RN and reported as soon as  they are identified.      Narrative:       Per Hospital Policy: Only change Specimen Src: to \"Line\" if  specified by physician order.  Right AC    BLOOD CULTURE [[id number]] Collected:  11/18/19 0000    Order Status:  Canceled Specimen:  Other from Peripheral     BLOOD CULTURE [[id number]] Collected:  11/18/19 0000    Order Status:  Canceled Specimen:  Other from Peripheral     GRAM STAIN [[id number]] Collected:  11/15/19 1925    Order Status:  Completed Specimen:  Wound Updated:  11/17/19 0028     Significant Indicator .     Source WND     Site Right Knee     Gram Stain Result Few WBCs.  No organisms seen.      Narrative:       Surgery - swabs received    Acid Fast Stain [[id number]] Collected:  11/15/19 1925    Order Status:  Completed Specimen:  Wound Updated:  11/17/19 0028     Significant Indicator NEG     Source WND     Site Right Knee     AFB Smear Results No acid fast bacilli seen.    Narrative:       Surgery - swabs received          Assessment:  Active Hospital Problems    Diagnosis   • Bacteremia due to Gram-positive bacteria [R78.81]   • Pyogenic arthritis of right knee joint (HCC) [M00.9]   • Transaminitis [R74.0]   • Chronic fatigue [R53.82]   • Leukocytosis [D72.829]       Plan:  Right knee septic arthritis  Afebrile  No leukocytosis  S/p I&D on 11/15/19 with Dr. Navas  OR cx +MSSA  Abx as below    MSSA bacteremia-ongoing workup  Bcx on 11/15 (1/2) +MSSA  Repeat Bcx 11/17 negative  TTE on 11/18 negative for endocarditis  Stop IV Cefazolin, discharging home today  Give first dose of IV daptomycin 8 mg/kg today  Receiving training from [facility] today for home infusion  CPK 34 on 11/20  Going to [facility] weekly for labs and dressing changes  Will need 4 weeks of IV " antibiotics  Estimated end date 12/15/2019    Transaminitis  Resolved        Discussed case with hospitalist, Dr. Bryson, EZIO-Mela RN and NIRMALA Kay from [facility].  Receiving training for daptomycin home infusion today.  Will discharge home today and start home infusions tomorrow.  Going to [facility] weekly for dressing changes and labs.    Okay from ID standpoint to discharge home after [facility] training and receives dosing of daptomycin.    Follow-up in ID clinic in 2 weeks.

## 2019-11-21 NOTE — PROGRESS NOTES
[facility] Medicine Daily Progress Note    Date of Service  11/21/2019    Chief Complaint  74 y.o. female admitted 11/15/2019 with right knee pain.     Hospital Course    This is a 75 y/o F with no significant past medical history who was admitted on 11/15/19 after presenting to ER with right knee pain. In ER she was found to have septic arthritis of her knee and ortho was consulted. Pt had I&D done on 11/15/19. Id was also consulted. Pt also had blood culture growing MSSA also her or cultures grew MSSA.       Interval Problem Update  Pt seen and examined, afebrile , pain controlled, no nausea or vomiting. No acute events overnight.  ID following abx as per ID, ortho following  Appreciate rec.     Consultants/Specialty  Ortho  ID     Code Status  Full Code    Disposition  Tbd     Review of Systems  Review of Systems   Constitutional: Negative for chills and fever.   HENT: Negative for congestion and ear pain.    Respiratory: Negative for cough and shortness of breath.    Cardiovascular: Negative for chest pain.   Gastrointestinal: Negative for heartburn, nausea and vomiting.   Musculoskeletal: Positive for joint pain and myalgias.   Skin: Negative for itching and rash.   Neurological: Negative for tingling, tremors and sensory change.   All other systems reviewed and are negative.       Physical Exam  Temp:  [36.7 °C (98.1 °F)-37.6 °C (99.6 °F)] 36.8 °C (98.2 °F)  Pulse:  [55-70] 70  Resp:  [16-17] 17  BP: (115-148)/(62-81) 141/73  SpO2:  [95 %-99 %] 97 %    Physical Exam  Constitutional:       Appearance: Normal appearance.   Eyes:      General: Scleral icterus present.      Conjunctiva/sclera: Conjunctivae normal.   Neck:      Musculoskeletal: Neck supple.   Cardiovascular:      Rate and Rhythm: Normal rate.      Heart sounds: No murmur. No gallop.    Pulmonary:      Effort: Pulmonary effort is normal.      Breath sounds: Normal breath sounds. No wheezing.   Abdominal:      General: There is no distension.       Tenderness: There is no tenderness. There is no guarding.   Musculoskeletal:         General: Swelling and tenderness present.   Neurological:      General: No focal deficit present.      Mental Status: She is alert and oriented to person, place, and time.         Fluids    Intake/Output Summary (Last 24 hours) at 11/21/2019 1510  Last data filed at 11/21/2019 1300  Gross per 24 hour   Intake 480 ml   Output --   Net 480 ml       Laboratory  Recent Labs     11/20/19  0011   WBC 6.5   RBC 4.27   HEMOGLOBIN 13.2   HEMATOCRIT 39.3   MCV 92.0   MCH 30.9   MCHC 33.6   RDW 43.0   PLATELETCT 314   MPV 9.3     Recent Labs     11/20/19  0011   SODIUM 129*   POTASSIUM 4.5   CHLORIDE 98   CO2 26   GLUCOSE 102*   BUN 15   CREATININE 0.68   CALCIUM 8.7                   Imaging  IR-MIDLINE CATHETER INSERTION WO GUIDANCE > AGE 3   Final Result                  Ultrasound-guided midline placement performed by qualified nursing staff    as above.          EC-ECHOCARDIOGRAM LTD W/O CONT   Final Result      US-EXTREMITY VENOUS LOWER UNILAT RIGHT   Final Result           Assessment/Plan  * Pyogenic arthritis of right knee joint (HCC)- (present on admission)  Assessment & Plan  I&D 11/15/2019  Staph aureus on aspirate, MSSA  Was on vancomycin, switch to cefazolin  Positive blood cultures  TTE neg  ID following appreciate rec.     MSSA bacteremia  Assessment & Plan  New  1 of 2 blood cultures + for MSSA  Continue cefazolin  Repeat blood cultures 11/17/2019 neg to date   TTE neg  ID following appreciate rec.     Transaminitis  Assessment & Plan  New  Unclear etiology  Improving  Monitor     Chronic fatigue  Assessment & Plan  Has vitiligo, outpatient providers thought may be autoimmune  Reviewed chart, all autoimmune work-up so far negative  Cortisol normal, ACTH normal no need to pursue Chris's work-up      Leukocytosis- (present on admission)  Assessment & Plan  Related to above    Hyponatremia- (present on admission)  Assessment &  Plan  On IV fluid, improving  Follow CMP in the morning       VTE prophylaxis: heparin

## 2019-11-22 VITALS
HEIGHT: 65 IN | WEIGHT: 136.69 LBS | TEMPERATURE: 98.6 F | SYSTOLIC BLOOD PRESSURE: 132 MMHG | DIASTOLIC BLOOD PRESSURE: 63 MMHG | HEART RATE: 63 BPM | BODY MASS INDEX: 22.77 KG/M2 | RESPIRATION RATE: 16 BRPM | OXYGEN SATURATION: 96 %

## 2019-11-22 LAB
ALBUMIN SERPL BCP-MCNC: 3.1 G/DL (ref 3.2–4.9)
ALBUMIN/GLOB SERPL: 1.3 G/DL
ALP SERPL-CCNC: 49 U/L (ref 30–99)
ALT SERPL-CCNC: 49 U/L (ref 2–50)
ANION GAP SERPL CALC-SCNC: 5 MMOL/L (ref 0–11.9)
AST SERPL-CCNC: 36 U/L (ref 12–45)
BACTERIA BLD CULT: NORMAL
BACTERIA BLD CULT: NORMAL
BILIRUB SERPL-MCNC: 0.4 MG/DL (ref 0.1–1.5)
BUN SERPL-MCNC: 14 MG/DL (ref 8–22)
CALCIUM SERPL-MCNC: 8.3 MG/DL (ref 8.5–10.5)
CHLORIDE SERPL-SCNC: 98 MMOL/L (ref 96–112)
CK SERPL-CCNC: 21 U/L (ref 0–154)
CO2 SERPL-SCNC: 25 MMOL/L (ref 20–33)
CREAT SERPL-MCNC: 0.65 MG/DL (ref 0.5–1.4)
ERYTHROCYTE [DISTWIDTH] IN BLOOD BY AUTOMATED COUNT: 41.2 FL (ref 35.9–50)
GLOBULIN SER CALC-MCNC: 2.4 G/DL (ref 1.9–3.5)
GLUCOSE SERPL-MCNC: 101 MG/DL (ref 65–99)
HCT VFR BLD AUTO: 36.5 % (ref 37–47)
HGB BLD-MCNC: 13 G/DL (ref 12–16)
MCH RBC QN AUTO: 31.3 PG (ref 27–33)
MCHC RBC AUTO-ENTMCNC: 35.6 G/DL (ref 33.6–35)
MCV RBC AUTO: 88 FL (ref 81.4–97.8)
PLATELET # BLD AUTO: 325 K/UL (ref 164–446)
PMV BLD AUTO: 9.1 FL (ref 9–12.9)
POTASSIUM SERPL-SCNC: 4.2 MMOL/L (ref 3.6–5.5)
PROT SERPL-MCNC: 5.5 G/DL (ref 6–8.2)
RBC # BLD AUTO: 4.15 M/UL (ref 4.2–5.4)
SIGNIFICANT IND 70042: NORMAL
SIGNIFICANT IND 70042: NORMAL
SITE SITE: NORMAL
SITE SITE: NORMAL
SODIUM SERPL-SCNC: 128 MMOL/L (ref 135–145)
SOURCE SOURCE: NORMAL
SOURCE SOURCE: NORMAL
WBC # BLD AUTO: 7.5 K/UL (ref 4.8–10.8)

## 2019-11-22 PROCEDURE — 82550 ASSAY OF CK (CPK): CPT

## 2019-11-22 PROCEDURE — 700111 HCHG RX REV CODE 636 W/ 250 OVERRIDE (IP): Performed by: INTERNAL MEDICINE

## 2019-11-22 PROCEDURE — 80053 COMPREHEN METABOLIC PANEL: CPT

## 2019-11-22 PROCEDURE — 85027 COMPLETE CBC AUTOMATED: CPT

## 2019-11-22 PROCEDURE — 99239 HOSP IP/OBS DSCHRG MGMT >30: CPT | Performed by: INTERNAL MEDICINE

## 2019-11-22 RX ADMIN — HEPARIN SODIUM 5000 UNITS: 5000 INJECTION, SOLUTION INTRAVENOUS; SUBCUTANEOUS at 05:50

## 2019-11-22 ASSESSMENT — ENCOUNTER SYMPTOMS
NERVOUS/ANXIOUS: 0
SENSORY CHANGE: 0
FEVER: 0
CONSTIPATION: 0
VOMITING: 0
COUGH: 0
MYALGIAS: 0
SHORTNESS OF BREATH: 0
ABDOMINAL PAIN: 0
WEAKNESS: 0
DIARRHEA: 0
HEADACHES: 0
NAUSEA: 0
TINGLING: 0
CHILLS: 0
SORE THROAT: 0

## 2019-11-22 NOTE — DISCHARGE SUMMARY
Discharge Summary    CHIEF COMPLAINT ON ADMISSION  Chief Complaint   Patient presents with   • Knee Pain     Pt sent from [facility]. pt found to have bacteria in fluid drained from right knee. sent to [facility] for further care.        Reason for Admission  Sent by MD     Admission Date  11/15/2019    CODE STATUS  Prior    HPI & HOSPITAL COURSE   This is a 73 y/o F with no significant past medical history who was admitted on 11/15/19 after presenting to ER with right knee pain. In ER she was found to have septic arthritis of her knee and ortho was consulted. Pt had I&D done on 11/15/19. Id was also consulted. Pt also had blood culture growing MSSA also her or knee  cultures grew MSSA.  Pt  Was started on ancef, her symptoms improved, per ID pt can be discharged home on daptomycin with end date on 12/15/19.  Pt will have her abx delivered to her and knows how to administer it to her self and will follow once a week on infusion center.  Pt will follow up with infection disease in 2 weeks.  Pt will be discharged home today      The patient met 2-midnight criteria for an inpatient stay at the time of discharge.    Discharge Date  11/22/2019    FOLLOW UP ITEMS POST DISCHARGE  Infection disease  Ortho     DISCHARGE DIAGNOSES  Principal Problem:    Pyogenic arthritis of right knee joint (HCC) POA: Yes  Active Problems:    MSSA bacteremia POA: Unknown    Hyponatremia POA: Yes      Overview: 2/12/13 Na 136      2/13/14 Na 131      3/15/16 Na 137      5/25/16 Na 134      9/20/17 Na 131      10/2/19 Na 129      10/9/19 Na 134      10/15/19 serum osm 277 (278-298), urine osm 128 (300-900), urine Na 14,       cortisol 9.8, ACTH 15    Leukocytosis POA: Yes    Chronic fatigue POA: Unknown    Transaminitis POA: Unknown  Resolved Problems:    * No resolved hospital problems. *      FOLLOW UP  Future Appointments   Date Time Provider Department Center   11/25/2019 11:20 AM [facility] [address]   11/26/2019  2:30 PM  [facility]   12/3/2019  3:00 PM INFUSION QUICK INJECT [facility]   12/4/2019  1:00 PM Elizabeth Calderon M.D. [address]   12/10/2019  2:30 PM INFUSION QUICK INJECT [facility]   1/29/2020  1:20 PM FLORINDA Olvera M.D.  [address]  [facility] [address]  [phone]    Schedule an appointment as soon as possible for a visit in 1 week        MEDICATIONS ON DISCHARGE     Medication List      START taking these medications      Instructions   NS SOLN 50 mL with DAPTOmycin 350 MG SOLR 500 mg   500 mg by Intravenous route every 24 hours for 24 days.  Dose:  8 mg/kg     oxyCODONE immediate-release 5 MG Tabs  Commonly known as:  ROXICODONE   Take 1 Tab by mouth every 6 hours as needed for up to 3 days.  Dose:  5 mg        CONTINUE taking these medications      Instructions   CALCIUM PO   Take 1 Dose by mouth 2 Times a Day. Unknown OTC Strength.  Dose:  1 Dose     FISH OIL PO   Take 2 Doses by mouth every evening. Unknown OTC Strength.  Dose:  2 Dose     therapeutic multivitamin-minerals Tabs   Take 1 Tab by mouth every day.  Dose:  1 Tab     VITAMIN C PO   Take 1 Dose by mouth every day. Unknown OTC Strength.  Dose:  1 Dose     VITAMIN D PO   Take 1 Dose by mouth every day. Unknown OTC Strength.  Dose:  1 Dose            Allergies  Allergies   Allergen Reactions   • Asa [Aspirin]      Epistaxis     • Nsaids      Epistaxis         DIET  No orders of the defined types were placed in this encounter.      ACTIVITY  As tolerated.  Weight bearing as tolerated    CONSULTATIONS  ID  Ortho    PROCEDURES  Irrigation and debridement, right septic knee    LABORATORY  Lab Results   Component Value Date    SODIUM 128 (L) 11/22/2019    POTASSIUM 4.2 11/22/2019    CHLORIDE 98 11/22/2019    CO2 25 11/22/2019    GLUCOSE 101 (H) 11/22/2019    BUN 14 11/22/2019    CREATININE 0.65 11/22/2019    CREATININE 0.9 06/01/2005        Lab Results    Component Value Date    WBC 7.5 11/22/2019    HEMOGLOBIN 13.0 11/22/2019    HEMATOCRIT 36.5 (L) 11/22/2019    PLATELETCT 325 11/22/2019        Total time of the discharge process exceeds 41 minutes.

## 2019-11-22 NOTE — DISCHARGE PLANNING
Received phone call from Eliza at [facility] and the IV antibiotics will be delivered to patient's [facility] home by 1030 today. Met with patient and family and they are aware. Bedside RN aware

## 2019-11-22 NOTE — CARE PLAN
"  Problem: Discharge Barriers/Planning  Goal: Patient's continuum of care needs will be met  Outcome: PROGRESSING AS EXPECTED  Pt updated on POC, pt most likely to be discharged today after education on antibiotics infusions. Pt reports feeling less fatigued \"just a little now\". Pt expressed anticipation of discharge, has no further questions or concerns.       Problem: Pain Management  Goal: Pain level will decrease to patient's comfort goal  Outcome: PROGRESSING AS EXPECTED   Pt reports pain at 0 at this time, pt understands to call if feeling pain or in need of assistance. Pt denies further needs.  "

## 2019-11-22 NOTE — PROGRESS NOTES
"Infectious Disease Progress Note    Author: ASHLEY Burgos Date & Time of service: 11/22/2019  11:54 AM    Chief Complaint:  FU Right knee septic arthritis, MSSA bacteremia    Interval History:  73 yo otherwise healthy woman admitted for acute R knee pain. S/p arthrocentesis on 11/15. Found to have R knee SA s/p I&D on 11/15. Also with MSSA bacteremia    11/18 AF no CBC done today. Has more knee flexion each day. Pain controlled. Plan for TTE today. Tolerating abx without issues. Would like to do home IV abx if possible. Repeat Bcx NGTD  11/19-AF, no WBC, no issue with antibiotics, 2/10 right knee stinging soreness that she tolerates, improves with rest and elevation, no longer taking pain medication, would prefer to do home infusion if possible.  11/20- Tm 99.2, WBC 6.5, tolerating abx, occasionally has a \"tinge\" of pain to the right knee but overall denies pain, would like to do daptomycin at home and go to [facility] for dressing changes and labs.  11/21- Tm 99.6, no WBC, doing well on the antibiotics without issue, should be receiving training from [facility] today around 1030, no issue with the midline, occasional twinges of pain to right knee but this is improving, able to ambulate on her own without FWW.  11/22- Tm 99.4, WBC 7.5, feeling much better with less fatigue this a.m., noting that she was so fatigued after the dose of daptomycin yesterday she could not even keep her mouth open, denies any shortness of breath/throat swelling or itching, denies any pain to right knee.    Labs Reviewed and Medications Reviewed.    Review of Systems:  Review of Systems   Constitutional: Positive for malaise/fatigue (Improved). Negative for chills and fever.   HENT: Negative for congestion and sore throat.    Respiratory: Negative for cough and shortness of breath.    Cardiovascular: Positive for leg swelling (Nearly resolved). Negative for chest pain.   Gastrointestinal: Negative for abdominal pain, " constipation, diarrhea, nausea and vomiting.   Genitourinary: Negative for dysuria and hematuria.   Musculoskeletal: Negative for joint pain and myalgias.   Skin: Negative for rash.   Neurological: Negative for tingling, sensory change, weakness and headaches.   Psychiatric/Behavioral: The patient is not nervous/anxious.        Hemodynamics:  Temp (24hrs), Av.9 °C (98.5 °F), Min:36.6 °C (97.8 °F), Max:37.4 °C (99.4 °F)  Temperature: 37 °C (98.6 °F)  Pulse  Av.2  Min: 51  Max: 81   Blood Pressure : 132/63       Physical Exam:  Physical Exam  Vitals signs and nursing note reviewed.   Constitutional:       Appearance: Normal appearance. She is not ill-appearing or diaphoretic.   HENT:      Head: Normocephalic and atraumatic.      Right Ear: External ear normal.      Left Ear: External ear normal.      Nose: Nose normal. No congestion.      Mouth/Throat:      Mouth: Mucous membranes are moist.      Pharynx: Oropharynx is clear. No oropharyngeal exudate or posterior oropharyngeal erythema.   Eyes:      Extraocular Movements: Extraocular movements intact.      Conjunctiva/sclera: Conjunctivae normal.      Pupils: Pupils are equal, round, and reactive to light.   Neck:      Musculoskeletal: Normal range of motion and neck supple. No neck rigidity or muscular tenderness.   Cardiovascular:      Rate and Rhythm: Normal rate and regular rhythm.      Pulses: Normal pulses.      Heart sounds: Normal heart sounds. No murmur. No friction rub.   Pulmonary:      Effort: Pulmonary effort is normal. No respiratory distress.      Breath sounds: Normal breath sounds. No wheezing or rales.   Abdominal:      General: Abdomen is flat. There is no distension.      Palpations: Abdomen is soft.      Tenderness: There is no tenderness.   Musculoskeletal:         General: Swelling (Trace right knee) present. No tenderness.      Comments: Right knee-occlusive dressing in place, no erythema to surrounding tissue, nontender to palpation,  no induration or fluctuance, slightly warm to touch.    RUE midline- CDI, non tender, no erythema.   Skin:     General: Skin is warm and dry.      Findings: No erythema or rash.   Neurological:      General: No focal deficit present.      Mental Status: She is alert and oriented to person, place, and time.      Cranial Nerves: No cranial nerve deficit.      Sensory: No sensory deficit.   Psychiatric:         Mood and Affect: Mood normal.         Behavior: Behavior normal.         Thought Content: Thought content normal.         Judgment: Judgment normal.      Comments: Very pleasant         Meds:  No current facility-administered medications for this encounter.     Current Outpatient Medications:   •  DAPTOmycin (CUBICIN) ivpb, 8 mg/kg, Intravenous, Q24HRS  •  oxyCODONE immediate-release, 5 mg, Oral, Q6HRS PRN  •  therapeutic multivitamin-minerals, 1 Tab, Oral, DAILY, 11/15/2019 at 0830  •  Ascorbic Acid (VITAMIN C PO), 1 Dose, Oral, DAILY, 11/15/2019 at 0830  •  Cholecalciferol (VITAMIN D PO), 1 Dose, Oral, DAILY, 11/15/2019 at 0830  •  CALCIUM PO, 1 Dose, Oral, BID, 11/15/2019 at 0830  •  Omega-3 Fatty Acids (FISH OIL PO), 2 Dose, Oral, Q EVENING, 11/14/2019 at 2100    Labs:  Recent Labs     11/20/19 0011 11/22/19 0135   WBC 6.5 7.5   RBC 4.27 4.15*   HEMOGLOBIN 13.2 13.0   HEMATOCRIT 39.3 36.5*   MCV 92.0 88.0   MCH 30.9 31.3   RDW 43.0 41.2   PLATELETCT 314 325   MPV 9.3 9.1     Recent Labs     11/20/19 0011 11/22/19  0135   SODIUM 129* 128*   POTASSIUM 4.5 4.2   CHLORIDE 98 98   CO2 26 25   GLUCOSE 102* 101*   BUN 15 14   CPKTOTAL 34 21     Recent Labs     11/20/19 0011 11/22/19 0135   ALBUMIN 3.2 3.1*   TBILIRUBIN 0.3 0.4   ALKPHOSPHAT 55 49   TOTPROTEIN 5.6* 5.5*   ALTSGPT 42 49   ASTSGOT 29 36   CREATININE 0.68 0.65       Imaging:  No new imaging within the last 24 hours.      Micro:  Results     Procedure Component Value Units Date/Time    BLOOD CULTURE [[id number]] Collected:  11/15/19 1549    Order  "Status:  Completed Specimen:  Blood from Peripheral Updated:  11/20/19 1901     Significant Indicator NEG     Source BLD     Site PERIPHERAL     Culture Result No growth after 5 days of incubation.    Narrative:       Per Hospital Policy: Only change Specimen Src: to \"Line\" if  specified by physician order.  Right AC    AFB Culture [[id number]] Collected:  11/15/19 1925    Order Status:  Completed Specimen:  Wound Updated:  11/18/19 1507     Significant Indicator NEG     Source WND     Site Right Knee     Culture Result NOTE:  Swabs are not recommended for the isolation of  Mycobacteria, since they provide limited material.  They  are acceptable only if a specimen cannot be collected by  any other means.  Negative results obtained from these  specimens submitted on swabs are not reliable.  Culture in progress.       AFB Smear Results No acid fast bacilli seen.    Narrative:       Surgery - swabs received    CULTURE WOUND W/ GRAM STAIN [[id number]]  (Abnormal)  (Susceptibility) Collected:  11/15/19 1925    Order Status:  Completed Specimen:  Wound Updated:  11/18/19 1507     Significant Indicator POS     Source WND     Site Right Knee     Culture Result -     Gram Stain Result Few WBCs.  No organisms seen.       Culture Result Staphylococcus aureus  Rare growth      Narrative:       Surgery - swabs received    Susceptibility     Staphylococcus aureus (1)     Antibiotic Interpretation Microscan Method Status    Clindamycin Sensitive <=0.5 mcg/mL SHAWN Final    Daptomycin Sensitive <=0.5 mcg/mL SHAWN Final    Erythromycin Sensitive <=0.5 mcg/mL SHAWN Final    Ampicillin/sulbactam Sensitive <=8/4 mcg/mL SHAWN Final    Moxifloxacin Sensitive <=0.5 mcg/mL SHAWN Final    Vancomycin Sensitive 1 mcg/mL SHAWN Final    Oxacillin Sensitive <=0.25 mcg/mL SHAWN Final    Trimeth/Sulfa Sensitive <=0.5/9.5 mcg/mL SHAWN Final    Tetracycline Sensitive <=4 mcg/mL SHAWN Final                   Anaerobic Culture [[id number]] Collected:  11/15/19 1925    " "Order Status:  Completed Specimen:  Wound Updated:  11/18/19 1507     Significant Indicator NEG     Source WND     Site Right Knee     Culture Result No Anaerobes isolated.    Narrative:       Surgery - swabs received    Fungal Culture [[id number]] Collected:  11/15/19 1925    Order Status:  Completed Specimen:  Wound Updated:  11/18/19 1507     Significant Indicator NEG     Source WND     Site Right Knee     Culture Result No fungal growth to date.    Narrative:       Surgery - swabs received    BLOOD CULTURE [[id number]]  (Abnormal)  (Susceptibility) Collected:  11/15/19 1520    Order Status:  Completed Specimen:  Blood from Peripheral Updated:  11/18/19 0815     Significant Indicator POS     Source BLD     Site PERIPHERAL     Culture Result Growth detected by Bactec instrument. 11/16/2019  20:43  Staphylococcus aureus (methicillin sensitive)  detected by PCR.        Staphylococcus aureus    Narrative:       CALL  Gross  131 tel. [phone],  CALLED  131 tel. [phone] 11/16/2019, 22:14, RB PERF. RESULTS CALLED  TO:[phone]  Per Hospital Policy: Only change Specimen Src: to \"Line\" if  specified by physician order.  No site indicated    Susceptibility     Staphylococcus aureus (1)     Antibiotic Interpretation Microscan Method Status    Clindamycin Sensitive <=0.5 mcg/mL SHAWN Final    Daptomycin Sensitive <=0.5 mcg/mL SHAWN Final    Erythromycin Sensitive <=0.5 mcg/mL SHAWN Final    Ampicillin/sulbactam Sensitive <=8/4 mcg/mL SHAWN Final    Moxifloxacin Sensitive <=0.5 mcg/mL SHAWN Final    Vancomycin Sensitive 1 mcg/mL SHAWN Final    Oxacillin Sensitive <=0.25 mcg/mL SHAWN Final    Trimeth/Sulfa Sensitive <=0.5/9.5 mcg/mL SHAWN Final    Tetracycline Sensitive <=4 mcg/mL SHAWN Final                   BLOOD CULTURE [[id number]] Collected:  11/17/19 1539    Order Status:  Completed Specimen:  Blood from Peripheral Updated:  11/18/19 0741     Significant Indicator NEG     Source BLD     Site PERIPHERAL     Culture Result No Growth  Note: " "Blood cultures are incubated for 5 days and  are monitored continuously.Positive blood cultures  are called to the RN and reported as soon as  they are identified.      Narrative:       Per Hospital Policy: Only change Specimen Src: to \"Line\" if  specified by physician order.  Left Hand    BLOOD CULTURE [[id number]] Collected:  11/17/19 1539    Order Status:  Completed Specimen:  Blood from Peripheral Updated:  11/18/19 0741     Significant Indicator NEG     Source BLD     Site PERIPHERAL     Culture Result No Growth  Note: Blood cultures are incubated for 5 days and  are monitored continuously.Positive blood cultures  are called to the RN and reported as soon as  they are identified.      Narrative:       Per Hospital Policy: Only change Specimen Src: to \"Line\" if  specified by physician order.  Right AC    BLOOD CULTURE [[id number]] Collected:  11/18/19 0000    Order Status:  Canceled Specimen:  Other from Peripheral     BLOOD CULTURE [[id number]] Collected:  11/18/19 0000    Order Status:  Canceled Specimen:  Other from Peripheral     GRAM STAIN [[id number]] Collected:  11/15/19 1925    Order Status:  Completed Specimen:  Wound Updated:  11/17/19 0028     Significant Indicator .     Source WND     Site Right Knee     Gram Stain Result Few WBCs.  No organisms seen.      Narrative:       Surgery - swabs received    Acid Fast Stain [[id number]] Collected:  11/15/19 1925    Order Status:  Completed Specimen:  Wound Updated:  11/17/19 0028     Significant Indicator NEG     Source WND     Site Right Knee     AFB Smear Results No acid fast bacilli seen.    Narrative:       Surgery - swabs received          Assessment:  Active Hospital Problems    Diagnosis   • Bacteremia due to Gram-positive bacteria [R78.81]   • Pyogenic arthritis of right knee joint (HCC) [M00.9]   • Transaminitis [R74.0]   • Chronic fatigue [R53.82]   • Leukocytosis [D72.829]       Plan:  Right knee septic arthritis  Afebrile  No leukocytosis  S/p I&D on " 11/15/19 with Dr. Navas  OR cx +MSSA  Abx as below    MSSA bacteremia-ongoing workup  Bcx on 11/15 (1/2) +MSSA  Repeat Bcx 11/17 negative  TTE on 11/18 negative for endocarditis  Stop IV Cefazolin, discharging home today  Received first dose of IV daptomycin yesterday  Will resume IV daptomycin infusions with [facility] today once discharged home  Receiving training from [facility] yesterday for home infusion  CPK 34 on 11/20  Going to [facility] weekly for labs and dressing changes  Will need 4 weeks of IV antibiotics  Estimated end date 12/15/2019    Transaminitis  Resolved        Discussed case with hospitalist, Dr. Bryson and NIRMALA Kay from [facility].  Had some fatigue immediately following Dapto infusion; however, she is feeling better and did not have any other issues with the medication.  Recommend she do her infusions in the evenings so she can sleep off the fatigue.  Okay to discharge home.  Will coordinate with [facility] regarding delivery of medications.    Follow-up in ID clinic in 2 weeks.    I personally saw and examined the patient independently. Labs, cultures and imaging reviewed. Case discussed with MICHAEL Newman. I agree with her assessment and plan as outlined with any additions noted below.

## 2019-11-25 ENCOUNTER — PATIENT OUTREACH (OUTPATIENT)
Dept: MEDICAL GROUP | Facility: MEDICAL CENTER | Age: 74
End: 2019-11-25

## 2019-11-26 ENCOUNTER — OUTPATIENT INFUSION SERVICES (OUTPATIENT)
Dept: ONCOLOGY | Facility: MEDICAL CENTER | Age: 74
End: 2019-11-26
Attending: NURSE PRACTITIONER
Payer: MEDICARE

## 2019-11-26 VITALS
OXYGEN SATURATION: 93 % | DIASTOLIC BLOOD PRESSURE: 60 MMHG | TEMPERATURE: 97.3 F | WEIGHT: 132.28 LBS | HEART RATE: 91 BPM | BODY MASS INDEX: 22.58 KG/M2 | SYSTOLIC BLOOD PRESSURE: 141 MMHG | RESPIRATION RATE: 18 BRPM | HEIGHT: 64 IN

## 2019-11-26 DIAGNOSIS — B95.61 MSSA BACTEREMIA: ICD-10-CM

## 2019-11-26 DIAGNOSIS — M00.061 STAPHYLOCOCCAL ARTHRITIS OF RIGHT KNEE (HCC): ICD-10-CM

## 2019-11-26 DIAGNOSIS — R78.81 MSSA BACTEREMIA: ICD-10-CM

## 2019-11-26 PROBLEM — D72.829 LEUKOCYTOSIS: Status: RESOLVED | Noted: 2019-11-15 | Resolved: 2019-11-26

## 2019-11-26 LAB
CRP SERPL HS-MCNC: 1.01 MG/DL (ref 0–0.75)
ERYTHROCYTE [SEDIMENTATION RATE] IN BLOOD BY WESTERGREN METHOD: 12 MM/HOUR (ref 0–30)

## 2019-11-26 PROCEDURE — 85652 RBC SED RATE AUTOMATED: CPT

## 2019-11-26 PROCEDURE — 86140 C-REACTIVE PROTEIN: CPT

## 2019-11-26 PROCEDURE — 36415 COLL VENOUS BLD VENIPUNCTURE: CPT

## 2019-11-26 RX ORDER — 0.9 % SODIUM CHLORIDE 0.9 %
5 VIAL (ML) INJECTION PRN
Status: CANCELLED | OUTPATIENT
Start: 2019-12-02

## 2019-11-26 RX ORDER — 0.9 % SODIUM CHLORIDE 0.9 %
10-20 VIAL (ML) INJECTION PRN
Status: CANCELLED | OUTPATIENT
Start: 2019-12-02

## 2019-11-26 NOTE — PROGRESS NOTES
Pt ambulatory to [facility] for midline bandage change and labs.  Pt w/ no s/s of infection, pt has no complaints at this time.  Pt presents w/ RUE midline in place, CDI.  Midline flushes w/ ease but has no blood return w/ multiple flushes and position changes.  Midline bandage and clave changed per protocol, midline wrapped in protective sleeve.  Pt labs drawn from LAC w/ 23G butterfly, gauze and coban dressing applied.  Pt left on foot in NAD.  Confirmed pt's next appt.

## 2019-12-02 ENCOUNTER — TELEPHONE (OUTPATIENT)
Dept: INFECTIOUS DISEASES | Facility: MEDICAL CENTER | Age: 74
End: 2019-12-02

## 2019-12-03 ENCOUNTER — OUTPATIENT INFUSION SERVICES (OUTPATIENT)
Dept: ONCOLOGY | Facility: MEDICAL CENTER | Age: 74
End: 2019-12-03
Attending: NURSE PRACTITIONER
Payer: MEDICARE

## 2019-12-03 VITALS
RESPIRATION RATE: 18 BRPM | WEIGHT: 134.48 LBS | TEMPERATURE: 98.9 F | BODY MASS INDEX: 23.83 KG/M2 | HEIGHT: 63 IN | HEART RATE: 65 BPM | SYSTOLIC BLOOD PRESSURE: 105 MMHG | DIASTOLIC BLOOD PRESSURE: 51 MMHG | OXYGEN SATURATION: 94 %

## 2019-12-03 DIAGNOSIS — R78.81 MSSA BACTEREMIA: ICD-10-CM

## 2019-12-03 DIAGNOSIS — B95.61 MSSA BACTEREMIA: ICD-10-CM

## 2019-12-03 DIAGNOSIS — M00.061 STAPHYLOCOCCAL ARTHRITIS OF RIGHT KNEE (HCC): ICD-10-CM

## 2019-12-03 LAB
ALBUMIN SERPL BCP-MCNC: 3.8 G/DL (ref 3.2–4.9)
ALBUMIN/GLOB SERPL: 1.5 G/DL
ALP SERPL-CCNC: 58 U/L (ref 30–99)
ALT SERPL-CCNC: 23 U/L (ref 2–50)
ANION GAP SERPL CALC-SCNC: 6 MMOL/L (ref 0–11.9)
AST SERPL-CCNC: 19 U/L (ref 12–45)
BASOPHILS # BLD AUTO: 0.4 % (ref 0–1.8)
BASOPHILS # BLD: 0.02 K/UL (ref 0–0.12)
BILIRUB SERPL-MCNC: 0.4 MG/DL (ref 0.1–1.5)
BUN SERPL-MCNC: 18 MG/DL (ref 8–22)
CALCIUM SERPL-MCNC: 9.3 MG/DL (ref 8.5–10.5)
CHLORIDE SERPL-SCNC: 97 MMOL/L (ref 96–112)
CO2 SERPL-SCNC: 26 MMOL/L (ref 20–33)
CREAT SERPL-MCNC: 0.67 MG/DL (ref 0.5–1.4)
CRP SERPL HS-MCNC: 0.66 MG/DL (ref 0–0.75)
EOSINOPHIL # BLD AUTO: 0.16 K/UL (ref 0–0.51)
EOSINOPHIL NFR BLD: 3 % (ref 0–6.9)
ERYTHROCYTE [DISTWIDTH] IN BLOOD BY AUTOMATED COUNT: 45.3 FL (ref 35.9–50)
ERYTHROCYTE [SEDIMENTATION RATE] IN BLOOD BY WESTERGREN METHOD: 21 MM/HOUR (ref 0–30)
GLOBULIN SER CALC-MCNC: 2.6 G/DL (ref 1.9–3.5)
GLUCOSE SERPL-MCNC: 99 MG/DL (ref 65–99)
HCT VFR BLD AUTO: 36.8 % (ref 37–47)
HGB BLD-MCNC: 12.7 G/DL (ref 12–16)
IMM GRANULOCYTES # BLD AUTO: 0.01 K/UL (ref 0–0.11)
IMM GRANULOCYTES NFR BLD AUTO: 0.2 % (ref 0–0.9)
LYMPHOCYTES # BLD AUTO: 0.68 K/UL (ref 1–4.8)
LYMPHOCYTES NFR BLD: 12.6 % (ref 22–41)
MCH RBC QN AUTO: 31.9 PG (ref 27–33)
MCHC RBC AUTO-ENTMCNC: 34.5 G/DL (ref 33.6–35)
MCV RBC AUTO: 92.5 FL (ref 81.4–97.8)
MONOCYTES # BLD AUTO: 0.35 K/UL (ref 0–0.85)
MONOCYTES NFR BLD AUTO: 6.5 % (ref 0–13.4)
NEUTROPHILS # BLD AUTO: 4.19 K/UL (ref 2–7.15)
NEUTROPHILS NFR BLD: 77.3 % (ref 44–72)
NRBC # BLD AUTO: 0 K/UL
NRBC BLD-RTO: 0 /100 WBC
PLATELET # BLD AUTO: 251 K/UL (ref 164–446)
PMV BLD AUTO: 9.2 FL (ref 9–12.9)
POTASSIUM SERPL-SCNC: 4 MMOL/L (ref 3.6–5.5)
PROT SERPL-MCNC: 6.4 G/DL (ref 6–8.2)
RBC # BLD AUTO: 3.98 M/UL (ref 4.2–5.4)
SODIUM SERPL-SCNC: 129 MMOL/L (ref 135–145)
WBC # BLD AUTO: 5.4 K/UL (ref 4.8–10.8)

## 2019-12-03 PROCEDURE — 85025 COMPLETE CBC W/AUTO DIFF WBC: CPT

## 2019-12-03 PROCEDURE — 99211 OFF/OP EST MAY X REQ PHY/QHP: CPT

## 2019-12-03 PROCEDURE — 80053 COMPREHEN METABOLIC PANEL: CPT

## 2019-12-03 PROCEDURE — 86140 C-REACTIVE PROTEIN: CPT

## 2019-12-03 PROCEDURE — 36415 COLL VENOUS BLD VENIPUNCTURE: CPT

## 2019-12-03 PROCEDURE — 85652 RBC SED RATE AUTOMATED: CPT

## 2019-12-03 PROCEDURE — 306780 HCHG STAT FOR TRANSFUSION PER CASE

## 2019-12-03 RX ORDER — 0.9 % SODIUM CHLORIDE 0.9 %
10-20 VIAL (ML) INJECTION PRN
Status: CANCELLED | OUTPATIENT
Start: 2019-12-09

## 2019-12-03 RX ORDER — 0.9 % SODIUM CHLORIDE 0.9 %
5 VIAL (ML) INJECTION PRN
Status: CANCELLED | OUTPATIENT
Start: 2019-12-09

## 2019-12-03 NOTE — PROGRESS NOTES
Pt to infusion services ambulatory per self.  Pt here for scheduled midline catheter dressing change and labs.  Plan of care reviewed.  Pt verbalizes understanding.  Pt with midline catheter in place to SERJIO.  Midline catheter flushes easily, however negative for blood return.  Midline catheter dressing changed per policy.  Pt tolerated well.  Line secured.  Labs drawn from L-AC using #23G BD needle.  Pt tolerated well.  Pressure dressing applied.  Pt left infusion services after completion of treatment, ambulatory.  Pt to return in one week; next appointment scheduled. Blood samples sent to lab.

## 2019-12-04 ENCOUNTER — APPOINTMENT (OUTPATIENT)
Dept: RADIOLOGY | Facility: MEDICAL CENTER | Age: 74
DRG: 864 | End: 2019-12-04
Attending: EMERGENCY MEDICINE
Payer: MEDICARE

## 2019-12-04 ENCOUNTER — TELEPHONE (OUTPATIENT)
Dept: INFECTIOUS DISEASES | Facility: MEDICAL CENTER | Age: 74
End: 2019-12-04

## 2019-12-04 ENCOUNTER — HOSPITAL ENCOUNTER (INPATIENT)
Facility: MEDICAL CENTER | Age: 74
LOS: 5 days | DRG: 864 | End: 2019-12-09
Attending: EMERGENCY MEDICINE | Admitting: HOSPITALIST
Payer: MEDICARE

## 2019-12-04 ENCOUNTER — OFFICE VISIT (OUTPATIENT)
Dept: MEDICAL GROUP | Facility: MEDICAL CENTER | Age: 74
End: 2019-12-04
Payer: MEDICARE

## 2019-12-04 VITALS
DIASTOLIC BLOOD PRESSURE: 62 MMHG | TEMPERATURE: 100.8 F | RESPIRATION RATE: 14 BRPM | WEIGHT: 130 LBS | SYSTOLIC BLOOD PRESSURE: 130 MMHG | BODY MASS INDEX: 23.04 KG/M2 | HEART RATE: 103 BPM | HEIGHT: 63 IN | OXYGEN SATURATION: 93 %

## 2019-12-04 DIAGNOSIS — R50.9 FEVER AND CHILLS: ICD-10-CM

## 2019-12-04 DIAGNOSIS — Z09 HOSPITAL DISCHARGE FOLLOW-UP: ICD-10-CM

## 2019-12-04 DIAGNOSIS — Z87.898 HISTORY OF BACTEREMIA: ICD-10-CM

## 2019-12-04 DIAGNOSIS — M00.061 STAPHYLOCOCCAL ARTHRITIS OF RIGHT KNEE (HCC): ICD-10-CM

## 2019-12-04 PROBLEM — M00.9 SEPTIC ARTHRITIS OF KNEE, RIGHT (HCC): Status: ACTIVE | Noted: 2019-12-04

## 2019-12-04 LAB
ALBUMIN SERPL BCP-MCNC: 3.9 G/DL (ref 3.2–4.9)
ALBUMIN/GLOB SERPL: 1.2 G/DL
ALP SERPL-CCNC: 69 U/L (ref 30–99)
ALT SERPL-CCNC: 23 U/L (ref 2–50)
ANION GAP SERPL CALC-SCNC: 15 MMOL/L (ref 0–11.9)
APPEARANCE UR: CLEAR
AST SERPL-CCNC: 19 U/L (ref 12–45)
BASOPHILS # BLD AUTO: 0.4 % (ref 0–1.8)
BASOPHILS # BLD: 0.03 K/UL (ref 0–0.12)
BILIRUB SERPL-MCNC: 0.6 MG/DL (ref 0.1–1.5)
BILIRUB UR QL STRIP.AUTO: NEGATIVE
BUN SERPL-MCNC: 12 MG/DL (ref 8–22)
CALCIUM SERPL-MCNC: 9.9 MG/DL (ref 8.4–10.2)
CHLORIDE SERPL-SCNC: 93 MMOL/L (ref 96–112)
CO2 SERPL-SCNC: 21 MMOL/L (ref 20–33)
COLOR UR: YELLOW
CREAT SERPL-MCNC: 0.54 MG/DL (ref 0.5–1.4)
EOSINOPHIL # BLD AUTO: 0.06 K/UL (ref 0–0.51)
EOSINOPHIL NFR BLD: 0.8 % (ref 0–6.9)
ERYTHROCYTE [DISTWIDTH] IN BLOOD BY AUTOMATED COUNT: 43.8 FL (ref 35.9–50)
FLUAV RNA SPEC QL NAA+PROBE: NEGATIVE
FLUBV RNA SPEC QL NAA+PROBE: NEGATIVE
GLOBULIN SER CALC-MCNC: 3.3 G/DL (ref 1.9–3.5)
GLUCOSE SERPL-MCNC: 117 MG/DL (ref 65–99)
GLUCOSE UR STRIP.AUTO-MCNC: NEGATIVE MG/DL
HCT VFR BLD AUTO: 42.6 % (ref 37–47)
HGB BLD-MCNC: 14 G/DL (ref 12–16)
IMM GRANULOCYTES # BLD AUTO: 0.02 K/UL (ref 0–0.11)
IMM GRANULOCYTES NFR BLD AUTO: 0.3 % (ref 0–0.9)
KETONES UR STRIP.AUTO-MCNC: NEGATIVE MG/DL
LACTATE BLD-SCNC: 1.8 MMOL/L (ref 0.5–2)
LEUKOCYTE ESTERASE UR QL STRIP.AUTO: NEGATIVE
LYMPHOCYTES # BLD AUTO: 0.36 K/UL (ref 1–4.8)
LYMPHOCYTES NFR BLD: 4.9 % (ref 22–41)
MCH RBC QN AUTO: 30.2 PG (ref 27–33)
MCHC RBC AUTO-ENTMCNC: 32.9 G/DL (ref 33.6–35)
MCV RBC AUTO: 92 FL (ref 81.4–97.8)
MICRO URNS: NORMAL
MONOCYTES # BLD AUTO: 0.35 K/UL (ref 0–0.85)
MONOCYTES NFR BLD AUTO: 4.8 % (ref 0–13.4)
NEUTROPHILS # BLD AUTO: 6.47 K/UL (ref 2–7.15)
NEUTROPHILS NFR BLD: 88.8 % (ref 44–72)
NITRITE UR QL STRIP.AUTO: NEGATIVE
NRBC # BLD AUTO: 0 K/UL
NRBC BLD-RTO: 0 /100 WBC
PH UR STRIP.AUTO: 6 [PH] (ref 5–8)
PLATELET # BLD AUTO: 197 K/UL (ref 164–446)
PMV BLD AUTO: 9.5 FL (ref 9–12.9)
POTASSIUM SERPL-SCNC: 4 MMOL/L (ref 3.6–5.5)
PROT SERPL-MCNC: 7.2 G/DL (ref 6–8.2)
PROT UR QL STRIP: NEGATIVE MG/DL
RBC # BLD AUTO: 4.63 M/UL (ref 4.2–5.4)
RBC UR QL AUTO: NEGATIVE
SODIUM SERPL-SCNC: 129 MMOL/L (ref 135–145)
SP GR UR STRIP.AUTO: 1.02
WBC # BLD AUTO: 7.3 K/UL (ref 4.8–10.8)

## 2019-12-04 PROCEDURE — 71045 X-RAY EXAM CHEST 1 VIEW: CPT

## 2019-12-04 PROCEDURE — 700105 HCHG RX REV CODE 258: Performed by: HOSPITALIST

## 2019-12-04 PROCEDURE — 87040 BLOOD CULTURE FOR BACTERIA: CPT

## 2019-12-04 PROCEDURE — 85025 COMPLETE CBC W/AUTO DIFF WBC: CPT

## 2019-12-04 PROCEDURE — 700111 HCHG RX REV CODE 636 W/ 250 OVERRIDE (IP): Performed by: HOSPITALIST

## 2019-12-04 PROCEDURE — 700111 HCHG RX REV CODE 636 W/ 250 OVERRIDE (IP): Performed by: EMERGENCY MEDICINE

## 2019-12-04 PROCEDURE — 99214 OFFICE O/P EST MOD 30 MIN: CPT | Performed by: FAMILY MEDICINE

## 2019-12-04 PROCEDURE — 99223 1ST HOSP IP/OBS HIGH 75: CPT | Mod: AI | Performed by: HOSPITALIST

## 2019-12-04 PROCEDURE — 83605 ASSAY OF LACTIC ACID: CPT

## 2019-12-04 PROCEDURE — 770006 HCHG ROOM/CARE - MED/SURG/GYN SEMI*

## 2019-12-04 PROCEDURE — 700102 HCHG RX REV CODE 250 W/ 637 OVERRIDE(OP): Performed by: EMERGENCY MEDICINE

## 2019-12-04 PROCEDURE — 99285 EMERGENCY DEPT VISIT HI MDM: CPT

## 2019-12-04 PROCEDURE — 700102 HCHG RX REV CODE 250 W/ 637 OVERRIDE(OP): Performed by: HOSPITALIST

## 2019-12-04 PROCEDURE — 80053 COMPREHEN METABOLIC PANEL: CPT

## 2019-12-04 PROCEDURE — 96367 TX/PROPH/DG ADDL SEQ IV INF: CPT

## 2019-12-04 PROCEDURE — 96365 THER/PROPH/DIAG IV INF INIT: CPT

## 2019-12-04 PROCEDURE — A9270 NON-COVERED ITEM OR SERVICE: HCPCS | Performed by: EMERGENCY MEDICINE

## 2019-12-04 PROCEDURE — A9270 NON-COVERED ITEM OR SERVICE: HCPCS | Performed by: HOSPITALIST

## 2019-12-04 PROCEDURE — 81003 URINALYSIS AUTO W/O SCOPE: CPT

## 2019-12-04 PROCEDURE — 87502 INFLUENZA DNA AMP PROBE: CPT

## 2019-12-04 PROCEDURE — 87086 URINE CULTURE/COLONY COUNT: CPT

## 2019-12-04 PROCEDURE — 700105 HCHG RX REV CODE 258: Performed by: EMERGENCY MEDICINE

## 2019-12-04 RX ORDER — ACETAMINOPHEN 325 MG/1
650 TABLET ORAL ONCE
Status: COMPLETED | OUTPATIENT
Start: 2019-12-04 | End: 2019-12-04

## 2019-12-04 RX ORDER — SODIUM CHLORIDE 9 MG/ML
INJECTION, SOLUTION INTRAVENOUS CONTINUOUS
Status: DISCONTINUED | OUTPATIENT
Start: 2019-12-04 | End: 2019-12-09 | Stop reason: HOSPADM

## 2019-12-04 RX ORDER — PROPARACAINE HYDROCHLORIDE 5 MG/ML
SOLUTION/ DROPS OPHTHALMIC
Status: DISPENSED
Start: 2019-12-04 | End: 2019-12-05

## 2019-12-04 RX ORDER — POLYETHYLENE GLYCOL 3350 17 G/17G
1 POWDER, FOR SOLUTION ORAL
Status: DISCONTINUED | OUTPATIENT
Start: 2019-12-04 | End: 2019-12-09 | Stop reason: HOSPADM

## 2019-12-04 RX ORDER — BISACODYL 10 MG
10 SUPPOSITORY, RECTAL RECTAL
Status: DISCONTINUED | OUTPATIENT
Start: 2019-12-04 | End: 2019-12-09 | Stop reason: HOSPADM

## 2019-12-04 RX ORDER — ACETAMINOPHEN 325 MG/1
650 TABLET ORAL EVERY 6 HOURS PRN
Status: DISCONTINUED | OUTPATIENT
Start: 2019-12-04 | End: 2019-12-09 | Stop reason: HOSPADM

## 2019-12-04 RX ORDER — AMOXICILLIN 250 MG
2 CAPSULE ORAL 2 TIMES DAILY
Status: DISCONTINUED | OUTPATIENT
Start: 2019-12-04 | End: 2019-12-09 | Stop reason: HOSPADM

## 2019-12-04 RX ADMIN — ACETAMINOPHEN 650 MG: 325 TABLET, FILM COATED ORAL at 16:10

## 2019-12-04 RX ADMIN — SODIUM CHLORIDE: 9 INJECTION, SOLUTION INTRAVENOUS at 18:30

## 2019-12-04 RX ADMIN — DAPTOMYCIN 500 MG: 500 INJECTION, POWDER, LYOPHILIZED, FOR SOLUTION INTRAVENOUS at 19:58

## 2019-12-04 RX ADMIN — CEFTRIAXONE SODIUM 1 G: 1 INJECTION, POWDER, FOR SOLUTION INTRAMUSCULAR; INTRAVENOUS at 17:59

## 2019-12-04 RX ADMIN — ACETAMINOPHEN 650 MG: 325 TABLET, FILM COATED ORAL at 22:27

## 2019-12-04 ASSESSMENT — ENCOUNTER SYMPTOMS
COUGH: 0
DIZZINESS: 0
PND: 0
CONSTIPATION: 0
ORTHOPNEA: 0
SORE THROAT: 0
DEPRESSION: 0
TINGLING: 0
HEADACHES: 0
SENSORY CHANGE: 0
MEMORY LOSS: 0
CHILLS: 1
MYALGIAS: 0
NECK PAIN: 0
PALPITATIONS: 0
DOUBLE VISION: 0
SPUTUM PRODUCTION: 0
WEAKNESS: 0
VOMITING: 0
BACK PAIN: 0
EYE PAIN: 0
PHOTOPHOBIA: 0
SPEECH CHANGE: 0
HEARTBURN: 0
BLOOD IN STOOL: 0
HEMOPTYSIS: 0
SHORTNESS OF BREATH: 0
FEVER: 1
NAUSEA: 0
NERVOUS/ANXIOUS: 0
TREMORS: 0
STRIDOR: 0
BLURRED VISION: 0
CLAUDICATION: 0

## 2019-12-04 ASSESSMENT — LIFESTYLE VARIABLES
EVER HAD A DRINK FIRST THING IN THE MORNING TO STEADY YOUR NERVES TO GET RID OF A HANGOVER: NO
CONSUMPTION TOTAL: NEGATIVE
TOTAL SCORE: 0
TOTAL SCORE: 0
HOW MANY TIMES IN THE PAST YEAR HAVE YOU HAD 5 OR MORE DRINKS IN A DAY: 0
TOTAL SCORE: 0
EVER_SMOKED: NEVER
HAVE YOU EVER FELT YOU SHOULD CUT DOWN ON YOUR DRINKING: NO
DOES PATIENT WANT TO STOP DRINKING: NO
HAVE PEOPLE ANNOYED YOU BY CRITICIZING YOUR DRINKING: NO
EVER FELT BAD OR GUILTY ABOUT YOUR DRINKING: NO
ALCOHOL_USE: YES
AVERAGE NUMBER OF DAYS PER WEEK YOU HAVE A DRINK CONTAINING ALCOHOL: 0
ON A TYPICAL DAY WHEN YOU DRINK ALCOHOL HOW MANY DRINKS DO YOU HAVE: 0

## 2019-12-04 ASSESSMENT — PATIENT HEALTH QUESTIONNAIRE - PHQ9
SUM OF ALL RESPONSES TO PHQ9 QUESTIONS 1 AND 2: 0
1. LITTLE INTEREST OR PLEASURE IN DOING THINGS: NOT AT ALL
2. FEELING DOWN, DEPRESSED, IRRITABLE, OR HOPELESS: NOT AT ALL

## 2019-12-04 NOTE — TELEPHONE ENCOUNTER
Notified by Dr. Calderon (Discharge clinic) that pt in clinic today with fever 100.8, mild cough and tachycardia. R knee surgical site per report without signs of infection. Recommend ER evaluation.

## 2019-12-04 NOTE — PATIENT INSTRUCTIONS
If you need further assistance, or have any questions; concerns or lingering symptoms before seeing your Primary Care Provider or specialist.     Do not hesitate to contact us.     Please contact us at the Post-Hospital Follow Up Program at [phone] and ask for the Medical Assistant for Dr Calderon.   Our offices hours are Monday-Friday 8 am-5 pm.

## 2019-12-04 NOTE — ED PROVIDER NOTES
CHIEF COMPLAINT  Chief Complaint   Patient presents with   • Fever     Sent by PCP for a fever, she was released from [facility] for sepsis of her right knee and at follow up today she had a fever. Currently on an antibiotics. During assessment she stated she is very tired and has a cough.       HPI  Bernadette Paul is a 74 y.o. female who presents with a fever that started earlier today.  She is followed by orthopedics and infectious disease for a recent septic arthritis from November.  She is on day 13 of daptomycin.  The knee was the right one and it has not been painful or swollen or red.  She notes a slight cough.  No sore throat.  No dysuria.  No abdominal pain.  No nausea vomiting or diarrhea.  Patient has follow-up with Kyra Weinstein next week and was told by Liana Plaza to come here today given the fever.    REVIEW OF SYSTEMS  All other systems are negative.     PAST MEDICAL HISTORY  Past Medical History:   Diagnosis Date   • Allergic rhinitis 6/17/2009   • Dyslipidemia 6/17/2009   • Preventative health care 6/17/2009   • S/P parathyroidectomy 6/17/2009       FAMILY HISTORY  Family History   Problem Relation Age of Onset   • Heart Disease Mother        SOCIAL HISTORY  Social History     Socioeconomic History   • Marital status:      Spouse name: Not on file   • Number of children: Not on file   • Years of education: Not on file   • Highest education level: Not on file   Occupational History   • Not on file   Social Needs   • Financial resource strain: Not on file   • Food insecurity:     Worry: Not on file     Inability: Not on file   • Transportation needs:     Medical: Not on file     Non-medical: Not on file   Tobacco Use   • Smoking status: Never Smoker   • Smokeless tobacco: Never Used   Substance and Sexual Activity   • Alcohol use: Yes     Alcohol/week: 0.0 oz     Frequency: Monthly or less     Comment: occ wine    • Drug use: Never   • Sexual activity: Yes     Partners: Male   Lifestyle  "  • Physical activity:     Days per week: Not on file     Minutes per session: Not on file   • Stress: Not on file   Relationships   • Social connections:     Talks on phone: Not on file     Gets together: Not on file     Attends [employer] service: Not on file     Active member of club or organization: Not on file     Attends meetings of clubs or organizations: Not on file     Relationship status: Not on file   • Intimate partner violence:     Fear of current or ex partner: Not on file     Emotionally abused: Not on file     Physically abused: Not on file     Forced sexual activity: Not on file   Other Topics Concern   • Not on file   Social History Narrative   • Not on file       SURGICAL HISTORY  Past Surgical History:   Procedure Laterality Date   • INCISION AND DRAINAGE ORTHOPEDIC Right 11/15/2019    Procedure: INCISION AND DRAINAGE, RIGHT KNEE  BY ORTHOPEDICS;  Surgeon: Ramsey Navas M.D.;  Location: SURGERY [facility];  Service: Orthopedics       CURRENT MEDICATIONS  Home Medications    **Home medications have not yet been reviewed for this encounter**         ALLERGIES  Allergies   Allergen Reactions   • Asa [Aspirin]      Epistaxis     • Nsaids      Epistaxis         PHYSICAL EXAM  VITAL SIGNS: /69   Pulse 91   Temp (!) 38.7 °C (101.6 °F) (Temporal)   Resp 16   Ht 1.651 m (5' 5\")   Wt 59.9 kg (132 lb 0.9 oz)   LMP  (Exact Date)   SpO2 92%   BMI 21.98 kg/m²      Constitutional: Well developed, Well nourished, No acute distress, Non-toxic appearance.   HENT: Normocephalic, Atraumatic, TMs normal, mucous membranes moist, no erythema, exudates, swelling, or masses, nares patent  Eyes: nonicteric  Neck: Supple, no meningismus  Lymphatic: No lymphadenopathy noted.   Cardiovascular: Regular rate and rhythm, no gallops rubs or murmurs  Lungs: Clear bilaterally   Abdomen: Bowel sounds normal, Soft, No tenderness, No pulsatile masses.   Skin: Warm, Dry, no rash  Back: No tenderness, No CVA " tenderness.   Genitalia: Deferred  Rectal: Deferred  Extremities: Right knee demonstrates a wound that is clean dry and intact with full range of motion of the knee and no significant redness or clinical effusion  Neurologic: Alert, appropriate, follows commands, moving all extremities, normal speech   Psychiatric: Affect normal    RADIOLOGY/PROCEDURES  DX-CHEST-PORTABLE (1 VIEW)   Final Result      No evidence of acute cardiopulmonary process.        Results for orders placed or performed during the hospital encounter of 12/04/19   CBC WITH DIFFERENTIAL   Result Value Ref Range    WBC 7.3 4.8 - 10.8 K/uL    RBC 4.63 4.20 - 5.40 M/uL    Hemoglobin 14.0 12.0 - 16.0 g/dL    Hematocrit 42.6 37.0 - 47.0 %    MCV 92.0 81.4 - 97.8 fL    MCH 30.2 27.0 - 33.0 pg    MCHC 32.9 (L) 33.6 - 35.0 g/dL    RDW 43.8 35.9 - 50.0 fL    Platelet Count 197 164 - 446 K/uL    MPV 9.5 9.0 - 12.9 fL    Neutrophils-Polys 88.80 (H) 44.00 - 72.00 %    Lymphocytes 4.90 (L) 22.00 - 41.00 %    Monocytes 4.80 0.00 - 13.40 %    Eosinophils 0.80 0.00 - 6.90 %    Basophils 0.40 0.00 - 1.80 %    Immature Granulocytes 0.30 0.00 - 0.90 %    Nucleated RBC 0.00 /100 WBC    Neutrophils (Absolute) 6.47 2.00 - 7.15 K/uL    Lymphs (Absolute) 0.36 (L) 1.00 - 4.80 K/uL    Monos (Absolute) 0.35 0.00 - 0.85 K/uL    Eos (Absolute) 0.06 0.00 - 0.51 K/uL    Baso (Absolute) 0.03 0.00 - 0.12 K/uL    Immature Granulocytes (abs) 0.02 0.00 - 0.11 K/uL    NRBC (Absolute) 0.00 K/uL   COMP METABOLIC PANEL   Result Value Ref Range    Sodium 129 (L) 135 - 145 mmol/L    Potassium 4.0 3.6 - 5.5 mmol/L    Chloride 93 (L) 96 - 112 mmol/L    Co2 21 20 - 33 mmol/L    Anion Gap 15.0 (H) 0.0 - 11.9    Glucose 117 (H) 65 - 99 mg/dL    Bun 12 8 - 22 mg/dL    Creatinine 0.54 0.50 - 1.40 mg/dL    Calcium 9.9 8.4 - 10.2 mg/dL    AST(SGOT) 19 12 - 45 U/L    ALT(SGPT) 23 2 - 50 U/L    Alkaline Phosphatase 69 30 - 99 U/L    Total Bilirubin 0.6 0.1 - 1.5 mg/dL    Albumin 3.9 3.2 - 4.9 g/dL     Total Protein 7.2 6.0 - 8.2 g/dL    Globulin 3.3 1.9 - 3.5 g/dL    A-G Ratio 1.2 g/dL   URINALYSIS   Result Value Ref Range    Color Yellow     Character Clear     Specific Gravity 1.020 <1.035    Ph 6.0 5.0 - 8.0    Glucose Negative Negative mg/dL    Ketones Negative Negative mg/dL    Protein Negative Negative mg/dL    Bilirubin Negative Negative    Nitrite Negative Negative    Leukocyte Esterase Negative Negative    Occult Blood Negative Negative    Micro Urine Req see below    Influenza A/B By PCR (Adult - Flu Only)   Result Value Ref Range    Influenza virus A RNA Negative Negative    Influenza virus B, PCR Negative Negative   LACTIC ACID   Result Value Ref Range    Lactic Acid 1.8 0.5 - 2.0 mmol/L   ESTIMATED GFR   Result Value Ref Range    GFR If African American >60 >60 mL/min/1.73 m 2    GFR If Non African American >60 >60 mL/min/1.73 m 2         COURSE & MEDICAL DECISION MAKING  Pertinent Labs & Imaging studies reviewed. (See chart for details)  This is a 74-year-old female with a history of septic arthritis of her right knee.  This was treated with washout by Dr. Navas back in November.  The patient was started on daptomycin and has continued on that.  This morning she was noted to have a fever at home and here has a temperature of about 101.6.  The knee itself looks good without any evidence of obvious infection-there is no redness or swelling or restriction in movement.  The patient's chest x-ray is clear.  Urine is clear.  Flu is negative.  Her lactate is 1.8 and white count is normal although there is a left shift.  The patient's PICC line demonstrates no redness at the site.  In discussion with infectious disease, , she recommended admission overnight and cultures through the PICC line which have been done.  Otherwise patient will be continued on her daptomycin.    FINAL IMPRESSION  1.  Fever  2.   3.         Electronically signed by: Ladarius Moscoso, 12/4/2019 3:08 PM

## 2019-12-04 NOTE — PROGRESS NOTES
Subjective:     Bernadette Paul is a 74 y.o. female who presents for Hospital Follow-up.  Chart and discharge summary reviewed.   Date of discharge 11/22/2019.  48- hour post discharge RN call completed on 11/25/2019 and documented in the medical record by Maria Elena Hdz RN:  POST DISCHARGE CALL:  Discharge Date:11/22/2019   Date of Outreach Call: 11/25/2019 11:25 AM  Now that you're home, how are you doing? Fair  Comment:Pt states she is doing well. Denies any current  problems. Pt will follow-up with [facility].  Do you have questions about your medications? No    Did you fill your medications? Yes    Do you have a follow-up appointment scheduled?Yes  Comment:Post discharge clinic 12/4/19    Discharging Department: Orthopedics    Number of Attempts: 1  Current or previous attempts completed within two business days of discharge? Yes  Provided education regarding treatment plan, medication, self-management, ADLs? Yes  Comment:Pt following at infusion center. Reviewed  importance of follow-up.   Has patient completed Advance Directive? If yes, advise them to bring to appointment. No  Care Manager phone number provided? Yes  Is there anything else I can help you with? No        HPI: The patient is a 74-year-old white female with history of hyponatremia who was recently hospitalized after she was found to have septic arthritis of her right knee.  She underwent I&D on 11/15/2019.  Culture results showed MSSA.  Blood culture was also positive for MSSA.  She was started on Ancef, ID was consulted.  She was discharged home on daily IV daptomycin.    Since returning home, patient reports feeling good until last night when she developed chills and a lot of fatigue today.  She has already taken 2 naps.  She has a slight cough but she says this is unchanged from the last few weeks.  This morning she noticed a twinge chest tightness when she coughed but denies any shortness of breath.  She denies pain in her knee.  She had the  IV catheter changed yesterday at the infusion center.  She had the staples out from the right knee wound yesterday.    The patient has questions regarding hospitalization or discharge: All questions were answered.  The patient has weakness; denies difficulty taking care of self at home.  Patient reports taking medications as instructed.    Current Outpatient Medications   Medication Sig Dispense Refill   • NS SOLN 50 mL with DAPTOmycin 350 MG SOLR 500 mg 500 mg by Intravenous route every 24 hours for 24 days.     • therapeutic multivitamin-minerals (THERAGRAN-M) Tab Take 1 Tab by mouth every day.     • Ascorbic Acid (VITAMIN C PO) Take 1 Dose by mouth every day. Unknown OTC Strength.     • Cholecalciferol (VITAMIN D PO) Take 1 Dose by mouth every day. Unknown OTC Strength.     • CALCIUM PO Take 1 Dose by mouth 2 Times a Day. Unknown OTC Strength.     • Omega-3 Fatty Acids (FISH OIL PO) Take 2 Doses by mouth every evening. Unknown OTC Strength.       No current facility-administered medications for this visit.        Patient Active Problem List    Diagnosis Date Noted   • MSSA bacteremia 11/18/2019     Priority: High   • Septic joint of right knee joint (HCC) 11/15/2019     Priority: High   • Transaminitis 11/17/2019   • Chronic fatigue 11/16/2019   • Hyponatremia 10/11/2019   • Restless leg syndrome 09/10/2018   • Low back pain 02/20/2017   • History wrist fracture 02/12/2013   • Vasovagal syncope 05/02/2012   • Adenomatous polyp of colon 02/11/2011   • S/P laparoscopic cholecystectomy 01/20/2011   • Dyslipidemia 06/17/2009   • S/P parathyroidectomy 06/17/2009   • Allergic rhinitis 06/17/2009   • Preventative health care 06/17/2009       Allergies:   Asa [aspirin] and Nsaids    Social History:  Social History     Tobacco Use   • Smoking status: Never Smoker   • Smokeless tobacco: Never Used   Substance Use Topics   • Alcohol use: Yes     Alcohol/week: 0.0 oz     Frequency: Monthly or less     Comment: occ wine      "    Family History:  Family History   Problem Relation Age of Onset   • Heart Disease Mother        Past Medical History:   Diagnosis Date   • Allergic rhinitis 6/17/2009   • Dyslipidemia 6/17/2009   • Preventative health care 6/17/2009   • S/P parathyroidectomy 6/17/2009       Surgical History  Past Surgical History:   Procedure Laterality Date   • INCISION AND DRAINAGE ORTHOPEDIC Right 11/15/2019    Procedure: INCISION AND DRAINAGE, RIGHT KNEE  BY ORTHOPEDICS;  Surgeon: Ramsey Navas M.D.;  Location: SURGERY [facility];  Service: Orthopedics       ROS:    Constitutional: Complains of chills, feels feverish, has fatigue and malaise.  HENT: Denies head congestion, ear pain or drainage, decreased hearing, sore throat  Eyes: Denies visual changes, eye drainage or redness, eye pain  Neck: Denies pain, swollen glands, decreased range of motion  Lungs: Denies shortness of breath, wheezing.  She has a slight cough which she says she has had for at least a few weeks.  Cardiovascular: Denies chest pain, orthopnea, lower extremity edema, palpitations  Abdominal: Denies abdominal pain, change in bowel or bladder habits, nausea or vomiting  Musculoskeletal: Denies back pain, joint pains, decreased range of motion  Endocrine: Denies unexplained weight changes, heat or cold intolerance, hair loss, polyuria or polydipsia  Neurological: Denies dizziness, headache, confusion, focal weakness or numbness, memory loss  Psychiatric: Denies depression, anxiety, insomnia       Objective:     /62 (BP Location: Left arm, Patient Position: Sitting, BP Cuff Size: Adult)   Pulse (!) 103   Temp (!) 38.2 °C (100.8 °F) (Temporal)   Resp 14   Ht 1.61 m (5' 3.39\")   Wt 59 kg (130 lb)   SpO2 93%      Physical Exam:    GEN:  Alert, oriented, in no distress  HEENT:  PERRLA, EOMI, TM's clear bilaterally, oropharynx clear without erythema or exudates.  NECK;  Supple without cervical adenopathy or thyromegaly.    LUNGS:  Clear to " auscultation without rales, rhonci, or wheezes.  CV:  Heart mildly tachycardic with regular rhythm without murmurs or S3 or S4  ABD:  Soft, nontender, nondistended  EXT:  Without cyanosis, clubbing, or edema.  IV site in the right upper extremity appears clean and dry.  Right knee wound appears clean and dry.  There is no swelling, redness, or tenderness.  NEURO:  Cranial nerves II through XII intact.  Motor function and sensation intact.  SKIN: No rashes or suspicious lesions.  PSYCH:  Behavior is appropriate.      Assessment and Plan:     1. Hospital follow-up:   Hospitalization and results reviewed with patient. High risk conditions requiring teaching or care coordination were identified and addressed.The patient demonstrate understanding of admission and underlying conditions. The patient understands discharge instructions and when to seek medical attention. Medications reviewed including instructions regarding high risk medications, dosing and side effects.    The patient is able to safely adhere to ADL/IADL, treatment and medication regimen, self-manage of high-risk conditions? Yes  The patient requires physical therapy/home health/DME referral? No   The patient requires referral to care coordination/behavioral health/social work?  No   Patient requires referral for pharmacy consult? No   Advance directive/POLST on file?  No   Required counseling on advance directive? Deferred    2. Staphylococcal arthritis of right knee (HCC)  -Treated with IV daptomycin and felt much better until last night.    3. Fever and chills  -No obvious source.  I consulted with ID who concurred that she should return to the ER.    4. History of bacteremia  -Repeat blood cultures negative.        Medication Reconciliation  Medication list at end of encounter:   Current Outpatient Medications   Medication Sig Dispense Refill   • NS SOLN 50 mL with DAPTOmycin 350 MG SOLR 500 mg 500 mg by Intravenous route every 24 hours for 24 days.      • therapeutic multivitamin-minerals (THERAGRAN-M) Tab Take 1 Tab by mouth every day.     • Ascorbic Acid (VITAMIN C PO) Take 1 Dose by mouth every day. Unknown OTC Strength.     • Cholecalciferol (VITAMIN D PO) Take 1 Dose by mouth every day. Unknown OTC Strength.     • CALCIUM PO Take 1 Dose by mouth 2 Times a Day. Unknown OTC Strength.     • Omega-3 Fatty Acids (FISH OIL PO) Take 2 Doses by mouth every evening. Unknown OTC Strength.       No current facility-administered medications for this visit.        Primary care follow-up:  New health conditions identified during hospitalization? Yes    Recommended followup:  with Eron Flowers M.D.   Future Appointments       Provider Department [place]    12/10/2019 2:30 PM INFUSION QUICK INJECT Infusion Services [facility]    12/11/2019 10:20 AM Amy Weinstein M.D. [address]    1/9/2020 2:20 PM Eron Flowers M.D. [facility]    1/29/2020 1:20 PM Je Schwarz M.D. [facility]          Patient Instruction  Patient provided with educational material on discharge diagnosis and management of symptoms/red flags. Patient instructed to keep follow-up appointments and to bring written questions and and actual medications to each office visit. Patient instructed to call PCP/specialist with any problems/questions/concerns. Patient verbalizes understanding and has no further questions at this time.    Face-to-face transitional care management services with moderate medical decision complexity were provided.

## 2019-12-04 NOTE — ED TRIAGE NOTES
Chief Complaint   Patient presents with   • Fever     Sent by PCP for a fever, she was released from [facility] for sepsis of her right knee and at follow up today she had a fever. Currently on an antibiotics. During assessment she stated she is very tired and has a cough.

## 2019-12-05 LAB
ALBUMIN SERPL BCP-MCNC: 3.6 G/DL (ref 3.2–4.9)
ALBUMIN/GLOB SERPL: 1.4 G/DL
ALP SERPL-CCNC: 61 U/L (ref 30–99)
ALT SERPL-CCNC: 20 U/L (ref 2–50)
ANION GAP SERPL CALC-SCNC: 11 MMOL/L (ref 0–11.9)
AST SERPL-CCNC: 17 U/L (ref 12–45)
BASOPHILS # BLD AUTO: 0.3 % (ref 0–1.8)
BASOPHILS # BLD: 0.02 K/UL (ref 0–0.12)
BILIRUB SERPL-MCNC: 0.6 MG/DL (ref 0.1–1.5)
BUN SERPL-MCNC: 7 MG/DL (ref 8–22)
CALCIUM SERPL-MCNC: 9.2 MG/DL (ref 8.4–10.2)
CHLORIDE SERPL-SCNC: 98 MMOL/L (ref 96–112)
CO2 SERPL-SCNC: 23 MMOL/L (ref 20–33)
CREAT SERPL-MCNC: 0.55 MG/DL (ref 0.5–1.4)
EOSINOPHIL # BLD AUTO: 0.22 K/UL (ref 0–0.51)
EOSINOPHIL NFR BLD: 3.2 % (ref 0–6.9)
ERYTHROCYTE [DISTWIDTH] IN BLOOD BY AUTOMATED COUNT: 42.6 FL (ref 35.9–50)
GLOBULIN SER CALC-MCNC: 2.6 G/DL (ref 1.9–3.5)
GLUCOSE SERPL-MCNC: 119 MG/DL (ref 65–99)
HCT VFR BLD AUTO: 37.4 % (ref 37–47)
HGB BLD-MCNC: 12.8 G/DL (ref 12–16)
IMM GRANULOCYTES # BLD AUTO: 0 K/UL (ref 0–0.11)
IMM GRANULOCYTES NFR BLD AUTO: 0 % (ref 0–0.9)
LYMPHOCYTES # BLD AUTO: 0.28 K/UL (ref 1–4.8)
LYMPHOCYTES NFR BLD: 4.1 % (ref 22–41)
MCH RBC QN AUTO: 30.3 PG (ref 27–33)
MCHC RBC AUTO-ENTMCNC: 34.2 G/DL (ref 33.6–35)
MCV RBC AUTO: 88.6 FL (ref 81.4–97.8)
MONOCYTES # BLD AUTO: 0.33 K/UL (ref 0–0.85)
MONOCYTES NFR BLD AUTO: 4.9 % (ref 0–13.4)
NEUTROPHILS # BLD AUTO: 5.94 K/UL (ref 2–7.15)
NEUTROPHILS NFR BLD: 87.5 % (ref 44–72)
NRBC # BLD AUTO: 0 K/UL
NRBC BLD-RTO: 0 /100 WBC
PLATELET # BLD AUTO: 203 K/UL (ref 164–446)
PMV BLD AUTO: 9 FL (ref 9–12.9)
POTASSIUM SERPL-SCNC: 3.9 MMOL/L (ref 3.6–5.5)
PROT SERPL-MCNC: 6.2 G/DL (ref 6–8.2)
RBC # BLD AUTO: 4.22 M/UL (ref 4.2–5.4)
SODIUM SERPL-SCNC: 132 MMOL/L (ref 135–145)
WBC # BLD AUTO: 6.8 K/UL (ref 4.8–10.8)

## 2019-12-05 PROCEDURE — 99223 1ST HOSP IP/OBS HIGH 75: CPT | Performed by: INTERNAL MEDICINE

## 2019-12-05 PROCEDURE — 36415 COLL VENOUS BLD VENIPUNCTURE: CPT

## 2019-12-05 PROCEDURE — 700105 HCHG RX REV CODE 258: Performed by: INTERNAL MEDICINE

## 2019-12-05 PROCEDURE — A9270 NON-COVERED ITEM OR SERVICE: HCPCS | Performed by: HOSPITALIST

## 2019-12-05 PROCEDURE — 700111 HCHG RX REV CODE 636 W/ 250 OVERRIDE (IP): Performed by: INTERNAL MEDICINE

## 2019-12-05 PROCEDURE — 85025 COMPLETE CBC W/AUTO DIFF WBC: CPT

## 2019-12-05 PROCEDURE — 700105 HCHG RX REV CODE 258: Performed by: HOSPITALIST

## 2019-12-05 PROCEDURE — 700102 HCHG RX REV CODE 250 W/ 637 OVERRIDE(OP): Performed by: HOSPITALIST

## 2019-12-05 PROCEDURE — 770006 HCHG ROOM/CARE - MED/SURG/GYN SEMI*

## 2019-12-05 PROCEDURE — 700111 HCHG RX REV CODE 636 W/ 250 OVERRIDE (IP): Performed by: HOSPITALIST

## 2019-12-05 PROCEDURE — 99233 SBSQ HOSP IP/OBS HIGH 50: CPT | Performed by: INTERNAL MEDICINE

## 2019-12-05 PROCEDURE — 80053 COMPREHEN METABOLIC PANEL: CPT

## 2019-12-05 RX ADMIN — CEFAZOLIN 2 G: 10 INJECTION, POWDER, FOR SOLUTION INTRAVENOUS; PARENTERAL at 20:32

## 2019-12-05 RX ADMIN — ACETAMINOPHEN 650 MG: 325 TABLET, FILM COATED ORAL at 17:10

## 2019-12-05 RX ADMIN — SODIUM CHLORIDE: 9 INJECTION, SOLUTION INTRAVENOUS at 18:50

## 2019-12-05 RX ADMIN — ACETAMINOPHEN 650 MG: 325 TABLET, FILM COATED ORAL at 08:20

## 2019-12-05 RX ADMIN — SODIUM CHLORIDE: 9 INJECTION, SOLUTION INTRAVENOUS at 06:05

## 2019-12-05 RX ADMIN — ENOXAPARIN SODIUM 40 MG: 100 INJECTION SUBCUTANEOUS at 17:16

## 2019-12-05 RX ADMIN — CEFTRIAXONE SODIUM 1 G: 1 INJECTION, POWDER, FOR SOLUTION INTRAMUSCULAR; INTRAVENOUS at 06:00

## 2019-12-05 ASSESSMENT — COGNITIVE AND FUNCTIONAL STATUS - GENERAL
SUGGESTED CMS G CODE MODIFIER DAILY ACTIVITY: CH
DAILY ACTIVITIY SCORE: 24
MOBILITY SCORE: 24
SUGGESTED CMS G CODE MODIFIER MOBILITY: CH

## 2019-12-05 ASSESSMENT — ENCOUNTER SYMPTOMS
FOCAL WEAKNESS: 0
BLOOD IN STOOL: 0
CHILLS: 0
WEIGHT LOSS: 1
COUGH: 0
PALPITATIONS: 0
HALLUCINATIONS: 0
MYALGIAS: 0
HEADACHES: 0
VOMITING: 0
SHORTNESS OF BREATH: 0
DIZZINESS: 0
ABDOMINAL PAIN: 0
DEPRESSION: 0
HEARTBURN: 0
WEAKNESS: 0
NAUSEA: 0
FEVER: 1
SORE THROAT: 0
BACK PAIN: 0
DIARRHEA: 0

## 2019-12-05 NOTE — PROGRESS NOTES
Received report from [facility] nurse.   Assumed pt care at approximately 0700  Pt is A&Ox4, resting comfortably in bed.   Pt on r.a.. No signs of SOB/respiratory distress.   Labs noted, Fever of 101.6 given Tylenol per MAR.   Pt c/o no pain at this moment.   Assessment completed  Fall precautions in place.   Bed at lowest position.   Call light and personal belongings within reach. Will continue to monitor

## 2019-12-05 NOTE — ASSESSMENT & PLAN NOTE
Recent history of MSSA bacteremia with seeding of the right knee status post washout, discharged with IV daptomycin tx.  Fever resolved after changing abx, cultures negative, highly suggestive of drug fever from daptomycin  Repeat blood cx 12/4: neg  PICC removed/cultured on 12/6: Neg  Lower extremity duplex, TTE and NICOLE all negative  CT abdomen pelvis negative  CT chest showed ground glass opacities: ?edema vs atypical infection vs ?AI (pt has hx of vitaligo and FUO)  I consulted pulmonology, at this point they recommend Lasix (outpaitent follow up and trending)  Check autoimmune panel  Continue Ancef (through 12/16)  Tylenol as needed for fever

## 2019-12-05 NOTE — CARE PLAN
Problem: Safety  Goal: Will remain free from injury  Outcome: PROGRESSING AS EXPECTED  Note:   Pt educated to call for assistance prior to ambulating if needed.      Problem: Venous Thromboembolism (VTW)/Deep Vein Thrombosis (DVT) Prevention:  Goal: Patient will participate in Venous Thrombosis (VTE)/Deep Vein Thrombosis (DVT)Prevention Measures  Outcome: PROGRESSING AS EXPECTED  Flowsheets (Taken 12/5/2019 0230)  Mechanical Prophylaxis : SCDs, Sequential Compression Device  SCDs, Sequential Compression Device: On

## 2019-12-05 NOTE — H&P
Hospital Medicine History & Physical Note    Date of Service  12/4/2019    Primary Care Physician  Eron Flowers M.D.    Consultants  ID Lalo    Code Status  Full Code    Chief Complaint  Chief Complaint   Patient presents with   • Fever     Sent by PCP for a fever, she was released from [facility] for sepsis of her right knee and at follow up today she had a fever. Currently on an antibiotics. During assessment she stated she is very tired and has a cough.       History of Presenting Illness  Humberto is a very pleasant 74 y.o. female with a past medical history of dyslipidemia, recently diagnosed with having septic arthritis growing MSSA in November, she was placed on 2 weeks of IV daptomycin which she was currently on until today where patient presented to the emergency room on 12/4/2019 for evaluation of fevers and chills that started earlier today in the morning.  Patient says that the only thing she fell, she says her right knee feels great, there has been no swelling no pain.  She says she had a slight cough but this is nonproductive, denies shortness of breath or diarrhea or dysuria.  At this point patient will be admitted for further infectious work-up.  Infectious disease has been consulted, waiting for recommendations.  Patient will be started on antibiotics.    Review of Systems  Review of Systems   Constitutional: Positive for chills and fever. Negative for malaise/fatigue.   HENT: Negative for congestion, hearing loss, sore throat and tinnitus.    Eyes: Negative for blurred vision, double vision, photophobia and pain.   Respiratory: Negative for cough, hemoptysis, sputum production, shortness of breath and stridor.    Cardiovascular: Negative for chest pain, palpitations, orthopnea, claudication and PND.   Gastrointestinal: Negative for blood in stool, constipation, heartburn, melena, nausea and vomiting.   Genitourinary: Negative for dysuria, frequency and urgency.   Musculoskeletal: Negative for  back pain, myalgias and neck pain.   Neurological: Negative for dizziness, tingling, tremors, sensory change, speech change, weakness and headaches.   Psychiatric/Behavioral: Negative for depression, memory loss and suicidal ideas. The patient is not nervous/anxious.    All other systems reviewed and are negative.      Past Medical History  Past Medical History:   Diagnosis Date   • Dyslipidemia 6/17/2009   • S/P parathyroidectomy 6/17/2009   • Allergic rhinitis 6/17/2009   • Preventative health care 6/17/2009       Surgical History   has a past surgical history that includes incision and drainage orthopedic (Right, 11/15/2019).    Family History  family history includes Heart Disease in her mother.    Social History   reports that she has never smoked. She has never used smokeless tobacco. She reports current alcohol use. She reports that she does not use drugs.    Allergies  Allergies   Allergen Reactions   • Asa [Aspirin]      Epistaxis     • Nsaids      Epistaxis         Medications  Prior to Admission medications    Medication Sig Start Date End Date Taking? Authorizing Provider   NS SOLN 50 mL with DAPTOmycin 350 MG SOLR 500 mg 500 mg by Intravenous route every 24 hours for 24 days.  Patient taking differently: 8 mg/kg by Intravenous route every 24 hours. Pt started on 11/21/2019 for 24 day course. 11/21/19 12/15/19  Antoinette Bryson M.D.   therapeutic multivitamin-minerals (THERAGRAN-M) Tab Take 1 Tab by mouth every day.    Physician Outpatient   Ascorbic Acid (VITAMIN C PO) Take 1 Tab by mouth every day. Unknown OTC Strength.    Physician Outpatient   Cholecalciferol (VITAMIN D PO) Take 1 Cap by mouth every day. Unknown OTC Strength.    Physician Outpatient   CALCIUM PO Take 1 Tab by mouth 2 Times a Day. Unknown OTC Strength.    Physician Outpatient   Omega-3 Fatty Acids (FISH OIL PO) Take 2 Caps by mouth every evening. Unknown OTC Strength.    Physician Outpatient       Physical Exam  Temp:  [36.9 °C (98.4  °F)-38.7 °C (101.6 °F)] 36.9 °C (98.4 °F)  Pulse:  [75-91] 75  Resp:  [14-19] 16  BP: (119-144)/(62-69) 119/63  SpO2:  [92 %-94 %] 93 %  Physical Exam   Constitutional: She is oriented to person, place, and time. She appears well-developed and well-nourished. No distress.   HENT:   Head: Normocephalic and atraumatic.   Mouth/Throat: No oropharyngeal exudate.   Eyes: Pupils are equal, round, and reactive to light. Conjunctivae are normal. Right eye exhibits no discharge. No scleral icterus.   Neck: Neck supple. No JVD present. No thyromegaly present.   Cardiovascular: Normal rate and intact distal pulses.   No murmur heard.  Pulmonary/Chest: Effort normal and breath sounds normal. No stridor. No respiratory distress. She has no wheezes. She has no rales.   Abdominal: Soft. Bowel sounds are normal. She exhibits no distension. There is no tenderness. There is no rebound.   Musculoskeletal: Normal range of motion.         General: No tenderness or edema.      Comments: Right knee incision looks clean, dry, no surrounding erythema or swelling.  Patient has a right PICC line, again no surrounding erythema site looks clean   Neurological: She is alert and oriented to person, place, and time. No cranial nerve deficit.   Skin: Skin is warm. She is not diaphoretic. No erythema.   Psychiatric: She has a normal mood and affect. Her behavior is normal. Thought content normal.   Nursing note and vitals reviewed.      Laboratory:  Recent Labs     12/03/19  1530 12/04/19  1514   WBC 5.4 7.3   RBC 3.98* 4.63   HEMOGLOBIN 12.7 14.0   HEMATOCRIT 36.8* 42.6   MCV 92.5 92.0   MCH 31.9 30.2   MCHC 34.5 32.9*   RDW 45.3 43.8   PLATELETCT 251 197   MPV 9.2 9.5     Recent Labs     12/03/19  1530 12/04/19  1514   SODIUM 129* 129*   POTASSIUM 4.0 4.0   CHLORIDE 97 93*   CO2 26 21   GLUCOSE 99 117*   BUN 18 12   CREATININE 0.67 0.54   CALCIUM 9.3 9.9     Recent Labs     12/03/19  1530 12/04/19  1514   ALTSGPT 23 23   ASTSGOT 19 19    ALKPHOSPHAT 58 69   TBILIRUBIN 0.4 0.6   GLUCOSE 99 117*               Urinalysis:          Imaging:  DX-CHEST-PORTABLE (1 VIEW)   Final Result      No evidence of acute cardiopulmonary process.          Assessment/Plan:  I anticipate this patient will require at least two midnights for appropriate medical management, necessitating inpatient admission.    * FUO (fever of unknown origin)  Assessment & Plan  Unclear, while on IV daptomycin, patient does have a PICC line hence suspicion of possible line infection however unsure.   Blood cultures have been obtained from the PICC line as well as peripheral  Patient will be continued on IV daptomycin, IV ceftriaxone has been ordered, recommendations from infectious disease  Tylenol as needed    Septic arthritis of knee, right (HCC)  Assessment & Plan  Hx of septic knee and MSSA bacteremia,   1/2 bottles from prior admission +MSSA  Repeat blood cultures from 11/17 were negative.  TTE done on 11/18 was negative for endocarditis  Overall her knee looks fantastic, no swelling no surrounding erythema, incision looks clean.        Hyponatremia- (present on admission)  Assessment & Plan  Most likely due to dehydration  IV fluids have been ordered  Recheck BMP in the morning      VTE prophylaxis: Prophylaxis: SCDs

## 2019-12-05 NOTE — PROGRESS NOTES
Pt fever is now 101.2. one hour after Tylenol given. Hospitalist notified. Dr. Wolf ordered for ice to be placed on hands. Fever rechecked at 0000. Temperature curently 99.4. Ice held off at this time.

## 2019-12-05 NOTE — ED NOTES
"Called pharmacy  Spoke to Johnson about med order   \"Med will be awhile\" due to \"It takes a long time to mix\" per pharmacy    "

## 2019-12-05 NOTE — ED NOTES
Patient transported to the floor; Patient updated on plan of care; All belongings transferred with patient

## 2019-12-05 NOTE — CARE PLAN
Problem: Safety  Goal: Will remain free from falls  Outcome: PROGRESSING AS EXPECTED  Note:   Pt. Is lying in bed comfortably, the bed is locked and in the lowest position. The patient's call light  and personal belongings are within reach and they have been educated on calling when in need of assistance.       Problem: Infection  Goal: Will remain free from infection  Outcome: PROGRESSING SLOWER THAN EXPECTED  Note:   Recorded fever of 101.6 this am. Patient is continuing her regimen of antibiotics, results of blood cultures pending.

## 2019-12-05 NOTE — PROGRESS NOTES
Report received from Vera SILVESTRE. Pt arrived to unit via [place]. Pt able to ambulate from gurney to bed without assistance. Pt tolerated well.

## 2019-12-05 NOTE — ASSESSMENT & PLAN NOTE
Hx of septic knee from MSSA bacteremia,   Repeat blood cultures from 11/17 were negative.  TTE done on 11/18 and NICOLE done 12/6 both negative for endocarditis  Knee shows no swelling no surrounding erythema, incision looks clean.

## 2019-12-05 NOTE — DIETARY
Nutrition Services - Poor po reported on admission. Recorded PO intake has been adequate at % of meals.  Malnutrition Screening Tool score <2. Nutrition assessment not indicated at this time. RD will monitor per department guidelines. Please consult RD for nutrition concerns.

## 2019-12-05 NOTE — ED NOTES
Med rec updated and complete  Allergies reviewed  Interviewed pt with  at bedside with permission from pt.  Pt reports that she takes LIPITOR 20MG, but has been off of this medication about 5 weeks, because pt is on DAPTOMYCIN.

## 2019-12-05 NOTE — CONSULTS
ADULT INFECTIOUS DISEASE CONSULT     Date of Service: 12/5/2019    Consult Requested By: Carlota Bowman D.O.    Reason for Consultation: Fevers    History of Present Illness:   Bernadette Paul is a 74 y.o. female with no significant past medical history presented to the emergency room on 11/15/2019 with right knee pain.  She had progressive fatigue and sudden right knee pain along with swelling and erythema.  She was diagnosed with MSSA septic arthritis and MSSA bacteremia.  She was initially started on cefazolin and vancomycin.  She was subsequently discharged home on IV daptomycin.  She again came back into the emergency room when she was seen in the discharge clinic and was found to have fevers.  Rocephin was added to daptomycin.  She is having fevers up to 102.  In view of that infectious disease consult has been called.  Patient states that she has been sick since September.    Review Of Systems:  Gen.-Complains of fevers.  Had some chills.  Complains of weight loss  HEENT- denies any sore throat, headache or vision changes  Pulmonary- denies any cough, shortness of breath  Cardiovascular- denies any chest pain, leg swelling.    GI- denies any nausea vomiting diarrhea or abdominal pain  Musculoskeletal- denies any joint pains or swelling  Neuro- denies any weakness or sensory change  Psych- denies any depression or suicidal ideation  Genitourinary- denies any frequency or dysuria        PMH:   Past Medical History:   Diagnosis Date   • Allergic rhinitis 6/17/2009   • Dyslipidemia 6/17/2009   • Preventative health care 6/17/2009   • S/P parathyroidectomy 6/17/2009         PSH:  Past Surgical History:   Procedure Laterality Date   • INCISION AND DRAINAGE ORTHOPEDIC Right 11/15/2019    Procedure: INCISION AND DRAINAGE, RIGHT KNEE  BY ORTHOPEDICS;  Surgeon: Ramsey Navas M.D.;  Location: SURGERY [facility];  Service: Orthopedics       FAMILY HX:  Family History   Problem Relation Age of Onset   •  Heart Disease Mother        SOCIAL HX:  Social History     Socioeconomic History   • Marital status:      Spouse name: Not on file   • Number of children: Not on file   • Years of education: Not on file   • Highest education level: Not on file   Occupational History   • Not on file   Social Needs   • Financial resource strain: Not on file   • Food insecurity:     Worry: Not on file     Inability: Not on file   • Transportation needs:     Medical: Not on file     Non-medical: Not on file   Tobacco Use   • Smoking status: Never Smoker   • Smokeless tobacco: Never Used   Substance and Sexual Activity   • Alcohol use: Yes     Alcohol/week: 0.0 oz     Frequency: Monthly or less     Comment: occ wine    • Drug use: Never   • Sexual activity: Yes     Partners: Male   Lifestyle   • Physical activity:     Days per week: Not on file     Minutes per session: Not on file   • Stress: Not on file   Relationships   • Social connections:     Talks on phone: Not on file     Gets together: Not on file     Attends Cheondoism service: Not on file     Active member of club or organization: Not on file     Attends meetings of clubs or organizations: Not on file     Relationship status: Not on file   • Intimate partner violence:     Fear of current or ex partner: Not on file     Emotionally abused: Not on file     Physically abused: Not on file     Forced sexual activity: Not on file   Other Topics Concern   • Not on file   Social History Narrative   • Not on file     Social History     Tobacco Use   Smoking Status Never Smoker   Smokeless Tobacco Never Used     Social History     Substance and Sexual Activity   Alcohol Use Yes   • Alcohol/week: 0.0 oz   • Frequency: Monthly or less    Comment: occ wine        Allergies/Intolerances:  Allergies   Allergen Reactions   • Asa [Aspirin]      Epistaxis     • Nsaids      Epistaxis         History reviewed with the patient    Other Current Medications:    Current Facility-Administered  "Medications:   •  cefTRIAXone (ROCEPHIN) 1 g in  mL IVPB, 1 g, Intravenous, Q24HRS, Netta Trujillo M.D., Stopped at 19 0630  •  senna-docusate (PERICOLACE or SENOKOT S) 8.6-50 MG per tablet 2 Tab, 2 Tab, Oral, BID **AND** polyethylene glycol/lytes (MIRALAX) PACKET 1 Packet, 1 Packet, Oral, QDAY PRN **AND** magnesium hydroxide (MILK OF MAGNESIA) suspension 30 mL, 30 mL, Oral, QDAY PRN **AND** bisacodyl (DULCOLAX) suppository 10 mg, 10 mg, Rectal, QDAY PRN, Netta Trujillo M.D.  •  NS infusion, , Intravenous, Continuous, Netta Trujillo M.D., Last Rate: 100 mL/hr at 19 0605  •  acetaminophen (TYLENOL) tablet 650 mg, 650 mg, Oral, Q6HRS PRN, Netta Trujillo M.D., 650 mg at 19 0820  •  DAPTOmycin 500 mg in NS 50 mL IVPB, 500 mg, Intravenous, Q24HRS, Netta Trujillo M.D.  [unfilled]    Most Recent Vital Signs:  /59   Pulse 95   Temp (!) 38.7 °C (101.6 °F)   Resp 16   Ht 1.651 m (5' 5\")   Wt 59.9 kg (132 lb 0.9 oz)   LMP  (Exact Date)   SpO2 92%   BMI 21.98 kg/m²   Temp  Av.8 °C (100 °F)  Min: 36.6 °C (97.8 °F)  Max: 38.9 °C (102 °F)    Physical Exam:  General: Nontoxic, no acute distress looks tired  HEENT: sclera anicteric, PERRL, EOMI, MMM, no oral lesions  Neck: supple, no lymphadenopathy  Chest: CTAB, no r/r/w, normal work of breathing.  Cardiac: Regular, no murmurs no gallops heard  Abdomen: + bowel sounds, soft, non-tender, non-distended, no HSM  Extremities: No edema.  Right knee incision is clean  Skin: no rashes or erythema  Neuro: Alert and oriented times 3, non-focal exam    Pertinent Lab Results:  Recent Labs     19  1530 19  1514 19  0443   WBC 5.4 7.3 6.8      Recent Labs     19  1530 19  1514 19  0443   HEMOGLOBIN 12.7 14.0 12.8   HEMATOCRIT 36.8* 42.6 37.4   MCV 92.5 92.0 88.6   MCH 31.9 30.2 30.3   PLATELETCT 251 197 203         Recent Labs     19  1530 19  1514 19  0443   SODIUM 129* " "129* 132*   POTASSIUM 4.0 4.0 3.9   CHLORIDE 97 93* 98   CO2 26 21 23   CREATININE 0.67 0.54 0.55        Recent Labs     12/03/19  1530 12/04/19  1514 12/05/19  0443   ALBUMIN 3.8 3.9 3.6        Pertinent Micro:  Results     Procedure Component Value Units Date/Time    BLOOD CULTURE [[id number]] Collected:  12/04/19 1733    Order Status:  Completed Specimen:  Blood from Peripheral Updated:  12/05/19 0926     Significant Indicator NEG     Source BLD     Site PERIPHERAL     Culture Result No Growth  Note: Blood cultures are incubated for 5 days and  are monitored continuously.Positive blood cultures  are called to the RN and reported as soon as  they are identified.  Blood culture testing and Gram stain, if indicated, are  performed at [facility], [facility]  [employer] [place], [place].  Positive blood cultures are  sent to [facility], [address] [place] [address], for organism identification and  susceptibility testing.      Narrative:       Per Hospital Policy: Only change Specimen Src: to \"Line\" if  specified by physician order.  Right Hand    Blood Culture [[id number]] Collected:  12/04/19 1544    Order Status:  Completed Specimen:  Blood from Line Updated:  12/05/19 0926     Significant Indicator NEG     Source BLD     Site LINE     Culture Result No Growth  Note: Blood cultures are incubated for 5 days and  are monitored continuously.Positive blood cultures  are called to the RN and reported as soon as  they are identified.  Blood culture testing and Gram stain, if indicated, are  performed at [facility], [facility]  [employer], [place].  Positive blood cultures are  sent to [facility], [address] [place] [address], for organism identification and  susceptibility testing.      Narrative:       Per Hospital Policy: Only change Specimen Src: to \"Line\" if  specified by physician order.  **PICC**  Left Hand    Influenza " A/B By PCR (Adult - Flu Only) [[id number]] Collected:  12/04/19 1524    Order Status:  Completed Specimen:  Respirate from Nasopharyngeal Updated:  12/04/19 1638     Influenza virus A RNA Negative     Influenza virus B, PCR Negative    URINALYSIS [[id number]] Collected:  12/04/19 1515    Order Status:  Completed Specimen:  Urine Updated:  12/04/19 1540     Color Yellow     Character Clear     Specific Gravity 1.020     Ph 6.0     Glucose Negative mg/dL      Ketones Negative mg/dL      Protein Negative mg/dL      Bilirubin Negative     Nitrite Negative     Leukocyte Esterase Negative     Occult Blood Negative     Micro Urine Req see below     Comment: Microscopic examination not performed when specimen is clear  and chemically negative for protein, blood, leukocyte esterase  and nitrite.         Narrative:       Indication for culture:->Septic Shock: Persistent  hypotension,  Lactic acid > 4, vasopressors/inotropes started    URINE CULTURE(NEW) [[id number]] Collected:  12/04/19 1515    Order Status:  Completed Specimen:  Urine Updated:  12/04/19 1526    Narrative:       Indication for culture:->Septic Shock: Persistent  hypotension,  Lactic acid > 4, vasopressors/inotropes started    Influenza A/B By PCR (Adult - Flu Only) [[id number]]     Order Status:  Canceled Specimen:  Respirate from Nasopharyngeal     BLOOD CULTURE [[id number]] Collected:  12/04/19 0000    Order Status:  Canceled Specimen:  Other from Peripheral         No results found for: BLOODCULTU, BLDCULT, BCHOLD     Studies:  Dx-chest-portable (1 View)    Result Date: 12/4/2019 12/4/2019 2:55 PM HISTORY/REASON FOR EXAM: Sepsis and cough TECHNIQUE/EXAM DESCRIPTION AND NUMBER OF VIEWS: Single portable view of the chest. COMPARISON: None FINDINGS: There is no evidence of focal consolidation or evidence of pulmonary edema. There is no pleural effusion. The heart is normal in size. There is a right upper arm venous catheter with the tip located at the level of  the lower axillary vein.     No evidence of acute cardiopulmonary process.    Us-extremity Venous Lower Unilat Right    Result Date: 11/15/2019   Vascular Laboratory  CONCLUSIONS  Normal right lower extremity superficial and deep venous examination.  No prior  SAMARA CISNEROS  Exam Date:     11/15/2019 16:33  Room #:     Inpatient  Priority:     Routine  Ht (in):             Wt (lb):  Ordering Physician:        YUMI MAYFIELD  Referring Physician:       TRAN Sol  Sonographer:               Brice Tovar RDCS,                             RVT  Study Type:                Complete Unilateral  Technical Quality:         Adequate  Age:    74    Gender:     F  MRN:    [id number]  :    1945      BSA:  Indications:     Swelling of Limb, Pain in Limb  CPT Codes:       92739  ICD Codes:       729.81  729.5  History:  Limitations:  PROCEDURES:  Right lower extremity venous duplex imaging.  The following venous structures were evaluated: common femoral, profunda  femoral, femoral, popliteal , peroneal and posterior tibial veins.  Serial compression, augmentation maneuvers,  color and spectral Doppler  flow evaluations were performed.  FINDINGS:  Right lower extremity:    Complete color filling and compressibility with normal venous flow dynamics  including spontaneous flow, response to augmentation maneuvers, and  respiratory phasicity.  Flow was evaluated in the contralateral common femoral vein and normal  venous flow dynamics including spontaneous flow, respiratory phasic  variation and augmentation were demonstrated.  No superficial or deep venous thrombosis.  Gen King MD  (Electronically Signed)  Final Date:      15 November 2019                   17:20    Nm-cardiac Stress Test    Result Date: 2019   Myocardial Perfusion  Report  NUCLEAR IMAGING INTERPRETATION  No evidence of significant jeopardized viable myocardium or prior myocardial  infarction.  Normal left ventricular size, ejection fraction, and  "wall motion.  ECG INTERPRETATION  Negative stress ECG for ischemia.  SAMARA CISNEROS  MRN:    [id number]         Gender:    F  Exam Date: 2019 08:54  Exam Location:      Out Patient  Ordering Phys:     OLEGARIO GASCA  [facility] Tech:       RT Snow CNMT  Age:    74    :    1945        Ht (in):     65  Wt (lb):     135    BMI:    22.49       Cardiologis                                          t  Risk Factors:             Family history of coronary disease  Indications:              Fatigue, Chest Pain  ICD Codes:                780.79  786.5  Cardiac History:          Positive risk factors, Chest Pain; Heart Murmur  Cardiac Meds:  Meds Past 24 hrs:  Pretest Chest Pain:       No symptoms  STRESS TEST      Pharmacologi                   c  Protoc   Lexiscan w/    Dose: 0.4 mg  ol:      Exer  Post-Injection Exercise:        An additional 1 minutes of exercise followed the                                  intravenous injection  Resting HR (bpm):      66  Peak HR (bpm):         113  Resting BP (mmHg):       112    /   59  Peak BP (mmHg):       132   /   66  MaxPHR:     146     Target HR (bpm):       124  % MaxPHR:     77  Double Product:       27096  BP Response:  Stress Termination:       Protocol completed  Stress Symptoms:  FEELS \"WEIRD\",FLUSHED  ECG  Resting ECG:     Sinus rhythm.  Stress ECG:      No ischemic changes with Regadenoson.  IMAGE PROTOCOL      Rest/Stress 1                      Day          RadiopharmaceuticalDose (mCi)   Imaging  Date      Imaging  Time         Inj to Img Time (min)  Rest:   Tc-99m             7.71         2019        09:26          Tetrofosmin  Stress: Tc-99m             28.1         2019        10:21          Tetrofosmin  Rest:  Administration Site:       Left antecubital                             fossa  Administered by:      RT Snow CNMT  Stress:  Administration Site:       Left antecubital                             " fossa  Administered by:      RT Gwendolyn, CRISTY  % Percent HR Achieved:  SPECT RESULTS  Technical Quality:       Good  Raw Data Analysis:  Summed Stress Score:    1  Summed Rest Score:    0  Summed Difference Score:        1  PERFUSION:  Normal myocardial perfusion with no ischemia.  FUNCTIONAL RESULTS (calculated via Gated SPECT)  Stress Image LV EF:        79     %  Upper Normal Limit  Stress EDV:      61     ml   EDVI:    36      ml/m???  Stress ESV:      13     ml   ESVI:    8       ml/m???  TID:    1      TID - 1.19      TID (ed) - 1.23  LV Function:  Normal left ventricular wall motion.  LV ejection fraction = 79%.  Sergo Moses MD  (Electronically Signed)  Final Date:      2019                   11:17    Ec-echocardiogram Ltd W/o Cont    Result Date: 2019  Transthoracic Echo Report Echocardiography Laboratory CONCLUSIONS Compared to the images of the study done on 10/30/2019 there has been no significant change. No evidence of endocarditis. SAMARA CISNEROS Exam Date:         2019                    13:08 Exam Location:     Inpatient Priority:          Routine Ordering Physician:        ANASTACIA CABA Referring Physician: Sonographer:               Charo Barajas RDCS Age:    74     Gender:    F MRN:    [id number] :    1945 BSA:    1.68   Ht (in):    65     Wt (lb):    136 Exam Type:     Limited Indications:     Endocarditis ICD Codes:       421 CPT Codes:       53141 BP:   155    /   76     HR:   71 Technical Quality:       Fair MEASUREMENTS  (Male / Female) Normal Values 2D ECHO Estimated LV Ejection Fraction    60 %                  * Indicates values subject to auto-interpretation LV EF:  60    % FINDINGS Left Ventricle Normal left ventricular size and systolic function. Left ventricular ejection fraction is visually estimated to be 60%. Right Ventricle Right Atrium Left Atrium Mitral Valve Structurally normal mitral valve without significant stenosis or  regurgitation. No valvular vegetations. Aortic Valve Structurally normal aortic valve without significant stenosis or regurgitation. No valvular vegetations. Tricuspid Valve Structurally normal tricuspid valve without significant stenosis or regurgitation. No valvular vegetations. Pulmonic Valve Pericardium Aorta Olvin Weldon MD (Electronically Signed) Final Date:     18 November 2019                 15:23    Ir-midline Catheter Insertion Wo Guidance > Age 3    Result Date: 11/20/2019  HISTORY/REASON FOR EXAM:  Midline Placement   TECHNIQUE/EXAM DESCRIPTION AND NUMBER OF VIEWS: Midline insertion with ultrasound guidance.  FINDINGS: Midline insertion with Ultrasound Guidance was performed by qualified nursing staff without the assistance of a Radiologist. Midline positioning as measured by RN or as appropriate length of catheter selected.              Ultrasound-guided midline placement performed by qualified nursing staff as above.   IMPRESSION:     Right knee septic arthritis  MSSA bacteremia  Fevers    PLAN:   Bernadette Paul is a 74 y.o. female who has not been feeling well since September.  She was diagnosed with MSSA bacteremia and right knee septic arthritis.  I would highly recommend getting  a NICOLE.  Related to her PICC line.  But the repeat blood cultures are negative so far.  Hold off on discontinuing the PICC line until the NICOLE is obtained.    Discussed with IM. Will continue to follow    Kirsten May M.D.     Please note that this dictation was created using voice recognition software. I have worked with technical experts from [employer] to optimize the interface.  I have made every reasonable attempt to correct obvious errors, but there may be errors of grammar and possibly content that I did not discover before finalizing the note.

## 2019-12-06 ENCOUNTER — APPOINTMENT (OUTPATIENT)
Dept: RADIOLOGY | Facility: MEDICAL CENTER | Age: 74
DRG: 864 | End: 2019-12-06
Attending: INTERNAL MEDICINE
Payer: MEDICARE

## 2019-12-06 ENCOUNTER — ANESTHESIA EVENT (OUTPATIENT)
Dept: SURGERY | Facility: MEDICAL CENTER | Age: 74
DRG: 864 | End: 2019-12-06
Payer: MEDICARE

## 2019-12-06 ENCOUNTER — ANESTHESIA (OUTPATIENT)
Dept: SURGERY | Facility: MEDICAL CENTER | Age: 74
DRG: 864 | End: 2019-12-06
Payer: MEDICARE

## 2019-12-06 ENCOUNTER — APPOINTMENT (OUTPATIENT)
Dept: CARDIOLOGY | Facility: MEDICAL CENTER | Age: 74
DRG: 864 | End: 2019-12-06
Attending: INTERNAL MEDICINE
Payer: MEDICARE

## 2019-12-06 LAB
ALBUMIN SERPL BCP-MCNC: 3.3 G/DL (ref 3.2–4.9)
BASOPHILS # BLD AUTO: 0.3 % (ref 0–1.8)
BASOPHILS # BLD: 0.02 K/UL (ref 0–0.12)
BUN SERPL-MCNC: 8 MG/DL (ref 8–22)
CALCIUM SERPL-MCNC: 9.1 MG/DL (ref 8.4–10.2)
CHLORIDE SERPL-SCNC: 100 MMOL/L (ref 96–112)
CO2 SERPL-SCNC: 25 MMOL/L (ref 20–33)
CREAT SERPL-MCNC: 0.56 MG/DL (ref 0.5–1.4)
EOSINOPHIL # BLD AUTO: 0.33 K/UL (ref 0–0.51)
EOSINOPHIL NFR BLD: 5.5 % (ref 0–6.9)
ERYTHROCYTE [DISTWIDTH] IN BLOOD BY AUTOMATED COUNT: 43.3 FL (ref 35.9–50)
GLUCOSE SERPL-MCNC: 102 MG/DL (ref 65–99)
HCT VFR BLD AUTO: 37.1 % (ref 37–47)
HGB BLD-MCNC: 12.5 G/DL (ref 12–16)
IMM GRANULOCYTES # BLD AUTO: 0.01 K/UL (ref 0–0.11)
IMM GRANULOCYTES NFR BLD AUTO: 0.2 % (ref 0–0.9)
LV EJECT FRACT  99904: 65
LYMPHOCYTES # BLD AUTO: 0.4 K/UL (ref 1–4.8)
LYMPHOCYTES NFR BLD: 6.7 % (ref 22–41)
MCH RBC QN AUTO: 30.4 PG (ref 27–33)
MCHC RBC AUTO-ENTMCNC: 33.7 G/DL (ref 33.6–35)
MCV RBC AUTO: 90.3 FL (ref 81.4–97.8)
MONOCYTES # BLD AUTO: 0.28 K/UL (ref 0–0.85)
MONOCYTES NFR BLD AUTO: 4.7 % (ref 0–13.4)
NEUTROPHILS # BLD AUTO: 4.95 K/UL (ref 2–7.15)
NEUTROPHILS NFR BLD: 82.6 % (ref 44–72)
NRBC # BLD AUTO: 0 K/UL
NRBC BLD-RTO: 0 /100 WBC
PHOSPHATE SERPL-MCNC: 2.3 MG/DL (ref 2.5–4.5)
PLATELET # BLD AUTO: 190 K/UL (ref 164–446)
PMV BLD AUTO: 9.2 FL (ref 9–12.9)
POTASSIUM SERPL-SCNC: 4.1 MMOL/L (ref 3.6–5.5)
RBC # BLD AUTO: 4.11 M/UL (ref 4.2–5.4)
SODIUM SERPL-SCNC: 134 MMOL/L (ref 135–145)
WBC # BLD AUTO: 6 K/UL (ref 4.8–10.8)

## 2019-12-06 PROCEDURE — 74177 CT ABD & PELVIS W/CONTRAST: CPT

## 2019-12-06 PROCEDURE — 700105 HCHG RX REV CODE 258: Performed by: HOSPITALIST

## 2019-12-06 PROCEDURE — 99233 SBSQ HOSP IP/OBS HIGH 50: CPT | Performed by: INTERNAL MEDICINE

## 2019-12-06 PROCEDURE — 93325 DOPPLER ECHO COLOR FLOW MAPG: CPT

## 2019-12-06 PROCEDURE — 36415 COLL VENOUS BLD VENIPUNCTURE: CPT

## 2019-12-06 PROCEDURE — B246ZZ4 ULTRASONOGRAPHY OF RIGHT AND LEFT HEART, TRANSESOPHAGEAL: ICD-10-PCS | Performed by: INTERNAL MEDICINE

## 2019-12-06 PROCEDURE — 99232 SBSQ HOSP IP/OBS MODERATE 35: CPT | Performed by: INTERNAL MEDICINE

## 2019-12-06 PROCEDURE — 700105 HCHG RX REV CODE 258: Performed by: INTERNAL MEDICINE

## 2019-12-06 PROCEDURE — 160035 HCHG PACU - 1ST 60 MINS PHASE I: Performed by: INTERNAL MEDICINE

## 2019-12-06 PROCEDURE — 700105 HCHG RX REV CODE 258: Performed by: ANESTHESIOLOGY

## 2019-12-06 PROCEDURE — 160002 HCHG RECOVERY MINUTES (STAT): Performed by: INTERNAL MEDICINE

## 2019-12-06 PROCEDURE — 700111 HCHG RX REV CODE 636 W/ 250 OVERRIDE (IP): Performed by: ANESTHESIOLOGY

## 2019-12-06 PROCEDURE — 700117 HCHG RX CONTRAST REV CODE 255: Performed by: INTERNAL MEDICINE

## 2019-12-06 PROCEDURE — 160009 HCHG ANES TIME/MIN: Performed by: INTERNAL MEDICINE

## 2019-12-06 PROCEDURE — 87071 CULTURE AEROBIC QUANT OTHER: CPT

## 2019-12-06 PROCEDURE — 160048 HCHG OR STATISTICAL LEVEL 1-5: Performed by: INTERNAL MEDICINE

## 2019-12-06 PROCEDURE — 700111 HCHG RX REV CODE 636 W/ 250 OVERRIDE (IP): Performed by: INTERNAL MEDICINE

## 2019-12-06 PROCEDURE — A9270 NON-COVERED ITEM OR SERVICE: HCPCS | Performed by: HOSPITALIST

## 2019-12-06 PROCEDURE — 80069 RENAL FUNCTION PANEL: CPT

## 2019-12-06 PROCEDURE — 85025 COMPLETE CBC W/AUTO DIFF WBC: CPT

## 2019-12-06 PROCEDURE — 770006 HCHG ROOM/CARE - MED/SURG/GYN SEMI*

## 2019-12-06 PROCEDURE — 99231 SBSQ HOSP IP/OBS SF/LOW 25: CPT | Performed by: INTERNAL MEDICINE

## 2019-12-06 PROCEDURE — 93312 ECHO TRANSESOPHAGEAL: CPT | Mod: 26 | Performed by: INTERNAL MEDICINE

## 2019-12-06 PROCEDURE — 700102 HCHG RX REV CODE 250 W/ 637 OVERRIDE(OP): Performed by: HOSPITALIST

## 2019-12-06 RX ORDER — MEPERIDINE HYDROCHLORIDE 25 MG/ML
12.5 INJECTION INTRAMUSCULAR; INTRAVENOUS; SUBCUTANEOUS
Status: DISCONTINUED | OUTPATIENT
Start: 2019-12-06 | End: 2019-12-06 | Stop reason: HOSPADM

## 2019-12-06 RX ORDER — OXYCODONE HCL 5 MG/5 ML
5 SOLUTION, ORAL ORAL
Status: DISCONTINUED | OUTPATIENT
Start: 2019-12-06 | End: 2019-12-06 | Stop reason: HOSPADM

## 2019-12-06 RX ORDER — OXYCODONE HCL 5 MG/5 ML
10 SOLUTION, ORAL ORAL
Status: DISCONTINUED | OUTPATIENT
Start: 2019-12-06 | End: 2019-12-06 | Stop reason: HOSPADM

## 2019-12-06 RX ORDER — HALOPERIDOL 5 MG/ML
1 INJECTION INTRAMUSCULAR
Status: DISCONTINUED | OUTPATIENT
Start: 2019-12-06 | End: 2019-12-06 | Stop reason: HOSPADM

## 2019-12-06 RX ORDER — ONDANSETRON 2 MG/ML
4 INJECTION INTRAMUSCULAR; INTRAVENOUS
Status: DISCONTINUED | OUTPATIENT
Start: 2019-12-06 | End: 2019-12-06 | Stop reason: HOSPADM

## 2019-12-06 RX ORDER — SODIUM CHLORIDE, SODIUM LACTATE, POTASSIUM CHLORIDE, CALCIUM CHLORIDE 600; 310; 30; 20 MG/100ML; MG/100ML; MG/100ML; MG/100ML
INJECTION, SOLUTION INTRAVENOUS
Status: DISCONTINUED | OUTPATIENT
Start: 2019-12-06 | End: 2019-12-06 | Stop reason: SURG

## 2019-12-06 RX ORDER — SODIUM CHLORIDE, SODIUM LACTATE, POTASSIUM CHLORIDE, CALCIUM CHLORIDE 600; 310; 30; 20 MG/100ML; MG/100ML; MG/100ML; MG/100ML
INJECTION, SOLUTION INTRAVENOUS CONTINUOUS
Status: DISCONTINUED | OUTPATIENT
Start: 2019-12-06 | End: 2019-12-06 | Stop reason: HOSPADM

## 2019-12-06 RX ORDER — DIPHENHYDRAMINE HYDROCHLORIDE 50 MG/ML
12.5 INJECTION INTRAMUSCULAR; INTRAVENOUS
Status: DISCONTINUED | OUTPATIENT
Start: 2019-12-06 | End: 2019-12-06 | Stop reason: HOSPADM

## 2019-12-06 RX ADMIN — SODIUM CHLORIDE, POTASSIUM CHLORIDE, SODIUM LACTATE AND CALCIUM CHLORIDE: 600; 310; 30; 20 INJECTION, SOLUTION INTRAVENOUS at 11:56

## 2019-12-06 RX ADMIN — SENNOSIDES AND DOCUSATE SODIUM 2 TABLET: 8.6; 5 TABLET ORAL at 17:58

## 2019-12-06 RX ADMIN — CEFAZOLIN 2 G: 10 INJECTION, POWDER, FOR SOLUTION INTRAVENOUS; PARENTERAL at 13:54

## 2019-12-06 RX ADMIN — SODIUM CHLORIDE: 9 INJECTION, SOLUTION INTRAVENOUS at 22:16

## 2019-12-06 RX ADMIN — ACETAMINOPHEN 650 MG: 325 TABLET, FILM COATED ORAL at 13:54

## 2019-12-06 RX ADMIN — IOHEXOL 100 ML: 350 INJECTION, SOLUTION INTRAVENOUS at 18:37

## 2019-12-06 RX ADMIN — CEFAZOLIN 2 G: 10 INJECTION, POWDER, FOR SOLUTION INTRAVENOUS; PARENTERAL at 22:16

## 2019-12-06 RX ADMIN — SODIUM CHLORIDE: 9 INJECTION, SOLUTION INTRAVENOUS at 05:49

## 2019-12-06 RX ADMIN — CEFAZOLIN 2 G: 10 INJECTION, POWDER, FOR SOLUTION INTRAVENOUS; PARENTERAL at 05:51

## 2019-12-06 RX ADMIN — PROPOFOL 50 MG: 10 INJECTION, EMULSION INTRAVENOUS at 11:52

## 2019-12-06 RX ADMIN — PROPOFOL 30 MG: 10 INJECTION, EMULSION INTRAVENOUS at 11:53

## 2019-12-06 ASSESSMENT — ENCOUNTER SYMPTOMS
EYE PAIN: 0
WEAKNESS: 0
CHILLS: 0
FEVER: 1
WEIGHT LOSS: 1
BACK PAIN: 0
NAUSEA: 0
DIZZINESS: 0
BLOOD IN STOOL: 0
MYALGIAS: 0
ABDOMINAL PAIN: 0
HEADACHES: 0
FOCAL WEAKNESS: 0
NECK PAIN: 0
HEARTBURN: 0
COUGH: 1
SORE THROAT: 0
FEVER: 0
PALPITATIONS: 0
SPUTUM PRODUCTION: 0
DEPRESSION: 0
DIARRHEA: 0
EYE DISCHARGE: 0
BRUISES/BLEEDS EASILY: 0
VOMITING: 0
COUGH: 0
SHORTNESS OF BREATH: 0
CONSTIPATION: 0
HALLUCINATIONS: 0

## 2019-12-06 ASSESSMENT — PAIN SCALES - GENERAL: PAIN_LEVEL: 0

## 2019-12-06 NOTE — PROGRESS NOTES
1900 Report received from day RN. POC discussed.     Pt AAOx4. Denies any pain, SOB, nausea, dizziness, numbness or tingling. Due med given. POC discussed w/ pt especially being NPO at mn fro planned NICOLE in the morning. Safety and comfort measures in place. No additional needs at this time.

## 2019-12-06 NOTE — PROGRESS NOTES
Received report from Christopher SILVESTRE. Assumed care. This pt is AOx4. pain, Patient and RN discussed plan of care including IV abx, NICOLE at 1130, monitor VS and for fevers: questions answered. Chart reviewed. Call light in place, fall precautions in place, patient educated on importance of calling for assistance. No additional needs at this time.

## 2019-12-06 NOTE — PROGRESS NOTES
Infectious Disease Progress Note    Author: Carlota Richards M.D. Date & Time of service: 2019  8:02 AM    Chief Complaint:  Fevers    Interval History:    Review of Systems:  Review of Systems   Constitutional: Positive for fever and malaise/fatigue. Negative for chills.   Respiratory: Positive for cough. Negative for sputum production and shortness of breath.    Gastrointestinal: Negative for abdominal pain, constipation, diarrhea, nausea and vomiting.   Musculoskeletal: Negative for back pain, joint pain, myalgias and neck pain.       Hemodynamics:  Temp (24hrs), Av.7 °C (99.9 °F), Min:36.9 °C (98.5 °F), Max:38.7 °C (101.6 °F)  Temperature: 37.6 °C (99.7 °F)  Pulse  Av.2  Min: 74  Max: 95   Blood Pressure : 115/47       Physical Exam:  Physical Exam  Constitutional:       Appearance: Normal appearance.   Neck:      Musculoskeletal: Normal range of motion and neck supple.   Cardiovascular:      Rate and Rhythm: Normal rate and regular rhythm.      Heart sounds: Murmur present.   Pulmonary:      Effort: Pulmonary effort is normal.      Breath sounds: Normal breath sounds.   Abdominal:      General: Abdomen is flat. Bowel sounds are normal. There is no distension.      Palpations: Abdomen is soft.      Tenderness: There is no tenderness. There is no guarding.   Musculoskeletal:         General: No swelling, tenderness, deformity or signs of injury.      Right lower leg: No edema.      Left lower leg: No edema.      Comments: Right knee surgical site incision with edges well approximated and no sign of infection or drainage.  Overall knee with no erythema, no tenderness andno edema.    No point tenderness in spine.   Skin:     General: Skin is warm and dry.   Neurological:      General: No focal deficit present.      Mental Status: She is alert and oriented to person, place, and time.   Psychiatric:         Mood and Affect: Mood normal.         Behavior: Behavior normal.         Meds:    Current  Facility-Administered Medications:   •  ceFAZolin  •  enoxaparin  •  senna-docusate **AND** polyethylene glycol/lytes **AND** magnesium hydroxide **AND** bisacodyl  •  NS  •  acetaminophen    Labs:  Recent Labs     12/03/19  1530 12/04/19  1514 12/05/19  0443   WBC 5.4 7.3 6.8   RBC 3.98* 4.63 4.22   HEMOGLOBIN 12.7 14.0 12.8   HEMATOCRIT 36.8* 42.6 37.4   MCV 92.5 92.0 88.6   MCH 31.9 30.2 30.3   RDW 45.3 43.8 42.6   PLATELETCT 251 197 203   MPV 9.2 9.5 9.0   NEUTSPOLYS 77.30* 88.80* 87.50*   LYMPHOCYTES 12.60* 4.90* 4.10*   MONOCYTES 6.50 4.80 4.90   EOSINOPHILS 3.00 0.80 3.20   BASOPHILS 0.40 0.40 0.30     Recent Labs     12/03/19  1530 12/04/19  1514 12/05/19  0443   SODIUM 129* 129* 132*   POTASSIUM 4.0 4.0 3.9   CHLORIDE 97 93* 98   CO2 26 21 23   GLUCOSE 99 117* 119*   BUN 18 12 7*     Recent Labs     12/03/19  1530 12/04/19  1514 12/05/19  0443   ALBUMIN 3.8 3.9 3.6   TBILIRUBIN 0.4 0.6 0.6   ALKPHOSPHAT 58 69 61   TOTPROTEIN 6.4 7.2 6.2   ALTSGPT 23 23 20   ASTSGOT 19 19 17   CREATININE 0.67 0.54 0.55       Imaging:  Dx-chest-portable (1 View)    Result Date: 12/4/2019 12/4/2019 2:55 PM HISTORY/REASON FOR EXAM: Sepsis and cough TECHNIQUE/EXAM DESCRIPTION AND NUMBER OF VIEWS: Single portable view of the chest. COMPARISON: None FINDINGS: There is no evidence of focal consolidation or evidence of pulmonary edema. There is no pleural effusion. The heart is normal in size. There is a right upper arm venous catheter with the tip located at the level of the lower axillary vein.     No evidence of acute cardiopulmonary process.    Us-extremity Venous Lower Unilat Right    Result Date: 11/15/2019   Vascular Laboratory  CONCLUSIONS  Normal right lower extremity superficial and deep venous examination.  No prior  SAMARA CISNEROS  Exam Date:     11/15/2019 16:33  Room #:     Inpatient  Priority:     Routine  Ht (in):             Wt (lb):  Ordering Physician:        YUMI MAYFIELD  Referring Physician:       [id number] Jimenez   Sonographer:               Brice Tovar RDCS,                             RVT  Study Type:                Complete Unilateral  Technical Quality:         Adequate  Age:    74    Gender:     F  MRN:    [id number]  :    1945      BSA:  Indications:     Swelling of Limb, Pain in Limb  CPT Codes:       05961  ICD Codes:       729.81  729.5  History:  Limitations:  PROCEDURES:  Right lower extremity venous duplex imaging.  The following venous structures were evaluated: common femoral, profunda  femoral, femoral, popliteal , peroneal and posterior tibial veins.  Serial compression, augmentation maneuvers,  color and spectral Doppler  flow evaluations were performed.  FINDINGS:  Right lower extremity:    Complete color filling and compressibility with normal venous flow dynamics  including spontaneous flow, response to augmentation maneuvers, and  respiratory phasicity.  Flow was evaluated in the contralateral common femoral vein and normal  venous flow dynamics including spontaneous flow, respiratory phasic  variation and augmentation were demonstrated.  No superficial or deep venous thrombosis.  Gen King MD  (Electronically Signed)  Final Date:      15 November 2019                   17:20    Nm-cardiac Stress Test    Result Date: 2019   Myocardial Perfusion  Report  NUCLEAR IMAGING INTERPRETATION  No evidence of significant jeopardized viable myocardium or prior myocardial  infarction.  Normal left ventricular size, ejection fraction, and wall motion.  ECG INTERPRETATION  Negative stress ECG for ischemia.  SAMARA CISNEROS  MRN:    [id number]         Gender:    F  Exam Date: 2019 08:54  Exam Location:      Out Patient  Ordering Phys:     OLEGARIO GASCA  NucMed Tech:       RT Gwendolyn,                     CNMT  Age:    74    :    1945        Ht (in):     65  Wt (lb):     135    BMI:    22.49       Cardiologis                                          t  Risk Factors:             Family  "history of coronary disease  Indications:              Fatigue, Chest Pain  ICD Codes:                780.79  786.5  Cardiac History:          Positive risk factors, Chest Pain; Heart Murmur  Cardiac Meds:  Meds Past 24 hrs:  Pretest Chest Pain:       No symptoms  STRESS TEST      Pharmacologi                   jeison Callesan w/    Dose: 0.4 mg  ol:      Exer  Post-Injection Exercise:        An additional 1 minutes of exercise followed the                                  intravenous injection  Resting HR (bpm):      66  Peak HR (bpm):         113  Resting BP (mmHg):       112    /   59  Peak BP (mmHg):       132   /   66  MaxPHR:     146     Target HR (bpm):       124  % MaxPHR:     77  Double Product:       84226  BP Response:  Stress Termination:       Protocol completed  Stress Symptoms:  FEELS \"WEIRD\",FLUSHED  ECG  Resting ECG:     Sinus rhythm.  Stress ECG:      No ischemic changes with Regadenoson.  IMAGE PROTOCOL      Rest/Stress 1                      Day          RadiopharmaceuticalDose (mCi)   Imaging  Date      Imaging  Time         Inj to Img Time (min)  Rest:   Tc-99m             7.71         11-Nov-2019        09:26          Tetrofosmin  Stress: Tc-99m             28.1         11-Nov-2019        10:21          Tetrofosmin  Rest:  Administration Site:       Left antecubital                             fossa  Administered by:      RT Snow CNMT  Stress:  Administration Site:       Left antecubital                             fossa  Administered by:      RT Snow CNMT  % Percent HR Achieved:  SPECT RESULTS  Technical Quality:       Good  Raw Data Analysis:  Summed Stress Score:    1  Summed Rest Score:    0  Summed Difference Score:        1  PERFUSION:  Normal myocardial perfusion with no ischemia.  FUNCTIONAL RESULTS (calculated via Gated SPECT)  Stress Image LV EF:        79     %  Upper Normal Limit  Stress EDV:      61     ml   EDVI:    36      ml/m???  Stress ESV:      13    "  ml   ESVI:    8       ml/m???  TID:    1      TID - 1.19      TID (ed) - 1.23  LV Function:  Normal left ventricular wall motion.  LV ejection fraction = 79%.  Sergo Moses MD  (Electronically Signed)  Final Date:      2019                   11:17    Ec-echocardiogram Ltd W/o Cont    Result Date: 2019  Transthoracic Echo Report Echocardiography Laboratory CONCLUSIONS Compared to the images of the study done on 10/30/2019 there has been no significant change. No evidence of endocarditis. SAMARA CISNEROS Exam Date:         2019                    13:08 Exam Location:     Inpatient Priority:          Routine Ordering Physician:        ANASTACIA CABA Referring Physician: Sonographer:               Charo Barajas RDCS Age:    74     Gender:    F MRN:    [id number] :    1945 BSA:    1.68   Ht (in):    65     Wt (lb):    136 Exam Type:     Limited Indications:     Endocarditis ICD Codes:       421 CPT Codes:       36146 BP:   155    /   76     HR:   71 Technical Quality:       Fair MEASUREMENTS  (Male / Female) Normal Values 2D ECHO Estimated LV Ejection Fraction    60 %                  * Indicates values subject to auto-interpretation LV EF:  60    % FINDINGS Left Ventricle Normal left ventricular size and systolic function. Left ventricular ejection fraction is visually estimated to be 60%. Right Ventricle Right Atrium Left Atrium Mitral Valve Structurally normal mitral valve without significant stenosis or regurgitation. No valvular vegetations. Aortic Valve Structurally normal aortic valve without significant stenosis or regurgitation. No valvular vegetations. Tricuspid Valve Structurally normal tricuspid valve without significant stenosis or regurgitation. No valvular vegetations. Pulmonic Valve Pericardium Aorta Olvin Weldon MD (Electronically Signed) Final Date:     2019                 15:23    Ir-midline Catheter Insertion Wo Guidance > Age 3    Result  "Date: 11/20/2019  HISTORY/REASON FOR EXAM:  Midline Placement   TECHNIQUE/EXAM DESCRIPTION AND NUMBER OF VIEWS: Midline insertion with ultrasound guidance.  FINDINGS: Midline insertion with Ultrasound Guidance was performed by qualified nursing staff without the assistance of a Radiologist. Midline positioning as measured by RN or as appropriate length of catheter selected.              Ultrasound-guided midline placement performed by qualified nursing staff as above.       Micro:  Results     Procedure Component Value Units Date/Time    BLOOD CULTURE [[id number]] Collected:  12/04/19 1733    Order Status:  Completed Specimen:  Blood from Peripheral Updated:  12/05/19 0926     Significant Indicator NEG     Source BLD     Site PERIPHERAL     Culture Result No Growth  Note: Blood cultures are incubated for 5 days and  are monitored continuously.Positive blood cultures  are called to the RN and reported as soon as  they are identified.  Blood culture testing and Gram stain, if indicated, are  performed at [facility], [facility]  [place], [place].  Positive blood cultures are  sent to [facility], [address] [place] [address], for organism identification and  susceptibility testing.      Narrative:       Per Hospital Policy: Only change Specimen Src: to \"Line\" if  specified by physician order.  Right Hand    Blood Culture [[id number]] Collected:  12/04/19 1544    Order Status:  Completed Specimen:  Blood from Line Updated:  12/05/19 0926     Significant Indicator NEG     Source BLD     Site LINE     Culture Result No Growth  Note: Blood cultures are incubated for 5 days and  are monitored continuously.Positive blood cultures  are called to the RN and reported as soon as  they are identified.  Blood culture testing and Gram stain, if indicated, are  performed at [facility], [facility]  [place] [facility].[place], [place].  Positive blood cultures " "are  sent to [facility], [address] [place] [address], for organism identification and  susceptibility testing.      Narrative:       Per Hospital Policy: Only change Specimen Src: to \"Line\" if  specified by physician order.  **PICC**  Left Hand    Influenza A/B By PCR (Adult - Flu Only) [[id number]] Collected:  12/04/19 1524    Order Status:  Completed Specimen:  Respirate from Nasopharyngeal Updated:  12/04/19 1638     Influenza virus A RNA Negative     Influenza virus B, PCR Negative    URINALYSIS [[id number]] Collected:  12/04/19 1515    Order Status:  Completed Specimen:  Urine Updated:  12/04/19 1540     Color Yellow     Character Clear     Specific Gravity 1.020     Ph 6.0     Glucose Negative mg/dL      Ketones Negative mg/dL      Protein Negative mg/dL      Bilirubin Negative     Nitrite Negative     Leukocyte Esterase Negative     Occult Blood Negative     Micro Urine Req see below     Comment: Microscopic examination not performed when specimen is clear  and chemically negative for protein, blood, leukocyte esterase  and nitrite.         Narrative:       Indication for culture:->Septic Shock: Persistent  hypotension,  Lactic acid > 4, vasopressors/inotropes started    URINE CULTURE(NEW) [[id number]] Collected:  12/04/19 1515    Order Status:  Completed Specimen:  Urine Updated:  12/04/19 1526    Narrative:       Indication for culture:->Septic Shock: Persistent  hypotension,  Lactic acid > 4, vasopressors/inotropes started    Influenza A/B By PCR (Adult - Flu Only) [[id number]]     Order Status:  Canceled Specimen:  Respirate from Nasopharyngeal     BLOOD CULTURE [[id number]] Collected:  12/04/19 0000    Order Status:  Canceled Specimen:  Other from Peripheral           Assessment:  Active Hospital Problems    Diagnosis   • *FUO (fever of unknown origin) [R50.9]   • MSSA bacteremia [R78.81]   • Septic arthritis of knee, right (HCC) [M00.9]   • Hyponatremia [E87.1]     Interval 24 hour " assessment:      100.9, O2 RA  Labs reviewed  Imaging personally reviewed both images and report.  Studies reviewed  Micro reviewed    Pt continued on cefazolin.  She has no significant complaints.  She does endorse a dry cough ongoing for several months and some fatigue.  Awaiting NICOLE.     Assessment     Bernadette Paul is a 74 y.o.  with no significant past medical history who presented in 11/2019 complaining of right knee pain and several weeks of fatigue.  Orthopedics was consulted at the time and she went to the OR on 11/15 for I&D of right septic knee.  A large amount of purulent fluid was encountered per op note.  Cultures were sent +MSSA.  She was also found to have an MSSA bacteremia with blood cultures positive on 11/15.  Repeat blood cultures on 11/17 were negative.  She underwent TTE on 11/18 which was negative for endocarditis.  Plan was to continue IV antibiotics and 1212/15/19 with daptomycin 8 mg/kg home infusion and come into [facility] weekly for labs and dressing changes.    She is now admitted after presenting to the ER on 12/4 complaining of 1 day fever and chills.  No new right knee pain and PICC line is still in place.  ID was consulted and has recommended NICOLE. She was transitioned to  Cefazolin.     Fevers, new, [id number] in progress  -Chest x-ray unremarkable  -Blood cultures on 12/4, 1 obtained from PICC -no growth to date  Right knee septic arthritis, with MSSA, on daptomycin prior to admission  MSSA bacteremia, on daptomycin prior to admission  Fatigue, ongoing for several months  Unintentional weight loss    Plan:  --- Continue cefazolin  --- Awaiting NICOLE  --- Will likely need to replace PICC line  --- Follow-up repeat blood cultures  --- Additional work-up pending results of NICOLE      Discussed with internal medicine, Dr. Bowman.

## 2019-12-06 NOTE — CARE PLAN
Problem: Communication  Goal: The ability to communicate needs accurately and effectively will improve  Outcome: PROGRESSING AS EXPECTED   Pt able to communicate needs appropriately to staff. Plan of care discussed to pt including planned NICOLE in the morning, thus needs NPO by mn. Questions and concerns answered. Pt. Verbalized understanding. Communication board updated.       Problem: Infection  Goal: Will remain free from infection  Outcome: PROGRESSING AS EXPECTED   Pt is afebrile so far, denies chills. On IV antibiotic (ancef).  Will continue to monitor temp.

## 2019-12-06 NOTE — PROGRESS NOTES
Hospital Medicine Daily Progress Note    Date of Service  12/5/2019    Chief Complaint  74 y.o. female admitted 12/4/2019 with fever    Hospital Course    History of 3 months of fatigue and weight loss, diagnosed with MSSA positive right septic knee 2 weeks ago status post washout has been on IV daptomycin, admitted for fever.      Interval Problem Update  12/5: Still having intermittent fevers, taking Tylenol with relief.  Cardiology consulted for NICOLE.  I discussed with ID.  Antibiotics changed to Ancef, no MRSA coverage needed    Consultants/Specialty  Dr. May, ID   To- cardiology    Code Status  Full    Disposition  NICOLE in AM  Home when medically clear w IV abx    Review of Systems  Review of Systems   Constitutional: Positive for fever, malaise/fatigue and weight loss. Negative for chills.   HENT: Negative for sore throat.    Respiratory: Negative for cough and shortness of breath.    Cardiovascular: Negative for chest pain, palpitations and leg swelling.   Gastrointestinal: Negative for abdominal pain, blood in stool, diarrhea, heartburn, nausea and vomiting.   Genitourinary: Negative for dysuria and frequency.   Musculoskeletal: Negative for back pain, joint pain (Knee feels good, no pain) and myalgias.   Neurological: Negative for dizziness, focal weakness, weakness and headaches.   Psychiatric/Behavioral: Negative for depression and hallucinations.   All other systems reviewed and are negative.       Physical Exam  Temp:  [36.6 °C (97.8 °F)-38.9 °C (102 °F)] 37.7 °C (99.9 °F)  Pulse:  [74-95] 76  Resp:  [14-23] 16  BP: (111-142)/(50-74) 111/50  SpO2:  [92 %-95 %] 92 %    Physical Exam  Constitutional:       General: She is not in acute distress.     Comments: Well-appearing   HENT:      Nose: Nose normal.      Mouth/Throat:      Mouth: Mucous membranes are dry.   Neck:      Musculoskeletal: No muscular tenderness.   Cardiovascular:      Rate and Rhythm: Normal rate and regular rhythm.      Pulses:  Normal pulses.   Pulmonary:      Effort: Pulmonary effort is normal.      Breath sounds: Normal breath sounds.   Abdominal:      General: There is no distension.      Palpations: Abdomen is soft.   Musculoskeletal: Normal range of motion.         General: No swelling or tenderness.      Comments: Good range of motion no tenderness of the right knee.  R PICC line no e/o infection, drainage   Lymphadenopathy:      Cervical: No cervical adenopathy.   Skin:     General: Skin is warm and dry.      Findings: No rash.   Neurological:      General: No focal deficit present.      Mental Status: She is alert and oriented to person, place, and time.      Motor: Weakness present.   Psychiatric:         Mood and Affect: Mood normal.         Thought Content: Thought content normal.         Fluids    Intake/Output Summary (Last 24 hours) at 12/5/2019 1654  Last data filed at 12/5/2019 1200  Gross per 24 hour   Intake 750 ml   Output --   Net 750 ml       Laboratory  Recent Labs     12/03/19  1530 12/04/19  1514 12/05/19  0443   WBC 5.4 7.3 6.8   RBC 3.98* 4.63 4.22   HEMOGLOBIN 12.7 14.0 12.8   HEMATOCRIT 36.8* 42.6 37.4   MCV 92.5 92.0 88.6   MCH 31.9 30.2 30.3   MCHC 34.5 32.9* 34.2   RDW 45.3 43.8 42.6   PLATELETCT 251 197 203   MPV 9.2 9.5 9.0     Recent Labs     12/03/19  1530 12/04/19  1514 12/05/19  0443   SODIUM 129* 129* 132*   POTASSIUM 4.0 4.0 3.9   CHLORIDE 97 93* 98   CO2 26 21 23   GLUCOSE 99 117* 119*   BUN 18 12 7*   CREATININE 0.67 0.54 0.55   CALCIUM 9.3 9.9 9.2                   Imaging  DX-CHEST-PORTABLE (1 VIEW)   Final Result      No evidence of acute cardiopulmonary process.      EC-NICOLE W/O CONT    (Results Pending)        Assessment/Plan  * FUO (fever of unknown origin)  Assessment & Plan  Recent history of MSSA bacteremia with negative TTE however with seeding of the right knee status post washout.    Even prior to the development of her infection, she has had fatigue which has been ongoing for 3  months.  She developed fever despite IV daptomycin  I discussed with ID  I consulted cardiology for NICOLE  If NICOLE is negative, consider pulling PICC line for culture  Peripheral blood cultures are negative so far (12/4)  She has no focal source of infection, malignancy at this time is less likely due to normal cell counts  Change antibiotics to Ancef  Tylenol as needed for fever    MSSA bacteremia- (present on admission)  Assessment & Plan  Previous admission  Has been on daptomycin  Repeat blood cultures are pending    Septic arthritis of knee, right (HCC)  Assessment & Plan  Hx of septic knee from MSSA bacteremia,   1/2 bottles from prior admission +MSSA  Repeat blood cultures from 11/17 were negative.  TTE done on 11/18 was negative for endocarditis  She will need a NICOLE, I consulted cardiology  N.p.o. midnight  Knee shows no swelling no surrounding erythema, incision looks clean.        Hyponatremia- (present on admission)  Assessment & Plan  Most likely due to dehydration, improving with IV fluids  Recheck BMP in the morning         VTE prophylaxis: Lovenox  Total time:  40 minutes.  I spent greater than 50% of the time for patient care, counseling, and coordination on this date, including unit/floor time, and face-to-face time with the patient as per interval events and assessment and plan above

## 2019-12-06 NOTE — ANESTHESIA POSTPROCEDURE EVALUATION
Patient: Bernadette Storey Humberto    Procedure Summary     Date:  12/06/19 Room / Location:   OR  / SURGERY [facility]    Anesthesia Start:  1148 Anesthesia Stop:  1200    Procedure:  ECHOCARDIOGRAM, TRANSESOPHAGEAL (N/A Esophagus) Diagnosis:  (bacteremia)    Surgeon:  Marion Dykes M.D. Responsible Provider:  Shon Becerra M.D.    Anesthesia Type:  general ASA Status:  3          Final Anesthesia Type: general  Last vitals  BP   Blood Pressure : 111/68    Temp   36.7 °C (98.1 °F)    Pulse   Pulse: 76   Resp   16    SpO2   92 %      Anesthesia Post Evaluation    Patient location during evaluation: PACU  Patient participation: complete - patient participated  Level of consciousness: awake and alert  Pain score: 0    Airway patency: patent  Anesthetic complications: no  Cardiovascular status: adequate and hemodynamically stable  Respiratory status: acceptable  Hydration status: acceptable    PONV: none           Nurse Pain Score: 0 (NPRS)

## 2019-12-06 NOTE — PROGRESS NOTES
Pt back from TE via transport. Spoke with Dr. Bowman, new orders received for a diet order after back from NICOLE.

## 2019-12-06 NOTE — PROGRESS NOTES
1201 - Pt arrived to PACU via gurney.  Report received from anesthesia & OR RN.  Natural airway patent.  Lung sounds clear.  Respirations spontaneous.  Denies pain, n/v at this time.    1220 - A&Ox4.  Continues to deny pain & n/v.  Cough present, clear sputum.  Denies SOB & chest pain.    1230 - Report given to NIRMALA Abraham.    1237 - No changes to assessment.  Transferred to room 216 with CNA.

## 2019-12-06 NOTE — ANESTHESIA TIME REPORT
Anesthesia Start and Stop Event Times     Date Time Event    12/6/2019 1123 Ready for Procedure     1148 Anesthesia Start     1200 Anesthesia Stop        Responsible Staff  12/06/19    Name Role Begin End    Shon Becerra M.D. Anesth 1148 1200        Preop Diagnosis (Free Text):  Pre-op Diagnosis     bacteremia        Preop Diagnosis (Codes):    Post op Diagnosis  Bacteremia      Premium Reason  Non-Premium    Comments:                                                                  GA for NICOLE

## 2019-12-06 NOTE — PROGRESS NOTES
Cardiology Follow Up Progress Note    Date of Service  12/6/2019    Attending Physician  Carlota Bowman D.O.    Chief Complaint   To rule out endocarditis.    ANUP Paul is a 74 y.o. female admitted 12/4/2019 with MSSA bacteremia and septic joint.    Interim Events  No acute events overnight.  No telemetry events.    Review of Systems  Review of Systems   Constitutional: Negative for chills and fever.   HENT: Negative for ear discharge, ear pain, hearing loss and nosebleeds.    Eyes: Negative for pain and discharge.   Respiratory: Negative for cough and shortness of breath.    Cardiovascular: Negative for chest pain, palpitations and leg swelling.   Gastrointestinal: Negative for abdominal pain, blood in stool, nausea and vomiting.   Genitourinary: Negative for dysuria and hematuria.   Musculoskeletal: Negative for myalgias.   Skin: Negative for rash.   Allergic/Immunologic: Negative for environmental allergies.   Neurological: Negative for dizziness and headaches.   Hematological: Does not bruise/bleed easily.   Psychiatric/Behavioral: Negative for hallucinations and suicidal ideas.       Vital signs in last 24 hours  Temp:  [36.7 °C (98.1 °F)-38.4 °C (101.1 °F)] 36.7 °C (98.1 °F)  Pulse:  [64-80] 71  Resp:  [16-20] 18  BP: (102-123)/(43-68) 116/61  SpO2:  [91 %-98 %] 95 %    Physical Exam  Physical Exam  Constitutional:       Appearance: She is well-developed.   HENT:      Head: Normocephalic and atraumatic.   Neck:      Vascular: No JVD.   Cardiovascular:      Rate and Rhythm: Normal rate and regular rhythm.      Heart sounds: Normal heart sounds. No murmur. No friction rub. No gallop.    Pulmonary:      Effort: No respiratory distress.      Breath sounds: No wheezing or rales.   Chest:      Chest wall: No tenderness.   Abdominal:      General: There is no distension.      Tenderness: There is no tenderness. There is no guarding or rebound.   Musculoskeletal:         General: No tenderness.    Lymphadenopathy:      Cervical: No cervical adenopathy.   Skin:     General: Skin is dry.   Neurological:      Mental Status: She is alert and oriented to person, place, and time.         Lab Review  Lab Results   Component Value Date/Time    WBC 6.0 12/06/2019 09:45 AM    RBC 4.11 (L) 12/06/2019 09:45 AM    HEMOGLOBIN 12.5 12/06/2019 09:45 AM    HEMATOCRIT 37.1 12/06/2019 09:45 AM    MCV 90.3 12/06/2019 09:45 AM    MCH 30.4 12/06/2019 09:45 AM    MCHC 33.7 12/06/2019 09:45 AM    MPV 9.2 12/06/2019 09:45 AM      Lab Results   Component Value Date/Time    SODIUM 134 (L) 12/06/2019 09:45 AM    POTASSIUM 4.1 12/06/2019 09:45 AM    CHLORIDE 100 12/06/2019 09:45 AM    CO2 25 12/06/2019 09:45 AM    GLUCOSE 102 (H) 12/06/2019 09:45 AM    BUN 8 12/06/2019 09:45 AM    CREATININE 0.56 12/06/2019 09:45 AM    CREATININE 0.9 06/01/2005 08:44 AM      Lab Results   Component Value Date/Time    ASTSGOT 17 12/05/2019 04:43 AM    ALTSGPT 20 12/05/2019 04:43 AM     Lab Results   Component Value Date/Time    CHOLSTRLTOT 218 (H) 09/10/2018 09:29 AM     (H) 09/10/2018 09:29 AM     09/10/2018 09:29 AM    TRIGLYCERIDE 48 09/10/2018 09:29 AM    TROPONINT <6 10/09/2019 03:15 PM       No results for input(s): NTPROBNP in the last 72 hours.    Cardiac Imaging and Procedures Review    Echocardiogram:  NICOLE showed no evidence of endocarditis.    Cardiac Catheterization:  None.      Assessment/Plan  Principal Problem:    FUO (fever of unknown origin) POA: Unknown  Active Problems:    MSSA bacteremia POA: Yes    Hyponatremia POA: Yes      Overview: 2/12/13 Na 136      2/13/14 Na 131      3/15/16 Na 137      5/25/16 Na 134      9/20/17 Na 131      10/2/19 Na 129      10/9/19 Na 134      10/15/19 serum osm 277 (278-298), urine osm 128 (300-900), urine Na 14,       cortisol 9.8, ACTH 15    Septic arthritis of knee, right (HCC) POA: Unknown  Resolved Problems:    * No resolved hospital problems. *        No evidence of endocarditis on  NICOLE.  No further cardiac work up.    Thank you for allowing me to participate in the care of this patient.  Cardiology will sign off on this patient    Please contact me with any questions.    Marion Dykes M.D.   Cardiologist, [facility]  [phone]

## 2019-12-06 NOTE — PROCEDURES
Patient was brought to cath lab holding.  Consent was obtained. Risks and benefits of procedures were explained.    Anesthesia was used for sedation process.    Indication: to rule out endocarditis in setting of bacteremia (MSSA).    Complication: none    Diagnosis: no evidence of endocarditis.      Thank you for referring this patient to our cardiology service.    Marion Dykes MD.

## 2019-12-06 NOTE — ASSESSMENT & PLAN NOTE
Present on previous admission in 1 out of 2 blood cultures  Has been on daptomycin but developed fever while on this medication  Repeat blood cultures this admission: No growth  Abx through 12/16 (outpatient infusion on DC)

## 2019-12-06 NOTE — CONSULTS
Cardiology Consult Note:    Marion To  Date & Time note created:    12/5/2019   4:09 PM     Referring MD:  Dr. Bowman    Patient ID:   Name:             Bernadette Paul   YOB: 1945  Age:                 74 y.o.  female   MRN:               [id number]                                                             Chief Complaint / Reason for consult:  Persistent bacteremia. To rule out endocarditis.    History of Present Illness:    This is a 74 years old women with prior history of persistent bacteremia and currently still bacteremic, presented to the hospital with right knee pain.  It is MSSA.  Cardiology been consulted for further evaluation and ruling out endocarditis.  No chest pain or shortness of breath.  No prior cardiac problems.  No prior cardiac procedures.    I have personally interpreted her EKG today with patient, there is no evidence of acute coronary syndrome, no evidence of prior infarct, normal CT and QT interval, no significant conduction disease.    Prior transthoracic echocardiogram did not show evidence of endocarditis.  No significant valvular disease.  LV function is normal.  I personally interpreted the images.    Review of Systems:      Constitutional: Denies fevers, Denies weight changes  Eyes: Denies changes in vision, no eye pain  Ears/Nose/Throat/Mouth: Denies nasal congestion or sore throat   Cardiovascular: no chest pain, no palpitations   Respiratory: no shortness of breath , Denies cough  Gastrointestinal/Hepatic: Denies abdominal pain, nausea, vomiting, diarrhea, constipation or GI bleeding   Genitourinary: Denies dysuria or frequency  Musculoskeletal/Rheum: Denies  joint pain and swelling   Skin: Denies rash  Neurological: Denies headache, confusion, memory loss or focal weakness/parasthesias  Psychiatric: denies mood disorder   Endocrine: [place] thyroid problems  Heme/Oncology/Lymph Nodes: Denies enlarged lymph nodes, denies brusing or known bleeding  disorder  All other systems were reviewed and are negative (AMA/CMS criteria)                Past Medical History:   Past Medical History:   Diagnosis Date   • Allergic rhinitis 6/17/2009   • Dyslipidemia 6/17/2009   • Preventative health care 6/17/2009   • S/P parathyroidectomy 6/17/2009     Active Hospital Problems    Diagnosis   • FUO (fever of unknown origin) [R50.9]     Priority: High   • Septic arthritis of knee, right (HCC) [M00.9]   • Hyponatremia [E87.1]       Past Surgical History:  Past Surgical History:   Procedure Laterality Date   • INCISION AND DRAINAGE ORTHOPEDIC Right 11/15/2019    Procedure: INCISION AND DRAINAGE, RIGHT KNEE  BY ORTHOPEDICS;  Surgeon: Ramsey Navas M.D.;  Location: SURGERY [facility];  Service: Orthopedics       Hospital Medications:    Current Facility-Administered Medications:   •  ceFAZolin (ANCEF) 2 g in  mL IVPB, 2 g, Intravenous, Q8HRS, Kirsten May M.D.  •  senna-docusate (PERICOLACE or SENOKOT S) 8.6-50 MG per tablet 2 Tab, 2 Tab, Oral, BID **AND** polyethylene glycol/lytes (MIRALAX) PACKET 1 Packet, 1 Packet, Oral, QDAY PRN **AND** magnesium hydroxide (MILK OF MAGNESIA) suspension 30 mL, 30 mL, Oral, QDAY PRN **AND** bisacodyl (DULCOLAX) suppository 10 mg, 10 mg, Rectal, QDAY PRN, Netta Trujillo M.D.  •  NS infusion, , Intravenous, Continuous, Netta Trujillo M.D., Last Rate: 100 mL/hr at 12/05/19 0605  •  acetaminophen (TYLENOL) tablet 650 mg, 650 mg, Oral, Q6HRS PRN, Netta Trujillo M.D., 650 mg at 12/05/19 0820    Current Outpatient Medications:  Medications Prior to Admission   Medication Sig Dispense Refill Last Dose   • NS SOLN 50 mL with DAPTOmycin 350 MG SOLR 500 mg 500 mg by Intravenous route every 24 hours for 24 days. (Patient taking differently: 8 mg/kg by Intravenous route every 24 hours. Pt started on 11/21/2019 for 24 day course.)   12/3/2019 at 2000   • therapeutic multivitamin-minerals (THERAGRAN-M) Tab Take 1 Tab by mouth  every day.   12/4/2019 at 0830   • Ascorbic Acid (VITAMIN C PO) Take 1 Tab by mouth every day. Unknown OTC Strength.   12/4/2019 at 0830   • Cholecalciferol (VITAMIN D PO) Take 1 Cap by mouth every day. Unknown OTC Strength.   12/4/2019 at 0830   • CALCIUM PO Take 1 Tab by mouth 2 Times a Day. Unknown OTC Strength.   12/4/2019 at 0830   • Omega-3 Fatty Acids (FISH OIL PO) Take 2 Caps by mouth every evening. Unknown OTC Strength.   12/2/2019 at PM       Medication Allergy:  Allergies   Allergen Reactions   • Asa [Aspirin]      Epistaxis     • Nsaids      Epistaxis         Family History:  Family History   Problem Relation Age of Onset   • Heart Disease Mother        Social History:  Social History     Socioeconomic History   • Marital status:      Spouse name: Not on file   • Number of children: Not on file   • Years of education: Not on file   • Highest education level: Not on file   Occupational History   • Not on file   Social Needs   • Financial resource strain: Not on file   • Food insecurity:     Worry: Not on file     Inability: Not on file   • Transportation needs:     Medical: Not on file     Non-medical: Not on file   Tobacco Use   • Smoking status: Never Smoker   • Smokeless tobacco: Never Used   Substance and Sexual Activity   • Alcohol use: Yes     Alcohol/week: 0.0 oz     Frequency: Monthly or less     Comment: occ wine    • Drug use: Never   • Sexual activity: Yes     Partners: Male   Lifestyle   • Physical activity:     Days per week: Not on file     Minutes per session: Not on file   • Stress: Not on file   Relationships   • Social connections:     Talks on phone: Not on file     Gets together: Not on file     Attends Rastafari service: Not on file     Active member of club or organization: Not on file     Attends meetings of clubs or organizations: Not on file     Relationship status: Not on file   • Intimate partner violence:     Fear of current or ex partner: Not on file     Emotionally  "abused: Not on file     Physically abused: Not on file     Forced sexual activity: Not on file   Other Topics Concern   • Not on file   Social History Narrative   • Not on file         Physical Exam:  Vitals/ General Appearance:   Weight/BMI: Body mass index is 21.98 kg/m².  /50   Pulse 76   Temp 37.7 °C (99.9 °F) (Oral)   Resp 16   Ht 1.651 m (5' 5\")   Wt 59.9 kg (132 lb 0.9 oz)   SpO2 92%   Vitals:    12/05/19 0000 12/05/19 0500 12/05/19 0813 12/05/19 1030   BP:  138/59  111/50   Pulse:  95  76   Resp:  16  16   Temp: 37.4 °C (99.4 °F) 36.6 °C (97.8 °F) (!) 38.7 °C (101.6 °F) 37.7 °C (99.9 °F)   TempSrc: Oral Oral  Oral   SpO2:  92%  92%   Weight:       Height:         Oxygen Therapy:  Pulse Oximetry: 92 %, O2 (LPM): 0, O2 Delivery: None (Room Air)    Constitutional:   Well developed, Well nourished, No acute distress  HENMT:  Normocephalic, Atraumatic, Oropharynx moist mucous membranes, No oral exudates, Nose normal.  No thyromegaly.  Eyes:  EOMI, Conjunctiva normal, No discharge.  Neck:  Normal range of motion, No cervical tenderness,  no JVD.  Cardiovascular:  Normal heart rate, Normal rhythm, No murmurs, No rubs, No gallops.   Extremitites with intact distal pulses, no cyanosis, or edema.  Lungs:  Normal breath sounds, breath sounds clear to auscultation bilaterally,  no rales, no rhonchi, no wheezing.   Abdomen: Bowel sounds normal, Soft, No tenderness, No guarding, No rebound, No masses, No hepatosplenomegaly.  Skin: Warm, Dry, No erythema, No rash, no induration.  Neurologic: Alert & oriented x 3, No focal deficits noted, cranial nerves II through X are intact.  Psychiatric: Affect normal, Judgment normal, Mood normal.      MDM (Data Review):     Records reviewed and summarized in current documentation    Lab Data Review:  Recent Results (from the past 24 hour(s))   BLOOD CULTURE    Collection Time: 12/04/19  5:33 PM   Result Value Ref Range    Significant Indicator NEG     Source BLD     Site " PERIPHERAL     Culture Result       No Growth  Note: Blood cultures are incubated for 5 days and  are monitored continuously.Positive blood cultures  are called to the RN and reported as soon as  they are identified.  Blood culture testing and Gram stain, if indicated, are  performed at [facility], [address] [place] [address].  Positive blood cultures are  sent to [facility], [address] [place] [address], for organism identification and  susceptibility testing.     CBC with Differential    Collection Time: 12/05/19  4:43 AM   Result Value Ref Range    WBC 6.8 4.8 - 10.8 K/uL    RBC 4.22 4.20 - 5.40 M/uL    Hemoglobin 12.8 12.0 - 16.0 g/dL    Hematocrit 37.4 37.0 - 47.0 %    MCV 88.6 81.4 - 97.8 fL    MCH 30.3 27.0 - 33.0 pg    MCHC 34.2 33.6 - 35.0 g/dL    RDW 42.6 35.9 - 50.0 fL    Platelet Count 203 164 - 446 K/uL    MPV 9.0 9.0 - 12.9 fL    Neutrophils-Polys 87.50 (H) 44.00 - 72.00 %    Lymphocytes 4.10 (L) 22.00 - 41.00 %    Monocytes 4.90 0.00 - 13.40 %    Eosinophils 3.20 0.00 - 6.90 %    Basophils 0.30 0.00 - 1.80 %    Immature Granulocytes 0.00 0.00 - 0.90 %    Nucleated RBC 0.00 /100 WBC    Neutrophils (Absolute) 5.94 2.00 - 7.15 K/uL    Lymphs (Absolute) 0.28 (L) 1.00 - 4.80 K/uL    Monos (Absolute) 0.33 0.00 - 0.85 K/uL    Eos (Absolute) 0.22 0.00 - 0.51 K/uL    Baso (Absolute) 0.02 0.00 - 0.12 K/uL    Immature Granulocytes (abs) 0.00 0.00 - 0.11 K/uL    NRBC (Absolute) 0.00 K/uL   Comp Metabolic Panel (CMP)    Collection Time: 12/05/19  4:43 AM   Result Value Ref Range    Sodium 132 (L) 135 - 145 mmol/L    Potassium 3.9 3.6 - 5.5 mmol/L    Chloride 98 96 - 112 mmol/L    Co2 23 20 - 33 mmol/L    Anion Gap 11.0 0.0 - 11.9    Glucose 119 (H) 65 - 99 mg/dL    Bun 7 (L) 8 - 22 mg/dL    Creatinine 0.55 0.50 - 1.40 mg/dL    Calcium 9.2 8.4 - 10.2 mg/dL    AST(SGOT) 17 12 - 45 U/L    ALT(SGPT) 20 2 - 50 U/L    Alkaline Phosphatase 61 30 - 99 U/L    Total  Bilirubin 0.6 0.1 - 1.5 mg/dL    Albumin 3.6 3.2 - 4.9 g/dL    Total Protein 6.2 6.0 - 8.2 g/dL    Globulin 2.6 1.9 - 3.5 g/dL    A-G Ratio 1.4 g/dL   ESTIMATED GFR    Collection Time: 12/05/19  4:43 AM   Result Value Ref Range    GFR If African American >60 >60 mL/min/1.73 m 2    GFR If Non African American >60 >60 mL/min/1.73 m 2       Imaging/Procedures Review:    Chest Xray:  Reviewed    EKG:   As in HPI.     MDM (Assessment and Plan):     Active Hospital Problems    Diagnosis   • FUO (fever of unknown origin) [R50.9]     Priority: High   • Septic arthritis of knee, right (HCC) [M00.9]   • Hyponatremia [E87.1]         At this time we will proceed with transesophageal echocardiogram to rule out endocarditis.  Risks and benefits were explained to patient.  She has agreed to proceed.      Thank you for referring this patient to our cardiology service.  We will follow patient with you.      Marion Dykes MD.   Cardiology Inpatient Service.  [facility].  [phone].  [place], [place].

## 2019-12-06 NOTE — ANESTHESIA PREPROCEDURE EVALUATION
Relevant Problems   Other   (+) Septic arthritis of knee, right (HCC)   (+) Septic joint of right knee joint (HCC)       Physical Exam    Airway   Mallampati: II  TM distance: >3 FB  Neck ROM: full       Cardiovascular - normal exam  Rhythm: regular  Rate: normal  (-) murmur     Dental - normal exam         Pulmonary - normal exam  Breath sounds clear to auscultation     Abdominal    Neurological - normal exam                 Anesthesia Plan    ASA 3   ASA physical status 3 criteria: other (comment)    Plan - general       Airway plan will be mask        Induction: intravenous    Postoperative Plan: Postoperative administration of opioids is intended.    Pertinent diagnostic labs and testing reviewed    Informed Consent:    Anesthetic plan and risks discussed with patient.    Use of blood products discussed with: patient whom consented to blood products.

## 2019-12-07 ENCOUNTER — APPOINTMENT (OUTPATIENT)
Dept: RADIOLOGY | Facility: MEDICAL CENTER | Age: 74
DRG: 864 | End: 2019-12-07
Attending: INTERNAL MEDICINE
Payer: MEDICARE

## 2019-12-07 LAB
ALBUMIN SERPL BCP-MCNC: 2.9 G/DL (ref 3.2–4.9)
ALBUMIN/GLOB SERPL: 0.9 G/DL
ALP SERPL-CCNC: 65 U/L (ref 30–99)
ALT SERPL-CCNC: 40 U/L (ref 2–50)
ANION GAP SERPL CALC-SCNC: 14 MMOL/L (ref 0–11.9)
AST SERPL-CCNC: 41 U/L (ref 12–45)
BACTERIA UR CULT: NORMAL
BASOPHILS # BLD AUTO: 0.3 % (ref 0–1.8)
BASOPHILS # BLD: 0.02 K/UL (ref 0–0.12)
BILIRUB SERPL-MCNC: 0.3 MG/DL (ref 0.1–1.5)
BUN SERPL-MCNC: 12 MG/DL (ref 8–22)
CALCIUM SERPL-MCNC: 9 MG/DL (ref 8.4–10.2)
CHLORIDE SERPL-SCNC: 99 MMOL/L (ref 96–112)
CO2 SERPL-SCNC: 21 MMOL/L (ref 20–33)
CREAT SERPL-MCNC: 0.47 MG/DL (ref 0.5–1.4)
EOSINOPHIL # BLD AUTO: 0.43 K/UL (ref 0–0.51)
EOSINOPHIL NFR BLD: 6.9 % (ref 0–6.9)
ERYTHROCYTE [DISTWIDTH] IN BLOOD BY AUTOMATED COUNT: 43.2 FL (ref 35.9–50)
GLOBULIN SER CALC-MCNC: 3.1 G/DL (ref 1.9–3.5)
GLUCOSE SERPL-MCNC: 103 MG/DL (ref 65–99)
HCT VFR BLD AUTO: 34.9 % (ref 37–47)
HGB BLD-MCNC: 11.8 G/DL (ref 12–16)
IMM GRANULOCYTES # BLD AUTO: 0.02 K/UL (ref 0–0.11)
IMM GRANULOCYTES NFR BLD AUTO: 0.3 % (ref 0–0.9)
LYMPHOCYTES # BLD AUTO: 0.53 K/UL (ref 1–4.8)
LYMPHOCYTES NFR BLD: 8.5 % (ref 22–41)
MAGNESIUM SERPL-MCNC: 1.9 MG/DL (ref 1.5–2.5)
MCH RBC QN AUTO: 30.5 PG (ref 27–33)
MCHC RBC AUTO-ENTMCNC: 33.8 G/DL (ref 33.6–35)
MCV RBC AUTO: 90.2 FL (ref 81.4–97.8)
MONOCYTES # BLD AUTO: 0.31 K/UL (ref 0–0.85)
MONOCYTES NFR BLD AUTO: 5 % (ref 0–13.4)
NEUTROPHILS # BLD AUTO: 4.9 K/UL (ref 2–7.15)
NEUTROPHILS NFR BLD: 79 % (ref 44–72)
NRBC # BLD AUTO: 0 K/UL
NRBC BLD-RTO: 0 /100 WBC
PHOSPHATE SERPL-MCNC: 3 MG/DL (ref 2.5–4.5)
PLATELET # BLD AUTO: 215 K/UL (ref 164–446)
PMV BLD AUTO: 9.7 FL (ref 9–12.9)
POTASSIUM SERPL-SCNC: 4 MMOL/L (ref 3.6–5.5)
PROCALCITONIN SERPL-MCNC: 0.07 NG/ML
PROT SERPL-MCNC: 6 G/DL (ref 6–8.2)
RBC # BLD AUTO: 3.87 M/UL (ref 4.2–5.4)
SIGNIFICANT IND 70042: NORMAL
SITE SITE: NORMAL
SODIUM SERPL-SCNC: 134 MMOL/L (ref 135–145)
SOURCE SOURCE: NORMAL
WBC # BLD AUTO: 6.2 K/UL (ref 4.8–10.8)

## 2019-12-07 PROCEDURE — A9270 NON-COVERED ITEM OR SERVICE: HCPCS | Performed by: HOSPITALIST

## 2019-12-07 PROCEDURE — 700105 HCHG RX REV CODE 258: Performed by: HOSPITALIST

## 2019-12-07 PROCEDURE — 700111 HCHG RX REV CODE 636 W/ 250 OVERRIDE (IP): Performed by: INTERNAL MEDICINE

## 2019-12-07 PROCEDURE — 71260 CT THORAX DX C+: CPT

## 2019-12-07 PROCEDURE — 99233 SBSQ HOSP IP/OBS HIGH 50: CPT | Performed by: INTERNAL MEDICINE

## 2019-12-07 PROCEDURE — 85025 COMPLETE CBC W/AUTO DIFF WBC: CPT

## 2019-12-07 PROCEDURE — 80053 COMPREHEN METABOLIC PANEL: CPT

## 2019-12-07 PROCEDURE — 84145 PROCALCITONIN (PCT): CPT

## 2019-12-07 PROCEDURE — 36415 COLL VENOUS BLD VENIPUNCTURE: CPT

## 2019-12-07 PROCEDURE — 700102 HCHG RX REV CODE 250 W/ 637 OVERRIDE(OP): Performed by: HOSPITALIST

## 2019-12-07 PROCEDURE — 83735 ASSAY OF MAGNESIUM: CPT

## 2019-12-07 PROCEDURE — 770006 HCHG ROOM/CARE - MED/SURG/GYN SEMI*

## 2019-12-07 PROCEDURE — 700105 HCHG RX REV CODE 258: Performed by: INTERNAL MEDICINE

## 2019-12-07 PROCEDURE — 700117 HCHG RX CONTRAST REV CODE 255: Performed by: INTERNAL MEDICINE

## 2019-12-07 PROCEDURE — 93970 EXTREMITY STUDY: CPT

## 2019-12-07 PROCEDURE — 84100 ASSAY OF PHOSPHORUS: CPT

## 2019-12-07 RX ADMIN — SODIUM CHLORIDE: 9 INJECTION, SOLUTION INTRAVENOUS at 10:26

## 2019-12-07 RX ADMIN — CEFAZOLIN 2 G: 10 INJECTION, POWDER, FOR SOLUTION INTRAVENOUS; PARENTERAL at 14:02

## 2019-12-07 RX ADMIN — CEFAZOLIN 2 G: 10 INJECTION, POWDER, FOR SOLUTION INTRAVENOUS; PARENTERAL at 21:19

## 2019-12-07 RX ADMIN — ACETAMINOPHEN 650 MG: 325 TABLET, FILM COATED ORAL at 05:13

## 2019-12-07 RX ADMIN — CEFAZOLIN 2 G: 10 INJECTION, POWDER, FOR SOLUTION INTRAVENOUS; PARENTERAL at 05:13

## 2019-12-07 RX ADMIN — SENNOSIDES AND DOCUSATE SODIUM 2 TABLET: 8.6; 5 TABLET ORAL at 05:13

## 2019-12-07 RX ADMIN — ENOXAPARIN SODIUM 40 MG: 100 INJECTION SUBCUTANEOUS at 05:12

## 2019-12-07 RX ADMIN — IOHEXOL 75 ML: 350 INJECTION, SOLUTION INTRAVENOUS at 18:32

## 2019-12-07 ASSESSMENT — ENCOUNTER SYMPTOMS
FEVER: 1
HEADACHES: 0
HEARTBURN: 0
DIARRHEA: 0
CONSTIPATION: 0
FEVER: 0
SPUTUM PRODUCTION: 0
SORE THROAT: 0
COUGH: 0
BLOOD IN STOOL: 0
HALLUCINATIONS: 0
BACK PAIN: 0
DIZZINESS: 0
COUGH: 1
SHORTNESS OF BREATH: 0
FOCAL WEAKNESS: 0
NAUSEA: 0
WEIGHT LOSS: 0
CHILLS: 0
ABDOMINAL PAIN: 0
NECK PAIN: 0
DEPRESSION: 0
VOMITING: 0
PALPITATIONS: 0
MYALGIAS: 0
WEAKNESS: 0

## 2019-12-07 NOTE — PROGRESS NOTES
Hospital Medicine Daily Progress Note    Date of Service  12/6/2019    Chief Complaint  74 y.o. female admitted 12/4/2019 with fever    Hospital Course    History of 3 months of fatigue and weight loss, diagnosed with MSSA positive right septic knee 2 weeks ago status post washout has been on IV daptomycin, admitted for fever.      Interval Problem Update  12/5: Still having intermittent fevers, taking Tylenol with relief.  Cardiology consulted for NICOLE.  I discussed with ID.  Antibiotics changed to Ancef, no MRSA coverage needed  12/6: Still with intermittent fever, NICOLE negative.  PICC line pulled and cultured.  CT abdomen pelvis ordered.  Tolerating ancef    Consultants/Specialty  Dr. May, ID  Dr. Dykes- cardiology    Code Status  Full    Disposition  Home when medically clear w IV abx    Review of Systems  Review of Systems   Constitutional: Positive for fever, malaise/fatigue and weight loss. Negative for chills.   HENT: Negative for sore throat.    Respiratory: Negative for cough and shortness of breath.    Cardiovascular: Negative for chest pain, palpitations and leg swelling.   Gastrointestinal: Negative for abdominal pain, blood in stool, diarrhea, heartburn, nausea and vomiting.   Genitourinary: Negative for dysuria and frequency.   Musculoskeletal: Negative for back pain, joint pain (no knee pain) and myalgias.   Neurological: Negative for dizziness, focal weakness, weakness and headaches.   Psychiatric/Behavioral: Negative for depression and hallucinations.   All other systems reviewed and are negative.       Physical Exam  Temp:  [36.7 °C (98.1 °F)-38.4 °C (101.1 °F)] 37.4 °C (99.3 °F)  Pulse:  [61-80] 74  Resp:  [16-20] 18  BP: (102-137)/(45-68) 112/56  SpO2:  [91 %-98 %] 92 %    Physical Exam  Constitutional:       General: She is not in acute distress.     Comments: Well-appearing   HENT:      Nose: Nose normal.      Mouth/Throat:      Mouth: Mucous membranes are dry.   Neck:      Musculoskeletal: No  muscular tenderness.   Cardiovascular:      Rate and Rhythm: Normal rate and regular rhythm.      Pulses: Normal pulses.   Pulmonary:      Effort: Pulmonary effort is normal.      Breath sounds: Normal breath sounds.   Abdominal:      General: There is no distension.      Palpations: Abdomen is soft.   Musculoskeletal: Normal range of motion.         General: No swelling or tenderness.      Comments: Good range of motion no tenderness of the right knee.  R PICC line no e/o infection, drainage   Lymphadenopathy:      Cervical: No cervical adenopathy.   Skin:     General: Skin is warm and dry.      Findings: No rash.   Neurological:      General: No focal deficit present.      Mental Status: She is alert and oriented to person, place, and time.   Psychiatric:         Mood and Affect: Mood normal.         Thought Content: Thought content normal.         Fluids    Intake/Output Summary (Last 24 hours) at 12/6/2019 1609  Last data filed at 12/6/2019 1335  Gross per 24 hour   Intake 1796.67 ml   Output --   Net 1796.67 ml       Laboratory  Recent Labs     12/04/19  1514 12/05/19  0443 12/06/19  0945   WBC 7.3 6.8 6.0   RBC 4.63 4.22 4.11*   HEMOGLOBIN 14.0 12.8 12.5   HEMATOCRIT 42.6 37.4 37.1   MCV 92.0 88.6 90.3   MCH 30.2 30.3 30.4   MCHC 32.9* 34.2 33.7   RDW 43.8 42.6 43.3   PLATELETCT 197 203 190   MPV 9.5 9.0 9.2     Recent Labs     12/04/19  1514 12/05/19  0443 12/06/19  0945   SODIUM 129* 132* 134*   POTASSIUM 4.0 3.9 4.1   CHLORIDE 93* 98 100   CO2 21 23 25   GLUCOSE 117* 119* 102*   BUN 12 7* 8   CREATININE 0.54 0.55 0.56   CALCIUM 9.9 9.2 9.1                   Imaging  EC-NICOLE W/O CONT   Final Result      DX-CHEST-PORTABLE (1 VIEW)   Final Result      No evidence of acute cardiopulmonary process.      CT-ABDOMEN-PELVIS WITH    (Results Pending)        Assessment/Plan  * FUO (fever of unknown origin)  Assessment & Plan  Recent history of MSSA bacteremia with negative TTE however with seeding of the right knee  status post washout.    Even prior to the development of her infection, she has had fatigue which has been ongoing for 3 months.  She developed fever despite IV daptomycin  And likely drug fever as she has had persistent fevers despite being off daptomycin  I discussed with ID and cardiology  NICOLE is negative  Peripheral blood cultures are negative so far (12/4)  Pull PICC line and culture  I will order CT scan of the abdomen and pelvis to rule out any other areas of seeding that may have occurred  She has no focal source of infection, malignancy at this time is less likely due to normal cell counts  Continue Ancef  Tylenol as needed for fever    MSSA bacteremia- (present on admission)  Assessment & Plan  Present on previous admission in 1 out of 2 blood cultures  Has been on daptomycin but developed fever while on this medication  Repeat blood cultures are pending, NGTD    Septic arthritis of knee, right (HCC)  Assessment & Plan  Hx of septic knee from MSSA bacteremia,   1/2 bottles from prior admission +MSSA  Repeat blood cultures from 11/17 were negative.  TTE done on 11/18 and NICOLE done 12/6 both negative for endocarditis  Knee shows no swelling no surrounding erythema, incision looks clean.        Hyponatremia- (present on admission)  Assessment & Plan  Most likely due to dehydration, improving with IV fluids  Recheck BMP in the morning       VTE prophylaxis: Lovenox

## 2019-12-07 NOTE — PROGRESS NOTES
Infectious Disease Progress Note    Author: Carlota Richards M.D. Date & Time of service: 2019  7:50 AM       Chief Complaint:  Fevers     Interval History:   100.9, no significant complaints.  She does endorse a dry cough ongoing for several months and some fatigue.  Awaiting NICOLE.      Review of Systems:  Review of Systems   Constitutional: Positive for fever and malaise/fatigue. Negative for chills.   Respiratory: Positive for cough. Negative for sputum production and shortness of breath.    Gastrointestinal: Negative for abdominal pain, constipation, diarrhea, nausea and vomiting.   Musculoskeletal: Negative for back pain, joint pain, myalgias and neck pain.       Hemodynamics:  Temp (24hrs), Av.2 °C (98.9 °F), Min:36.7 °C (98.1 °F), Max:38 °C (100.4 °F)  Temperature: 36.7 °C (98.1 °F)  Pulse  Av.7  Min: 61  Max: 95   Blood Pressure : 130/63       Physical Exam:  Physical Exam  Constitutional:       Appearance: Normal appearance.   Cardiovascular:      Rate and Rhythm: Normal rate and regular rhythm.      Pulses: Normal pulses.      Heart sounds: Normal heart sounds.   Pulmonary:      Effort: Pulmonary effort is normal.      Comments: Faint crackles on left  Abdominal:      General: Abdomen is flat. Bowel sounds are normal.      Palpations: Abdomen is soft.   Musculoskeletal: Normal range of motion.         General: No swelling, tenderness, deformity or signs of injury.      Right lower leg: No edema.      Left lower leg: No edema.      Comments: Left knee with some warmth, no erythema, no tenderness, no drainage   Skin:     General: Skin is warm and dry.   Neurological:      General: No focal deficit present.      Mental Status: She is alert and oriented to person, place, and time.   Psychiatric:         Mood and Affect: Mood normal.         Behavior: Behavior normal.         Meds:    Current Facility-Administered Medications:   •  ceFAZolin  •  enoxaparin  •  senna-docusate **AND**  polyethylene glycol/lytes **AND** magnesium hydroxide **AND** bisacodyl  •  NS  •  acetaminophen    Labs:  Recent Labs     12/05/19  0443 12/06/19  0945 12/07/19  0421   WBC 6.8 6.0 6.2   RBC 4.22 4.11* 3.87*   HEMOGLOBIN 12.8 12.5 11.8*   HEMATOCRIT 37.4 37.1 34.9*   MCV 88.6 90.3 90.2   MCH 30.3 30.4 30.5   RDW 42.6 43.3 43.2   PLATELETCT 203 190 215   MPV 9.0 9.2 9.7   NEUTSPOLYS 87.50* 82.60* 79.00*   LYMPHOCYTES 4.10* 6.70* 8.50*   MONOCYTES 4.90 4.70 5.00   EOSINOPHILS 3.20 5.50 6.90   BASOPHILS 0.30 0.30 0.30     Recent Labs     12/05/19  0443 12/06/19  0945 12/07/19  0421   SODIUM 132* 134* 134*   POTASSIUM 3.9 4.1 4.0   CHLORIDE 98 100 99   CO2 23 25 21   GLUCOSE 119* 102* 103*   BUN 7* 8 12     Recent Labs     12/04/19  1514 12/05/19  0443 12/06/19  0945 12/07/19  0421   ALBUMIN 3.9 3.6 3.3 2.9*   TBILIRUBIN 0.6 0.6  --  0.3   ALKPHOSPHAT 69 61  --  65   TOTPROTEIN 7.2 6.2  --  6.0   ALTSGPT 23 20  --  40   ASTSGOT 19 17  --  41   CREATININE 0.54 0.55 0.56 0.47*       Imaging:  Ct-abdomen-pelvis With    Result Date: 12/6/2019 12/6/2019 6:25 PM HISTORY/REASON FOR EXAM:  Sepsis. Fever. Fatigue and weight loss. TECHNIQUE/EXAM DESCRIPTION: CT scan of the abdomen and pelvis with contrast. Contrast-enhanced helical scanning was obtained from the diaphragmatic domes through the pubic symphysis following the bolus administration of 100 mL of Omnipaque 350 without complication. Low dose optimization technique was utilized for this CT exam including automated exposure control and adjustment of the mA and/or kV according to patient size. COMPARISON: No prior studies available. FINDINGS: Visualized lung bases demonstrate small bilateral pleural effusions and minor atelectasis or pneumonia in the right lower lobe. CT Abdomen: The liver is enlarged and is homogeneous. There is minimal intrahepatic biliary dilatation. The gallbladder is absent. The pancreas is unremarkable. The spleen appears normal. The adrenal glands  are not enlarged and the kidneys enhance symmetrically. There is a 3.9 cm simple cyst in the medial aspect of the mid and lower pole region of the left kidney. There is no lymphadenopathy. The aorta and IVC are normal in caliber. The bowel is without obstruction. The appendix is not visualized.  No focal inflammatory change is present. CT Pelvis: There is no lymphadenopathy. There is a small amount of free fluid in the pelvis. No loculated fluid collection is identified. The bladder appears normal. The uterus is present. There is no acute inflammatory process.     1.  Minimal right lower lobe atelectasis or pneumonia. Small bilateral pleural effusions. 2.  Small amount of free fluid in the pelvis. No loculated fluid collection identified in the abdomen or pelvis. 3.  No evidence of bowel or renal obstruction or inflammation. 4.  Surgical absence of the gallbladder. Minor intrahepatic biliary dilatation.    Dx-chest-portable (1 View)    Result Date: 12/4/2019 12/4/2019 2:55 PM HISTORY/REASON FOR EXAM: Sepsis and cough TECHNIQUE/EXAM DESCRIPTION AND NUMBER OF VIEWS: Single portable view of the chest. COMPARISON: None FINDINGS: There is no evidence of focal consolidation or evidence of pulmonary edema. There is no pleural effusion. The heart is normal in size. There is a right upper arm venous catheter with the tip located at the level of the lower axillary vein.     No evidence of acute cardiopulmonary process.    Us-extremity Venous Lower Unilat Right    Result Date: 11/15/2019   Vascular Laboratory  CONCLUSIONS  Normal right lower extremity superficial and deep venous examination.  No prior  BLAYNE, SAMARA  Exam Date:     11/15/2019 16:33  Room #:     Inpatient  Priority:     Routine  Ht (in):             Wt (lb):  Ordering Physician:        YUMI MAYFIELD  Referring Physician:       [id number] Jimenez  Sonographer:               Brice Tovar RD,                             RVT  Study Type:                Complete  Unilateral  Technical Quality:         Adequate  Age:    74    Gender:     F  MRN:    [id number]  :    1945      BSA:  Indications:     Swelling of Limb, Pain in Limb  CPT Codes:       57863  ICD Codes:       729.81  729.5  History:  Limitations:  PROCEDURES:  Right lower extremity venous duplex imaging.  The following venous structures were evaluated: common femoral, profunda  femoral, femoral, popliteal , peroneal and posterior tibial veins.  Serial compression, augmentation maneuvers,  color and spectral Doppler  flow evaluations were performed.  FINDINGS:  Right lower extremity:    Complete color filling and compressibility with normal venous flow dynamics  including spontaneous flow, response to augmentation maneuvers, and  respiratory phasicity.  Flow was evaluated in the contralateral common femoral vein and normal  venous flow dynamics including spontaneous flow, respiratory phasic  variation and augmentation were demonstrated.  No superficial or deep venous thrombosis.  Gen King MD  (Electronically Signed)  Final Date:      15 November 2019                   17:20    Nm-cardiac Stress Test    Result Date: 2019   Myocardial Perfusion  Report  NUCLEAR IMAGING INTERPRETATION  No evidence of significant jeopardized viable myocardium or prior myocardial  infarction.  Normal left ventricular size, ejection fraction, and wall motion.  ECG INTERPRETATION  Negative stress ECG for ischemia.  SAMARA CISNEROS  MRN:    [id number]         Gender:    F  Exam Date: 2019 08:54  Exam Location:      Out Patient  Ordering Phys:     OLEGARIO GASCA  NucMed Tech:       RT Gwendolyn,                     CRISTY  Age:    74    :    1945        Ht (in):     65  Wt (lb):     135    BMI:    22.49       Cardiologis                                          t  Risk Factors:             Family history of coronary disease  Indications:              Fatigue, Chest Pain  ICD Codes:                780.79   "786.5  Cardiac History:          Positive risk factors, Chest Pain; Heart Murmur  Cardiac Meds:  Meds Past 24 hrs:  Pretest Chest Pain:       No symptoms  STRESS TEST      Pharmacologrosetta Callesan w/    Dose: 0.4 mg  ol:      Exer  Post-Injection Exercise:        An additional 1 minutes of exercise followed the                                  intravenous injection  Resting HR (bpm):      66  Peak HR (bpm):         113  Resting BP (mmHg):       112    /   59  Peak BP (mmHg):       132   /   66  MaxPHR:     146     Target HR (bpm):       124  % MaxPHR:     77  Double Product:       69638  BP Response:  Stress Termination:       Protocol completed  Stress Symptoms:  FEELS \"WEIRD\",FLUSHED  ECG  Resting ECG:     Sinus rhythm.  Stress ECG:      No ischemic changes with Regadenoson.  IMAGE PROTOCOL      Rest/Stress 1                      Day          RadiopharmaceuticalDose (mCi)   Imaging  Date      Imaging  Time         Inj to Img Time (min)  Rest:   Tc-99m             7.71         11-Nov-2019        09:26          Tetrofosmin  Stress: Tc-99m             28.1         11-Nov-2019        10:21          Tetrofosmin  Rest:  Administration Site:       Left antecubital                             fossa  Administered by:      RT Snow CNMT  Stress:  Administration Site:       Left antecubital                             fossa  Administered by:      RT Snow CNMT  % Percent HR Achieved:  SPECT RESULTS  Technical Quality:       Good  Raw Data Analysis:  Summed Stress Score:    1  Summed Rest Score:    0  Summed Difference Score:        1  PERFUSION:  Normal myocardial perfusion with no ischemia.  FUNCTIONAL RESULTS (calculated via Gated SPECT)  Stress Image LV EF:        79     %  Upper Normal Limit  Stress EDV:      61     ml   EDVI:    36      ml/m???  Stress ESV:      13     ml   ESVI:    8       ml/m???  TID:    1      TID - 1.19      TID (ed) - 1.23  LV Function:  Normal left " ventricular wall motion.  LV ejection fraction = 79%.  Sergo Moses MD  (Electronically Signed)  Final Date:      2019                   11:17    Ec-echocardiogram Ltd W/o Cont    Result Date: 2019  Transthoracic Echo Report Echocardiography Laboratory CONCLUSIONS Compared to the images of the study done on 10/30/2019 there has been no significant change. No evidence of endocarditis. SAMARA CISNEROS Exam Date:         2019                    13:08 Exam Location:     Inpatient Priority:          Routine Ordering Physician:        ANASTACIA CABA Referring Physician: Sonographer:               Charo Barajas RDCS Age:    74     Gender:    F MRN:    [id number] :    1945 BSA:    1.68   Ht (in):    65     Wt (lb):    136 Exam Type:     Limited Indications:     Endocarditis ICD Codes:       421 CPT Codes:       56654 BP:   155    /   76     HR:   71 Technical Quality:       Fair MEASUREMENTS  (Male / Female) Normal Values 2D ECHO Estimated LV Ejection Fraction    60 %                  * Indicates values subject to auto-interpretation LV EF:  60    % FINDINGS Left Ventricle Normal left ventricular size and systolic function. Left ventricular ejection fraction is visually estimated to be 60%. Right Ventricle Right Atrium Left Atrium Mitral Valve Structurally normal mitral valve without significant stenosis or regurgitation. No valvular vegetations. Aortic Valve Structurally normal aortic valve without significant stenosis or regurgitation. No valvular vegetations. Tricuspid Valve Structurally normal tricuspid valve without significant stenosis or regurgitation. No valvular vegetations. Pulmonic Valve Pericardium Aorta Olvin Weldon MD (Electronically Signed) Final Date:     2019                 15:23    Ec-jose W/o Cont    Result Date: 2019  Transesophageal Echo Report Echocardiography Laboratory CONCLUSIONS No evidence of endocarditis. SAMARA CISNEROS Exam Date:           2019                     11:51 Exam Location:      Inpatient Priority:            Routine Ordering Physician:        MARION DYKES  ([id number]) Referring Physician:       [id number] Sonographer:               Ariana Ruelas Age:    74     Gender:    F MRN:    [id number] :    1945 BSA:           Ht (in):           Wt (lb): Report Type:      Complete Indications:     Endocarditis ICD Codes:       421 CPT Codes:       53786 BP:          /          HR: Technical Quality:       Fair MEASUREMENTS  (Male / Female) Normal Values 2D ECHO Estimated LV Ejection Fraction    65 %                  * Indicates values subject to auto-interpretation LV EF:  65    % Medications Limitations Complications Proc. Components Probe SM  was used for this procedure. The probe was inserted and manipulated by Dr. Dykes. FINDINGS Left Ventricle Normal left ventricular systolic function. Right Ventricle Right Atrium Left Atrium LA Appendage IA Septum IV Septum Mitral Valve Structurally normal mitral valve without significant stenosis or regurgitation. Aortic Valve Structurally normal aortic valve without significant stenosis or regurgitation. Tricuspid Valve Structurally normal tricuspid valve without significant stenosis or regurgitation. Pulmonic Valve Structurally normal pulmonic valve without significant stenosis or regurgitation. Pericardium Aorta Marion Dykes (Electronically Signed) Final Date:     2019                 15:37    Ir-midline Catheter Insertion Wo Guidance > Age 3    Result Date: 2019  HISTORY/REASON FOR EXAM:  Midline Placement   TECHNIQUE/EXAM DESCRIPTION AND NUMBER OF VIEWS: Midline insertion with ultrasound guidance.  FINDINGS: Midline insertion with Ultrasound Guidance was performed by qualified nursing staff without the assistance of a Radiologist. Midline positioning as measured by RN or as appropriate length of catheter selected.               "Ultrasound-guided midline placement performed by qualified nursing staff as above.       Micro:  Results     Procedure Component Value Units Date/Time    URINE CULTURE(NEW) [[id number]] Collected:  12/04/19 1515    Order Status:  Completed Specimen:  Urine Updated:  12/07/19 0637     Significant Indicator NEG     Source UR     Site -     Culture Result No growth at 48 hours.    Narrative:       Indication for culture:->Septic Shock: Persistent  hypotension,  Lactic acid > 4, vasopressors/inotropes started  Indication for culture:->Septic Shock: Persistent    CATH TIP CULTURE [[id number]] Collected:  12/06/19 1700    Order Status:  Completed Specimen:  Cath Tip from IV Peripheral Line Updated:  12/06/19 2046    Narrative:       Collected By:[id number] WILDA sams  Condition of Cath Site:->CLEAR    Blood Culture [[id number]]     Order Status:  Canceled Specimen:  Blood from Line     Blood Culture [[id number]]     Order Status:  Canceled Specimen:  Blood from Line     BLOOD CULTURE [[id number]] Collected:  12/04/19 1733    Order Status:  Completed Specimen:  Blood from Peripheral Updated:  12/05/19 0926     Significant Indicator NEG     Source BLD     Site PERIPHERAL     Culture Result No Growth  Note: Blood cultures are incubated for 5 days and  are monitored continuously.Positive blood cultures  are called to the RN and reported as soon as  they are identified.  Blood culture testing and Gram stain, if indicated, are  performed at [facility], [address] [place] [address].  Positive blood cultures are  sent to [facility], [address] [place] [address], for organism identification and  susceptibility testing.      Narrative:       Per Hospital Policy: Only change Specimen Src: to \"Line\" if  specified by physician order.  Right Hand    Blood Culture [[id number]] Collected:  12/04/19 1544    Order Status:  Completed Specimen:  Blood from Line Updated:  12/05/19 " "0926     Significant Indicator NEG     Source BLD     Site LINE     Culture Result No Growth  Note: Blood cultures are incubated for 5 days and  are monitored continuously.Positive blood cultures  are called to the RN and reported as soon as  they are identified.  Blood culture testing and Gram stain, if indicated, are  performed at [facility], [address] [place] [address].  Positive blood cultures are  sent to [facility], [address] [place] [address], for organism identification and  susceptibility testing.      Narrative:       Per Hospital Policy: Only change Specimen Src: to \"Line\" if  specified by physician order.  **PICC**  Left Hand    Influenza A/B By PCR (Adult - Flu Only) [[id number]] Collected:  12/04/19 1524    Order Status:  Completed Specimen:  Respirate from Nasopharyngeal Updated:  12/04/19 1638     Influenza virus A RNA Negative     Influenza virus B, PCR Negative    URINALYSIS [[id number]] Collected:  12/04/19 1515    Order Status:  Completed Specimen:  Urine Updated:  12/04/19 1540     Color Yellow     Character Clear     Specific Gravity 1.020     Ph 6.0     Glucose Negative mg/dL      Ketones Negative mg/dL      Protein Negative mg/dL      Bilirubin Negative     Nitrite Negative     Leukocyte Esterase Negative     Occult Blood Negative     Micro Urine Req see below     Comment: Microscopic examination not performed when specimen is clear  and chemically negative for protein, blood, leukocyte esterase  and nitrite.         Narrative:       Indication for culture:->Septic Shock: Persistent  hypotension,  Lactic acid > 4, vasopressors/inotropes started    Influenza A/B By PCR (Adult - Flu Only) [[id number]]     Order Status:  Canceled Specimen:  Respirate from Nasopharyngeal     BLOOD CULTURE [[id number]] Collected:  12/04/19 0000    Order Status:  Canceled Specimen:  Other from Peripheral           Assessment:  Active Hospital Problems    " Diagnosis   • *FUO (fever of unknown origin) [R50.9]   • MSSA bacteremia [R78.81]   • Septic arthritis of knee, right (HCC) [M00.9]   • Hyponatremia [E87.1]     Interval 24 hour assessment:      100.4, O2 Ra  Labs reviewed  Imaging personally reviewed both images and report.   Studies reviewed  Micro reviewed    Pt continued on cefazolin.  She complained of increased nonproductive cough overnight as well as some chest tightness this morning.  No back or joint pain.  Right knee continues to do well.      Assessment      Bernadette Paul is a 74 y.o.  with no significant past medical history who presented in 11/2019 complaining of right knee pain and several weeks of fatigue.  Orthopedics was consulted at the time and she went to the OR on 11/15 for I&D of right septic knee.  A large amount of purulent fluid was encountered per op note.  Cultures were sent +MSSA.  She was also found to have an MSSA bacteremia with blood cultures positive on 11/15.  Repeat blood cultures on 11/17 were negative.  She underwent TTE on 11/18 which was negative for endocarditis.  Plan was to continue IV antibiotics and 12/15/19 with daptomycin 8 mg/kg home infusion and come into [facility] weekly for labs and dressing changes.     She is now admitted after presenting to the ER on 12/4 complaining of 1 day fever and chills.  No new right knee pain and PICC line is still in place.  ID was consulted and has recommended NICOLE. She was transitioned to  Cefazolin.      Fevers, new, improving - work-up in progress-so far etiology is unknown.  She has had a cough possibly mild pneumonia in right lower lobe.  Drug fever on the differential and her fevers were not associated with tachycardia which would support a drug fever.  Improving since transition to cefazolin.  PICC line infection is another possibility, line removed and cath tip sent for culture.  -Chest x-ray unremarkable  -Blood cultures on 12/4, 1 obtained from PICC -no growth to date  -NICOLE on  12/6 with no vegetations or significant valvular disease, EF 65%  -PICC removed on 12/6 and cath tip sent for culture  -CT abdomen pelvis on 12/6 with no significant disease to explain fever.  She does have some right lower lobe atelectasis versus pneumonia and small pleural effusions    Right knee septic arthritis, with MSSA, on daptomycin prior to admission  MSSA bacteremia, on daptomycin prior to admission  -Plan is to continue antibiotics through 12/15/19    Cough, nonproductive, ongoing for several months but worse overnight and now new chest tightness    Fatigue, ongoing for several months  Unintentional weight loss - 8 pounds over 2 months        Plan:  --- Continue cefazolin  --- F/up cath tip cultures   --- Follow-up blood cultures  --- CT chest-pending  --- Ultrasound extremities to rule out DVT-pending  --- Hospitalist will discuss if she has had recent screenings such as colonoscopy and mammogram  --- If she has any new back or joint pain would order MRI         Discussed with internal medicine, Dr. Bowman.

## 2019-12-07 NOTE — PROGRESS NOTES
"Report received from NIRMALA Abraham.     Pt AAOx4. Denies any pain, nausea, SOB, dizziness. Feeling \" a little numb\" to right knee. Steri strips (to right knee) clean, dry, intact. P{OC discussed w/ pt. Pt went to the bathroon to void. Gait steady. Pt now back to bed and resting. SCDs applied. IVF infusing. Safety and comfort measures in place. No additional needs at this time.   "

## 2019-12-07 NOTE — PROGRESS NOTES
Hospital Medicine Daily Progress Note    Date of Service  12/7/2019    Chief Complaint  74 y.o. female admitted 12/4/2019 with fever    Hospital Course    History of 3 months of fatigue and weight loss, diagnosed with MSSA positive right septic knee 2 weeks ago status post washout has been on IV daptomycin, admitted for fever.      Interval Problem Update  12/5: Still having intermittent fevers, taking Tylenol with relief.  Cardiology consulted for NICOLE.  I discussed with ID.  Antibiotics changed to Ancef, no MRSA coverage needed  12/6: Still with intermittent fever, NICOLE negative.  PICC line pulled and cultured.  CT abdomen pelvis ordered.  Tolerating ancef  12/7: Highest temp overnight 100.4, feeling well.  I reviewed the CT abdomen pelvis, no significant findings other than bilateral basilar atelectasis.  I discussed with ID.  Chest CT ordered, lower external duplex ordered    Consultants/Specialty  Dr. May, ID  Dr. Dykes- cardiology    Code Status  Full    Disposition  Home when medically clear w IV abx (Ancef)    Review of Systems  Review of Systems   Constitutional: Negative for chills, fever (Resolved), malaise/fatigue and weight loss.        Feeling well, energy improving   HENT: Negative for sore throat.    Respiratory: Negative for cough and shortness of breath.    Cardiovascular: Negative for chest pain, palpitations and leg swelling.   Gastrointestinal: Negative for abdominal pain, blood in stool, diarrhea, heartburn, nausea and vomiting.   Genitourinary: Negative for dysuria and frequency.   Musculoskeletal: Negative for back pain, joint pain (no knee pain) and myalgias.   Neurological: Negative for dizziness, focal weakness, weakness and headaches.   Psychiatric/Behavioral: Negative for depression and hallucinations.   All other systems reviewed and are negative.       Physical Exam  Temp:  [36.7 °C (98 °F)-38 °C (100.4 °F)] 36.7 °C (98 °F)  Pulse:  [70-89] 70  Resp:  [18-20] 18  BP: (112-138)/(49-92)  133/62  SpO2:  [90 %-93 %] 93 %    Physical Exam  Constitutional:       General: She is not in acute distress.     Comments: Well-appearing   HENT:      Nose: Nose normal.      Mouth/Throat:      Mouth: Mucous membranes are dry.   Neck:      Musculoskeletal: No muscular tenderness.   Cardiovascular:      Rate and Rhythm: Normal rate and regular rhythm.      Pulses: Normal pulses.   Pulmonary:      Effort: Pulmonary effort is normal.      Breath sounds: Normal breath sounds.   Abdominal:      General: There is no distension.      Palpations: Abdomen is soft.   Musculoskeletal: Normal range of motion.         General: No swelling or tenderness.      Comments: Good range of motion no tenderness of the right knee.  R PICC line removed   Lymphadenopathy:      Cervical: No cervical adenopathy.   Skin:     General: Skin is warm and dry.      Findings: No rash.   Neurological:      General: No focal deficit present.      Mental Status: She is alert and oriented to person, place, and time.   Psychiatric:         Mood and Affect: Mood normal.         Thought Content: Thought content normal.         Fluids    Intake/Output Summary (Last 24 hours) at 12/7/2019 1421  Last data filed at 12/7/2019 0543  Gross per 24 hour   Intake 1280 ml   Output 1750 ml   Net -470 ml       Laboratory  Recent Labs     12/05/19  0443 12/06/19  0945 12/07/19  0421   WBC 6.8 6.0 6.2   RBC 4.22 4.11* 3.87*   HEMOGLOBIN 12.8 12.5 11.8*   HEMATOCRIT 37.4 37.1 34.9*   MCV 88.6 90.3 90.2   MCH 30.3 30.4 30.5   MCHC 34.2 33.7 33.8   RDW 42.6 43.3 43.2   PLATELETCT 203 190 215   MPV 9.0 9.2 9.7     Recent Labs     12/05/19  0443 12/06/19  0945 12/07/19  0421   SODIUM 132* 134* 134*   POTASSIUM 3.9 4.1 4.0   CHLORIDE 98 100 99   CO2 23 25 21   GLUCOSE 119* 102* 103*   BUN 7* 8 12   CREATININE 0.55 0.56 0.47*   CALCIUM 9.2 9.1 9.0                   Imaging  US-EXTREMITY VENOUS LOWER BILAT   Final Result      1.  No evidence of Bilateral  lower extremity deep  venous thrombosis.      2.  There are no incidental findings.      CT-ABDOMEN-PELVIS WITH   Final Result      1.  Minimal right lower lobe atelectasis or pneumonia. Small bilateral pleural effusions.      2.  Small amount of free fluid in the pelvis. No loculated fluid collection identified in the abdomen or pelvis.      3.  No evidence of bowel or renal obstruction or inflammation.      4.  Surgical absence of the gallbladder. Minor intrahepatic biliary dilatation.      EC-NICOLE W/O CONT   Final Result      DX-CHEST-PORTABLE (1 VIEW)   Final Result      No evidence of acute cardiopulmonary process.      CT-CHEST (THORAX) WITH    (Results Pending)        Assessment/Plan  * FUO (fever of unknown origin)  Assessment & Plan  Recent history of MSSA bacteremia with seeding of the right knee status post washout, discharged with IV daptomycin tx.  Prior to the development of her infection, she has had fatigue and weight loss for 3 months.  ?Drug fever from daptomycin (fevers improving after changing to Ancef)  Repeat blood cx 12/4: NGTD  PICC was removed and cultured on 12/6: NGTD  I discussed with ID and cardiology  TTE and NICOLE both negative  CT abdomen pelvis shows no evidence of intra-abdominal infection, it did mention bilateral atelectasis versus atypical pneumonia  We will order CT chest  Will order lower extremity duplex to rule out DVT as a cause of fever  I asked about cancer screening and she is up-to-date with mammogram and colonoscopy  Continue Ancef  Tylenol as needed for fever    MSSA bacteremia- (present on admission)  Assessment & Plan  Present on previous admission in 1 out of 2 blood cultures  Has been on daptomycin but developed fever while on this medication  Repeat blood cultures are pending, NGTD    Septic arthritis of knee, right (HCC)  Assessment & Plan  Hx of septic knee from MSSA bacteremia,   1/2 bottles from prior admission +MSSA  Repeat blood cultures from 11/17 were negative.  TTE done on 11/18  and NICOLE done 12/6 both negative for endocarditis  Knee shows no swelling no surrounding erythema, incision looks clean.        Hyponatremia- (present on admission)  Assessment & Plan  Most likely due to dehydration, improving with IV fluids  Recheck BMP in the morning       VTE prophylaxis: Lovenox    Total time:  40 minutes.  I spent greater than 50% of the time for patient care, counseling, and coordination on this date, including unit/floor time, and face-to-face time with the patient as per interval events and assessment and plan above

## 2019-12-08 PROBLEM — R05.3 CHRONIC COUGH: Status: ACTIVE | Noted: 2019-12-08

## 2019-12-08 PROBLEM — J90 BILATERAL PLEURAL EFFUSION: Status: ACTIVE | Noted: 2019-12-08

## 2019-12-08 PROBLEM — R93.89 ABNORMAL CT OF THE CHEST: Status: ACTIVE | Noted: 2019-12-08

## 2019-12-08 LAB
ALBUMIN SERPL BCP-MCNC: 2.7 G/DL (ref 3.2–4.9)
BASOPHILS # BLD AUTO: 0.2 % (ref 0–1.8)
BASOPHILS # BLD: 0.01 K/UL (ref 0–0.12)
BUN SERPL-MCNC: 8 MG/DL (ref 8–22)
CALCIUM SERPL-MCNC: 8.7 MG/DL (ref 8.4–10.2)
CC # CATH TIP CULT: NORMAL /ML
CHLORIDE SERPL-SCNC: 100 MMOL/L (ref 96–112)
CO2 SERPL-SCNC: 22 MMOL/L (ref 20–33)
CREAT SERPL-MCNC: 0.45 MG/DL (ref 0.5–1.4)
EOSINOPHIL # BLD AUTO: 0.5 K/UL (ref 0–0.51)
EOSINOPHIL NFR BLD: 10.1 % (ref 0–6.9)
ERYTHROCYTE [DISTWIDTH] IN BLOOD BY AUTOMATED COUNT: 43 FL (ref 35.9–50)
GLUCOSE SERPL-MCNC: 99 MG/DL (ref 65–99)
HCT VFR BLD AUTO: 33.2 % (ref 37–47)
HGB BLD-MCNC: 11.2 G/DL (ref 12–16)
IMM GRANULOCYTES # BLD AUTO: 0.02 K/UL (ref 0–0.11)
IMM GRANULOCYTES NFR BLD AUTO: 0.4 % (ref 0–0.9)
LYMPHOCYTES # BLD AUTO: 0.66 K/UL (ref 1–4.8)
LYMPHOCYTES NFR BLD: 13.4 % (ref 22–41)
MCH RBC QN AUTO: 30.4 PG (ref 27–33)
MCHC RBC AUTO-ENTMCNC: 33.7 G/DL (ref 33.6–35)
MCV RBC AUTO: 90.2 FL (ref 81.4–97.8)
MONOCYTES # BLD AUTO: 0.34 K/UL (ref 0–0.85)
MONOCYTES NFR BLD AUTO: 6.9 % (ref 0–13.4)
NEUTROPHILS # BLD AUTO: 3.41 K/UL (ref 2–7.15)
NEUTROPHILS NFR BLD: 69 % (ref 44–72)
NRBC # BLD AUTO: 0 K/UL
NRBC BLD-RTO: 0 /100 WBC
NT-PROBNP SERPL IA-MCNC: 432 PG/ML (ref 0–125)
PHOSPHATE SERPL-MCNC: 3.6 MG/DL (ref 2.5–4.5)
PLATELET # BLD AUTO: 231 K/UL (ref 164–446)
PMV BLD AUTO: 9.4 FL (ref 9–12.9)
POTASSIUM SERPL-SCNC: 3.9 MMOL/L (ref 3.6–5.5)
RBC # BLD AUTO: 3.68 M/UL (ref 4.2–5.4)
RHEUMATOID FACT SER IA-ACNC: <10 IU/ML (ref 0–14)
SIGNIFICANT IND 70042: NORMAL
SITE SITE: NORMAL
SODIUM SERPL-SCNC: 134 MMOL/L (ref 135–145)
SOURCE SOURCE: NORMAL
WBC # BLD AUTO: 4.9 K/UL (ref 4.8–10.8)

## 2019-12-08 PROCEDURE — 83880 ASSAY OF NATRIURETIC PEPTIDE: CPT

## 2019-12-08 PROCEDURE — 80069 RENAL FUNCTION PANEL: CPT

## 2019-12-08 PROCEDURE — 99233 SBSQ HOSP IP/OBS HIGH 50: CPT | Performed by: INTERNAL MEDICINE

## 2019-12-08 PROCEDURE — 700105 HCHG RX REV CODE 258: Performed by: INTERNAL MEDICINE

## 2019-12-08 PROCEDURE — 82785 ASSAY OF IGE: CPT

## 2019-12-08 PROCEDURE — 99223 1ST HOSP IP/OBS HIGH 75: CPT | Performed by: INTERNAL MEDICINE

## 2019-12-08 PROCEDURE — 770006 HCHG ROOM/CARE - MED/SURG/GYN SEMI*

## 2019-12-08 PROCEDURE — 700111 HCHG RX REV CODE 636 W/ 250 OVERRIDE (IP): Performed by: INTERNAL MEDICINE

## 2019-12-08 PROCEDURE — 86200 CCP ANTIBODY: CPT

## 2019-12-08 PROCEDURE — 86431 RHEUMATOID FACTOR QUANT: CPT

## 2019-12-08 PROCEDURE — 85025 COMPLETE CBC W/AUTO DIFF WBC: CPT

## 2019-12-08 PROCEDURE — 86038 ANTINUCLEAR ANTIBODIES: CPT

## 2019-12-08 PROCEDURE — 36415 COLL VENOUS BLD VENIPUNCTURE: CPT

## 2019-12-08 PROCEDURE — 700105 HCHG RX REV CODE 258: Performed by: HOSPITALIST

## 2019-12-08 RX ORDER — 0.9 % SODIUM CHLORIDE 0.9 %
10-20 VIAL (ML) INJECTION PRN
Status: CANCELLED | OUTPATIENT
Start: 2019-12-09

## 2019-12-08 RX ORDER — 0.9 % SODIUM CHLORIDE 0.9 %
5 VIAL (ML) INJECTION PRN
Status: CANCELLED | OUTPATIENT
Start: 2019-12-09

## 2019-12-08 RX ADMIN — SODIUM CHLORIDE: 9 INJECTION, SOLUTION INTRAVENOUS at 22:39

## 2019-12-08 RX ADMIN — CEFAZOLIN 2 G: 10 INJECTION, POWDER, FOR SOLUTION INTRAVENOUS; PARENTERAL at 22:34

## 2019-12-08 RX ADMIN — CEFAZOLIN 2 G: 10 INJECTION, POWDER, FOR SOLUTION INTRAVENOUS; PARENTERAL at 13:37

## 2019-12-08 RX ADMIN — SODIUM CHLORIDE: 9 INJECTION, SOLUTION INTRAVENOUS at 01:22

## 2019-12-08 RX ADMIN — ENOXAPARIN SODIUM 40 MG: 100 INJECTION SUBCUTANEOUS at 05:30

## 2019-12-08 RX ADMIN — CEFAZOLIN 2 G: 10 INJECTION, POWDER, FOR SOLUTION INTRAVENOUS; PARENTERAL at 05:30

## 2019-12-08 ASSESSMENT — ENCOUNTER SYMPTOMS
SPUTUM PRODUCTION: 0
ORTHOPNEA: 0
ABDOMINAL PAIN: 0
VOMITING: 0
NECK PAIN: 0
CONSTIPATION: 0
WEAKNESS: 0
CLAUDICATION: 0
MYALGIAS: 0
HEMOPTYSIS: 0
BACK PAIN: 0
COUGH: 1
SORE THROAT: 0
SHORTNESS OF BREATH: 0
WEIGHT LOSS: 1
FOCAL WEAKNESS: 0
HALLUCINATIONS: 0
WEIGHT LOSS: 0
FEVER: 0
SPUTUM PRODUCTION: 1
DIZZINESS: 0
DEPRESSION: 0
DIARRHEA: 1
HEARTBURN: 0
DIARRHEA: 0
PALPITATIONS: 0
CHILLS: 0
FEVER: 1
BLOOD IN STOOL: 0
WHEEZING: 0
HEADACHES: 0
NAUSEA: 0
CHILLS: 1

## 2019-12-08 NOTE — ASSESSMENT & PLAN NOTE
Groundglass capacities could be seen in atypical pneumonia, pulmonary edema or autoimmune.  Also primary pulmonary fibrosis.  Due to her FUO I consulted pulmonology to help interpret this findings and recommend additional work-up  Check autoimmune panel  Check IgE  Trial of Lasix (Echo normal)  Repeat CT chest in 6 weeks and outpatient pulmonary follow-up.  If worse, consider bronchoscopy with transbronchial biopsy

## 2019-12-08 NOTE — CARE PLAN
Problem: Communication  Goal: The ability to communicate needs accurately and effectively will improve  Outcome: PROGRESSING AS EXPECTED     Problem: Safety  Goal: Will remain free from injury  Outcome: PROGRESSING AS EXPECTED     Problem: Infection  Goal: Will remain free from infection  Outcome: PROGRESSING AS EXPECTED     Problem: Knowledge Deficit  Goal: Knowledge of disease process/condition, treatment plan, diagnostic tests, and medications will improve  Outcome: PROGRESSING AS EXPECTED     Problem: Discharge Barriers/Planning  Goal: Patient's continuum of care needs will be met  Outcome: PROGRESSING AS EXPECTED

## 2019-12-08 NOTE — PROGRESS NOTES
Hospital Medicine Daily Progress Note    Date of Service  12/8/2019    Chief Complaint  74 y.o. female admitted 12/4/2019 with fever    Hospital Course    History of 3 months of fatigue and weight loss, diagnosed with MSSA positive right septic knee 2 weeks ago status post washout has been on IV daptomycin, admitted for fever.      Interval Problem Update  12/5: Still having intermittent fevers, taking Tylenol with relief.  Cardiology consulted for NICOLE.  I discussed with ID.  Antibiotics changed to Ancef, no MRSA coverage needed  12/6: Still with intermittent fever, NICOLE negative.  PICC line pulled and cultured.  CT abdomen pelvis ordered.  Tolerating ancef  12/7: Highest temp overnight 100.4, feeling well.  I reviewed the CT abdomen pelvis, no significant findings other than bilateral basilar atelectasis.  I discussed with ID.  Chest CT ordered, lower external duplex ordered    Consultants/Specialty  Dr. May, ID  Dr. Dykes- cardiology    Code Status  Full    Disposition  Home when medically clear w IV abx (Ancef)    Review of Systems  Review of Systems   Constitutional: Positive for malaise/fatigue. Negative for chills, fever (Resolved) and weight loss.        Was feeling better but then had sudden onset fatigue and myalgias this morning   HENT: Negative for sore throat.    Respiratory: Positive for cough. Negative for shortness of breath.    Cardiovascular: Negative for chest pain, palpitations and leg swelling.   Gastrointestinal: Negative for abdominal pain, blood in stool, diarrhea, heartburn, nausea and vomiting.   Genitourinary: Negative for dysuria and frequency.   Musculoskeletal: Negative for back pain, joint pain (no knee pain) and myalgias.   Neurological: Negative for dizziness, focal weakness, weakness and headaches.   Psychiatric/Behavioral: Negative for depression and hallucinations.   All other systems reviewed and are negative.       Physical Exam  Temp:  [36.6 °C (97.9 °F)-37.3 °C (99.1 °F)] 37.2  °C (99 °F)  Pulse:  [66-77] 70  Resp:  [18-20] 18  BP: (128-155)/(59-69) 155/69  SpO2:  [91 %-93 %] 93 %    Physical Exam  Constitutional:       General: She is not in acute distress.     Appearance: She is ill-appearing.      Comments: Listless, lethargic   HENT:      Nose: Nose normal.      Mouth/Throat:      Mouth: Mucous membranes are dry.   Neck:      Musculoskeletal: No muscular tenderness.   Cardiovascular:      Rate and Rhythm: Normal rate and regular rhythm.      Pulses: Normal pulses.   Pulmonary:      Effort: Pulmonary effort is normal.      Breath sounds: Normal breath sounds.   Abdominal:      General: There is no distension.      Palpations: Abdomen is soft.   Musculoskeletal: Normal range of motion.         General: No swelling or tenderness.      Comments: Good range of motion no tenderness of the right knee   Lymphadenopathy:      Cervical: No cervical adenopathy.   Skin:     General: Skin is warm and dry.      Findings: No rash.      Comments: Vitiligo   Neurological:      General: No focal deficit present.      Mental Status: She is alert and oriented to person, place, and time.   Psychiatric:         Mood and Affect: Mood normal.         Thought Content: Thought content normal.         Fluids    Intake/Output Summary (Last 24 hours) at 12/8/2019 1421  Last data filed at 12/8/2019 1337  Gross per 24 hour   Intake 2875.33 ml   Output 1200 ml   Net 1675.33 ml       Laboratory  Recent Labs     12/06/19  0945 12/07/19  0421 12/08/19  0414   WBC 6.0 6.2 4.9   RBC 4.11* 3.87* 3.68*   HEMOGLOBIN 12.5 11.8* 11.2*   HEMATOCRIT 37.1 34.9* 33.2*   MCV 90.3 90.2 90.2   MCH 30.4 30.5 30.4   MCHC 33.7 33.8 33.7   RDW 43.3 43.2 43.0   PLATELETCT 190 215 231   MPV 9.2 9.7 9.4     Recent Labs     12/06/19  0945 12/07/19  0421 12/08/19  0414   SODIUM 134* 134* 134*   POTASSIUM 4.1 4.0 3.9   CHLORIDE 100 99 100   CO2 25 21 22   GLUCOSE 102* 103* 99   BUN 8 12 8   CREATININE 0.56 0.47* 0.45*   CALCIUM 9.1 9.0 8.7                    Imaging  CT-CHEST (THORAX) WITH   Final Result      1.  There are bilateral subpleural interstitial opacities with hazy groundglass opacities. This could represent changes of mild interstitial edema versus atypical pneumonitis or underlying chronic interstitial lung disease.   2.  There is no acute pneumonia.   3.  There is dependent bibasilar atelectasis.   4.  There are very small amounts of dependent bilateral pleural fluid.   5.  There is a partially visualized cystic lesion in the medial left kidney with questionable foci of internal enhancement.            US-EXTREMITY VENOUS LOWER BILAT   Final Result      1.  No evidence of Bilateral  lower extremity deep venous thrombosis.      2.  There are no incidental findings.      CT-ABDOMEN-PELVIS WITH   Final Result      1.  Minimal right lower lobe atelectasis or pneumonia. Small bilateral pleural effusions.      2.  Small amount of free fluid in the pelvis. No loculated fluid collection identified in the abdomen or pelvis.      3.  No evidence of bowel or renal obstruction or inflammation.      4.  Surgical absence of the gallbladder. Minor intrahepatic biliary dilatation.      EC-NICOLE W/O CONT   Final Result      DX-CHEST-PORTABLE (1 VIEW)   Final Result      No evidence of acute cardiopulmonary process.           Assessment/Plan  * FUO (fever of unknown origin)  Assessment & Plan  Recent history of MSSA bacteremia with seeding of the right knee status post washout, discharged with IV daptomycin tx.  Fever resolved after changing abx, cultures negative, highly suggestive of drug fever from daptomycin  Repeat blood cx 12/4: neg  PICC removed/cultured on 12/6: Neg  Lower extremity duplex, TTE and NICOLE all negative  CT abdomen pelvis negative  CT chest showed ground glass opacities: ?edema vs atypical infection vs ?AI (pt has hx of vitaligo and FUO)  I consulted pulmonology, at this point they recommend Lasix (outpaitent follow up and  trending)  Check autoimmune panel  Continue Ancef (through 12/16)  Tylenol as needed for fever    MSSA bacteremia- (present on admission)  Assessment & Plan  Present on previous admission in 1 out of 2 blood cultures  Has been on daptomycin but developed fever while on this medication  Repeat blood cultures this admission: No growth  Abx through 12/16 (outpatient infusion on DC)    Chronic cough  Assessment & Plan  I discussed with pulmonology, needs outpatient work-up for asthma    Abnormal CT of the chest  Assessment & Plan  Groundglass capacities could be seen in atypical pneumonia, pulmonary edema or autoimmune.  Also primary pulmonary fibrosis.  Due to her FUO I consulted pulmonology to help interpret this findings and recommend additional work-up  Check autoimmune panel  Check IgE  Trial of Lasix (Echo normal)  Repeat CT chest in 6 weeks and outpatient pulmonary follow-up.  If worse, consider bronchoscopy with transbronchial biopsy    Septic arthritis of knee, right (HCC)  Assessment & Plan  Hx of septic knee from MSSA bacteremia,   Repeat blood cultures from 11/17 were negative.  TTE done on 11/18 and NICOLE done 12/6 both negative for endocarditis  Knee shows no swelling no surrounding erythema, incision looks clean.        Hyponatremia- (present on admission)  Assessment & Plan  Stable  Recheck BMP in the morning       VTE prophylaxis: Lovenox    Total time:  45 minutes.  I spent greater than 50% of the time for patient care, counseling, and coordination on this date, including unit/floor time, and face-to-face time with the patient as per interval events and assessment and plan above

## 2019-12-08 NOTE — ASSESSMENT & PLAN NOTE
-- Review of the CT chest showed right apical scarring, patchy scattered GGO, engorged vessels, bibasilar atelectasis changes, and small bilateral pleural effusion. Labs notable for peripheral eosinophilia.  -- Differentials include infectious vs volume overload vs inflammatory (ie, CEP vs AEP vs  vs NSIP). Chronic eosinophilic pneumonia (CEP) is a concern due to peripheral eosinophilia with recent exposure to daptomycin which can cause CEP. However, CT chest is not typical for CEP given no peripheral segmental alveolar filling process and it tends to follow a photographic negative pulmonary edema picture and less commonly associated with GGO or pleural effusion. Infectious is possible but clinical picture not supportive as there is no evidence of hypoxia or worsening phlegm production.    -- Given hypoalbuminemia and being on continuous maintenance fluids, this is likely secondary to volume overload. Inflammatory still possible but appears to be less likely at this time given normal CXR two months ago and on admission.    -- Recommend stopping IVF and gentle diuresis to seek euvolemia    -- Nutrition consultation for additional supplemental to help improve nutrition status   -- Recommend checking REGGIE, RF, IgE level   -- Will need outpatient pulmonary follow up and repeat CT chest in 6 weeks; if GGO remains persistent then will need additional diagnostic intervention (ie, bronchoscopy with TBBX vs surgical lung biopsy)

## 2019-12-08 NOTE — CONSULTS
Pulmonary Consultation    Date of consult: 12/8/2019    Referring Physician  Carlota Bowman D.O.    Reason for Consultation  Cough and abnormal CT chest     History of Presenting Illness  74 y.o. female who presented 12/4/2019 with fever and progressive fatigue. Patient with recent hospitalization for MSSA septic arthritis discharged home on IV daptomycin on 11/22, presents to the ED with complaints of fever and fatigue. Her PICC line was pulled and cultured. ID consulted and switched abx to ancef given concern for drug fever. Blood cx, rapid flu, and urine culture negative to date. NICOLE performed which showed no evidence of endocarditis. CT abdomen/pelvis showed small amount of free fluid in the pelvis and small bilateral pleural effusion. Venous duplex negative for bilateral DVT. She was started on IVF with no accurate I/O. CT chest showed showed right apical scarring, motion artifact, engorge vessels,  patchy scattered GGO, and mild interstitial thickening at the bases, atelectasis changes lower lobes, and small bilateral pleural effusion (R>L). Labs significant for elevated peripheral eosinophilia (absolute eosinophil ~500), procalcitonin 0.07, , albumin 2.7, CRP 0.66, ESR 21. Pulmonary consulted for further evaluation.     Currently, patient reports having an intermittent productive cough with white phlegm. She state having a nonproductive cough started back in September prior to feeling sick. Cough worse with deep inspiration. No reported history of asthma, recent travel history, or family history significant for lung disease. Associated with 10 pounds weight loss since October for unknown reason. She feels like she has been eating well with no decreased appetite. Denies orthopnea, LE swelling, N/V, abdominal pain, or new onset rash. She was diagnosed with vitiligo at a young age but no other reported autoimmune disease. Review of her CXR on admission and back in October 2019 showed no acute  infiltrate or pleural effusion. Current IS ~1000 mL.        Of note, patient is a never smoker but has second hand smoke exposure at a young age. She is a retired . Denies chemical exposure. Never had PFTs.       Code Status  Full Code    Review of Systems  Review of Systems   Constitutional: Positive for chills, fever, malaise/fatigue and weight loss.   Respiratory: Positive for cough and sputum production. Negative for hemoptysis, shortness of breath and wheezing.    Cardiovascular: Negative for chest pain, palpitations, orthopnea, claudication and leg swelling.   Gastrointestinal: Positive for diarrhea. Negative for abdominal pain, nausea and vomiting.   Genitourinary: Negative for dysuria, frequency and urgency.   Musculoskeletal: Negative for back pain, myalgias and neck pain.   Skin: Negative for rash.   All other systems reviewed and are negative.      Past Medical History   has a past medical history of Allergic rhinitis (6/17/2009), Dyslipidemia (6/17/2009), Preventative health care (6/17/2009), and S/P parathyroidectomy (6/17/2009).    Surgical History   has a past surgical history that includes incision and drainage orthopedic (Right, 11/15/2019).    Family History  family history includes Heart Disease in her mother.    Social History   reports that she has never smoked. She has never used smokeless tobacco. She reports current alcohol use. She reports that she does not use drugs.    Medications  Home Medications     Reviewed by Tonya Song R.N. (Registered Nurse) on 12/06/19 at 1029  Med List Status: Complete   Medication Last Dose Status   Ascorbic Acid (VITAMIN C PO) 12/4/2019 Active   CALCIUM PO 12/4/2019 Active   Cholecalciferol (VITAMIN D PO) 12/4/2019 Active   NS SOLN 50 mL with DAPTOmycin 350 MG SOLR 500 mg 12/3/2019 Active   Omega-3 Fatty Acids (FISH OIL PO) 12/2/2019 Active   therapeutic multivitamin-minerals (THERAGRAN-M) Tab 12/4/2019 Active              Current  Facility-Administered Medications   Medication Dose Route Frequency Provider Last Rate Last Dose   • ceFAZolin (ANCEF) 2 g in  mL IVPB  2 g Intravenous Q8HRS Kirsten May M.D.   Stopped at 12/08/19 0600   • enoxaparin (LOVENOX) inj 40 mg  40 mg Subcutaneous DAILY Carlota Bowman D.ORudy   40 mg at 12/08/19 0530   • senna-docusate (PERICOLACE or SENOKOT S) 8.6-50 MG per tablet 2 Tab  2 Tab Oral BID Netta Trujillo M.D.   2 Tab at 12/07/19 0513    And   • polyethylene glycol/lytes (MIRALAX) PACKET 1 Packet  1 Packet Oral QDAY PRN Netta Trujillo M.D.        And   • magnesium hydroxide (MILK OF MAGNESIA) suspension 30 mL  30 mL Oral QDAY PRN Netta Trujillo M.D.        And   • bisacodyl (DULCOLAX) suppository 10 mg  10 mg Rectal QDAY PRN Netta Trujillo M.D.       • NS infusion   Intravenous Continuous Netta Trujillo M.D. 100 mL/hr at 12/08/19 0122     • acetaminophen (TYLENOL) tablet 650 mg  650 mg Oral Q6HRS PRN Netta Trujillo M.D.   650 mg at 12/07/19 0513       Allergies  Allergies   Allergen Reactions   • Asa [Aspirin]      Epistaxis     • Nsaids      Epistaxis         Vital Signs last 24 hours  Temp:  [36.6 °C (97.9 °F)-37.3 °C (99.1 °F)] 37.2 °C (98.9 °F)  Pulse:  [66-77] 73  Resp:  [18-20] 18  BP: (122-139)/(55-67) 139/59  SpO2:  [91 %-94 %] 93 %    Physical Exam  Physical Exam  Constitutional:       General: She is not in acute distress.     Appearance: Normal appearance. She is not ill-appearing, toxic-appearing or diaphoretic.   HENT:      Head: Normocephalic.      Mouth/Throat:      Mouth: Mucous membranes are moist.   Eyes:      Extraocular Movements: Extraocular movements intact.      Pupils: Pupils are equal, round, and reactive to light.   Neck:      Musculoskeletal: Normal range of motion and neck supple.   Cardiovascular:      Rate and Rhythm: Normal rate and regular rhythm.      Pulses: Normal pulses.      Heart sounds: Normal heart sounds. No murmur. No friction rub. No  gallop.    Pulmonary:      Comments: Decrease breath sounds. No wheezes or crackles noted.   Abdominal:      General: Abdomen is flat. Bowel sounds are normal.      Palpations: Abdomen is soft.   Musculoskeletal: Normal range of motion.      Right lower leg: No edema.      Left lower leg: No edema.   Skin:     Comments: R knee sutures intact and mild erythema.    Neurological:      General: No focal deficit present.      Mental Status: She is alert and oriented to person, place, and time.      Cranial Nerves: No cranial nerve deficit.   Psychiatric:         Mood and Affect: Mood normal.         Fluids    Intake/Output Summary (Last 24 hours) at 12/8/2019 1127  Last data filed at 12/8/2019 0100  Gross per 24 hour   Intake 1158.33 ml   Output 1200 ml   Net -41.67 ml       Laboratory  Recent Results (from the past 48 hour(s))   EC-NICOLE W/O CONT    Collection Time: 12/06/19  1:10 PM   Result Value Ref Range    Left Ventrical Ejection Fraction 65    CATH TIP CULTURE    Collection Time: 12/06/19  5:00 PM   Result Value Ref Range    Significant Indicator NEG     Source CTIP     Site IV PERIPHERAL LINE     Culture Result -    CBC WITH DIFFERENTIAL    Collection Time: 12/07/19  4:21 AM   Result Value Ref Range    WBC 6.2 4.8 - 10.8 K/uL    RBC 3.87 (L) 4.20 - 5.40 M/uL    Hemoglobin 11.8 (L) 12.0 - 16.0 g/dL    Hematocrit 34.9 (L) 37.0 - 47.0 %    MCV 90.2 81.4 - 97.8 fL    MCH 30.5 27.0 - 33.0 pg    MCHC 33.8 33.6 - 35.0 g/dL    RDW 43.2 35.9 - 50.0 fL    Platelet Count 215 164 - 446 K/uL    MPV 9.7 9.0 - 12.9 fL    Neutrophils-Polys 79.00 (H) 44.00 - 72.00 %    Lymphocytes 8.50 (L) 22.00 - 41.00 %    Monocytes 5.00 0.00 - 13.40 %    Eosinophils 6.90 0.00 - 6.90 %    Basophils 0.30 0.00 - 1.80 %    Immature Granulocytes 0.30 0.00 - 0.90 %    Nucleated RBC 0.00 /100 WBC    Neutrophils (Absolute) 4.90 2.00 - 7.15 K/uL    Lymphs (Absolute) 0.53 (L) 1.00 - 4.80 K/uL    Monos (Absolute) 0.31 0.00 - 0.85 K/uL    Eos (Absolute)  0.43 0.00 - 0.51 K/uL    Baso (Absolute) 0.02 0.00 - 0.12 K/uL    Immature Granulocytes (abs) 0.02 0.00 - 0.11 K/uL    NRBC (Absolute) 0.00 K/uL   Comp Metabolic Panel    Collection Time: 12/07/19  4:21 AM   Result Value Ref Range    Sodium 134 (L) 135 - 145 mmol/L    Potassium 4.0 3.6 - 5.5 mmol/L    Chloride 99 96 - 112 mmol/L    Co2 21 20 - 33 mmol/L    Anion Gap 14.0 (H) 0.0 - 11.9    Glucose 103 (H) 65 - 99 mg/dL    Bun 12 8 - 22 mg/dL    Creatinine 0.47 (L) 0.50 - 1.40 mg/dL    Calcium 9.0 8.4 - 10.2 mg/dL    AST(SGOT) 41 12 - 45 U/L    ALT(SGPT) 40 2 - 50 U/L    Alkaline Phosphatase 65 30 - 99 U/L    Total Bilirubin 0.3 0.1 - 1.5 mg/dL    Albumin 2.9 (L) 3.2 - 4.9 g/dL    Total Protein 6.0 6.0 - 8.2 g/dL    Globulin 3.1 1.9 - 3.5 g/dL    A-G Ratio 0.9 g/dL   MAGNESIUM    Collection Time: 12/07/19  4:21 AM   Result Value Ref Range    Magnesium 1.9 1.5 - 2.5 mg/dL   PHOSPHORUS    Collection Time: 12/07/19  4:21 AM   Result Value Ref Range    Phosphorus 3.0 2.5 - 4.5 mg/dL   ESTIMATED GFR    Collection Time: 12/07/19  4:21 AM   Result Value Ref Range    GFR If African American >60 >60 mL/min/1.73 m 2    GFR If Non African American >60 >60 mL/min/1.73 m 2   PROCALCITONIN    Collection Time: 12/07/19  4:21 AM   Result Value Ref Range    Procalcitonin 0.07 <0.25 ng/mL   CBC WITH DIFFERENTIAL    Collection Time: 12/08/19  4:14 AM   Result Value Ref Range    WBC 4.9 4.8 - 10.8 K/uL    RBC 3.68 (L) 4.20 - 5.40 M/uL    Hemoglobin 11.2 (L) 12.0 - 16.0 g/dL    Hematocrit 33.2 (L) 37.0 - 47.0 %    MCV 90.2 81.4 - 97.8 fL    MCH 30.4 27.0 - 33.0 pg    MCHC 33.7 33.6 - 35.0 g/dL    RDW 43.0 35.9 - 50.0 fL    Platelet Count 231 164 - 446 K/uL    MPV 9.4 9.0 - 12.9 fL    Neutrophils-Polys 69.00 44.00 - 72.00 %    Lymphocytes 13.40 (L) 22.00 - 41.00 %    Monocytes 6.90 0.00 - 13.40 %    Eosinophils 10.10 (H) 0.00 - 6.90 %    Basophils 0.20 0.00 - 1.80 %    Immature Granulocytes 0.40 0.00 - 0.90 %    Nucleated RBC 0.00 /100 WBC     Neutrophils (Absolute) 3.41 2.00 - 7.15 K/uL    Lymphs (Absolute) 0.66 (L) 1.00 - 4.80 K/uL    Monos (Absolute) 0.34 0.00 - 0.85 K/uL    Eos (Absolute) 0.50 0.00 - 0.51 K/uL    Baso (Absolute) 0.01 0.00 - 0.12 K/uL    Immature Granulocytes (abs) 0.02 0.00 - 0.11 K/uL    NRBC (Absolute) 0.00 K/uL   Renal Function Panel    Collection Time: 12/08/19  4:14 AM   Result Value Ref Range    Sodium 134 (L) 135 - 145 mmol/L    Potassium 3.9 3.6 - 5.5 mmol/L    Chloride 100 96 - 112 mmol/L    Co2 22 20 - 33 mmol/L    Glucose 99 65 - 99 mg/dL    Creatinine 0.45 (L) 0.50 - 1.40 mg/dL    Bun 8 8 - 22 mg/dL    Calcium 8.7 8.4 - 10.2 mg/dL    Phosphorus 3.6 2.5 - 4.5 mg/dL    Albumin 2.7 (L) 3.2 - 4.9 g/dL   ESTIMATED GFR    Collection Time: 12/08/19  4:14 AM   Result Value Ref Range    GFR If African American >60 >60 mL/min/1.73 m 2    GFR If Non African American >60 >60 mL/min/1.73 m 2       Imaging  CXR reviewed 12/4: No consolidation or pleural effusion noted.      CT chest 12/7 reviewed: right apical scarring, motion artifact, engorge vessels, patchy scattered GGO, and mild interstitial thickening at the bases, atelectasis changes lower lobes, and small bilateral pleural effusion (R>L).      Assessment/Plan  Bilateral pleural effusion  Assessment & Plan  -- Confirmed on CT chest and fluids too small making it unsafe for bedside thoracentesis   -- Given it's bilateral with acute sickness over the past few months, this is likely secondary to hypoalbuminemia vs volume overload from post resuscitation. Cannot effectively rule out parapneumonic effusion but clinical picture with a negative procalcitonin, suggest parapneumonic effusion is low on my differential.   -- Recommend gentle diuresis   -- If cough is more productive with change in color or hypoxia develop then recommend diagnostic thoracentesis by IR to rule out parapneumonic effusion.   -- Outpatient follow up with pulmonary and obtain repeat CT chest in 4-6 weeks      Chronic cough  Assessment & Plan  -- Patient with reported history of nonproductive cough for the past three months. No clear risk factors for chronic cough. Differentials are broad which include GERD vs. medication induced cough vs. asthma vs. volume overload vs ILD vs post nasal drip vs. eosinophilic granuloma vs. nonasthmatic eosinophilic bronchitis.   -- Given CT findings, cough may have been exacerbated with volume overload.   -- Recommend gentle diuresis   -- Given peripheral eosinophilia noted on multiple hospitalizations and ED visits, air hyperreactivity is high on the differentials which include asthma vs. nonasthmatic eosinophilic bronchitis. To further differentiate between the two, he would need full PFTs with methacholine challenge to test for bronchial hyperresponsiveness and check sputum eosinophils which all can be done as outpatient if cough persist. -- Will need repeat CT chest for abnormal CT chest findings to rule out ILD   -- Seek PT/OT and OOB as tolerated  -- Encourage IS as tolerated       Abnormal CT of the chest  Assessment & Plan  -- Review of the CT chest showed right apical scarring, patchy scattered GGO, engorged vessels, bibasilar atelectasis changes, and small bilateral pleural effusion. Labs notable for peripheral eosinophilia.  -- Differentials include infectious vs volume overload vs inflammatory (ie, CEP vs AEP vs  vs NSIP). Chronic eosinophilic pneumonia (CEP) is a concern due to peripheral eosinophilia with recent exposure to daptomycin which can cause CEP. However, CT chest is not typical for CEP given no peripheral segmental alveolar filling process and it tends to follow a photographic negative pulmonary edema picture and less commonly associated with GGO or pleural effusion. Infectious is possible but clinical picture not supportive as there is no evidence of hypoxia or worsening phlegm production.    -- Given hypoalbuminemia and being on continuous maintenance  fluids, this is likely secondary to volume overload. Inflammatory still possible but appears to be less likely at this time given normal CXR two months ago and on admission.    -- Recommend stopping IVF and gentle diuresis to seek euvolemia    -- Nutrition consultation for additional supplemental to help improve nutrition status   -- Recommend checking REGGIE, RF, IgE level   -- Will need outpatient pulmonary follow up and repeat CT chest in 6 weeks; if GGO remains persistent then will need additional diagnostic intervention (ie, bronchoscopy with TBBX vs surgical lung biopsy)           Discussed patient condition and risk of morbidity and/or mortality with Family, Patient and Dr. Bowman .      Edgar Alas MD   Pulmonary Critical Care   [facility]

## 2019-12-08 NOTE — PROGRESS NOTES
"Report received from NIRMALA Moon. POC discussed.     Pt AAOx4. Reports \"feeling congested\" but denies any pain, nausea, dizziness, SOB. POC discussed w/ pt. Due meds given.  Pt went up to bathroom to void and brushed teeth in sink. Gait steady. Now back to bed and resting. Safety and comfort measures in place. Fresh water given.  No additional needs at this time.  "

## 2019-12-08 NOTE — CARE PLAN
Problem: Bowel/Gastric:  Goal: Normal bowel function is maintained or improved  Outcome: PROGRESSING AS EXPECTED  Pt educated about s/e of pain meds, encouraged to drink plenty of fluids and will medicated as needed for constipation     Problem: Knowledge Deficit  Goal: Knowledge of disease process/condition, treatment plan, diagnostic tests, and medications will improve  Outcome: PROGRESSING AS EXPECTED  Pain med s/e education given     Problem: Mobility  Goal: Risk for activity intolerance will decrease  Outcome: PROGRESSING AS EXPECTED

## 2019-12-08 NOTE — PROGRESS NOTES
Infectious Disease Progress Note    Author: Carlota Richards M.D. Date & Time of service: 2019  7:56 AM       Chief Complaint:  Fevers     Interval History:   100.9, no significant complaints.  She does endorse a dry cough ongoing for several months and some fatigue.  Awaiting NICOLE.    100.4, O2 RA, continued on cefazolin.  She complained of increased nonproductive cough overnight as well as some chest tightness this morning.  No back or joint pain.  Right knee continues to do well.      Review of Systems:  Review of Systems   Constitutional: Positive for malaise/fatigue. Negative for chills and fever.   Respiratory: Positive for cough. Negative for sputum production and shortness of breath.    Gastrointestinal: Positive for diarrhea. Negative for abdominal pain, constipation, nausea and vomiting.   Musculoskeletal: Negative for back pain, joint pain, myalgias and neck pain.       Hemodynamics:  Temp (24hrs), Av.9 °C (98.5 °F), Min:36.6 °C (97.9 °F), Max:37.3 °C (99.1 °F)  Temperature: 37.3 °C (99.1 °F)  Pulse  Av  Min: 61  Max: 95   Blood Pressure : 128/62       Physical Exam:  Physical Exam  Constitutional:       Appearance: Normal appearance.   Cardiovascular:      Rate and Rhythm: Normal rate and regular rhythm.      Heart sounds: Murmur present.   Pulmonary:      Effort: Pulmonary effort is normal.      Breath sounds: Normal breath sounds.   Abdominal:      General: Abdomen is flat. Bowel sounds are normal. There is no distension.      Palpations: Abdomen is soft.      Tenderness: There is no tenderness. There is no guarding.   Musculoskeletal:         General: No swelling, tenderness or deformity.   Skin:     General: Skin is warm and dry.   Neurological:      General: No focal deficit present.      Mental Status: She is oriented to person, place, and time.      Comments: Somnolent    Psychiatric:         Mood and Affect: Mood normal.         Behavior: Behavior normal.          Meds:    Current Facility-Administered Medications:   •  ceFAZolin  •  enoxaparin  •  senna-docusate **AND** polyethylene glycol/lytes **AND** magnesium hydroxide **AND** bisacodyl  •  NS  •  acetaminophen    Labs:  Recent Labs     12/06/19  0945 12/07/19 0421 12/08/19  0414   WBC 6.0 6.2 4.9   RBC 4.11* 3.87* 3.68*   HEMOGLOBIN 12.5 11.8* 11.2*   HEMATOCRIT 37.1 34.9* 33.2*   MCV 90.3 90.2 90.2   MCH 30.4 30.5 30.4   RDW 43.3 43.2 43.0   PLATELETCT 190 215 231   MPV 9.2 9.7 9.4   NEUTSPOLYS 82.60* 79.00* 69.00   LYMPHOCYTES 6.70* 8.50* 13.40*   MONOCYTES 4.70 5.00 6.90   EOSINOPHILS 5.50 6.90 10.10*   BASOPHILS 0.30 0.30 0.20     Recent Labs     12/06/19  0945 12/07/19 0421 12/08/19  0414   SODIUM 134* 134* 134*   POTASSIUM 4.1 4.0 3.9   CHLORIDE 100 99 100   CO2 25 21 22   GLUCOSE 102* 103* 99   BUN 8 12 8     Recent Labs     12/06/19  0945 12/07/19 0421 12/08/19  0414   ALBUMIN 3.3 2.9* 2.7*   TBILIRUBIN  --  0.3  --    ALKPHOSPHAT  --  65  --    TOTPROTEIN  --  6.0  --    ALTSGPT  --  40  --    ASTSGOT  --  41  --    CREATININE 0.56 0.47* 0.45*       Imaging:  Ct-abdomen-pelvis With    Result Date: 12/6/2019 12/6/2019 6:25 PM HISTORY/REASON FOR EXAM:  Sepsis. Fever. Fatigue and weight loss. TECHNIQUE/EXAM DESCRIPTION: CT scan of the abdomen and pelvis with contrast. Contrast-enhanced helical scanning was obtained from the diaphragmatic domes through the pubic symphysis following the bolus administration of 100 mL of Omnipaque 350 without complication. Low dose optimization technique was utilized for this CT exam including automated exposure control and adjustment of the mA and/or kV according to patient size. COMPARISON: No prior studies available. FINDINGS: Visualized lung bases demonstrate small bilateral pleural effusions and minor atelectasis or pneumonia in the right lower lobe. CT Abdomen: The liver is enlarged and is homogeneous. There is minimal intrahepatic biliary dilatation. The  gallbladder is absent. The pancreas is unremarkable. The spleen appears normal. The adrenal glands are not enlarged and the kidneys enhance symmetrically. There is a 3.9 cm simple cyst in the medial aspect of the mid and lower pole region of the left kidney. There is no lymphadenopathy. The aorta and IVC are normal in caliber. The bowel is without obstruction. The appendix is not visualized.  No focal inflammatory change is present. CT Pelvis: There is no lymphadenopathy. There is a small amount of free fluid in the pelvis. No loculated fluid collection is identified. The bladder appears normal. The uterus is present. There is no acute inflammatory process.     1.  Minimal right lower lobe atelectasis or pneumonia. Small bilateral pleural effusions. 2.  Small amount of free fluid in the pelvis. No loculated fluid collection identified in the abdomen or pelvis. 3.  No evidence of bowel or renal obstruction or inflammation. 4.  Surgical absence of the gallbladder. Minor intrahepatic biliary dilatation.    Ct-chest (thorax) With    Result Date: 12/7/2019 12/7/2019 6:22 PM HISTORY/REASON FOR EXAM:  Dyspnea, chronic; Fever of unknown origin. TECHNIQUE/EXAM DESCRIPTION: CT scan of the chest with contrast. Thin-section helical images were obtained from the lung apices through the adrenal glands following the bolus administration of contrast. 100 mL of Omnipaque 350 nonionic contrast was utilized. Low dose optimization technique was utilized for this CT exam including automated exposure control and adjustment of the mA and/or kV according to patient size. COMPARISON:  None. FINDINGS: There is atherosclerosis with coronary artery calcification. Heart is normal in size without significant pericardial effusion. There are trace amounts of dependent bilateral pleural effusion. There are scattered normal sized mediastinal nodes and hilar nodes. The lungs demonstrate interstitial opacities with some hazy scattered bilateral  groundglass opacity. There is dependent atelectasis. There are subpleural interstitial opacities. Limited imaging of the upper abdomen is demonstrates partial visualization of a cystic lesion in the posterior medial left kidney with several foci of internal enhancement. There is no upper abdominal adenopathy. The gallbladder is surgically absent. There are degenerative changes in the spine and shoulders.     1.  There are bilateral subpleural interstitial opacities with hazy groundglass opacities. This could represent changes of mild interstitial edema versus atypical pneumonitis or underlying chronic interstitial lung disease. 2.  There is no acute pneumonia. 3.  There is dependent bibasilar atelectasis. 4.  There are very small amounts of dependent bilateral pleural fluid. 5.  There is a partially visualized cystic lesion in the medial left kidney with questionable foci of internal enhancement.     Dx-chest-portable (1 View)    Result Date: 12/4/2019 12/4/2019 2:55 PM HISTORY/REASON FOR EXAM: Sepsis and cough TECHNIQUE/EXAM DESCRIPTION AND NUMBER OF VIEWS: Single portable view of the chest. COMPARISON: None FINDINGS: There is no evidence of focal consolidation or evidence of pulmonary edema. There is no pleural effusion. The heart is normal in size. There is a right upper arm venous catheter with the tip located at the level of the lower axillary vein.     No evidence of acute cardiopulmonary process.    Us-extremity Venous Lower Bilat    Result Date: 12/7/2019 12/7/2019 11:50 AM HISTORY/REASON FOR EXAM:  History of right septic knee 2 weeks ago. Fever of unknown origin. TECHNIQUE/EXAM DESCRIPTION: Bilateral lower extremity doppler venous ultrasound was performed. Using both color and pulse-wave Doppler imaging, multiple images were obtained from the common femoral vein origins distally through the popliteal trifurcation. COMPARISON:  None. FINDINGS: REAL-TIME GRAY-SCALE IMAGING: Real-time gray-scale imaging  reveals no evidence of focal wall thickening. COLOR AND DUPLEX DOPPLER IMAGING: Graded compression was used to demonstrate patent lumens.  There is no evidence for luminal thrombus.  There is normal response to augmentation and respiratory variation. There are no abnormal fluid collections.     1.  No evidence of Bilateral  lower extremity deep venous thrombosis. 2.  There are no incidental findings.    Us-extremity Venous Lower Unilat Right    Result Date: 11/15/2019   Vascular Laboratory  CONCLUSIONS  Normal right lower extremity superficial and deep venous examination.  No prior  BLAYNESAMARA  Exam Date:     11/15/2019 16:33  Room #:     Inpatient  Priority:     Routine  Ht (in):             Wt (lb):  Ordering Physician:        YUMI MAYFIELD  Referring Physician:       TRAN Sol  Sonographer:               Brice Tovar RDCS,                             RVT  Study Type:                Complete Unilateral  Technical Quality:         Adequate  Age:    74    Gender:     F  MRN:    [id number]  :    1945      BSA:  Indications:     Swelling of Limb, Pain in Limb  CPT Codes:       33099  ICD Codes:       729.81  729.5  History:  Limitations:  PROCEDURES:  Right lower extremity venous duplex imaging.  The following venous structures were evaluated: common femoral, profunda  femoral, femoral, popliteal , peroneal and posterior tibial veins.  Serial compression, augmentation maneuvers,  color and spectral Doppler  flow evaluations were performed.  FINDINGS:  Right lower extremity:    Complete color filling and compressibility with normal venous flow dynamics  including spontaneous flow, response to augmentation maneuvers, and  respiratory phasicity.  Flow was evaluated in the contralateral common femoral vein and normal  venous flow dynamics including spontaneous flow, respiratory phasic  variation and augmentation were demonstrated.  No superficial or deep venous thrombosis.  Gen King MD  (Electronically  "Signed)  Final Date:      15 November 2019                   17:20    Nm-cardiac Stress Test    Result Date: 2019   Myocardial Perfusion  Report  NUCLEAR IMAGING INTERPRETATION  No evidence of significant jeopardized viable myocardium or prior myocardial  infarction.  Normal left ventricular size, ejection fraction, and wall motion.  ECG INTERPRETATION  Negative stress ECG for ischemia.  SAMARA CISNEROS  MRN:    [id number]         Gender:    F  Exam Date: 2019 08:54  Exam Location:      Out Patient  Ordering Phys:     OLEGARIO GASCA  NucMed Tech:       Chary Jones RT,                     CNMT  Age:    74    :    1945        Ht (in):     65  Wt (lb):     135    BMI:    22.49       Cardiologis                                          t  Risk Factors:             Family history of coronary disease  Indications:              Fatigue, Chest Pain  ICD Codes:                780.79  786.5  Cardiac History:          Positive risk factors, Chest Pain; Heart Murmur  Cardiac Meds:  Meds Past 24 hrs:  Pretest Chest Pain:       No symptoms  STRESS TEST      Pharmacologi                   c  Protoc   Lexiscan w/    Dose: 0.4 mg  ol:      Exer  Post-Injection Exercise:        An additional 1 minutes of exercise followed the                                  intravenous injection  Resting HR (bpm):      66  Peak HR (bpm):         113  Resting BP (mmHg):       112    /   59  Peak BP (mmHg):       132   /   66  MaxPHR:     146     Target HR (bpm):       124  % MaxPHR:     77  Double Product:       93649  BP Response:  Stress Termination:       Protocol completed  Stress Symptoms:  FEELS \"WEIRD\",FLUSHED  ECG  Resting ECG:     Sinus rhythm.  Stress ECG:      No ischemic changes with Regadenoson.  IMAGE PROTOCOL      Rest/Stress 1                      Day          RadiopharmaceuticalDose (mCi)   Imaging  Date      Imaging  Time         Inj to Img Time (min)  Rest:   Tc-99m             7.71         2019        " 09:26          Tetrofosmin  Stress: Tc-99m             28.1         2019        10:21          Tetrofosmin  Rest:  Administration Site:       Left antecubital                             fossa  Administered by:      RT Snow CNMT  Stress:  Administration Site:       Left antecubital                             fossa  Administered by:      RT Snow CNMT  % Percent HR Achieved:  SPECT RESULTS  Technical Quality:       Good  Raw Data Analysis:  Summed Stress Score:    1  Summed Rest Score:    0  Summed Difference Score:        1  PERFUSION:  Normal myocardial perfusion with no ischemia.  FUNCTIONAL RESULTS (calculated via Gated SPECT)  Stress Image LV EF:        79     %  Upper Normal Limit  Stress EDV:      61     ml   EDVI:    36      ml/m???  Stress ESV:      13     ml   ESVI:    8       ml/m???  TID:    1      TID - 1.19      TID (ed) - 1.23  LV Function:  Normal left ventricular wall motion.  LV ejection fraction = 79%.  Sergo Moses MD  (Electronically Signed)  Final Date:      2019                   11:17    Ec-echocardiogram Ltd W/o Cont    Result Date: 2019  Transthoracic Echo Report Echocardiography Laboratory CONCLUSIONS Compared to the images of the study done on 10/30/2019 there has been no significant change. No evidence of endocarditis. SAMARA CISNEROS Exam Date:         2019                    13:08 Exam Location:     Inpatient Priority:          Routine Ordering Physician:        ANASTACIA CABA Referring Physician: Sonographer:               Charo Barajas RDCS Age:    74     Gender:    F MRN:    [id number] :    1945 BSA:    1.68   Ht (in):    65     Wt (lb):    136 Exam Type:     Limited Indications:     Endocarditis ICD Codes:       421 CPT Codes:       27121 BP:   155    /   76     HR:   71 Technical Quality:       Fair MEASUREMENTS  (Male / Female) Normal Values 2D ECHO Estimated LV Ejection Fraction    60 %                  * Indicates  values subject to auto-interpretation LV EF:  60    % FINDINGS Left Ventricle Normal left ventricular size and systolic function. Left ventricular ejection fraction is visually estimated to be 60%. Right Ventricle Right Atrium Left Atrium Mitral Valve Structurally normal mitral valve without significant stenosis or regurgitation. No valvular vegetations. Aortic Valve Structurally normal aortic valve without significant stenosis or regurgitation. No valvular vegetations. Tricuspid Valve Structurally normal tricuspid valve without significant stenosis or regurgitation. No valvular vegetations. Pulmonic Valve Pericardium Aorta Olvin Weldon MD (Electronically Signed) Final Date:     2019                 15:23    Ec-jose W/o Cont    Result Date: 2019  Transesophageal Echo Report Echocardiography Laboratory CONCLUSIONS No evidence of endocarditis. BLAYNEERIN VALENCIATHIA Exam Date:          2019                     11:51 Exam Location:      Inpatient Priority:            Routine Ordering Physician:        DEX LONDONO  ([id number]) Referring Physician:       [id number] Sonographer:               Ariana Ruelas Age:    74     Gender:    F MRN:    [id number] :    1945 BSA:           Ht (in):           Wt (lb): Report Type:      Complete Indications:     Endocarditis ICD Codes:       421 CPT Codes:       68950 BP:          /          HR: Technical Quality:       Fair MEASUREMENTS  (Male / Female) Normal Values 2D ECHO Estimated LV Ejection Fraction    65 %                  * Indicates values subject to auto-interpretation LV EF:  65    % Medications Limitations Complications Proc. Components Probe   was used for this procedure. The probe was inserted and manipulated by Dr. Londono. FINDINGS Left Ventricle Normal left ventricular systolic function. Right Ventricle Right Atrium Left Atrium LA Appendage IA Septum IV Septum Mitral Valve Structurally normal mitral valve  without significant stenosis or regurgitation. Aortic Valve Structurally normal aortic valve without significant stenosis or regurgitation. Tricuspid Valve Structurally normal tricuspid valve without significant stenosis or regurgitation. Pulmonic Valve Structurally normal pulmonic valve without significant stenosis or regurgitation. Pericardium Aorta Marion LORETTA To (Electronically Signed) Final Date:     06 December 2019                 15:37    Ir-midline Catheter Insertion Wo Guidance > Age 3    Result Date: 11/20/2019  HISTORY/REASON FOR EXAM:  Midline Placement   TECHNIQUE/EXAM DESCRIPTION AND NUMBER OF VIEWS: Midline insertion with ultrasound guidance.  FINDINGS: Midline insertion with Ultrasound Guidance was performed by qualified nursing staff without the assistance of a Radiologist. Midline positioning as measured by RN or as appropriate length of catheter selected.              Ultrasound-guided midline placement performed by qualified nursing staff as above.       Micro:  Results     Procedure Component Value Units Date/Time    CATH TIP CULTURE [[id number]] Collected:  12/06/19 1700    Order Status:  Completed Specimen:  Cath Tip from IV Peripheral Line Updated:  12/07/19 0929     Significant Indicator NEG     Source CTIP     Site IV PERIPHERAL LINE     Culture Result No growth at 24 hours.    Narrative:       Collected By:[id number] WILDA CURIEL  midline  Condition of Cath Site:->CLEAR  Collected By:[id number] WILDA CURIEL    Influenza A/B By PCR (Adult - Flu Only) [[id number]]     Order Status:  Canceled Specimen:  Respirate from Nasopharyngeal     URINE CULTURE(NEW) [[id number]] Collected:  12/04/19 1515    Order Status:  Completed Specimen:  Urine Updated:  12/07/19 0637     Significant Indicator NEG     Source UR     Site -     Culture Result No growth at 48 hours.    Narrative:       Indication for culture:->Septic Shock: Persistent  hypotension,  Lactic acid > 4, vasopressors/inotropes  "started  Indication for culture:->Septic Shock: Persistent    Blood Culture [[id number]]     Order Status:  Canceled Specimen:  Blood from Line     Blood Culture [[id number]]     Order Status:  Canceled Specimen:  Blood from Line     BLOOD CULTURE [[id number]] Collected:  12/04/19 1733    Order Status:  Completed Specimen:  Blood from Peripheral Updated:  12/05/19 0926     Significant Indicator NEG     Source BLD     Site PERIPHERAL     Culture Result No Growth  Note: Blood cultures are incubated for 5 days and  are monitored continuously.Positive blood cultures  are called to the RN and reported as soon as  they are identified.  Blood culture testing and Gram stain, if indicated, are  performed at [facility], [facility]  [address] [place], [place].  Positive blood cultures are  sent to [facility], [address] [place] [address], for organism identification and  susceptibility testing.      Narrative:       Per Hospital Policy: Only change Specimen Src: to \"Line\" if  specified by physician order.  Right Hand    Blood Culture [[id number]] Collected:  12/04/19 1544    Order Status:  Completed Specimen:  Blood from Line Updated:  12/05/19 0926     Significant Indicator NEG     Source BLD     Site LINE     Culture Result No Growth  Note: Blood cultures are incubated for 5 days and  are monitored continuously.Positive blood cultures  are called to the RN and reported as soon as  they are identified.  Blood culture testing and Gram stain, if indicated, are  performed at [facility], [facility]  [employer] [place] [facility].[place], [place].  Positive blood cultures are  sent to [facility], [address] [place] [address], for organism identification and  susceptibility testing.      Narrative:       Per Hospital Policy: Only change Specimen Src: to \"Line\" if  specified by physician order.  **PICC**  Left Hand    Influenza A/B By PCR (Adult - Flu Only) " [[id number]] Collected:  12/04/19 1524    Order Status:  Completed Specimen:  Respirate from Nasopharyngeal Updated:  12/04/19 1638     Influenza virus A RNA Negative     Influenza virus B, PCR Negative    URINALYSIS [[id number]] Collected:  12/04/19 1515    Order Status:  Completed Specimen:  Urine Updated:  12/04/19 1540     Color Yellow     Character Clear     Specific Gravity 1.020     Ph 6.0     Glucose Negative mg/dL      Ketones Negative mg/dL      Protein Negative mg/dL      Bilirubin Negative     Nitrite Negative     Leukocyte Esterase Negative     Occult Blood Negative     Micro Urine Req see below     Comment: Microscopic examination not performed when specimen is clear  and chemically negative for protein, blood, leukocyte esterase  and nitrite.         Narrative:       Indication for culture:->Septic Shock: Persistent  hypotension,  Lactic acid > 4, vasopressors/inotropes started    Influenza A/B By PCR (Adult - Flu Only) [[id number]]     Order Status:  Canceled Specimen:  Respirate from Nasopharyngeal     BLOOD CULTURE [[id number]] Collected:  12/04/19 0000    Order Status:  Canceled Specimen:  Other from Peripheral           Assessment:  Active Hospital Problems    Diagnosis   • *FUO (fever of unknown origin) [R50.9]   • MSSA bacteremia [R78.81]   • Septic arthritis of knee, right (HCC) [M00.9]   • Hyponatremia [E87.1]     Interval 24 hour assessment:      AF, O2 RA  Labs reviewed  Imaging personally reviewed both images and report. GGO bilateral, worse on right per my read.   Studies reviewed  Micro reviewed    Pt continued on cefazolin.  She is somnolent today and stated that she was hypoxic last night.  No hypoxia and repeat vitals this am.  She reports watery diarrhea x2 this am.     Assessment      Bernadette Paul is a 74 y.o.  with no significant past medical history who presented in 11/2019 complaining of right knee pain and several weeks of fatigue.  Orthopedics was consulted at the time and  she went to the OR on 11/15 for I&D of right septic knee.  A large amount of purulent fluid was encountered per op note.  Cultures were sent +MSSA.  She was also found to have an MSSA bacteremia with blood cultures positive on 11/15.  Repeat blood cultures on 11/17 were negative.  She underwent TTE on 11/18 which was negative for endocarditis.  Plan was to continue IV antibiotics until 12/15/19 with daptomycin 8 mg/kg home infusion and come into [facility] weekly for labs and dressing changes.     She is now admitted after presenting to the ER on 12/4 complaining of 1 day fever and chills.  No new right knee pain and PICC line is still in place.  ID was consulted and she was transitioned to  Cefazolin.      Fevers, new, improved - work-up in progress-so far etiology is unknown.  Drug fever on the differential as she improved within 48 hours of stopping daptomycin and her fevers were not associated with tachycardia which would support a drug fever. PICC line infection is another possibility, line removed, so far cath tip with no growth.  She has chronic appearing lung disease as discussed below.   -Chest x-ray unremarkable  -Blood cultures on 12/4, 1 obtained from PICC -no growth to date   -NICOLE on 12/6 with no vegetations or significant valvular disease, EF 65%  -PICC removed on 12/6 and cath tip sent for culture- NGTD  -CT abdomen pelvis on 12/6 with no significant disease to explain fever.  She does have some right lower lobe atelectasis versus pneumonia and small pleural effusions  ESR 21 on 12/3,  12 on 11/26, and 44 on 11/15      Right knee septic arthritis, with MSSA, on daptomycin prior to admission  MSSA bacteremia, on daptomycin prior to admission  -Plan is to continue antibiotics through 12/15/19     Cough, nonproductive, ongoing for several months, started prior to daptomycin initiation so less likely Daptomycin-induced Eosinophilic Pneumonia and would been bronch with cell count (eosinophils) to investigate -   will not restart dapto regardless   - CT chest on 12/7 with bilateral subpleural interstitial opacities with hazy groundglass opacities. This could represent changes of mild interstitial edema versus atypical pneumonitis or underlying chronic interstitial lung disease. No acute pneumonia.   - Procalcitonin 0.07 on 12/8     Fatigue, ongoing for several months  Unintentional weight loss - 8 pounds over 2-3 months     Somnolent - new   Watery diarrhea- new         Plan:  --- Continue cefazolin, end date 12/15/19 - will not re-start daptomycin   --- She is refusing midline and they prefer to go into the outpatient infusion center to complete her antibiotic course-  will order ertapenem at [facility]   --- F/up cath tip cultures to final   --- Follow-up blood cultures to final   ---  Hospitalist will discuss if she has had recent screenings such as colonoscopy and mammogram  ---  If any new fevers, any new back or joint pain would order MRI spine or MRI of affected joint   --- After completion of antibiotics monitor right knee for any new erythema, tenderness or drainage and seek care if these occur   ---  Follow-up with pulmonology for possible interstitial lung disease or chronic pneumonitis -hospitalist will discussed inpatient consult   --- C. difficile testing for rule out        Discussed with internal medicine, Dr. Bowman. ID will follow.

## 2019-12-08 NOTE — ASSESSMENT & PLAN NOTE
-- Patient with reported history of nonproductive cough for the past three months. No clear risk factors for chronic cough. Differentials are broad which include GERD vs. medication induced cough vs. asthma vs. volume overload vs ILD vs post nasal drip vs. eosinophilic granuloma vs. nonasthmatic eosinophilic bronchitis.   -- Given CT findings, cough may have been exacerbated with volume overload.   -- Recommend gentle diuresis   -- Given peripheral eosinophilia noted on multiple hospitalizations and ED visits, air hyperreactivity is high on the differentials which include asthma vs. nonasthmatic eosinophilic bronchitis. To further differentiate between the two, he would need full PFTs with methacholine challenge to test for bronchial hyperresponsiveness and check sputum eosinophils which all can be done as outpatient if cough persist. -- Will need repeat CT chest for abnormal CT chest findings to rule out ILD   -- Seek PT/OT and OOB as tolerated  -- Encourage IS as tolerated

## 2019-12-08 NOTE — CARE PLAN
Problem: Infection  Goal: Will remain free from infection  Outcome: PROGRESSING AS EXPECTED  Pt is afebrile so far. Denies any chills. On IV Ancef antibiotic. Pt encouraged to inform RN for any changes of temperature or if having chills. Pt verbalized understanding.      Problem: Communication  Goal: The ability to communicate needs accurately and effectively will improve  Outcome: PROGRESSING AS EXPECTED   Pt able to communicate needs appropriately to staff. Plan of care discussed to pt. Questions and concerns answered. Pt. Verbalized understanding. Communication board updated.

## 2019-12-09 VITALS
HEIGHT: 65 IN | HEART RATE: 88 BPM | DIASTOLIC BLOOD PRESSURE: 62 MMHG | SYSTOLIC BLOOD PRESSURE: 137 MMHG | BODY MASS INDEX: 22 KG/M2 | TEMPERATURE: 98.2 F | WEIGHT: 132.06 LBS | RESPIRATION RATE: 18 BRPM | OXYGEN SATURATION: 94 %

## 2019-12-09 LAB
ALBUMIN SERPL BCP-MCNC: 3.2 G/DL (ref 3.2–4.9)
BACTERIA BLD CULT: NORMAL
BACTERIA BLD CULT: NORMAL
BASOPHILS # BLD AUTO: 0.5 % (ref 0–1.8)
BASOPHILS # BLD: 0.03 K/UL (ref 0–0.12)
BUN SERPL-MCNC: 6 MG/DL (ref 8–22)
C DIFF DNA SPEC QL NAA+PROBE: NEGATIVE
C DIFF TOX GENS STL QL NAA+PROBE: NEGATIVE
CALCIUM SERPL-MCNC: 9.5 MG/DL (ref 8.4–10.2)
CHLORIDE SERPL-SCNC: 100 MMOL/L (ref 96–112)
CO2 SERPL-SCNC: 23 MMOL/L (ref 20–33)
CREAT SERPL-MCNC: 0.52 MG/DL (ref 0.5–1.4)
EOSINOPHIL # BLD AUTO: 0.69 K/UL (ref 0–0.51)
EOSINOPHIL NFR BLD: 11.9 % (ref 0–6.9)
ERYTHROCYTE [DISTWIDTH] IN BLOOD BY AUTOMATED COUNT: 42.7 FL (ref 35.9–50)
GLUCOSE SERPL-MCNC: 91 MG/DL (ref 65–99)
HCT VFR BLD AUTO: 35.6 % (ref 37–47)
HGB BLD-MCNC: 11.9 G/DL (ref 12–16)
IMM GRANULOCYTES # BLD AUTO: 0.03 K/UL (ref 0–0.11)
IMM GRANULOCYTES NFR BLD AUTO: 0.5 % (ref 0–0.9)
LYMPHOCYTES # BLD AUTO: 0.97 K/UL (ref 1–4.8)
LYMPHOCYTES NFR BLD: 16.7 % (ref 22–41)
MCH RBC QN AUTO: 30.5 PG (ref 27–33)
MCHC RBC AUTO-ENTMCNC: 33.4 G/DL (ref 33.6–35)
MCV RBC AUTO: 91.3 FL (ref 81.4–97.8)
MONOCYTES # BLD AUTO: 0.48 K/UL (ref 0–0.85)
MONOCYTES NFR BLD AUTO: 8.2 % (ref 0–13.4)
NEUTROPHILS # BLD AUTO: 3.62 K/UL (ref 2–7.15)
NEUTROPHILS NFR BLD: 62.2 % (ref 44–72)
NRBC # BLD AUTO: 0 K/UL
NRBC BLD-RTO: 0 /100 WBC
PHOSPHATE SERPL-MCNC: 3.1 MG/DL (ref 2.5–4.5)
PLATELET # BLD AUTO: 247 K/UL (ref 164–446)
PMV BLD AUTO: 9.6 FL (ref 9–12.9)
POTASSIUM SERPL-SCNC: 4.3 MMOL/L (ref 3.6–5.5)
RBC # BLD AUTO: 3.9 M/UL (ref 4.2–5.4)
SIGNIFICANT IND 70042: NORMAL
SIGNIFICANT IND 70042: NORMAL
SITE SITE: NORMAL
SITE SITE: NORMAL
SODIUM SERPL-SCNC: 135 MMOL/L (ref 135–145)
SOURCE SOURCE: NORMAL
SOURCE SOURCE: NORMAL
WBC # BLD AUTO: 5.8 K/UL (ref 4.8–10.8)

## 2019-12-09 PROCEDURE — 36415 COLL VENOUS BLD VENIPUNCTURE: CPT

## 2019-12-09 PROCEDURE — 80069 RENAL FUNCTION PANEL: CPT

## 2019-12-09 PROCEDURE — 87493 C DIFF AMPLIFIED PROBE: CPT

## 2019-12-09 PROCEDURE — 700105 HCHG RX REV CODE 258: Performed by: INTERNAL MEDICINE

## 2019-12-09 PROCEDURE — 99232 SBSQ HOSP IP/OBS MODERATE 35: CPT | Performed by: INTERNAL MEDICINE

## 2019-12-09 PROCEDURE — 99239 HOSP IP/OBS DSCHRG MGMT >30: CPT | Performed by: INTERNAL MEDICINE

## 2019-12-09 PROCEDURE — 700111 HCHG RX REV CODE 636 W/ 250 OVERRIDE (IP): Performed by: INTERNAL MEDICINE

## 2019-12-09 PROCEDURE — 85025 COMPLETE CBC W/AUTO DIFF WBC: CPT

## 2019-12-09 RX ADMIN — ENOXAPARIN SODIUM 40 MG: 100 INJECTION SUBCUTANEOUS at 07:18

## 2019-12-09 RX ADMIN — SODIUM CHLORIDE 1000 MG: 900 INJECTION INTRAVENOUS at 14:43

## 2019-12-09 RX ADMIN — CEFAZOLIN 2 G: 10 INJECTION, POWDER, FOR SOLUTION INTRAVENOUS; PARENTERAL at 07:18

## 2019-12-09 ASSESSMENT — ENCOUNTER SYMPTOMS
NAUSEA: 0
BACK PAIN: 0
ABDOMINAL PAIN: 0
FEVER: 0
COUGH: 1
SHORTNESS OF BREATH: 0
DIARRHEA: 0
CHILLS: 0
CONSTIPATION: 0
VOMITING: 0
MYALGIAS: 0
NECK PAIN: 0

## 2019-12-09 NOTE — PROGRESS NOTES
Report received from night shift RN. Assume care. Pt. AAOx4 pt is bed, Assessment completed. Denies Pain to R knee, steri strips DCI and in place, no s/sx of infection noted or reported.  VSS. Pt was update for the care for the day. All question answered. Pt has call light within reach, bed alarm on, bed is in the lowest position. Pt has no other needs at this time.white board updated.     1130- awaiting for lab results to r/o c-diff, and I&D MD to assess- then Pt will be DC    1700. C-Diff results negative.  Pt discharged home, left unit on stable conditions accompanied with C-RN and family, voided prior DC, all belongings taken. VSS, IV removed. All questions and concerns answered.

## 2019-12-09 NOTE — DISCHARGE PLANNING
Care Transition Team Assessment    Patient lives at home with her spouse and the current discharge plan is for this patient to return home when medically able.  SW notes that this patient with probably need ROPIC set up at discharge.  Pending medical clearance.  SW will continue to monitor and assist as needed.     Information Source  Orientation : Oriented x 4  Information Given By: Patient  Informant's Name: li    Elopement Risk  Legal Hold: No  Ambulatory or Self Mobile in Wheelchair: No-Not an Elopement Risk  Disoriented: No  Psychiatric Symptoms: None  History of Wandering: No  Elopement this Admit: No  Vocalizing Wanting to Leave: No  Displays Behaviors, Body Language Wanting to Leave: No-Not at Risk for Elopement  Elopement Risk: Not at Risk for Elopement    Interdisciplinary Discharge Planning  Does Admitting Nurse Feel This Could be a Complex Discharge?: No  Primary Care Physician: Dr. Flowers  Lives with - Patient's Self Care Capacity: Significant Other  Patient or legal guardian wants to designate a caregiver (see row info): No  Support Systems: Friends / Neighbors, Family Member(s)  Housing / Facility: [address]  Do You Take your Prescribed Medications Regularly: Yes  Able to Return to Previous ADL's: No  Mobility Issues: No  Prior Services: Home-Independent  Patient Expects to be Discharged to:: Home  Assistance Needed: No  Durable Medical Equipment: Not Applicable    Discharge Preparedness  What is your plan after discharge?: Home with help  Prior Functional Level: Independent with Activities of Daily Living    Functional Assesment  Prior Functional Level: Independent with Activities of Daily Living    Finances  Financial Barriers to Discharge: No  Prescription Coverage: Yes    Vision / Hearing Impairment  Vision Impairment : No  Hearing Impairment : No    Advance Directive  Advance Directive?: None    Domestic Abuse  Have you ever been the victim of abuse or violence?: No  Physical Abuse or  Sexual Abuse: No  Verbal Abuse or Emotional Abuse: No  Possible Abuse Reported to:: Not Applicable    Psychological Assessment  History of Substance Abuse: None  History of Psychiatric Problems: No    Discharge Risks or Barriers  Discharge risks or barriers?: No    Anticipated Discharge Information  Anticipated discharge disposition: Home

## 2019-12-09 NOTE — CARE PLAN
Problem: Communication  Goal: The ability to communicate needs accurately and effectively will improve  Outcome: PROGRESSING AS EXPECTED   Plan of care. Medication and S/E and Tx plan explained to Pt and family. Pt and family verbalized understanding.   Problem: Infection  Goal: Will remain free from infection  Outcome: PROGRESSING AS EXPECTED   VSS, Labs results and all tx check and perform as per protocol and emar.   Problem: Respiratory:  Goal: Respiratory status will improve  Outcome: PROGRESSING AS EXPECTED  Encouraged to continue the use of IS, ambulate x3 per shift

## 2019-12-09 NOTE — DISCHARGE PLANNING
MIRELLA called ADELINA and scheduled patient to be start antibiotics tomorrow at 14:30.  Please see AVS for details. Patient discussed in afternoon rounds and SW updated Hospitalist.

## 2019-12-09 NOTE — DISCHARGE SUMMARY
Discharge Summary    CHIEF COMPLAINT ON ADMISSION  Chief Complaint   Patient presents with   • Fever     Sent by PCP for a fever, she was released from [facility] for sepsis of her right knee and at follow up today she had a fever. Currently on an antibiotics. During assessment she stated she is very tired and has a cough.       Reason for Admission  Fever     Admission Date  12/4/2019    CODE STATUS  Full Code    HPI & HOSPITAL COURSE  This is a 74 y.o. female with a History of 3 months of fatigue and weight loss, diagnosed with MSSA bacteremia and right (MSSA) septic knee a few weeks ago status post washout.  She has been on IV daptomycin, since that time and presents now with fever.  Initially, there was some concern for persistent infection.  Repeat blood cultures were drawn and her daptomycin was changed to Ancef.  Her knee showed no evidence of persistent infection and had no ongoing pain or discomfort.  Repeat blood cultures were negative for any evidence of MSSA or other bacteria.  She had had a negative TTE at her previous hospitalization however, with her persistent fevers, a NICOLE was performed and this was negative.    Her right PICC line was removed and cultured and blood work from this line also remained negative.    She was evaluated a CT of the chest abdomen and pelvis.  This revealed groundglass opacities in bilateral upper lobes, and small bilateral pleural effusions however no other acute abnormality.  She had been complaining of a dry cough as well.  In the setting of fever, weight loss, cough and groundglass opacities, pulmonology was consulted to weigh in on possible autoimmune etiology as a contribution to her symptoms.  After they evaluated her, the thought that her pulmonary changes may be related to pulmonary edema, despite her normal echocardiograms.  They recommended repeating the CAT scan of the chest in 6 weeks and, if there is any evidence of worsening, at that time they will consider doing a  bronchoscopy with a transbronchial biopsy.  Autoimmune serology was drawn and sent.  Most of which, is pending at the time of this dictation however, rheumatoid factor did return and was negative.    Her lower extremities were scanned for any evidence of DVT and they remained negative.    Her fever did resolve after about 48 hours without daptomycin therefore, drug fever was a strong consideration and remains the most likely etiology of her fever during this hospital stay.    Autoimmune versus underlying malignancy are still something that will need to be ruled out as time goes by.  She is up-to-date with colonoscopy and mammography screening.  Her CBC did not show any changes that would warrant bone marrow biopsy at this time.    She needs to complete her antibiotic course through 12/15.  She will do this at outpatient infusion through daily placement of peripheral lines.    Therefore, she is discharged in fair and stable condition to home with close outpatient follow-up.    The patient met 2-midnight criteria for an inpatient stay at the time of discharge.    Discharge Date  12/9/2019    FOLLOW UP ITEMS POST DISCHARGE  Follow-up with PCP  Follow-up with ID for ongoing outpatient antibiotic infusions  Follow-up with rheumatology to discuss any further work-up for autoimmune if indicated  Follow up with pulmonology for repeat CT scan of the chest in 6 weeks and possible biopsy if indicated    DISCHARGE DIAGNOSES  Principal Problem:    FUO (fever of unknown origin) POA: Unknown  Active Problems:    MSSA bacteremia POA: Yes    Hyponatremia POA: Yes      Overview: 2/12/13 Na 136      2/13/14 Na 131      3/15/16 Na 137      5/25/16 Na 134      9/20/17 Na 131      10/2/19 Na 129      10/9/19 Na 134      10/15/19 serum osm 277 (278-298), urine osm 128 (300-900), urine Na 14,       cortisol 9.8, ACTH 15    Septic arthritis of knee, right (HCC) POA: Unknown    Abnormal CT of the chest POA: Unknown    Chronic cough POA:  Unknown    Bilateral pleural effusion POA: Unknown  Resolved Problems:    * No resolved hospital problems. *      FOLLOW UP  Future Appointments   Date Time Provider Department Center   12/10/2019  2:30 PM INFUSION QUICK INJECT ONP [facility] [address]   12/11/2019 10:20 AM Amy Weinstein M.D. [facility] [address]   12/11/2019 11:45 AM [place] [facility] INFUSION ONP [facility] [address]   12/12/2019 10:45 AM [place] [facility] INFUSION ONP [address]   12/13/2019  7:00 AM [facility] [facility] INFUSION ONP [address]   12/14/2019  9:15 AM [facility] INFUSION ONP [address]   12/15/2019 10:45 AM [facility] INFUSION ONP [address]   12/18/2019  9:45 AM Glendy Russ P.A.-C. PULM None   1/9/2020  2:20 PM Eron Flowers M.D. [facility] [address] [place]   1/29/2020  1:20 PM Je Schwarz M.D. [facility] [address] [place]       MEDICATIONS ON DISCHARGE     Medication List      START taking these medications      Instructions   NS SOLN 100 mL with ertapenem 1 GM SOLR 1,000 mg  Start taking on:  December 10, 2019   1,000 mg by Intravenous route every 24 hours.  Dose:  1,000 mg        CONTINUE taking these medications      Instructions   CALCIUM PO   Take 1 Tab by mouth 2 Times a Day. Unknown OTC Strength.  Dose:  1 Tab     FISH OIL PO   Take 2 Caps by mouth every evening. Unknown OTC Strength.  Dose:  2 Cap     therapeutic multivitamin-minerals Tabs   Take 1 Tab by mouth every day.  Dose:  1 Tab     VITAMIN C PO   Take 1 Tab by mouth every day. Unknown OTC Strength.  Dose:  1 Tab     VITAMIN D PO   Take 1 Cap by mouth every day. Unknown OTC Strength.  Dose:  1 Cap        STOP taking these medications    NS SOLN 50 mL with DAPTOmycin 350 MG SOLR 500 mg            Allergies  Allergies   Allergen Reactions   • Asa [Aspirin]      Epistaxis     • Nsaids      Epistaxis         DIET  Orders Placed This Encounter   Procedures   • Diet Order Regular     Standing Status:   Standing     Number of Occurrences:   1     Order Specific Question:   Diet:     Answer:   Regular [1]       ACTIVITY  As  tolerated.  Weight bearing as tolerated    CONSULTATIONS  Dr. Richards -ID  Dr. Alas -Pulmonology    PROCEDURES  NICOLE: Negative for vegetation    LABORATORY  Lab Results   Component Value Date    SODIUM 135 12/09/2019    POTASSIUM 4.3 12/09/2019    CHLORIDE 100 12/09/2019    CO2 23 12/09/2019    GLUCOSE 91 12/09/2019    BUN 6 (L) 12/09/2019    CREATININE 0.52 12/09/2019    CREATININE 0.9 06/01/2005        Lab Results   Component Value Date    WBC 5.8 12/09/2019    HEMOGLOBIN 11.9 (L) 12/09/2019    HEMATOCRIT 35.6 (L) 12/09/2019    PLATELETCT 247 12/09/2019        Total time of the discharge process exceeds 48 minutes.

## 2019-12-09 NOTE — PROGRESS NOTES
Infectious Disease Progress Note    Author: Carlota Richards M.D. Date & Time of service: 2019  3:26 PM    Chief Complaint:  Fevers     Interval History:   100.9, no significant complaints.  She does endorse a dry cough ongoing for several months and some fatigue.  Awaiting NICOLE.    100.4, O2 RA, continued on cefazolin.  She complained of increased nonproductive cough overnight as well as some chest tightness this morning.  No back or joint pain.  Right knee continues to do well.     AF, O2 RA, somnolent today and stated that she was hypoxic last night.  No hypoxia and repeat vitals this am.  She reports watery diarrhea x2 this am.      Review of Systems:  Review of Systems   Constitutional: Positive for malaise/fatigue. Negative for chills and fever.   Respiratory: Positive for cough. Negative for shortness of breath.    Gastrointestinal: Negative for abdominal pain, constipation, diarrhea, nausea and vomiting.   Musculoskeletal: Negative for back pain, joint pain, myalgias and neck pain.       Hemodynamics:  Temp (24hrs), Av.9 °C (98.5 °F), Min:36.8 °C (98.2 °F), Max:37.2 °C (98.9 °F)  Temperature: 36.8 °C (98.2 °F)  Pulse  Av.8  Min: 61  Max: 95   Blood Pressure : 137/62       Physical Exam:  Physical Exam  Constitutional:       Appearance: Normal appearance.   Cardiovascular:      Rate and Rhythm: Normal rate and regular rhythm.      Heart sounds: Normal heart sounds.   Pulmonary:      Effort: Pulmonary effort is normal.      Comments: Decreased breath sounds in the bases and faint crackles  Abdominal:      General: Abdomen is flat. Bowel sounds are normal.      Palpations: Abdomen is soft.   Musculoskeletal: Normal range of motion.         General: No swelling or tenderness.      Comments: No spinal point tenderness, knee with mild warmth, no erythema, no tenderness, no drainage   Skin:     General: Skin is warm and dry.   Neurological:      General: No focal deficit present.       Mental Status: She is alert and oriented to person, place, and time.   Psychiatric:         Mood and Affect: Mood normal.         Behavior: Behavior normal.         Meds:    Current Facility-Administered Medications:   •  ertapenem (INVANZ) 1000 mg IVPB in 100 mL  •  enoxaparin  •  senna-docusate **AND** polyethylene glycol/lytes **AND** magnesium hydroxide **AND** bisacodyl  •  NS  •  acetaminophen    Labs:  Recent Labs     12/07/19 0421 12/08/19 0414 12/09/19 0428   WBC 6.2 4.9 5.8   RBC 3.87* 3.68* 3.90*   HEMOGLOBIN 11.8* 11.2* 11.9*   HEMATOCRIT 34.9* 33.2* 35.6*   MCV 90.2 90.2 91.3   MCH 30.5 30.4 30.5   RDW 43.2 43.0 42.7   PLATELETCT 215 231 247   MPV 9.7 9.4 9.6   NEUTSPOLYS 79.00* 69.00 62.20   LYMPHOCYTES 8.50* 13.40* 16.70*   MONOCYTES 5.00 6.90 8.20   EOSINOPHILS 6.90 10.10* 11.90*   BASOPHILS 0.30 0.20 0.50     Recent Labs     12/07/19 0421 12/08/19 0414 12/09/19 0428   SODIUM 134* 134* 135   POTASSIUM 4.0 3.9 4.3   CHLORIDE 99 100 100   CO2 21 22 23   GLUCOSE 103* 99 91   BUN 12 8 6*     Recent Labs     12/07/19 0421 12/08/19 0414 12/09/19 0428   ALBUMIN 2.9* 2.7* 3.2   TBILIRUBIN 0.3  --   --    ALKPHOSPHAT 65  --   --    TOTPROTEIN 6.0  --   --    ALTSGPT 40  --   --    ASTSGOT 41  --   --    CREATININE 0.47* 0.45* 0.52       Imaging:  Ct-abdomen-pelvis With    Result Date: 12/6/2019 12/6/2019 6:25 PM HISTORY/REASON FOR EXAM:  Sepsis. Fever. Fatigue and weight loss. TECHNIQUE/EXAM DESCRIPTION: CT scan of the abdomen and pelvis with contrast. Contrast-enhanced helical scanning was obtained from the diaphragmatic domes through the pubic symphysis following the bolus administration of 100 mL of Omnipaque 350 without complication. Low dose optimization technique was utilized for this CT exam including automated exposure control and adjustment of the mA and/or kV according to patient size. COMPARISON: No prior studies available. FINDINGS: Visualized lung bases demonstrate small bilateral  pleural effusions and minor atelectasis or pneumonia in the right lower lobe. CT Abdomen: The liver is enlarged and is homogeneous. There is minimal intrahepatic biliary dilatation. The gallbladder is absent. The pancreas is unremarkable. The spleen appears normal. The adrenal glands are not enlarged and the kidneys enhance symmetrically. There is a 3.9 cm simple cyst in the medial aspect of the mid and lower pole region of the left kidney. There is no lymphadenopathy. The aorta and IVC are normal in caliber. The bowel is without obstruction. The appendix is not visualized.  No focal inflammatory change is present. CT Pelvis: There is no lymphadenopathy. There is a small amount of free fluid in the pelvis. No loculated fluid collection is identified. The bladder appears normal. The uterus is present. There is no acute inflammatory process.     1.  Minimal right lower lobe atelectasis or pneumonia. Small bilateral pleural effusions. 2.  Small amount of free fluid in the pelvis. No loculated fluid collection identified in the abdomen or pelvis. 3.  No evidence of bowel or renal obstruction or inflammation. 4.  Surgical absence of the gallbladder. Minor intrahepatic biliary dilatation.    Ct-chest (thorax) With    Result Date: 12/7/2019 12/7/2019 6:22 PM HISTORY/REASON FOR EXAM:  Dyspnea, chronic; Fever of unknown origin. TECHNIQUE/EXAM DESCRIPTION: CT scan of the chest with contrast. Thin-section helical images were obtained from the lung apices through the adrenal glands following the bolus administration of contrast. 100 mL of Omnipaque 350 nonionic contrast was utilized. Low dose optimization technique was utilized for this CT exam including automated exposure control and adjustment of the mA and/or kV according to patient size. COMPARISON:  None. FINDINGS: There is atherosclerosis with coronary artery calcification. Heart is normal in size without significant pericardial effusion. There are trace amounts of  dependent bilateral pleural effusion. There are scattered normal sized mediastinal nodes and hilar nodes. The lungs demonstrate interstitial opacities with some hazy scattered bilateral groundglass opacity. There is dependent atelectasis. There are subpleural interstitial opacities. Limited imaging of the upper abdomen is demonstrates partial visualization of a cystic lesion in the posterior medial left kidney with several foci of internal enhancement. There is no upper abdominal adenopathy. The gallbladder is surgically absent. There are degenerative changes in the spine and shoulders.     1.  There are bilateral subpleural interstitial opacities with hazy groundglass opacities. This could represent changes of mild interstitial edema versus atypical pneumonitis or underlying chronic interstitial lung disease. 2.  There is no acute pneumonia. 3.  There is dependent bibasilar atelectasis. 4.  There are very small amounts of dependent bilateral pleural fluid. 5.  There is a partially visualized cystic lesion in the medial left kidney with questionable foci of internal enhancement.     Dx-chest-portable (1 View)    Result Date: 12/4/2019 12/4/2019 2:55 PM HISTORY/REASON FOR EXAM: Sepsis and cough TECHNIQUE/EXAM DESCRIPTION AND NUMBER OF VIEWS: Single portable view of the chest. COMPARISON: None FINDINGS: There is no evidence of focal consolidation or evidence of pulmonary edema. There is no pleural effusion. The heart is normal in size. There is a right upper arm venous catheter with the tip located at the level of the lower axillary vein.     No evidence of acute cardiopulmonary process.    Us-extremity Venous Lower Bilat    Result Date: 12/7/2019 12/7/2019 11:50 AM HISTORY/REASON FOR EXAM:  History of right septic knee 2 weeks ago. Fever of unknown origin. TECHNIQUE/EXAM DESCRIPTION: Bilateral lower extremity doppler venous ultrasound was performed. Using both color and pulse-wave Doppler imaging, multiple images  were obtained from the common femoral vein origins distally through the popliteal trifurcation. COMPARISON:  None. FINDINGS: REAL-TIME GRAY-SCALE IMAGING: Real-time gray-scale imaging reveals no evidence of focal wall thickening. COLOR AND DUPLEX DOPPLER IMAGING: Graded compression was used to demonstrate patent lumens.  There is no evidence for luminal thrombus.  There is normal response to augmentation and respiratory variation. There are no abnormal fluid collections.     1.  No evidence of Bilateral  lower extremity deep venous thrombosis. 2.  There are no incidental findings.    Us-extremity Venous Lower Unilat Right    Result Date: 11/15/2019   Vascular Laboratory  CONCLUSIONS  Normal right lower extremity superficial and deep venous examination.  No prior  BLAYNE, SAMARA  Exam Date:     11/15/2019 16:33  Room #:     Inpatient  Priority:     Routine  Ht (in):             Wt (lb):  Ordering Physician:        YUMI MAYFIELD  Referring Physician:       [id number] Jimenez  Sonographer:               Brice Tovar RDCS,                             RVT  Study Type:                Complete Unilateral  Technical Quality:         Adequate  Age:    74    Gender:     F  MRN:    [id number]  :    1945      BSA:  Indications:     Swelling of Limb, Pain in Limb  CPT Codes:       37893  ICD Codes:       729.81  729.5  History:  Limitations:  PROCEDURES:  Right lower extremity venous duplex imaging.  The following venous structures were evaluated: common femoral, profunda  femoral, femoral, popliteal , peroneal and posterior tibial veins.  Serial compression, augmentation maneuvers,  color and spectral Doppler  flow evaluations were performed.  FINDINGS:  Right lower extremity:    Complete color filling and compressibility with normal venous flow dynamics  including spontaneous flow, response to augmentation maneuvers, and  respiratory phasicity.  Flow was evaluated in the contralateral common femoral vein and normal  venous flow  "dynamics including spontaneous flow, respiratory phasic  variation and augmentation were demonstrated.  No superficial or deep venous thrombosis.  Gen King MD  (Electronically Signed)  Final Date:      15 November 2019                   17:20    Nm-cardiac Stress Test    Result Date: 2019   Myocardial Perfusion  Report  NUCLEAR IMAGING INTERPRETATION  No evidence of significant jeopardized viable myocardium or prior myocardial  infarction.  Normal left ventricular size, ejection fraction, and wall motion.  ECG INTERPRETATION  Negative stress ECG for ischemia.  SAMARA CISNEROS  MRN:    [id number]         Gender:    F  Exam Date: 2019 08:54  Exam Location:      Out Patient  Ordering Phys:     OLEGARIO GASCA  NucMed Tech:       RT Gwendolyn,                     CNMT  Age:    74    :    1945        Ht (in):     65  Wt (lb):     135    BMI:    22.49       Cardiologis                                          t  Risk Factors:             Family history of coronary disease  Indications:              Fatigue, Chest Pain  ICD Codes:                780.79  786.5  Cardiac History:          Positive risk factors, Chest Pain; Heart Murmur  Cardiac Meds:  Meds Past 24 hrs:  Pretest Chest Pain:       No symptoms  STRESS TEST      Pharmacologi                   c  Nolvia   Lexiscan w/    Dose: 0.4 mg  ol:      Exer  Post-Injection Exercise:        An additional 1 minutes of exercise followed the                                  intravenous injection  Resting HR (bpm):      66  Peak HR (bpm):         113  Resting BP (mmHg):       112    /   59  Peak BP (mmHg):       132   /   66  MaxPHR:     146     Target HR (bpm):       124  % MaxPHR:     77  Double Product:       85737  BP Response:  Stress Termination:       Protocol completed  Stress Symptoms:  FEELS \"WEIRD\",FLUSHED  ECG  Resting ECG:     Sinus rhythm.  Stress ECG:      No ischemic changes with Regadenoson.  IMAGE PROTOCOL      Rest/Stress 1         "              Day          RadiopharmaceuticalDose (mCi)   Imaging  Date      Imaging  Time         Inj to Img Time (min)  Rest:   Tc-99m             7.71         2019        09:26          Tetrofosmin  Stress: Tc-99m             28.1         2019        10:21          Tetrofosmin  Rest:  Administration Site:       Left antecubital                             fossa  Administered by:      RT Snow CNMT  Stress:  Administration Site:       Left antecubital                             fossa  Administered by:      RT Snow CNMT  % Percent HR Achieved:  SPECT RESULTS  Technical Quality:       Good  Raw Data Analysis:  Summed Stress Score:    1  Summed Rest Score:    0  Summed Difference Score:        1  PERFUSION:  Normal myocardial perfusion with no ischemia.  FUNCTIONAL RESULTS (calculated via Gated SPECT)  Stress Image LV EF:        79     %  Upper Normal Limit  Stress EDV:      61     ml   EDVI:    36      ml/m???  Stress ESV:      13     ml   ESVI:    8       ml/m???  TID:    1      TID - 1.19      TID (ed) - 1.23  LV Function:  Normal left ventricular wall motion.  LV ejection fraction = 79%.  Sergo Moses MD  (Electronically Signed)  Final Date:      2019                   11:17    Ec-echocardiogram Ltd W/o Cont    Result Date: 2019  Transthoracic Echo Report Echocardiography Laboratory CONCLUSIONS Compared to the images of the study done on 10/30/2019 there has been no significant change. No evidence of endocarditis. SAMARA CISNEROS Exam Date:         2019                    13:08 Exam Location:     Inpatient Priority:          Routine Ordering Physician:        ANASTACIA CABA Referring Physician: Sonographer:               Charo Barajas RDCS Age:    74     Gender:    F MRN:    [id number] :    1945 BSA:    1.68   Ht (in):    65     Wt (lb):    136 Exam Type:     Limited Indications:     Endocarditis ICD Codes:       421 CPT Codes:       29070 BP:    155    /   76     HR:   71 Technical Quality:       Fair MEASUREMENTS  (Male / Female) Normal Values 2D ECHO Estimated LV Ejection Fraction    60 %                  * Indicates values subject to auto-interpretation LV EF:  60    % FINDINGS Left Ventricle Normal left ventricular size and systolic function. Left ventricular ejection fraction is visually estimated to be 60%. Right Ventricle Right Atrium Left Atrium Mitral Valve Structurally normal mitral valve without significant stenosis or regurgitation. No valvular vegetations. Aortic Valve Structurally normal aortic valve without significant stenosis or regurgitation. No valvular vegetations. Tricuspid Valve Structurally normal tricuspid valve without significant stenosis or regurgitation. No valvular vegetations. Pulmonic Valve Pericardium Aorta Olvin Weldon MD (Electronically Signed) Final Date:     2019                 15:23    Ec-jose W/o Cont    Result Date: 2019  Transesophageal Echo Report Echocardiography Laboratory CONCLUSIONS No evidence of endocarditis. SAMARA CISNEROS Exam Date:          2019                     11:51 Exam Location:      Inpatient Priority:            Routine Ordering Physician:        DEX LONDONO  ([id number]) Referring Physician:       [id number], TO Sonographer:               Ariana Ruelas Age:    74     Gender:    F MRN:    [id number] :    1945 BSA:           Ht (in):           Wt (lb): Report Type:      Complete Indications:     Endocarditis ICD Codes:       421 CPT Codes:       23429 BP:          /          HR: Technical Quality:       Fair MEASUREMENTS  (Male / Female) Normal Values 2D ECHO Estimated LV Ejection Fraction    65 %                  * Indicates values subject to auto-interpretation LV EF:  65    % Medications Limitations Complications Proc. Components Probe SM  was used for this procedure. The probe was inserted and manipulated by Dr. Londono. FINDINGS Left  Ventricle Normal left ventricular systolic function. Right Ventricle Right Atrium Left Atrium LA Appendage IA Septum IV Septum Mitral Valve Structurally normal mitral valve without significant stenosis or regurgitation. Aortic Valve Structurally normal aortic valve without significant stenosis or regurgitation. Tricuspid Valve Structurally normal tricuspid valve without significant stenosis or regurgitation. Pulmonic Valve Structurally normal pulmonic valve without significant stenosis or regurgitation. Pericardium Aorta Marion N To (Electronically Signed) Final Date:     06 December 2019                 15:37    Ir-midline Catheter Insertion Wo Guidance > Age 3    Result Date: 11/20/2019  HISTORY/REASON FOR EXAM:  Midline Placement   TECHNIQUE/EXAM DESCRIPTION AND NUMBER OF VIEWS: Midline insertion with ultrasound guidance.  FINDINGS: Midline insertion with Ultrasound Guidance was performed by qualified nursing staff without the assistance of a Radiologist. Midline positioning as measured by RN or as appropriate length of catheter selected.              Ultrasound-guided midline placement performed by qualified nursing staff as above.       Micro:  Results     Procedure Component Value Units Date/Time    C Diff by PCR rflx Toxin [[id number]] Collected:  12/09/19 0902    Order Status:  Completed Specimen:  Stool Updated:  12/09/19 1523     C Diff by PCR Negative     Comment: C. difficile NOT detected by PCR.  Treatment not indicated per guidelines.  Repeat testing not indicated within 7 days.          027-NAP1-BI Presumptive Negative     Comment: Presumptive 027/NAP1/BI target DNA sequences are NOT DETECTED.       Narrative:       Special Contact [id number] CHRISTINA CURIEL  Does this patient have risk factors for C-diff?->Yes    CATH TIP CULTURE [[id number]] Collected:  12/06/19 1700    Order Status:  Completed Specimen:  Cath Tip from IV Peripheral Line Updated:  12/08/19 1538     Significant  "Indicator NEG     Source CTIP     Site IV PERIPHERAL LINE     Culture Result No Growth at 48 hours.    Narrative:       Collected By:[id number] WILDA CURIEL  midline  Condition of Cath Site:->CLEAR  Collected By:[id number] WILDA CURIEL    Influenza A/B By PCR (Adult - Flu Only) [[id number]]     Order Status:  Canceled Specimen:  Respirate from Nasopharyngeal     URINE CULTURE(NEW) [[id number]] Collected:  12/04/19 1515    Order Status:  Completed Specimen:  Urine Updated:  12/07/19 0637     Significant Indicator NEG     Source UR     Site -     Culture Result No growth at 48 hours.    Narrative:       Indication for culture:->Septic Shock: Persistent  hypotension,  Lactic acid > 4, vasopressors/inotropes started  Indication for culture:->Septic Shock: Persistent    Blood Culture [[id number]]     Order Status:  Canceled Specimen:  Blood from Line     Blood Culture [[id number]]     Order Status:  Canceled Specimen:  Blood from Line     BLOOD CULTURE [[id number]] Collected:  12/04/19 1733    Order Status:  Completed Specimen:  Blood from Peripheral Updated:  12/05/19 0926     Significant Indicator NEG     Source BLD     Site PERIPHERAL     Culture Result No Growth  Note: Blood cultures are incubated for 5 days and  are monitored continuously.Positive blood cultures  are called to the RN and reported as soon as  they are identified.  Blood culture testing and Gram stain, if indicated, are  performed at [facility], [address] [place] [address].  Positive blood cultures are  sent to [facility], [address] [place] [address], for organism identification and  susceptibility testing.      Narrative:       Per Hospital Policy: Only change Specimen Src: to \"Line\" if  specified by physician order.  Right Hand    Blood Culture [[id number]] Collected:  12/04/19 1544    Order Status:  Completed Specimen:  Blood from Line Updated:  12/05/19 0926     Significant Indicator NEG     " "Source BLD     Site LINE     Culture Result No Growth  Note: Blood cultures are incubated for 5 days and  are monitored continuously.Positive blood cultures  are called to the RN and reported as soon as  they are identified.  Blood culture testing and Gram stain, if indicated, are  performed at [facility], [address] [place] [address].  Positive blood cultures are  sent to [facility], [address] [place] [address], for organism identification and  susceptibility testing.      Narrative:       Per Hospital Policy: Only change Specimen Src: to \"Line\" if  specified by physician order.  **PICC**  Left Hand    Influenza A/B By PCR (Adult - Flu Only) [[id number]] Collected:  12/04/19 1524    Order Status:  Completed Specimen:  Respirate from Nasopharyngeal Updated:  12/04/19 1638     Influenza virus A RNA Negative     Influenza virus B, PCR Negative    URINALYSIS [[id number]] Collected:  12/04/19 1515    Order Status:  Completed Specimen:  Urine Updated:  12/04/19 1540     Color Yellow     Character Clear     Specific Gravity 1.020     Ph 6.0     Glucose Negative mg/dL      Ketones Negative mg/dL      Protein Negative mg/dL      Bilirubin Negative     Nitrite Negative     Leukocyte Esterase Negative     Occult Blood Negative     Micro Urine Req see below     Comment: Microscopic examination not performed when specimen is clear  and chemically negative for protein, blood, leukocyte esterase  and nitrite.         Narrative:       Indication for culture:->Septic Shock: Persistent  hypotension,  Lactic acid > 4, vasopressors/inotropes started    Influenza A/B By PCR (Adult - Flu Only) [[id number]]     Order Status:  Canceled Specimen:  Respirate from Nasopharyngeal     BLOOD CULTURE [[id number]] Collected:  12/04/19 0000    Order Status:  Canceled Specimen:  Other from Peripheral           Assessment:  Active Hospital Problems    Diagnosis   • *FUO (fever of unknown origin) " [R50.9]   • MSSA bacteremia [R78.81]   • Abnormal CT of the chest [R93.89]   • Chronic cough [R05]   • Bilateral pleural effusion [J90]   • Septic arthritis of knee, right (HCC) [M00.9]   • Hyponatremia [E87.1]     Interval 24 hour assessment:      AF, O2 Ra  Labs reviewed  Micro reviewed    Pt continued on ertapenem.  She is remains afebrile.  Still with some fatigue and lightheadedness this morning.  The diarrhea has stopped.      Assessment      Bernadette Paul is a 74 y.o.  with no significant past medical history who presented in 11/2019 complaining of right knee pain and several weeks of fatigue.  Orthopedics was consulted at the time and she went to the OR on 11/15 for I&D of right septic knee.  A large amount of purulent fluid was encountered per op note.  Cultures were sent +MSSA.  She was also found to have an MSSA bacteremia with blood cultures positive on 11/15.  Repeat blood cultures on 11/17 were negative.  She underwent TTE on 11/18 which was negative for endocarditis.  Plan was to continue IV antibiotics until 12/15/19 with daptomycin 8 mg/kg home infusion and come into [facility] weekly for labs and dressing changes.     She is now admitted after presenting to the ER on 12/4 complaining of 1 day fever and chills.  No new right knee pain and PICC line is still in place.  ID was consulted and she was transitioned to  Cefazolin.      Fevers, new, improved - work-up in progress-so far etiology is unknown.  Drug fever on the differential as she improved within 48 hours of stopping daptomycin and her fevers were not associated with tachycardia which would support a drug fever. PICC line infection is another possibility, line removed, so far cath tip with no growth.  She has chronic appearing lung disease as discussed below.   -Chest x-ray unremarkable  -Blood cultures on 12/4, 1 obtained from PICC -no growth to date   -NICOLE on 12/6 with no vegetations or significant valvular disease, EF 65%  -PICC removed  on 12/6 and cath tip sent for culture- NGTD  -CT abdomen pelvis on 12/6 with no significant disease to explain fever.  She does have some right lower lobe atelectasis versus pneumonia and small pleural effusions  ESR 21 on 12/3,  12 on 11/26, and 44 on 11/15      Right knee septic arthritis, with MSSA, on daptomycin prior to admission  MSSA bacteremia, on daptomycin prior to admission  -Plan is to continue antibiotics through 12/15/19     Cough, nonproductive, ongoing for several months, started prior to daptomycin initiation so less likely Daptomycin-induced Eosinophilic Pneumonia and would been bronch with cell count (eosinophils) to investigate -  will not restart dapto regardless   - CT chest on 12/7 with bilateral subpleural interstitial opacities with hazy groundglass opacities. This could represent changes of mild interstitial edema versus atypical pneumonitis or underlying chronic interstitial lung disease. No acute pneumonia.   - Procalcitonin 0.07 on 12/8      Fatigue, ongoing for several months  Unintentional weight loss - 8 pounds over 2-3 months      Somnolent - new   Watery diarrhea- new         Plan:  --- Transition to ertapenem-  end date 12/15/19 - will not re-start daptomycin   --- She is refusing midline and they prefer to go into the outpatient infusion center to complete her antibiotic course-  will order ertapenem at [facility]   --- F/up cath tip cultures to final   ---  Hospitalist will discuss if she has had recent screenings such as colonoscopy and mammogram  ---  If any new fevers, any new back or joint pain would order MRI spine or MRI of affected joint   --- After completion of antibiotics monitor right knee for any new erythema, tenderness or drainage and seek care if these occur   ---  Follow-up with pulmonology for possible interstitial lung disease or chronic pneumonitis -hospitalist will discussed inpatient consult   --- C. difficile testing for rule out-patient is asymptomatic and no  need to wait C. difficile finalization  --- Follow-up in ID clinic with me on 12/17      --- Okay for discharge from ID perspective     Discussed with internal medicine, Dr. Bowman. ID will sign off.

## 2019-12-10 ENCOUNTER — OUTPATIENT INFUSION SERVICES (OUTPATIENT)
Dept: ONCOLOGY | Facility: MEDICAL CENTER | Age: 74
End: 2019-12-10
Attending: NURSE PRACTITIONER
Payer: MEDICARE

## 2019-12-10 VITALS
OXYGEN SATURATION: 93 % | HEART RATE: 77 BPM | SYSTOLIC BLOOD PRESSURE: 142 MMHG | WEIGHT: 136.47 LBS | TEMPERATURE: 97.6 F | RESPIRATION RATE: 18 BRPM | HEIGHT: 64 IN | BODY MASS INDEX: 23.3 KG/M2 | DIASTOLIC BLOOD PRESSURE: 67 MMHG

## 2019-12-10 DIAGNOSIS — R78.81 MSSA BACTEREMIA: ICD-10-CM

## 2019-12-10 DIAGNOSIS — B95.61 MSSA BACTEREMIA: ICD-10-CM

## 2019-12-10 LAB
ALBUMIN SERPL BCP-MCNC: 3.8 G/DL (ref 3.2–4.9)
ALBUMIN/GLOB SERPL: 1.6 G/DL
ALP SERPL-CCNC: 63 U/L (ref 30–99)
ALT SERPL-CCNC: 30 U/L (ref 2–50)
ANION GAP SERPL CALC-SCNC: 8 MMOL/L (ref 0–11.9)
AST SERPL-CCNC: 32 U/L (ref 12–45)
BASOPHILS # BLD AUTO: 0.5 % (ref 0–1.8)
BASOPHILS # BLD: 0.03 K/UL (ref 0–0.12)
BILIRUB SERPL-MCNC: 0.3 MG/DL (ref 0.1–1.5)
BUN SERPL-MCNC: 10 MG/DL (ref 8–22)
CALCIUM SERPL-MCNC: 9.4 MG/DL (ref 8.5–10.5)
CCP IGG SERPL-ACNC: 6 UNITS (ref 0–19)
CHLORIDE SERPL-SCNC: 98 MMOL/L (ref 96–112)
CO2 SERPL-SCNC: 26 MMOL/L (ref 20–33)
CREAT SERPL-MCNC: 0.77 MG/DL (ref 0.5–1.4)
CRP SERPL HS-MCNC: 3.96 MG/DL (ref 0–0.75)
EOSINOPHIL # BLD AUTO: 0.49 K/UL (ref 0–0.51)
EOSINOPHIL NFR BLD: 8.7 % (ref 0–6.9)
ERYTHROCYTE [DISTWIDTH] IN BLOOD BY AUTOMATED COUNT: 43.1 FL (ref 35.9–50)
ERYTHROCYTE [SEDIMENTATION RATE] IN BLOOD BY WESTERGREN METHOD: 70 MM/HOUR (ref 0–30)
GLOBULIN SER CALC-MCNC: 2.4 G/DL (ref 1.9–3.5)
GLUCOSE SERPL-MCNC: 100 MG/DL (ref 65–99)
HCT VFR BLD AUTO: 36.7 % (ref 37–47)
HGB BLD-MCNC: 12.5 G/DL (ref 12–16)
IGE SERPL-ACNC: 30 KU/L
IMM GRANULOCYTES # BLD AUTO: 0.08 K/UL (ref 0–0.11)
IMM GRANULOCYTES NFR BLD AUTO: 1.4 % (ref 0–0.9)
LYMPHOCYTES # BLD AUTO: 0.94 K/UL (ref 1–4.8)
LYMPHOCYTES NFR BLD: 16.8 % (ref 22–41)
MCH RBC QN AUTO: 30.9 PG (ref 27–33)
MCHC RBC AUTO-ENTMCNC: 34.1 G/DL (ref 33.6–35)
MCV RBC AUTO: 90.8 FL (ref 81.4–97.8)
MONOCYTES # BLD AUTO: 0.46 K/UL (ref 0–0.85)
MONOCYTES NFR BLD AUTO: 8.2 % (ref 0–13.4)
NEUTROPHILS # BLD AUTO: 3.61 K/UL (ref 2–7.15)
NEUTROPHILS NFR BLD: 64.4 % (ref 44–72)
NRBC # BLD AUTO: 0 K/UL
NRBC BLD-RTO: 0 /100 WBC
NUCLEAR IGG SER QL IA: NORMAL
PLATELET # BLD AUTO: 325 K/UL (ref 164–446)
PMV BLD AUTO: 9.1 FL (ref 9–12.9)
POTASSIUM SERPL-SCNC: 4 MMOL/L (ref 3.6–5.5)
PROT SERPL-MCNC: 6.2 G/DL (ref 6–8.2)
RBC # BLD AUTO: 4.04 M/UL (ref 4.2–5.4)
SODIUM SERPL-SCNC: 132 MMOL/L (ref 135–145)
WBC # BLD AUTO: 5.6 K/UL (ref 4.8–10.8)

## 2019-12-10 PROCEDURE — 700111 HCHG RX REV CODE 636 W/ 250 OVERRIDE (IP): Performed by: INTERNAL MEDICINE

## 2019-12-10 PROCEDURE — 86140 C-REACTIVE PROTEIN: CPT

## 2019-12-10 PROCEDURE — 700105 HCHG RX REV CODE 258: Performed by: INTERNAL MEDICINE

## 2019-12-10 PROCEDURE — 80053 COMPREHEN METABOLIC PANEL: CPT

## 2019-12-10 PROCEDURE — 96365 THER/PROPH/DIAG IV INF INIT: CPT

## 2019-12-10 PROCEDURE — 85652 RBC SED RATE AUTOMATED: CPT

## 2019-12-10 PROCEDURE — 36415 COLL VENOUS BLD VENIPUNCTURE: CPT

## 2019-12-10 PROCEDURE — 85025 COMPLETE CBC W/AUTO DIFF WBC: CPT

## 2019-12-10 RX ORDER — 0.9 % SODIUM CHLORIDE 0.9 %
10-20 VIAL (ML) INJECTION PRN
Status: CANCELLED | OUTPATIENT
Start: 2019-12-11

## 2019-12-10 RX ORDER — 0.9 % SODIUM CHLORIDE 0.9 %
5 VIAL (ML) INJECTION PRN
Status: CANCELLED | OUTPATIENT
Start: 2019-12-11

## 2019-12-10 RX ADMIN — SODIUM CHLORIDE 1000 MG: 900 INJECTION INTRAVENOUS at 14:55

## 2019-12-10 NOTE — PROGRESS NOTES
Pt arrived ambulatory to \[facility]\"" for Invanz infusion. Pt states Midline dc'd yesterday. POC discussed with pt and she agrees with plan. PIV established after 2 attempts. Blood draw completed, specimens to lab. Pt medicated per MAR. Pt tolerated infusion without s/s adverse reaction. PIV flushed with NS, brisk blood return. Site wrapped with gauze and coban for infusion tomorrow. Pt discharged to self care, NAD. Pt given printout of future appts.

## 2019-12-10 NOTE — DISCHARGE INSTRUCTIONS
Discharge Instructions    Discharged to home by car with relative. Discharged via wheelchair, hospital escort: Yes.  Special equipment needed: Not Applicable    Be sure to schedule a follow-up appointment with your primary care doctor or any specialists as instructed.     Discharge Plan:   Diet Plan: Discussed  Activity Level: Discussed  Confirmed Follow up Appointment: Patient to Call and Schedule Appointment  Confirmed Symptoms Management: Discussed  Medication Reconciliation Updated: Yes  Influenza Vaccine Indication: Patient Refuses    I understand that a diet low in cholesterol, fat, and sodium is recommended for good health. Unless I have been given specific instructions below for another diet, I accept this instruction as my diet prescription.   Other diet: regular      Special Instructions: None    · Is patient discharged on Warfarin / Coumadin?   No     Depression / Suicide Risk    As you are discharged from this [facility] facility, it is important to learn how to keep safe from harming yourself.    Recognize the warning signs:  · Abrupt changes in personality, positive or negative- including increase in energy   · Giving away possessions  · Change in eating patterns- significant weight changes-  positive or negative  · Change in sleeping patterns- unable to sleep or sleeping all the time   · Unwillingness or inability to communicate  · Depression  · Unusual sadness, discouragement and loneliness  · Talk of wanting to die  · Neglect of personal appearance   · Rebelliousness- reckless behavior  · Withdrawal from people/activities they love  · Confusion- inability to concentrate     If you or a loved one observes any of these behaviors or has concerns about self-harm, here's what you can do:  · Talk about it- your feelings and reasons for harming yourself  · Remove any means that you might use to hurt yourself (examples: pills, rope, extension cords, firearm)  · Get professional help from the community  (Mental Health, Substance Abuse, psychological counseling)  · Do not be alone:Call your Safe Contact- someone whom you trust who will be there for you.  · Call your local CRISIS HOTLINE 105-5997 or 182-475-7944  · Call your local Children's Mobile Crisis Response Team [place] (056) 411-6543 or www.[employer]  · Call the toll free National Suicide Prevention Hotlines   · National Suicide Prevention Lifeline 977-198-BTMV (9867)  · National Hope Line Network 800-SUICIDE (501-7451)

## 2019-12-11 ENCOUNTER — OUTPATIENT INFUSION SERVICES (OUTPATIENT)
Dept: ONCOLOGY | Facility: MEDICAL CENTER | Age: 74
End: 2019-12-11
Attending: INTERNAL MEDICINE
Payer: MEDICARE

## 2019-12-11 ENCOUNTER — TELEPHONE (OUTPATIENT)
Dept: MEDICAL GROUP | Facility: MEDICAL CENTER | Age: 74
End: 2019-12-11

## 2019-12-11 VITALS
DIASTOLIC BLOOD PRESSURE: 54 MMHG | RESPIRATION RATE: 18 BRPM | TEMPERATURE: 98 F | HEART RATE: 73 BPM | SYSTOLIC BLOOD PRESSURE: 132 MMHG | OXYGEN SATURATION: 94 %

## 2019-12-11 DIAGNOSIS — B95.61 MSSA BACTEREMIA: ICD-10-CM

## 2019-12-11 DIAGNOSIS — J84.9 INTERSTITIAL PULMONARY DISEASE (HCC): ICD-10-CM

## 2019-12-11 DIAGNOSIS — R78.81 MSSA BACTEREMIA: ICD-10-CM

## 2019-12-11 PROBLEM — M00.9 SEPTIC ARTHRITIS OF KNEE, RIGHT (HCC): Status: RESOLVED | Noted: 2019-12-04 | Resolved: 2019-12-11

## 2019-12-11 PROBLEM — R50.9 FUO (FEVER OF UNKNOWN ORIGIN): Status: RESOLVED | Noted: 2019-12-04 | Resolved: 2019-12-11

## 2019-12-11 PROBLEM — R05.3 CHRONIC COUGH: Status: RESOLVED | Noted: 2019-12-08 | Resolved: 2019-12-11

## 2019-12-11 PROBLEM — R53.82 CHRONIC FATIGUE: Status: RESOLVED | Noted: 2019-11-16 | Resolved: 2019-12-11

## 2019-12-11 PROBLEM — R74.01 TRANSAMINITIS: Status: RESOLVED | Noted: 2019-11-17 | Resolved: 2019-12-11

## 2019-12-11 LAB
FUNGUS SPEC CULT: NORMAL
SIGNIFICANT IND 70042: NORMAL
SITE SITE: NORMAL
SOURCE SOURCE: NORMAL

## 2019-12-11 PROCEDURE — 700111 HCHG RX REV CODE 636 W/ 250 OVERRIDE (IP): Performed by: INTERNAL MEDICINE

## 2019-12-11 PROCEDURE — 96365 THER/PROPH/DIAG IV INF INIT: CPT

## 2019-12-11 PROCEDURE — 700105 HCHG RX REV CODE 258: Performed by: INTERNAL MEDICINE

## 2019-12-11 RX ORDER — 0.9 % SODIUM CHLORIDE 0.9 %
10-20 VIAL (ML) INJECTION PRN
Status: CANCELLED | OUTPATIENT
Start: 2019-12-12

## 2019-12-11 RX ORDER — 0.9 % SODIUM CHLORIDE 0.9 %
5 VIAL (ML) INJECTION PRN
Status: CANCELLED | OUTPATIENT
Start: 2019-12-12

## 2019-12-11 RX ADMIN — SODIUM CHLORIDE 1000 MG: 900 INJECTION INTRAVENOUS at 15:35

## 2019-12-11 NOTE — TELEPHONE ENCOUNTER
Bernadette Paul  [phone]    Pt needs you to write a letter explaining that she had to cancel her vacation due to health issues. Trip was scheduled for 12/23/19 thru 1/1/20.

## 2019-12-12 ENCOUNTER — OUTPATIENT INFUSION SERVICES (OUTPATIENT)
Dept: ONCOLOGY | Facility: MEDICAL CENTER | Age: 74
End: 2019-12-12
Attending: NURSE PRACTITIONER
Payer: MEDICARE

## 2019-12-12 VITALS
HEART RATE: 68 BPM | SYSTOLIC BLOOD PRESSURE: 123 MMHG | RESPIRATION RATE: 18 BRPM | OXYGEN SATURATION: 95 % | DIASTOLIC BLOOD PRESSURE: 58 MMHG | TEMPERATURE: 98.7 F

## 2019-12-12 DIAGNOSIS — B95.61 MSSA BACTEREMIA: ICD-10-CM

## 2019-12-12 DIAGNOSIS — R78.81 MSSA BACTEREMIA: ICD-10-CM

## 2019-12-12 PROCEDURE — 700111 HCHG RX REV CODE 636 W/ 250 OVERRIDE (IP): Performed by: INTERNAL MEDICINE

## 2019-12-12 PROCEDURE — 700105 HCHG RX REV CODE 258: Performed by: INTERNAL MEDICINE

## 2019-12-12 PROCEDURE — 96365 THER/PROPH/DIAG IV INF INIT: CPT

## 2019-12-12 RX ORDER — 0.9 % SODIUM CHLORIDE 0.9 %
10-20 VIAL (ML) INJECTION PRN
Status: CANCELLED | OUTPATIENT
Start: 2019-12-13

## 2019-12-12 RX ORDER — 0.9 % SODIUM CHLORIDE 0.9 %
5 VIAL (ML) INJECTION PRN
Status: CANCELLED | OUTPATIENT
Start: 2019-12-13

## 2019-12-12 RX ADMIN — SODIUM CHLORIDE 1000 MG: 900 INJECTION INTRAVENOUS at 16:26

## 2019-12-12 NOTE — TELEPHONE ENCOUNTER
Letter has been printed, however our toner must be running low on the back printer.    Do you mind printing of the letter again for her and notifying her?

## 2019-12-12 NOTE — PROGRESS NOTES
Patient to infusion for daily Invanz.  PIV from yesterday with blood return and flushing.  Patient very fatigued.  Per patient this has been occurring since the current issue began.  Invanz infused as ordered without issue.  PIV saline locked and patient discharged ambulatory to home in stable condition.

## 2019-12-13 ENCOUNTER — OUTPATIENT INFUSION SERVICES (OUTPATIENT)
Dept: ONCOLOGY | Facility: MEDICAL CENTER | Age: 74
End: 2019-12-13
Attending: NURSE PRACTITIONER
Payer: MEDICARE

## 2019-12-13 VITALS
HEART RATE: 83 BPM | BODY MASS INDEX: 23.59 KG/M2 | OXYGEN SATURATION: 94 % | DIASTOLIC BLOOD PRESSURE: 63 MMHG | RESPIRATION RATE: 18 BRPM | SYSTOLIC BLOOD PRESSURE: 136 MMHG | TEMPERATURE: 97.4 F | WEIGHT: 136.47 LBS

## 2019-12-13 DIAGNOSIS — R78.81 MSSA BACTEREMIA: ICD-10-CM

## 2019-12-13 DIAGNOSIS — B95.61 MSSA BACTEREMIA: ICD-10-CM

## 2019-12-13 PROCEDURE — 700111 HCHG RX REV CODE 636 W/ 250 OVERRIDE (IP): Performed by: INTERNAL MEDICINE

## 2019-12-13 PROCEDURE — 700105 HCHG RX REV CODE 258: Performed by: INTERNAL MEDICINE

## 2019-12-13 PROCEDURE — 96365 THER/PROPH/DIAG IV INF INIT: CPT

## 2019-12-13 RX ORDER — 0.9 % SODIUM CHLORIDE 0.9 %
10-20 VIAL (ML) INJECTION PRN
Status: CANCELLED | OUTPATIENT
Start: 2019-12-14

## 2019-12-13 RX ORDER — 0.9 % SODIUM CHLORIDE 0.9 %
5 VIAL (ML) INJECTION PRN
Status: CANCELLED | OUTPATIENT
Start: 2019-12-14

## 2019-12-13 RX ADMIN — SODIUM CHLORIDE 1000 MG: 900 INJECTION INTRAVENOUS at 17:00

## 2019-12-13 NOTE — PROGRESS NOTES
Pt returns for daily IV abx for septic knee. PIV in place from prior appt. Pt preferring to have PIVs until end of treatment due to complications with midline. PIV flushed and patent, negative for blood return. Pt denies pain with flushing and site appears intact with no redness or swelling. Invanz infused per MAR. Pt emily well. Line flushed clear. IV flushed and wrapped for tomorrow appt. Pt would prefer to keep IV as long as it's viable. Pt discharged home under care of  in no apparent distress.

## 2019-12-14 ENCOUNTER — OUTPATIENT INFUSION SERVICES (OUTPATIENT)
Dept: ONCOLOGY | Facility: MEDICAL CENTER | Age: 74
End: 2019-12-14
Attending: NURSE PRACTITIONER
Payer: MEDICARE

## 2019-12-14 VITALS
RESPIRATION RATE: 18 BRPM | OXYGEN SATURATION: 95 % | SYSTOLIC BLOOD PRESSURE: 125 MMHG | TEMPERATURE: 97.2 F | HEART RATE: 75 BPM | DIASTOLIC BLOOD PRESSURE: 64 MMHG

## 2019-12-14 DIAGNOSIS — B95.61 MSSA BACTEREMIA: ICD-10-CM

## 2019-12-14 DIAGNOSIS — R78.81 MSSA BACTEREMIA: ICD-10-CM

## 2019-12-14 PROCEDURE — 700105 HCHG RX REV CODE 258: Performed by: INTERNAL MEDICINE

## 2019-12-14 PROCEDURE — 96365 THER/PROPH/DIAG IV INF INIT: CPT

## 2019-12-14 PROCEDURE — 700111 HCHG RX REV CODE 636 W/ 250 OVERRIDE (IP): Performed by: INTERNAL MEDICINE

## 2019-12-14 RX ORDER — 0.9 % SODIUM CHLORIDE 0.9 %
10-20 VIAL (ML) INJECTION PRN
Status: CANCELLED | OUTPATIENT
Start: 2019-12-15

## 2019-12-14 RX ORDER — 0.9 % SODIUM CHLORIDE 0.9 %
5 VIAL (ML) INJECTION PRN
Status: CANCELLED | OUTPATIENT
Start: 2019-12-15

## 2019-12-14 RX ADMIN — SODIUM CHLORIDE 1000 MG: 900 INJECTION INTRAVENOUS at 15:42

## 2019-12-14 NOTE — PROGRESS NOTES
returns for IVABX. Invanz infused as ordered. Cordelia tolerated well and without incident. RFA-SL in good condition, no blood return. PIV flushed with saline per protocol. Cordelia DC'd home with  in good condition and NAD. Returns daily. Appointment confirm for next treatment.

## 2019-12-15 ENCOUNTER — OUTPATIENT INFUSION SERVICES (OUTPATIENT)
Dept: ONCOLOGY | Facility: MEDICAL CENTER | Age: 74
End: 2019-12-15
Attending: NURSE PRACTITIONER
Payer: MEDICARE

## 2019-12-15 VITALS
DIASTOLIC BLOOD PRESSURE: 57 MMHG | TEMPERATURE: 97.6 F | RESPIRATION RATE: 18 BRPM | SYSTOLIC BLOOD PRESSURE: 107 MMHG | OXYGEN SATURATION: 96 % | HEART RATE: 72 BPM

## 2019-12-15 DIAGNOSIS — R78.81 MSSA BACTEREMIA: ICD-10-CM

## 2019-12-15 DIAGNOSIS — B95.61 MSSA BACTEREMIA: ICD-10-CM

## 2019-12-15 PROCEDURE — 700111 HCHG RX REV CODE 636 W/ 250 OVERRIDE (IP): Performed by: INTERNAL MEDICINE

## 2019-12-15 PROCEDURE — 700105 HCHG RX REV CODE 258: Performed by: INTERNAL MEDICINE

## 2019-12-15 PROCEDURE — 96365 THER/PROPH/DIAG IV INF INIT: CPT

## 2019-12-15 RX ORDER — 0.9 % SODIUM CHLORIDE 0.9 %
10-20 VIAL (ML) INJECTION PRN
Status: CANCELLED | OUTPATIENT
Start: 2019-12-16

## 2019-12-15 RX ORDER — 0.9 % SODIUM CHLORIDE 0.9 %
5 VIAL (ML) INJECTION PRN
Status: CANCELLED | OUTPATIENT
Start: 2019-12-16

## 2019-12-15 RX ADMIN — SODIUM CHLORIDE 1000 MG: 900 INJECTION INTRAVENOUS at 15:47

## 2019-12-16 NOTE — PROGRESS NOTES
Pt arrived ambulatory to [facility] for daily Ertapenem infusion.  POC discussed.  PIV in place to RFA, line flushes easily, no blood return noted.  Medication infused as ordered without complication.  Line flushed, removed, and site covered with gauze and coban.  No additional appointments.  Pt discharged from IS in NAD with spouse.

## 2019-12-17 ENCOUNTER — OFFICE VISIT (OUTPATIENT)
Dept: INFECTIOUS DISEASES | Facility: MEDICAL CENTER | Age: 74
End: 2019-12-17
Payer: MEDICARE

## 2019-12-17 VITALS
OXYGEN SATURATION: 96 % | HEIGHT: 65 IN | HEART RATE: 66 BPM | SYSTOLIC BLOOD PRESSURE: 100 MMHG | WEIGHT: 129 LBS | TEMPERATURE: 98.3 F | BODY MASS INDEX: 21.49 KG/M2 | DIASTOLIC BLOOD PRESSURE: 58 MMHG

## 2019-12-17 DIAGNOSIS — M00.061 STAPHYLOCOCCAL ARTHRITIS OF RIGHT KNEE (HCC): ICD-10-CM

## 2019-12-17 DIAGNOSIS — R78.81 MSSA BACTEREMIA: ICD-10-CM

## 2019-12-17 DIAGNOSIS — R93.89 ABNORMAL CT OF THE CHEST: ICD-10-CM

## 2019-12-17 DIAGNOSIS — B95.61 MSSA BACTEREMIA: ICD-10-CM

## 2019-12-17 DIAGNOSIS — R70.0 ELEVATED SED RATE: ICD-10-CM

## 2019-12-17 PROCEDURE — 99213 OFFICE O/P EST LOW 20 MIN: CPT | Performed by: INTERNAL MEDICINE

## 2019-12-17 RX ORDER — DOXYCYCLINE HYCLATE 100 MG
100 TABLET ORAL 2 TIMES DAILY
Qty: 60 TAB | Refills: 1 | Status: SHIPPED | OUTPATIENT
Start: 2019-12-17 | End: 2020-01-29

## 2019-12-17 NOTE — PROGRESS NOTES
Infectious Disease Clinic    Subjective:     Chief Complaint   Patient presents with   • Hospital Follow-up     Septic arthritis of the right knee, MSSA Bacteremia, FUO        Bernadette Paul is a 74 y.o.  with no significant past medical history who presented in 11/2019 complaining of right knee pain and several weeks of fatigue.  Orthopedics was consulted at the time and she went to the OR on 11/15 for I&D of right septic knee.  A large amount of purulent fluid was encountered per op note.  Cultures were sent +MSSA.  She was also found to have an MSSA bacteremia with blood cultures positive on 11/15.  Repeat blood cultures on 11/17 were negative.  She underwent TTE on 11/18 which was negative for endocarditis.  Plan was to continue IV antibiotics until 12/15/19 with daptomycin 8 mg/kg home infusion and come into [facility] weekly for labs and dressing changes.     She was then admitted after presenting to the ER on 12/4 complaining of 1 day fever and chills.  No new right knee pain and PICC line is still in place.  ID was consulted and she was transitioned to  Cefazolin.  ID was consulted and work-up for fevers as below.  Daptomycin was transitioned to ertapenem and fevers improved. PICC line infection as a cause of fever was another possibility and PICC line was removed.  Cath tip sent for culture and no growth.  Imaging was unremarkable other than CT chest as discussed below.  She is basically asymptomatic other than a mild persistent cough that she has had for several months as well as fatigue that she also described as present for several months.     -Chest x-ray unremarkable  -Blood cultures on 12/4, 1 obtained from PICC -no growth at 13 days  -NICOLE on 12/6 with no vegetations or significant valvular disease, EF 65%  -PICC removed on 12/6 and cath tip sent for culture-no growth  -CT abdomen pelvis on 12/6 with no significant disease to explain fever.  She does have some right lower lobe atelectasis versus  pneumonia and small pleural effusions  ESR 21 on 12/3,  12 on 11/26, and 44 on 11/15      Right knee septic arthritis, with MSSA, on daptomycin prior to admission  MSSA bacteremia, on daptomycin prior to admission  -Plan is to continue antibiotics through 12/15/19     Cough, nonproductive, ongoing for several months, started prior to daptomycin initiation so less likely Daptomycin-induced Eosinophilic Pneumonia and would been bronch with cell count (eosinophils) to investigate -  will not restart dapto regardless   - CT chest on 12/7 with bilateral subpleural interstitial opacities with hazy groundglass opacities. This could represent changes of mild interstitial edema versus atypical pneumonitis or underlying chronic interstitial lung disease. No acute pneumonia.   - Procalcitonin 0.07 on 12/8      Fatigue, ongoing for several months  Unintentional weight loss - 8 pounds over 2-3 months     Watery diarrhea-present for 1 day only, C. difficile tested and negative     As her fevers resolved and she had been seen by pulmonology for CT chest irregularities she was discharged with plan to continue ertapenem until 12/15/2019.  Follow-up with pulmonology.      Hospital records reviewed    Today, 12/17/2019: Patient reports feeling well.  She completed her antibiotics as planned on 12/15.  She states that she still has some ongoing fatigue and takes frequent naps but this is somewhat improved.  She is just driving some mild cough but also says is improved and no significant shortness of breath.  She again denies any back pain or focal neurologic changes.  She has no pain, edema, erythema or drainage from the right knee.  Labs as of 12/10 were significant for some increase in ESR and CRP.  She denies any headaches, nausea, vomiting, abdominal pain, rash or myalgias.      ROS  As documented above in my HPI.    Past Medical History:   Diagnosis Date   • Allergic rhinitis 6/17/2009   • Dyslipidemia 6/17/2009   • Preventative  "health care 6/17/2009   • S/P parathyroidectomy 6/17/2009       Social History     Tobacco Use   • Smoking status: Never Smoker   • Smokeless tobacco: Never Used   Substance Use Topics   • Alcohol use: Yes     Alcohol/week: 0.0 oz     Frequency: Monthly or less     Comment: occ wine    • Drug use: Never       Allergies: Asa [aspirin] and Nsaids    Pt's medication and problem list reviewed.     Objective:     PE:  /58 (BP Location: Left arm, Patient Position: Sitting, BP Cuff Size: Adult)   Pulse 66   Temp 36.8 °C (98.3 °F) (Temporal)   Ht 1.651 m (5' 5\")   Wt 58.5 kg (129 lb)   SpO2 96%   Breastfeeding? No   BMI 21.47 kg/m²     Vital signs reviewed    Constitutional: Appears well-developed and well-nourished. No acute distress.  Speech fluent.    Eyes: Conjunctivae normal and EOM are normal. Pupils are equal, round, and reactive to light.   ENMT: Lips without lesions, good dentition.  Oropharynx is clear and moist.  Neck: Trachea midline. Normal range of motion. Neck supple. No masses.  No JVD.  Cardiovascular: Normal rate, regular rhythm, normal heart sounds and intact distal pulses. No murmur, gallop, or friction rub. No edema.  Respiratory: No respiratory distress, unlabored respiratory effort.  Lungs clear to auscultation bilaterally. No wheezes or rales.   Abdomen: Soft, non tender, non-distended. BS + x 4. No masses or hepatosplenomegaly.   Musculoskeletal: Steady gait.  Right knee with very slight edema, no erythema, no tenderness, no drainage.  Right knee is warmer to touch than left.  Skin: Warm and dry. Good turgor. No visible rashes or lesions.  Neurological: No cranial nerve deficit. Coordination normal.  Sensation intact.  Psychiatric: Alert and oriented to person, place, and time. Normal mood, calm affect.  Normal behavior and judgment.     Labs:  WBC   Date/Time Value Ref Range Status   12/10/2019 02:49 PM 5.6 4.8 - 10.8 K/uL Final     RBC   Date/Time Value Ref Range Status   12/10/2019 " 02:49 PM 4.04 (L) 4.20 - 5.40 M/uL Final     Hemoglobin   Date/Time Value Ref Range Status   12/10/2019 02:49 PM 12.5 12.0 - 16.0 g/dL Final     Hematocrit   Date/Time Value Ref Range Status   12/10/2019 02:49 PM 36.7 (L) 37.0 - 47.0 % Final     MCV   Date/Time Value Ref Range Status   12/10/2019 02:49 PM 90.8 81.4 - 97.8 fL Final     MCH   Date/Time Value Ref Range Status   12/10/2019 02:49 PM 30.9 27.0 - 33.0 pg Final     MCHC   Date/Time Value Ref Range Status   12/10/2019 02:49 PM 34.1 33.6 - 35.0 g/dL Final     MPV   Date/Time Value Ref Range Status   12/10/2019 02:49 PM 9.1 9.0 - 12.9 fL Final        Sodium   Date/Time Value Ref Range Status   12/10/2019 02:49  (L) 135 - 145 mmol/L Final     Potassium   Date/Time Value Ref Range Status   12/10/2019 02:49 PM 4.0 3.6 - 5.5 mmol/L Final     Chloride   Date/Time Value Ref Range Status   12/10/2019 02:49 PM 98 96 - 112 mmol/L Final     Co2   Date/Time Value Ref Range Status   12/10/2019 02:49 PM 26 20 - 33 mmol/L Final     Glucose   Date/Time Value Ref Range Status   12/10/2019 02:49  (H) 65 - 99 mg/dL Final     Bun   Date/Time Value Ref Range Status   12/10/2019 02:49 PM 10 8 - 22 mg/dL Final     Creatinine   Date/Time Value Ref Range Status   12/10/2019 02:49 PM 0.77 0.50 - 1.40 mg/dL Final   06/01/2005 08:44 AM 0.9 0.5 - 1.4 mg/dL Final       Alkaline Phosphatase   Date/Time Value Ref Range Status   12/10/2019 02:49 PM 63 30 - 99 U/L Final     AST(SGOT)   Date/Time Value Ref Range Status   12/10/2019 02:49 PM 32 12 - 45 U/L Final     ALT(SGPT)   Date/Time Value Ref Range Status   12/10/2019 02:49 PM 30 2 - 50 U/L Final     Total Bilirubin   Date/Time Value Ref Range Status   12/10/2019 02:49 PM 0.3 0.1 - 1.5 mg/dL Final        CPK Total   Date/Time Value Ref Range Status   11/22/2019 01:35 AM 21 0 - 154 U/L Final        Assessment and Plan:   The following treatment plan was discussed with patient at length:    1. MSSA bacteremia     2.  Staphylococcal arthritis of right knee (HCC)     3. Abnormal CT of the chest     4. Elevated sed rate       --- Patient is doing well overall no significant symptoms.  However, some elevation in ESR and CRP in the right knee is still warm to touch so we will transition her IV antibiotics to doxycycline 100 mg twice daily to complete another 4 weeks  --- Discussed side effects of doxycycline and proper administration  --- Follow-up labs in 4 weeks with CMP P, CBC, ESR and CRP  --- Return to clinic in 4 weeks and if doing well and labs have returned normal may be able to stop antibiotics  --- She is not tolerated doxycycline for any reason will likely stop  --- Follow-up with pulmonology as planned to discuss lab results and repeat CT chest    Discussed dosing and side effects of medications being prescribed.  Pt instructed to call clinic with any adverse effects or to discontinue the medication and go to the ER should difficulty breathing, SOB, CP, facial swelling and/or gross rash/itching occurs.     Follow up: 4 weeks, RTC sooner if needed. FU with PCP for ongoing chronic medical conditions.     Carlota Richards M.D.       Please note that this dictation was created using voice recognition software. I have  worked with technical experts from [employer] to optimize the interface.  I have made every reasonable attempt to correct obvious errors, but there may be errors of grammar and possibly content that I did not discover before finalizing the note.

## 2019-12-18 ENCOUNTER — OFFICE VISIT (OUTPATIENT)
Dept: PULMONOLOGY | Facility: HOSPICE | Age: 74
End: 2019-12-18
Payer: MEDICARE

## 2019-12-18 VITALS
BODY MASS INDEX: 21.33 KG/M2 | WEIGHT: 128 LBS | DIASTOLIC BLOOD PRESSURE: 70 MMHG | RESPIRATION RATE: 16 BRPM | HEART RATE: 74 BPM | OXYGEN SATURATION: 94 % | HEIGHT: 65 IN | SYSTOLIC BLOOD PRESSURE: 110 MMHG

## 2019-12-18 DIAGNOSIS — R05.3 PERSISTENT DRY COUGH: ICD-10-CM

## 2019-12-18 DIAGNOSIS — R93.89 ABNORMAL CT SCAN, CHEST: ICD-10-CM

## 2019-12-18 PROCEDURE — 99213 OFFICE O/P EST LOW 20 MIN: CPT | Performed by: PHYSICIAN ASSISTANT

## 2019-12-18 ASSESSMENT — ENCOUNTER SYMPTOMS
FEVER: 0
INSOMNIA: 0
CLAUDICATION: 0
PALPITATIONS: 0
COUGH: 1
SPUTUM PRODUCTION: 0
WHEEZING: 0
SINUS PAIN: 0
WEIGHT LOSS: 0
EYES NEGATIVE: 1
TREMORS: 0
DIZZINESS: 0
HEADACHES: 0
HEARTBURN: 0
ORTHOPNEA: 0
DIAPHORESIS: 0
SHORTNESS OF BREATH: 0

## 2019-12-18 NOTE — PROGRESS NOTES
CC    HPI:  Bernadette Paul is a 74 y.o. year old female here today for follow-up on recent hospitalization for fever of unknown origin from 12/4/2019 to 12/9/2019.  Patient hospitalized November 15, 2020 19-11 22 2019 for pyogenic arthritis right knee joint.  Patient was seen by pulmonology while in the hospital.  There was concern regarding CT results 12/7/2019 and desire for follow-up with patient to assure no respiratory issues.  Patient needs follow-up CT in 6 weeks and appointment to review results.  Patient not previously seen in our clinic.  She is a never smoker.    Pertinent past medical history includes allergic rhinitis, bilateral pleural effusion, MSSA bacteremia, vitiligo, restless leg syndrome and history of bilateral pleural effusion.  History of parathyroid surgery in 2000, patient previously lived in [facility] before moving to [place] and [address].  Reports she worked as a  before retiring.    Reviewed in clinic vitals including blood pressure 110/70, heart rate of 74, O2 sat of 94% and BMI of 21.3 kg/m².    Reviewed home medication regimen including doxycycline maintenance antibiotics for septic knee 11/15/2019.  Denies prior pulmonary concerns.  No maintenance or rescue inhalers.    Per Dr. Serrano's consult notes, patient did report a cough beginning back in September prior to feeling sick which was nonproductive, more pronounced with deep inspiration.  No history of asthma, recent travel or significant lung disease in her family.  She has had a 10 pound weight loss since October for no known reason with normal appetite.  With the exception of vitiligo no known autoimmune disease.    Reviewed most recent imaging including chest CT obtained 12/7/2019 demonstrating bilateral subpleural interstitial opacities with hazy groundglass opacities, could represent mild interstitial edema versus atypical pneumonitis or underlying chronic interstitial lung disease, no acute  pneumonia, dependent bibasilar atelectasis, very small amounts of dependent bilateral pleural fluid.  Scattered normal-sized mediastinal nodes and hilar nodes.     experiencing chills 12/4/2019 does report cough.  Reports sleeping 10 hours at night plus nap    Review of Systems   Constitutional: Positive for chills (Since 12/4/2019) and malaise/fatigue (Patient reports sleeping 10 hours at night plus a nap). Negative for diaphoresis, fever and weight loss.   HENT: Positive for nosebleeds (occasional right sided ). Negative for congestion, hearing loss, sinus pain, sore throat (Dry raspy voice at time) and tinnitus.    Eyes: Negative.         Prescription eyeglasses   Respiratory: Positive for cough (dry, raspy voice at times). Negative for sputum production, shortness of breath and wheezing.    Cardiovascular: Negative for chest pain, palpitations, orthopnea, claudication and leg swelling.   Gastrointestinal: Negative for heartburn.        No dentures or difficulty swallowing    Skin: Negative.    Neurological: Negative for dizziness, tremors and headaches.        Reports brain fog at times, fatigue like    Psychiatric/Behavioral: The patient does not have insomnia.        Past Medical History:   Diagnosis Date   • Allergic rhinitis 6/17/2009   • Dyslipidemia 6/17/2009   • Preventative health care 6/17/2009   • S/P parathyroidectomy 6/17/2009       Past Surgical History:   Procedure Laterality Date   • NICOLE N/A 12/6/2019    Procedure: ECHOCARDIOGRAM, TRANSESOPHAGEAL;  Surgeon: Marion Dykes M.D.;  Location: SURGERY [facility];  Service: Cardiac   • INCISION AND DRAINAGE ORTHOPEDIC Right 11/15/2019    Procedure: INCISION AND DRAINAGE, RIGHT KNEE  BY ORTHOPEDICS;  Surgeon: Ramsey Navas M.D.;  Location: SURGERY [facility];  Service: Orthopedics       Family History   Problem Relation Age of Onset   • Heart Disease Mother        Social History     Socioeconomic History   • Marital status:   "    Spouse name: Not on file   • Number of children: Not on file   • Years of education: Not on file   • Highest education level: Not on file   Occupational History   • Not on file   Social Needs   • Financial resource strain: Not on file   • Food insecurity:     Worry: Not on file     Inability: Not on file   • Transportation needs:     Medical: Not on file     Non-medical: Not on file   Tobacco Use   • Smoking status: Never Smoker   • Smokeless tobacco: Never Used   Substance and Sexual Activity   • Alcohol use: Yes     Alcohol/week: 0.0 oz     Frequency: Monthly or less     Comment: occ wine    • Drug use: Never   • Sexual activity: Yes     Partners: Male   Lifestyle   • Physical activity:     Days per week: Not on file     Minutes per session: Not on file   • Stress: Not on file   Relationships   • Social connections:     Talks on phone: Not on file     Gets together: Not on file     Attends Judaism service: Not on file     Active member of club or organization: Not on file     Attends meetings of clubs or organizations: Not on file     Relationship status: Not on file   • Intimate partner violence:     Fear of current or ex partner: Not on file     Emotionally abused: Not on file     Physically abused: Not on file     Forced sexual activity: Not on file   Other Topics Concern   • Not on file   Social History Narrative   • Not on file       Allergies as of 12/18/2019 - Reviewed 12/18/2019   Allergen Reaction Noted   • Asa [aspirin]  06/17/2009   • Nsaids  06/17/2009        @Vital signs for this encounter:  Vitals:    12/18/19 0944   Height: 1.651 m (5' 5\")   Weight: 58.1 kg (128 lb)   Weight % change since last entry.: 0 %   BP: 110/70   Pulse: 74   BMI (Calculated): 21.3   Resp: 16       Current medications as of today   Current Outpatient Medications   Medication Sig Dispense Refill   • doxycycline (VIBRAMYCIN) 100 MG Tab Take 1 Tab by mouth 2 times a day. 60 Tab 1   • Cholecalciferol (VITAMIN D PO) Take 1 " Cap by mouth every day. Unknown OTC Strength.     • CALCIUM PO Take 1 Tab by mouth 2 Times a Day. Unknown OTC Strength.     • NS SOLN 100 mL with ertapenem 1 GM SOLR 1,000 mg 1,000 mg by Intravenous route every 24 hours. (Patient not taking: Reported on 12/17/2019) 6 mL 0     No current facility-administered medications for this visit.          Physical Exam:   Gen:           Alert and oriented, No apparent distress. Mood and affect     appropriate, normal interaction with provider.  Eyes:          sclere white, conjunctive moist.  Hearing:     Grossly intact.  Dentition:    Good dentition.  Oropharynx:   Tongue early changes, posterior pharynx without erythema or exudate.  Neck:        Supple, trachea midline, no masses.  Respiratory Effort: No intercostal retractions or use of accessory muscles.   Lung Auscultation:      Clear to auscultation bilaterally; no rales, rhonchi or wheezing.  CV:            Regular rate and rhythm. No edema. No murmurs, rubs or gallops.  Digits, Nails, Ext: No clubbing, cyanosis, petechiae, or nodes.   Skin:        Vitaligo, no rashes, lesions or ulcers noted on back or exposed skin    surfaces.                     Assessment:  1. Abnormal CT scan, chest  CT-CHEST, HIGH RESOLUTION LUNG   2. Persistent dry cough   continue current antibiotics       Immunizations:    Flu: 12/7/2018, recommend updating when feeling better  Pneumovax 23: 1/19/2016  Prevnar 13: 2/10/2017    Plan:  1-continued dry cough  2-on antibiotics  3-has seen GI Dr. Reece  4-endoscopy on hold for current illness  5-continue antibiotic  6-follow up HR CT   7-results appointment end of January     This dictation was created using voice recognition software. The accuracy of the dictation is limited to the abilities of the software. I expect there may be some errors of grammar and possibly content.

## 2019-12-20 ASSESSMENT — ENCOUNTER SYMPTOMS
CHILLS: 1
SORE THROAT: 0

## 2019-12-30 ENCOUNTER — TELEPHONE (OUTPATIENT)
Dept: MEDICAL GROUP | Facility: MEDICAL CENTER | Age: 74
End: 2019-12-30

## 2019-12-30 ENCOUNTER — PATIENT MESSAGE (OUTPATIENT)
Dept: MEDICAL GROUP | Facility: MEDICAL CENTER | Age: 74
End: 2019-12-30

## 2019-12-30 NOTE — LETTER
[email or web address]  Eron Flowers M.D.  [address]  [place] [address]  Fax: [phone]   Authorization for Release/Disclosure of   Protected Health Information   Name: SAMARA CISNEROS : 1945 SSN: [id number]   Address: [address] [place] [address] Phone:    [phone] (home)    I authorize the entity listed below to release/disclose the PHI below to:   Replaced by [facility]/Eron Flowers M.D. and Eron Flowers M.D.   Provider or Entity Name:                                                          Dr Richards (INF Disease)    Address   City, State, Zip   Phone:      Fax:     Reason for request: continuity of care   Information to be released:  Last 3 mo records   [  ] LAST COLONOSCOPY,  including any PATH REPORT and follow-up  [  ] LAST FIT/COLOGUARD RESULT [  ] LAST DEXA  [  ] LAST MAMMOGRAM  [  ] LAST PAP  [  ] LAST LABS [  ] RETINA EXAM REPORT  [  ] IMMUNIZATION RECORDS  [xx  ] Release all info      [  ] Check here and initial the line next to each item to release ALL health information INCLUDING  _____ Care and treatment for drug and / or alcohol abuse  _____ HIV testing, infection status, or AIDS  _____ Genetic Testing    DATES OF SERVICE OR TIME PERIOD TO BE DISCLOSED: _____________  I understand and acknowledge that:  * This Authorization may be revoked at any time by you in writing, except if your health information has already been used or disclosed.  * Your health information that will be used or disclosed as a result of you signing this authorization could be re-disclosed by the recipient. If this occurs, your re-disclosed health information may no longer be protected by State or Federal laws.  * You may refuse to sign this Authorization. Your refusal will not affect your ability to obtain treatment.  * This Authorization becomes effective upon signing and will  on (date) __________.      If no date is indicated, this Authorization will  one (1)  year from the signature date.    Name: Bernadette Paul    Signature:   Date:     12/31/2019       PLEASE FAX REQUESTED RECORDS BACK TO: [phone]

## 2019-12-30 NOTE — TELEPHONE ENCOUNTER
From: Bernadette Paul  To: Eron Flowers M.D.  Sent: 12/30/2019 10:03 AM PST  Subject: Non-Urgent Medical Question    To bring you up to date on my medical condition. On 12/17/19 Infectious Disease Dr. Richards prescribed 30 more days of oral antibiotic, Doxycycline, twice daily and will end on January 17, 2020. I am still napping daily but was told the antibiotic would make me tired. We are scheduled for another trip on January 7, 2020, to [facility]. I was hoping to make this trip but on Saturday I had some issues which now makes me hesitant to leave the country. So this brings us to another insurance claim form which I will drop at your office tomorrow. I would appreciate your filling this out for me and have your office mail it to our [place] address. I am scheduled to see you on January 9, 2020.   Cordelia Paul

## 2020-01-05 ENCOUNTER — TELEPHONE (OUTPATIENT)
Dept: MEDICAL GROUP | Facility: MEDICAL CENTER | Age: 75
End: 2020-01-05

## 2020-01-06 NOTE — TELEPHONE ENCOUNTER
Please notify patient that we received her form.  Thank her for attaching the additional form for information.      I have completed my portion of the new form, she needs to complete her claimant portion, we are mailing that back to her as she was kind enough to provide a self address that envelope.

## 2020-01-09 ENCOUNTER — OFFICE VISIT (OUTPATIENT)
Dept: MEDICAL GROUP | Facility: MEDICAL CENTER | Age: 75
End: 2020-01-09
Payer: MEDICARE

## 2020-01-09 VITALS
HEART RATE: 79 BPM | HEIGHT: 65 IN | OXYGEN SATURATION: 98 % | DIASTOLIC BLOOD PRESSURE: 72 MMHG | BODY MASS INDEX: 22.16 KG/M2 | TEMPERATURE: 98.5 F | SYSTOLIC BLOOD PRESSURE: 130 MMHG | WEIGHT: 133 LBS

## 2020-01-09 DIAGNOSIS — Z23 NEEDS FLU SHOT: ICD-10-CM

## 2020-01-09 DIAGNOSIS — M00.061 STAPHYLOCOCCAL ARTHRITIS OF RIGHT KNEE (HCC): ICD-10-CM

## 2020-01-09 DIAGNOSIS — R78.81 MSSA BACTEREMIA: ICD-10-CM

## 2020-01-09 DIAGNOSIS — J90 BILATERAL PLEURAL EFFUSION: ICD-10-CM

## 2020-01-09 DIAGNOSIS — B95.61 MSSA BACTEREMIA: ICD-10-CM

## 2020-01-09 DIAGNOSIS — E78.5 DYSLIPIDEMIA: Chronic | ICD-10-CM

## 2020-01-09 PROCEDURE — 99214 OFFICE O/P EST MOD 30 MIN: CPT | Mod: 25 | Performed by: INTERNAL MEDICINE

## 2020-01-09 PROCEDURE — 90662 IIV NO PRSV INCREASED AG IM: CPT | Performed by: INTERNAL MEDICINE

## 2020-01-09 PROCEDURE — G0008 ADMIN INFLUENZA VIRUS VAC: HCPCS | Performed by: INTERNAL MEDICINE

## 2020-01-09 ASSESSMENT — PATIENT HEALTH QUESTIONNAIRE - PHQ9: CLINICAL INTERPRETATION OF PHQ2 SCORE: 0

## 2020-01-09 NOTE — TELEPHONE ENCOUNTER
Please try calling Dr. Richards's offic from infectious disease again, we have not received the old records, I believe we have already faxed a release of information request on December 30.

## 2020-01-09 NOTE — PROGRESS NOTES
Subjective:      Cordelia Paul is a 74 y.o. female who presents with follow-up knee septic arthritis          HPI     Patient admitted in November right knee pain, seen by orthopedics had arthrocentesis performed, subsequently admitted to the hospital with leukocytosis, right knee septic positive MSSA, also blood cultures positive as well, seen by infectious disease discharged home on IV daptomycin, subsequent admission in December for fevers and chills on IV daptomycin, seen by infectious disease, cardiology, NICOLE structurally normal heart valves blood cultures repeated negative, CT abdomen and pelvis minimal right lower lobe atelectasis, small bilateral pleural effusions, CT chest interstitial groundglass opacities, patient continue cefazolin until December 15, pulmonary consultation recommended pleural effusions too small for thoracentesis, repeat outpatient CT thorax, potential pulmonary function test, bronchoscopy if opacities persist, remains on doxycycline per infectious disease  Started to walk at home 15 minutes at a time, no sob, no chest pain, no cough, no knee pain, no swelling, no fevers or chills  Still taking naps but overall energy level is improving, she is at least able to go out walking, her knee does not give her any issues at all with ambulation.  No nausea, no emesis, no diarrhea, no rash with the doxycycline.  Has follow-up upcoming with infectious disease, labs have been ordered.  She has been off statin therapy since the initiation of IV antibiotics and remains off statin therapy with doxycycline, has not been taking coenzyme Q fish oil, remains off multivitamin calcium, still taking vitamin D.  No anxiety or depression  Good social support with family and   No tobacco, no alcohol    Hospital records reviewed  11/17/19  infectious disease hospital consultation admitted 11/15/19 right knee pain after treadmill stress test, seen by orthopedics and had arthrocentesis performed,  sent to emergency room with increased cell count on arthrocentesis, presentation ER WBC 17.8, ESR 44, went to the operating room for incision and drainage right septic knee positive staphylococcus aureus MSSA, blood cultures positive for staphylococcus aureus MSSA  11/18/19 echo EF 60% no evidence of endocarditis  11/20/19 ultrasound-guided catheter insertion for intravenous antibiotics  11/22/19 discharged home IV daptomycin and date 12/15/19, follow-up once a week infusion center, follow-up infectious disease 2 weeks  12/4/19 hospital admission, 12/9/19 hospital discharge, admission for fevers and chills while on home infusion IV daptomycin for septic arthritis MSSA    12/4/19 chest x-ray negative  12/5/19  infectious disease [facility] consultation recommend NICOLE, repeat blood cultures negative thus far, hold off on discontinuation of PICC line until NICOLE obtained, changed to cefazolin from daptomycin  12/6/19 transesophageal echo structurally normal heart valves  12/6/19 infectious [facility] note repeat blood cultures negative  12/6/19 PICC line removed, catheter tip culture negative, blood cultures no growth to date  12/6/19 CT abdomen pelvis with contrast, minimal right lower lobe atelectasis or pneumonia, small bilateral pleural effusions, no loculated fluid collection  12/7/19 CT chest with; trace amounts dependent bilateral pleural effusion, interstitial groundglass opacities  12/8/19 infectious disease [facility] note cough ongoing prior to initiation of daptomycin, less likely daptomycin induced eosinophilic pneumonia, will not restart daptomycin, continue cefazolin with end-date 12/15/19, follow-up pulmonary  12/8/19  pulmonary hospital consultation bilateral pleural effusion confirmed on CT, too small for bedside thoracentesis, given acute sickness likely related to hypoalbuminemia versus volume overload status post resuscitation, cannot rule out parapneumonic effusion but clinical  picture with negative procalcitonin is not suggestive of this, recommend gentle diuresis, outpatient follow-up CT thorax 4 to 6 weeks, regarding cough differential includes reflux, asthma, volume overload, interstitial lung disease, postnasal drip, eosinophilic granuloma, nonasthmatic eosinophilic bronchitis, given eosinophilia to differentiate would need full pulmonary function test with methacholine challenge, which can be done as an outpatient if cough persists, repeat CT scan as above (if groundglass opacities persist will need further diagnostic intervention bronchoscopy), check REGGIE, rheumatoid factor, IgE level  12/8/19 REGGIE negative,CCP 6,RF negative  12/9/19 hospital discharge per infectious disease recommendation continue cefazolin through 12/15/15 through peripheral IV, follow-up infectious disease outpatient pulmonary outpatient  12/10/19 ESR 70,CRP 3.9,wbc 5.6,hgb 12,hct 36  12/11/19 referral pulmonary placed, repeat CT high-resolution thorax ordered 4 to 6 weeks  12/17/19 infectious disease note transition IV antibiotics to doxycycline  12/18/19 pulmonary note order CT high resolution thorax      Current Outpatient Medications   Medication Sig Dispense Refill   • doxycycline (VIBRAMYCIN) 100 MG Tab Take 1 Tab by mouth 2 times a day. 60 Tab 1   • NS SOLN 100 mL with ertapenem 1 GM SOLR 1,000 mg 1,000 mg by Intravenous route every 24 hours. (Patient not taking: Reported on 12/17/2019) 6 mL 0   • Cholecalciferol (VITAMIN D PO) Take 1 Cap by mouth every day. Unknown OTC Strength.     • CALCIUM PO Take 1 Tab by mouth 2 Times a Day. Unknown OTC Strength.       No current facility-administered medications for this visit.      Adenoma  2/11 colon per [facility] adenoma   3/16/16 colon per [facility] tortuous colon, melanosis in colon, repeat colonoscopy 5 years for surveillance     Allergic rhinitis    bilateral pleural effusion  12/4/19 hospital admission, 12/9/19 hospital discharge, admission for fevers and chills while on  home infusion IV daptomycin for septic arthritis MSSA    12/4/19 chest x-ray negative  12/5/19  infectious disease hospital consultation recommend NICOLE, repeat blood cultures negative thus far, hold off on discontinuation of PICC line until NICOLE obtained, changed to cefazolin from daptomycin  12/6/19 transesophageal echo structurally normal heart valves  12/6/19 infectious disease hospital note repeat blood cultures negative  12/6/19 PICC line removed, catheter tip culture negative, blood cultures no growth to date  12/6/19 CT abdomen pelvis with contrast, minimal right lower lobe atelectasis or pneumonia, small bilateral pleural effusions, no loculated fluid collection  12/7/19 CT chest with; trace amounts dependent bilateral pleural effusion, interstitial groundglass opacities  12/8/19 infectious [facility] hospital note cough ongoing prior to initiation of daptomycin, less likely daptomycin induced eosinophilic pneumonia, will not restart daptomycin, continue cefazolin with end-date 12/15/19, follow-up pulmonary  12/8/19  pulmonary hospital consultation bilateral pleural effusion confirmed on CT, too small for bedside thoracentesis, given acute sickness likely related to hypoalbuminemia versus volume overload status post resuscitation, cannot rule out parapneumonic effusion but clinical picture with negative procalcitonin is not suggestive of this, recommend gentle diuresis, outpatient follow-up CT thorax 4 to 6 weeks, regarding cough differential includes reflux, asthma, volume overload, interstitial lung disease, postnasal drip, eosinophilic granuloma, nonasthmatic eosinophilic bronchitis, given eosinophilia to differentiate would need full pulmonary function test with methacholine challenge, which can be done as an outpatient if cough persists, repeat CT scan as above (if groundglass opacities persist will need further diagnostic intervention bronchoscopy), check REGGIE, rheumatoid factor, IgE  level  12/8/19 REGGIE negative,CCP 6,RF negative  12/9/19 hospital discharge per infectious disease recommendation continue cefazolin through 12/15/15 through peripheral IV, follow-up infectious disease outpatient pulmonary outpatient  12/10/19 ESR 70,CRP 3.9,wbc 5.6,hgb 12,hct 36    hyponatremia  2/12/13 Na 136  2/13/14 Na 131  3/15/16 Na 137  5/25/16 Na 134  9/20/17 Na 131  10/2/19 seen ER URI, Na 129 labs ordered to be done in 2 weeks  10/9/19 Na 134, normal cbc,cmp,tsh (consider cortisol, ACTH, HIV, hepatitis serology, REGGIE, B12)   10/10/19 labs ordered dry mouth, fatigue, hyponatremia, echo ordered murmur, ativan as needed for breakthrough panic attack  10/15/19 serum osm 277 (278-298), urine osm 128 (300-900), urine Na 14, cortisol 9.8, ACTH 15  12/6/19 Na 134  12/10/19 Na 132     Dyslipidemia  11/08 chol 210,trig 45,hdl 95,ldl 97  6/09 chol 271,trig 113,hdl 74,ldl 163 restart on zocor 40 (1/2)  5/05 aorta abd u/s negative  11/08 p.thallium no ischemia EF 87%  2/09 ERIC negative  2/10 chol 205,trig 55,hdl 100,ldl 94  1/11 chol 197,trig 66,hdl 100,ldl 84 on zocor 20 mg  2/12 chol 205,trig 43,hdl 106,ldl 90 on zocor 20 mg  2/12/13 chol 216,trig 58,hdl 114,ldl 90 on zocor 20 mg  2/13/14 chol 195,trig 30,hdl 106,ldl 83 on zocor 20 mg  3/12/15 chol 205,trig 41,hdl 99,ldl 98 on zocor 20 mg  3/15/16 chol 260,trig 77,hdl 102,ldl 143 on zocor 20 mg repeat lipid panel 2 months  5/25/16 chol 243,trig 51,hdl 104,ldl 129 on zocor 20 mg  2/7/17 chol 203,trig 66,hdl 105,ldl 85 on zocor 20 mg  9/20/17 chol 211,trig 71,hdl 104,ldl 93 on zocor 20 mg  9/10/18 chol 218,trig 48,hdl 106,ldl 102 on zocor 20 mg  11/12/19 myocardial perfusion study no evidence of ischemia, EF 79%    History septic joint right knee  11/17/19  infectious disease hospital consultation admitted 11/15/19 right knee pain after treadmill stress test, seen by orthopedics and had arthrocentesis performed, sent to emergency room with increased cell count  on arthrocentesis, presentation ER WBC 17.8, ESR 44, went to the operating room for incision and drainage right septic knee positive staphylococcus aureus MSSA, blood cultures positive for staphylococcus aureus MSSA  11/18/19 echo EF 60% no evidence of endocarditis  11/20/19 ultrasound-guided catheter insertion for intravenous antibiotics  11/22/19 discharged home IV daptomycin and date 12/15/19, follow-up once a week infusion center, follow-up infectious disease 2 weeks  12/4/19 hospital admission, 12/9/19 hospital discharge, admission for fevers and chills while on home infusion IV daptomycin for septic arthritis MSSA    12/4/19 chest x-ray negative  12/5/19  infectious disease \[facility] [place] [facility]\"" consultation recommend NICOLE, repeat blood cultures negative thus far, hold off on discontinuation of PICC line until NICOLE obtained, changed to cefazolin from daptomycin  12/6/19 transesophageal echo structurally normal heart valves  12/6/19 infectious [facility] hospital note repeat blood cultures negative  12/6/19 PICC line removed, catheter tip culture negative, blood cultures no growth to date  12/6/19 CT abdomen pelvis with contrast, minimal right lower lobe atelectasis or pneumonia, small bilateral pleural effusions, no loculated fluid collection  12/7/19 CT chest with; trace amounts dependent bilateral pleural effusion, interstitial groundglass opacities  12/8/19 infectious disease \[facility]\"" note cough ongoing prior to initiation of daptomycin, less likely daptomycin induced eosinophilic pneumonia, will not restart daptomycin, continue cefazolin with end-date 12/15/19, follow-up pulmonary  12/8/19  pulmonary hospital consultation bilateral pleural effusion confirmed on CT, too small for bedside thoracentesis, given acute sickness likely related to hypoalbuminemia versus volume overload status post resuscitation, cannot rule out parapneumonic effusion but clinical picture with negative procalcitonin is not  suggestive of this, recommend gentle diuresis, outpatient follow-up CT thorax 4 to 6 weeks, regarding cough differential includes reflux, asthma, volume overload, interstitial lung disease, postnasal drip, eosinophilic granuloma, nonasthmatic eosinophilic bronchitis, given eosinophilia to differentiate would need full pulmonary function test with methacholine challenge, which can be done as an outpatient if cough persists, repeat CT scan as above (if groundglass opacities persist will need further diagnostic intervention bronchoscopy), check REGGIE, rheumatoid factor, IgE level  12/8/19 REGGIE negative,CCP 6,RF negative  12/9/19 hospital discharge per infectious disease recommendation continue cefazolin through 12/15/15 through peripheral IV, follow-up infectious disease outpatient pulmonary outpatient  12/10/19 ESR 70,CRP 3.9,wbc 5.6,hgb 12,hct 36     history wrist fracture  12/30/12 left wrist fracture had splint for 6 weeks, no surgery     low back pain  2/14/17 [facility]      Preventative health  2/10 pap  1/19/16 declines shingles vaccine  1/19/16 pneumovax   3/16/16 colon per [facility] tortuous colon, melanosis in colon, repeat colonoscopy 5 years for surveillance  2/10/17 [facility]  9/7/17 tdap  9/26/18 mammogram  9/26/18 dexa  9/26/18 LS +1.4, hip -0.2  10/15/19 vit d 60     restless leg  9/27/18 restless leg tried mirapex 0.125 mg no benefit  9/11/18 trial neurontin     Status post laparoscopic cholecystectomy     Status post parathyroidectomy  2005 no old records  11/08 calcium 10.3  2/10 calcium 9.9  1/11 calcium 10.1  2/12 calcium 10, vit d 64  2/13 calcium 10.0  2/13/14 calcium 9.9  3/12/15 calcium 10.3  3/15/16 calcium 10.7  5/25/16 calcium 10.5,ionized 1.3,PTH 26  2/7/17 calcium 10.5  9/10/18 calcium 9.4,vit d 58  10/10/19 calcium 10.3     vasovagal syncope  5/12 one episode, ekg, echo, holter per snca negative, no further workup necessary  10/30/19 echo normal LV function EF 60%,  normal diastolic function, mild aortic insufficiency, normal right LV size and function, RSVP 25  11/12/19 myocardial perfusion study no evidence of ischemia, EF 79%           Patient Active Problem List   Diagnosis   • Dyslipidemia   • S/P parathyroidectomy   • Allergic rhinitis   • Preventative health care   • S/P laparoscopic cholecystectomy   • Adenomatous polyp of colon   • Vasovagal syncope   • History wrist fracture   • Low back pain   • Restless leg syndrome   • Hyponatremia   • History septic joint of right knee joint (HCC)   • MSSA bacteremia   • Abnormal CT of the chest   • Bilateral pleural effusion            Health Maintenance Summary                HEPATITIS C SCREENING Overdue 1945     IMM INFLUENZA Overdue 9/1/2019      Done 12/7/2018 Imm Admin: Influenza Vaccine Quad Inj (Preserved)     Patient has more history with this topic...    MAMMOGRAM Overdue 9/26/2019      Done 9/26/2018 MA-SCREENING MAMMO BILAT W/TOMOSYNTHESIS W/CAD     Patient has more history with this topic...    IMM ZOSTER VACCINES Postponed 2/27/2020 Originally 8/11/1995. System: vaccine not available, other system reasons    Annual Wellness Visit Next Due 10/3/2020      Done 10/3/2019 Visit Dx: Medicare annual wellness visit, subsequent     Patient has more history with this topic...    COLONOSCOPY Next Due 3/16/2021      Done 3/16/2016 AMB REFERRAL TO GI FOR COLONOSCOPY    BONE DENSITY Next Due 9/26/2023      Done 9/26/2018 DS-BONE DENSITY STUDY (DEXA)     Patient has more history with this topic...    IMM DTaP/Tdap/Td Vaccine Next Due 9/7/2027      Done 9/7/2017 Imm Admin: Tdap Vaccine     Patient has more history with this topic...          Patient Care Team:  Eron Flowers M.D. as PCP - General  Ramin Meadows M.D. as Consulting Physician (Ophthalmology)      ROS       Objective:       Physical Exam  Vitals signs and nursing note reviewed.   Constitutional:       Appearance: Normal appearance.   HENT:      Head:  Normocephalic and atraumatic.      Right Ear: External ear normal.      Left Ear: External ear normal.   Eyes:      General:         Right eye: No discharge.   Neck:      Musculoskeletal: Neck supple.   Cardiovascular:      Rate and Rhythm: Normal rate and regular rhythm.      Heart sounds: Normal heart sounds. No murmur.   Pulmonary:      Effort: Pulmonary effort is normal. No respiratory distress.      Breath sounds: Normal breath sounds.   Abdominal:      General: There is no distension.   Musculoskeletal:         General: No swelling.   Skin:     General: Skin is warm.   Neurological:      General: No focal deficit present.      Mental Status: She is alert.   Psychiatric:         Mood and Affect: Mood normal.         Behavior: Behavior normal.       Right knee normal range of motion, no tenderness, no effusion, no edema, operative site clean, dry, intact, no lower extremity edema, left lower extremity no varicosities, right lower extremity moderate varicosities          Assessment/Plan:        Assessment  #1 history right knee septic joint status post arthrocentesis growing out Staphylococcus aureus MSSA, septicemia with blood cultures positive, seen by infectious disease, initially started on IV daptomycin, NICOLE in December negative, status post PICC line removal, remains on doxycycline per infectious disease    #2 MSSA bacteremia last ESR 7, CRP 3.9, WBC 5.6 in December per infectious disease, has upcoming appoint with infectious disease, afebrile, tolerating doxycycline without difficulty    #3 interstitial groundglass changes and trace bilateral pleural effusion on CT scan, seen by pulmonary during the hospitalization, not enough to perform thoracentesis, possibly related to hypoalbuminemia from acute infectious state, less likely parapneumonic effusion, patient's cough has resolved, afebrile, normal saturation, normal breath sounds bilateral, no history of asthma, COPD, or rheumatologic disease    #4  dyslipidemia stable off statin therapy    #5 hyponatremia most recent sodium 132, previous work-up including thyroid testing, serum osmole, urine osmole, urine sodium, cortisol, ACTH did not point to any specific diagnosis    Plan  #1 labs as ordered per infectious disease CBC, CMP, ESR, CRP pending    #2 CT high-resolution thorax pending follow-up interstitial changes and bilateral small pleural effusions    #3 follow-up pulmonary    #4 based upon repeat labs including sodium can consider deferring endocrinology work-up    #5 influenza vaccine high dose today    #6 complete course of doxycycline    #7 can slowly increase activity including walking, skiing, golfing being careful not to overdo things    #8 continue statin therapy    #9 continue off fish oil and coenzyme Q, will start multivitamin calcium once completed doxycycline, continue vitamin D    #10 follow-up 6 months

## 2020-01-11 LAB
MYCOBACTERIUM SPEC CULT: NORMAL
RHODAMINE-AURAMINE STN SPEC: NORMAL
SIGNIFICANT IND 70042: NORMAL
SITE SITE: NORMAL
SOURCE SOURCE: NORMAL

## 2020-01-14 ENCOUNTER — HOSPITAL ENCOUNTER (OUTPATIENT)
Dept: LAB | Facility: MEDICAL CENTER | Age: 75
End: 2020-01-14
Attending: INTERNAL MEDICINE
Payer: MEDICARE

## 2020-01-14 DIAGNOSIS — M00.061 STAPHYLOCOCCAL ARTHRITIS OF RIGHT KNEE (HCC): ICD-10-CM

## 2020-01-14 LAB
ALBUMIN SERPL BCP-MCNC: 4.4 G/DL (ref 3.2–4.9)
ALBUMIN/GLOB SERPL: 1.7 G/DL
ALP SERPL-CCNC: 65 U/L (ref 30–99)
ALT SERPL-CCNC: 25 U/L (ref 2–50)
ANION GAP SERPL CALC-SCNC: 6 MMOL/L (ref 0–11.9)
AST SERPL-CCNC: 25 U/L (ref 12–45)
BASOPHILS # BLD AUTO: 1.3 % (ref 0–1.8)
BASOPHILS # BLD: 0.04 K/UL (ref 0–0.12)
BILIRUB SERPL-MCNC: 0.6 MG/DL (ref 0.1–1.5)
BUN SERPL-MCNC: 12 MG/DL (ref 8–22)
CALCIUM SERPL-MCNC: 10.3 MG/DL (ref 8.5–10.5)
CHLORIDE SERPL-SCNC: 98 MMOL/L (ref 96–112)
CO2 SERPL-SCNC: 29 MMOL/L (ref 20–33)
CREAT SERPL-MCNC: 0.69 MG/DL (ref 0.5–1.4)
CRP SERPL HS-MCNC: 0.13 MG/DL (ref 0–0.75)
EOSINOPHIL # BLD AUTO: 0.08 K/UL (ref 0–0.51)
EOSINOPHIL NFR BLD: 2.5 % (ref 0–6.9)
ERYTHROCYTE [DISTWIDTH] IN BLOOD BY AUTOMATED COUNT: 46.4 FL (ref 35.9–50)
ERYTHROCYTE [SEDIMENTATION RATE] IN BLOOD BY WESTERGREN METHOD: 7 MM/HOUR (ref 0–30)
FASTING STATUS PATIENT QL REPORTED: NORMAL
GLOBULIN SER CALC-MCNC: 2.6 G/DL (ref 1.9–3.5)
GLUCOSE SERPL-MCNC: 88 MG/DL (ref 65–99)
HCT VFR BLD AUTO: 46.6 % (ref 37–47)
HGB BLD-MCNC: 15.1 G/DL (ref 12–16)
IMM GRANULOCYTES # BLD AUTO: 0.01 K/UL (ref 0–0.11)
IMM GRANULOCYTES NFR BLD AUTO: 0.3 % (ref 0–0.9)
LYMPHOCYTES # BLD AUTO: 0.88 K/UL (ref 1–4.8)
LYMPHOCYTES NFR BLD: 27.8 % (ref 22–41)
MCH RBC QN AUTO: 29.5 PG (ref 27–33)
MCHC RBC AUTO-ENTMCNC: 32.4 G/DL (ref 33.6–35)
MCV RBC AUTO: 91 FL (ref 81.4–97.8)
MONOCYTES # BLD AUTO: 0.18 K/UL (ref 0–0.85)
MONOCYTES NFR BLD AUTO: 5.7 % (ref 0–13.4)
NEUTROPHILS # BLD AUTO: 1.98 K/UL (ref 2–7.15)
NEUTROPHILS NFR BLD: 62.4 % (ref 44–72)
NRBC # BLD AUTO: 0 K/UL
NRBC BLD-RTO: 0 /100 WBC
PLATELET # BLD AUTO: 288 K/UL (ref 164–446)
PMV BLD AUTO: 10.2 FL (ref 9–12.9)
POTASSIUM SERPL-SCNC: 4.1 MMOL/L (ref 3.6–5.5)
PROT SERPL-MCNC: 7 G/DL (ref 6–8.2)
RBC # BLD AUTO: 5.12 M/UL (ref 4.2–5.4)
SODIUM SERPL-SCNC: 133 MMOL/L (ref 135–145)
WBC # BLD AUTO: 3.2 K/UL (ref 4.8–10.8)

## 2020-01-14 PROCEDURE — 80053 COMPREHEN METABOLIC PANEL: CPT

## 2020-01-14 PROCEDURE — 86140 C-REACTIVE PROTEIN: CPT

## 2020-01-14 PROCEDURE — 85025 COMPLETE CBC W/AUTO DIFF WBC: CPT

## 2020-01-14 PROCEDURE — 85652 RBC SED RATE AUTOMATED: CPT

## 2020-01-14 PROCEDURE — 36415 COLL VENOUS BLD VENIPUNCTURE: CPT

## 2020-01-16 ENCOUNTER — OFFICE VISIT (OUTPATIENT)
Dept: INFECTIOUS DISEASES | Facility: MEDICAL CENTER | Age: 75
End: 2020-01-16
Payer: MEDICARE

## 2020-01-16 VITALS
SYSTOLIC BLOOD PRESSURE: 100 MMHG | BODY MASS INDEX: 21.99 KG/M2 | TEMPERATURE: 97.9 F | OXYGEN SATURATION: 97 % | DIASTOLIC BLOOD PRESSURE: 62 MMHG | HEIGHT: 65 IN | WEIGHT: 132 LBS | HEART RATE: 74 BPM

## 2020-01-16 DIAGNOSIS — R70.0 ELEVATED SED RATE: ICD-10-CM

## 2020-01-16 DIAGNOSIS — M00.061 STAPHYLOCOCCAL ARTHRITIS OF RIGHT KNEE (HCC): ICD-10-CM

## 2020-01-16 PROCEDURE — 99213 OFFICE O/P EST LOW 20 MIN: CPT | Performed by: NURSE PRACTITIONER

## 2020-01-16 RX ORDER — ASCORBIC ACID 500 MG
500 TABLET ORAL DAILY
COMMUNITY
End: 2020-01-29

## 2020-01-16 ASSESSMENT — ENCOUNTER SYMPTOMS
HEADACHES: 0
MYALGIAS: 1
DIARRHEA: 0
FEVER: 0
SORE THROAT: 0
COUGH: 0
CONSTIPATION: 0
VOMITING: 0
NERVOUS/ANXIOUS: 0
SHORTNESS OF BREATH: 0
ABDOMINAL PAIN: 0
WEAKNESS: 0
CHILLS: 0
NAUSEA: 0

## 2020-01-16 NOTE — TELEPHONE ENCOUNTER
Called Dr. Carlota Richards office at [phone], she is a [facility] provider so all notes will be in Epic. Is there another Dr. Richards we should look for?

## 2020-01-21 ENCOUNTER — HOSPITAL ENCOUNTER (OUTPATIENT)
Dept: RADIOLOGY | Facility: MEDICAL CENTER | Age: 75
End: 2020-01-21
Attending: PHYSICIAN ASSISTANT
Payer: MEDICARE

## 2020-01-21 DIAGNOSIS — R93.89 ABNORMAL CT SCAN, CHEST: ICD-10-CM

## 2020-01-21 PROCEDURE — 71250 CT THORAX DX C-: CPT

## 2020-01-23 ENCOUNTER — HOSPITAL ENCOUNTER (OUTPATIENT)
Dept: RADIOLOGY | Facility: MEDICAL CENTER | Age: 75
End: 2020-01-23
Attending: INTERNAL MEDICINE
Payer: MEDICARE

## 2020-01-23 DIAGNOSIS — Z12.31 ENCOUNTER FOR SCREENING MAMMOGRAM FOR BREAST CANCER: ICD-10-CM

## 2020-01-23 PROCEDURE — 77067 SCR MAMMO BI INCL CAD: CPT

## 2020-01-27 NOTE — PROGRESS NOTES
Endocrinology Clinic Progress Note  PCP: Eron Flowers M.D.      Reason for consult: Hyponatremia and fatigue      HPI:  Bernadette Paul is a 74 y.o. old patient who comes in today for evaluation of above states problems.   She has a history of parathyroidectomy in 2009.   Hyponatremia:  Sodium levels was 133 on 1/14/2020 and 132 12/10/19 of 132.  She is trying to correct on her own by drinking Pedialyte and at times taking a small hand full of table salt.    Fatigue This started in October 2019, in November she was diagnosed with septic right knee.  She has been hospitalized several times and finally finished with her antibiotics 2 weeks ago.  She is still having problems with severe fatigue.          ROS:  Constitutional: fatigue,No weight loss  Cardiac: No palpitations or racing heart  Resp: No shortness of breath  Neuro: No numbness or tinging in feet  Endo: No heat or cold intolerance, no polyuria or polydipsia  All other systems were reviewed and were negative.    Past Medical History:  Patient Active Problem List    Diagnosis Date Noted   • Abnormal CT of the chest 12/08/2019   • Bilateral pleural effusion 12/08/2019   • History MSSA bacteremia 11/18/2019   • History septic joint of right knee joint (HCC) 11/15/2019   • Hyponatremia 10/11/2019   • Restless leg syndrome 09/10/2018   • Low back pain 02/20/2017   • History wrist fracture 02/12/2013   • Vasovagal syncope 05/02/2012   • Adenomatous polyp of colon 02/11/2011   • S/P laparoscopic cholecystectomy 01/20/2011   • Dyslipidemia 06/17/2009   • S/P parathyroidectomy 06/17/2009   • Allergic rhinitis 06/17/2009   • Preventative health care 06/17/2009       Past Surgical History:  Past Surgical History:   Procedure Laterality Date   • NICOLE N/A 12/6/2019    Procedure: ECHOCARDIOGRAM, TRANSESOPHAGEAL;  Surgeon: Marion Dykes M.D.;  Location: SURGERY [facility];  Service: Cardiac   • INCISION AND DRAINAGE ORTHOPEDIC Right 11/15/2019     Procedure: INCISION AND DRAINAGE, RIGHT KNEE  BY ORTHOPEDICS;  Surgeon: Ramsey Navas M.D.;  Location: SURGERY [facility];  Service: Orthopedics       Allergies:  Asa [aspirin] and Nsaids    Social History:  Social History     Socioeconomic History   • Marital status:      Spouse name: Not on file   • Number of children: Not on file   • Years of education: Not on file   • Highest education level: Not on file   Occupational History   • Not on file   Social Needs   • Financial resource strain: Not on file   • Food insecurity:     Worry: Not on file     Inability: Not on file   • Transportation needs:     Medical: Not on file     Non-medical: Not on file   Tobacco Use   • Smoking status: Never Smoker   • Smokeless tobacco: Never Used   Substance and Sexual Activity   • Alcohol use: Yes     Alcohol/week: 0.0 oz     Frequency: Monthly or less     Comment: occ wine    • Drug use: Never   • Sexual activity: Yes     Partners: Male   Lifestyle   • Physical activity:     Days per week: Not on file     Minutes per session: Not on file   • Stress: Not on file   Relationships   • Social connections:     Talks on phone: Not on file     Gets together: Not on file     Attends Muslim service: Not on file     Active member of club or organization: Not on file     Attends meetings of clubs or organizations: Not on file     Relationship status: Not on file   • Intimate partner violence:     Fear of current or ex partner: Not on file     Emotionally abused: Not on file     Physically abused: Not on file     Forced sexual activity: Not on file   Other Topics Concern   • Not on file   Social History Narrative   • Not on file       Family History:  Family History   Problem Relation Age of Onset   • Heart Disease Mother        Medications:    Current Outpatient Medications:   •  MULTIPLE VITAMIN PO, Take  by mouth., Disp: , Rfl:   •  Cholecalciferol (VITAMIN D PO), Take 1 Cap by mouth every day. Unknown OTC Strength.,  "Disp: , Rfl:   •  CALCIUM PO, Take 1 Tab by mouth 2 Times a Day. Unknown OTC Strength., Disp: , Rfl:     Labs: Reviewed    Physical Examination:  Vital signs: /70   Resp 17   Ht 1.651 m (5' 5\")   Wt 59.4 kg (131 lb)   BMI 21.80 kg/m²  Body mass index is 21.8 kg/m².  General: No apparent distress, cooperative,thin  Eyes: No scleral icterus or discharge  ENMT: Normal on external inspection of nose, lips, normal thyroid exam  Neck: No abnormal masses on inspection  Resp: Normal effort, clear to auscultation bilaterally   CVS: Regular rate and rhythm, S1 S2 normal, no murmur   Extremities: No edema  Abdomen: no abdominal obesity present  Neuro: Alert and oriented  Skin: No rash  Psych: Normal mood and affect, intact memory and able to make informed decisions    Assessment and Plan:  1. Hyponatremia  Rule out adrenal insufficiency as the contributory factor. We will fax order for ACTH stim test to the [facility] infusion center. Explained the pituitary-adrenal axis is in detail and role of adequate cortisol on sodium balance, energy levels and other implications.     2. S/P parathyroidectomy   monitor PTH and ca closely.     3. Fatigue, unspecified type  Rule out Vitamin D and B 12  Deficiency and adrenal insufficiency as the contributory factor for fatigue.     Return in about 3 months (around 4/29/2020).    Thank you for allowing me to participate in the care of this patient.    Je Schwarz M.D.  01/27/20    CC:   Eron Flowers M.D.    This note was created using voice recognition software (Dragon). The accuracy of the dictation is limited by the abilities of the software. I have reviewed the note prior to signing, however some errors in grammar and context are still possible. If you have any questions related to this note please do not hesitate to contact our office.   This note was scribed by Laurie Cherry RN, ARVINDE  "

## 2020-01-29 ENCOUNTER — OFFICE VISIT (OUTPATIENT)
Dept: ENDOCRINOLOGY | Facility: MEDICAL CENTER | Age: 75
End: 2020-01-29
Payer: MEDICARE

## 2020-01-29 VITALS
HEIGHT: 65 IN | DIASTOLIC BLOOD PRESSURE: 70 MMHG | SYSTOLIC BLOOD PRESSURE: 124 MMHG | BODY MASS INDEX: 21.83 KG/M2 | WEIGHT: 131 LBS | RESPIRATION RATE: 17 BRPM

## 2020-01-29 DIAGNOSIS — E03.9 HYPOTHYROIDISM, UNSPECIFIED TYPE: ICD-10-CM

## 2020-01-29 DIAGNOSIS — E53.8 VITAMIN B 12 DEFICIENCY: ICD-10-CM

## 2020-01-29 DIAGNOSIS — E55.9 VITAMIN D DEFICIENCY: ICD-10-CM

## 2020-01-29 DIAGNOSIS — E89.2 S/P PARATHYROIDECTOMY: Chronic | ICD-10-CM

## 2020-01-29 DIAGNOSIS — R53.83 FATIGUE, UNSPECIFIED TYPE: ICD-10-CM

## 2020-01-29 DIAGNOSIS — E27.1 ADRENAL INSUFFICIENCY (ADDISON'S DISEASE) (HCC): ICD-10-CM

## 2020-01-29 DIAGNOSIS — E87.1 HYPONATREMIA: ICD-10-CM

## 2020-01-29 PROCEDURE — 99204 OFFICE O/P NEW MOD 45 MIN: CPT | Performed by: INTERNAL MEDICINE

## 2020-02-04 ENCOUNTER — HOSPITAL ENCOUNTER (OUTPATIENT)
Dept: LAB | Facility: MEDICAL CENTER | Age: 75
End: 2020-02-04
Attending: INTERNAL MEDICINE
Payer: MEDICARE

## 2020-02-04 DIAGNOSIS — E55.9 VITAMIN D DEFICIENCY: ICD-10-CM

## 2020-02-04 DIAGNOSIS — E03.9 HYPOTHYROIDISM, UNSPECIFIED TYPE: ICD-10-CM

## 2020-02-04 DIAGNOSIS — R53.83 FATIGUE, UNSPECIFIED TYPE: ICD-10-CM

## 2020-02-04 DIAGNOSIS — E27.1 ADRENAL INSUFFICIENCY (ADDISON'S DISEASE) (HCC): ICD-10-CM

## 2020-02-04 DIAGNOSIS — E53.8 VITAMIN B 12 DEFICIENCY: ICD-10-CM

## 2020-02-04 DIAGNOSIS — E87.1 HYPONATREMIA: ICD-10-CM

## 2020-02-04 LAB
25(OH)D3 SERPL-MCNC: 54 NG/ML (ref 30–100)
ANION GAP SERPL CALC-SCNC: 4 MMOL/L (ref 0–11.9)
BUN SERPL-MCNC: 12 MG/DL (ref 8–22)
CALCIUM SERPL-MCNC: 10.2 MG/DL (ref 8.5–10.5)
CHLORIDE SERPL-SCNC: 100 MMOL/L (ref 96–112)
CO2 SERPL-SCNC: 30 MMOL/L (ref 20–33)
CORTIS SERPL-MCNC: 5.4 UG/DL (ref 0–23)
CREAT SERPL-MCNC: 0.75 MG/DL (ref 0.5–1.4)
GLUCOSE SERPL-MCNC: 90 MG/DL (ref 65–99)
OSMOLALITY SERPL: 282 MOSM/KG H2O (ref 278–298)
OSMOLALITY UR: 344 MOSM/KG H2O (ref 300–900)
POTASSIUM SERPL-SCNC: 4.1 MMOL/L (ref 3.6–5.5)
SODIUM SERPL-SCNC: 134 MMOL/L (ref 135–145)
T3 SERPL-MCNC: 89.1 NG/DL (ref 60–181)
T3FREE SERPL-MCNC: 2.84 PG/ML (ref 2.4–4.2)
T4 FREE SERPL-MCNC: 0.83 NG/DL (ref 0.53–1.43)
TSH SERPL DL<=0.005 MIU/L-ACNC: 2.71 UIU/ML (ref 0.38–5.33)
VIT B12 SERPL-MCNC: 764 PG/ML (ref 211–911)

## 2020-02-04 PROCEDURE — 84481 FREE ASSAY (FT-3): CPT

## 2020-02-04 PROCEDURE — 36415 COLL VENOUS BLD VENIPUNCTURE: CPT

## 2020-02-04 PROCEDURE — 83930 ASSAY OF BLOOD OSMOLALITY: CPT

## 2020-02-04 PROCEDURE — 84443 ASSAY THYROID STIM HORMONE: CPT

## 2020-02-04 PROCEDURE — 84480 ASSAY TRIIODOTHYRONINE (T3): CPT

## 2020-02-04 PROCEDURE — 83935 ASSAY OF URINE OSMOLALITY: CPT

## 2020-02-04 PROCEDURE — 82024 ASSAY OF ACTH: CPT

## 2020-02-04 PROCEDURE — 84439 ASSAY OF FREE THYROXINE: CPT

## 2020-02-04 PROCEDURE — 80048 BASIC METABOLIC PNL TOTAL CA: CPT

## 2020-02-04 PROCEDURE — 82306 VITAMIN D 25 HYDROXY: CPT

## 2020-02-04 PROCEDURE — 82533 TOTAL CORTISOL: CPT

## 2020-02-04 PROCEDURE — 82607 VITAMIN B-12: CPT

## 2020-02-05 LAB — ACTH PLAS-MCNC: 14.1 PG/ML (ref 7.2–63.3)

## 2020-02-10 ENCOUNTER — OFFICE VISIT (OUTPATIENT)
Dept: PULMONOLOGY | Facility: HOSPICE | Age: 75
End: 2020-02-10
Payer: MEDICARE

## 2020-02-10 VITALS
HEIGHT: 65 IN | DIASTOLIC BLOOD PRESSURE: 68 MMHG | HEART RATE: 57 BPM | BODY MASS INDEX: 22.22 KG/M2 | WEIGHT: 133.38 LBS | OXYGEN SATURATION: 97 % | SYSTOLIC BLOOD PRESSURE: 116 MMHG

## 2020-02-10 DIAGNOSIS — R09.81 NASAL CONGESTION: ICD-10-CM

## 2020-02-10 DIAGNOSIS — R93.89 ABNORMAL CT SCAN, CHEST: ICD-10-CM

## 2020-02-10 PROCEDURE — 99213 OFFICE O/P EST LOW 20 MIN: CPT | Performed by: INTERNAL MEDICINE

## 2020-02-10 NOTE — PROGRESS NOTES
Chief Complaint   Patient presents with   • Follow-Up     Abnormal CT scan. Last seen 12/18/19   • Results     CT-Chest HR 01/21/2020       Problems:   1. Abnormal CT scan, chest    2. Nasal congestion        Assessment/Plan:   # Abnormal CT chest   -- Repeat CT chest showed resolution of GGO and pleural effusions with trace of subpleural reticular thickening.    -- No residual symptoms noted at this time    -- Suspect etiology to be multifactorial from sepsis induced lung injury and volume overload.   -- Follow up as needed      # Nasal congestion   -- Advised patient to use nasal saline irrigation and flonase BID as needed    -- Follow up as needed    HPI:  Ms. Paul is a 74 year old female with history of recent history of MSSA septic arthritis and abnormal CT chest presents today for a follow up. Patient was seen by myself at [facility] for abnormal CT chest and cough. Review of her CT chest then showed right apical scarring, motion artifact, engorge vessels, patchy scattered GGO, and mild interstitial thickening at the bases, atelectasis changes lower lobes, and small bilateral pleural effusion (R>L). She was diuresed and seen by Glendy Russ on 12/18/2019 for post hospital follow up. Patient underwent repeat HRCT showing resolution of GGO and trace of subpleural reticular thickening. Today, patient reports having nasal congestion which is chronic and fatigue. Denies cough, SOB, chest pain, N/V, fever/chills, or recent sick contact Able to walk uphill without any shortness of breath. She is up to date on her immunizations (influenza /1/2020, PCV13 2016, and PPSV23 2017).     Patient is a retired . She is never smoker.     Past Medical History:   Diagnosis Date   • Allergic rhinitis 6/17/2009   • Dyslipidemia 6/17/2009   • Preventative health care 6/17/2009   • S/P parathyroidectomy 6/17/2009       Past Surgical History:   Procedure Laterality Date   • NICOLE N/A 12/6/2019    Procedure:  ECHOCARDIOGRAM, TRANSESOPHAGEAL;  Surgeon: Marion Dykes M.D.;  Location: SURGERY [facility];  Service: Cardiac   • INCISION AND DRAINAGE ORTHOPEDIC Right 11/15/2019    Procedure: INCISION AND DRAINAGE, RIGHT KNEE  BY ORTHOPEDICS;  Surgeon: Ramsey Navas M.D.;  Location: SURGERY [facility];  Service: Orthopedics       ROS:   Constitutional: Denies fevers, chills, night sweats, fatigue or weight loss  Eyes: Denies vision loss, pain, drainage, double vision  Ears, Nose, Throat: Denies earache, tinnitus, hoarseness  Cardiovascular: Denies chest pain, tightness, palpitations  Respiratory: See HPI  Sleep: See HPI   GI: Denies abdominal pain, nausea, vomiting, diarrhea  : Denies frequent urination, hematuria, painful urination  Musculoskeletal: Denies back pain, painful joints, sore muscles  Neurological: Denies headaches, seizures  Skin: Denies rashes, color changes  Psychiatric: Denies depression or thoughts of suicide  Hematologic: Denies bleeding tendency or clotting tendency  Allergic/Immunologic: Denies rhinitis, skin sensitivity    Social History     Socioeconomic History   • Marital status:      Spouse name: Not on file   • Number of children: Not on file   • Years of education: Not on file   • Highest education level: Not on file   Occupational History   • Not on file   Social Needs   • Financial resource strain: Not on file   • Food insecurity:     Worry: Not on file     Inability: Not on file   • Transportation needs:     Medical: Not on file     Non-medical: Not on file   Tobacco Use   • Smoking status: Never Smoker   • Smokeless tobacco: Never Used   Substance and Sexual Activity   • Alcohol use: Yes     Alcohol/week: 0.0 oz     Frequency: Monthly or less     Comment: occ wine    • Drug use: Never   • Sexual activity: Yes     Partners: Male   Lifestyle   • Physical activity:     Days per week: Not on file     Minutes per session: Not on file   • Stress: Not on file  "  Relationships   • Social connections:     Talks on phone: Not on file     Gets together: Not on file     Attends [employer] service: Not on file     Active member of club or organization: Not on file     Attends meetings of clubs or organizations: Not on file     Relationship status: Not on file   • Intimate partner violence:     Fear of current or ex partner: Not on file     Emotionally abused: Not on file     Physically abused: Not on file     Forced sexual activity: Not on file   Other Topics Concern   • Not on file   Social History Narrative   • Not on file     Asa [aspirin] and Nsaids  Current Outpatient Medications on File Prior to Visit   Medication Sig Dispense Refill   • Ascorbic Acid (VITAMIN C PO) Take  by mouth every day.     • Probiotic Product (PROBIOTIC PO) Take  by mouth every day.     • MULTIPLE VITAMIN PO Take  by mouth.     • Cholecalciferol (VITAMIN D PO) Take 1 Cap by mouth every day. Unknown OTC Strength.     • CALCIUM PO Take 1 Tab by mouth every day. Unknown OTC Strength.       No current facility-administered medications on file prior to visit.      /68 (BP Location: Left arm, Patient Position: Sitting, BP Cuff Size: Adult)   Pulse (!) 57   Ht 1.651 m (5' 5\")   Wt 60.5 kg (133 lb 6 oz)   SpO2 97%   Family History   Problem Relation Age of Onset   • Heart Disease Mother      Immunization History   Administered Date(s) Administered   • Influenza Vaccine Adult HD 01/19/2016, 01/09/2020   • Influenza Vaccine Quad Inj (Pf) 10/21/2014   • Influenza Vaccine Quad Inj (Preserved) 12/07/2018   • Pneumococcal Conjugate Vaccine (Prevnar/PCV-13) 02/10/2017   • Pneumococcal polysaccharide vaccine (PPSV-23) 01/19/2016   • TD Vaccine 02/07/2012   • Tdap Vaccine 09/07/2017         Physical Exam:  General: NAD. Speaking in full sentence.   HEENT: PERRLA, EOMI, no scleral icterus, no nasal or oral lesions  Neck: No thyromegaly, no adenopathy, no bruits  Mallampatti: Grade II  Lungs: Equal breath " sounds, no wheezes or crackles  Heart: Regular rate and rhythm, no gallops or murmurs  Abdomen: Soft, benign, no organomegaly  Extremities: No clubbing, cyanosis, or edema  Neurologic: Cranial nerve, motor, and sensory exam are normal    Diagnostic Test:  HRCT reviewed with patient and her spouse:   Resolution of GGO and pleura effusion. Trace of subpleural reticular thickening.     Edgar Alas MD   Pulmonary Critical Care   Formerly [facility], [facility]

## 2020-02-12 ENCOUNTER — OUTPATIENT INFUSION SERVICES (OUTPATIENT)
Dept: ONCOLOGY | Facility: MEDICAL CENTER | Age: 75
End: 2020-02-12
Attending: INTERNAL MEDICINE
Payer: MEDICARE

## 2020-02-12 VITALS
RESPIRATION RATE: 18 BRPM | TEMPERATURE: 98.4 F | OXYGEN SATURATION: 98 % | SYSTOLIC BLOOD PRESSURE: 113 MMHG | HEIGHT: 64 IN | BODY MASS INDEX: 22.81 KG/M2 | WEIGHT: 133.6 LBS | DIASTOLIC BLOOD PRESSURE: 66 MMHG | HEART RATE: 66 BPM

## 2020-02-12 DIAGNOSIS — E87.1 HYPONATREMIA: ICD-10-CM

## 2020-02-12 LAB
CORTIS SERPL-MCNC: 16.3 UG/DL (ref 0–23)
CORTIS SERPL-MCNC: 25.1 UG/DL (ref 0–23)
CORTIS SERPL-MCNC: 5.9 UG/DL (ref 0–23)

## 2020-02-12 PROCEDURE — 96374 THER/PROPH/DIAG INJ IV PUSH: CPT

## 2020-02-12 PROCEDURE — 700111 HCHG RX REV CODE 636 W/ 250 OVERRIDE (IP): Performed by: INTERNAL MEDICINE

## 2020-02-12 PROCEDURE — 36415 COLL VENOUS BLD VENIPUNCTURE: CPT

## 2020-02-12 PROCEDURE — 82533 TOTAL CORTISOL: CPT | Mod: 91

## 2020-02-12 PROCEDURE — 82024 ASSAY OF ACTH: CPT

## 2020-02-12 RX ORDER — COSYNTROPIN 0.25 MG/ML
0.25 INJECTION, POWDER, FOR SOLUTION INTRAMUSCULAR; INTRAVENOUS ONCE
Status: COMPLETED | OUTPATIENT
Start: 2020-02-12 | End: 2020-02-12

## 2020-02-12 RX ADMIN — COSYNTROPIN 250 MCG: 0.25 INJECTION, POWDER, LYOPHILIZED, FOR SOLUTION INTRAVENOUS at 11:23

## 2020-02-12 NOTE — PROGRESS NOTES
Patient presents to clinic for Cosyntropin stim test. Plan of care reviewed with patient, pt verbalized understanding. PIV established, brisk blood return noted. Labs drawn as ordered. Cosyntropin infused over 2 minutes. Cortisol level drawn 30 minutes and 60 minutes after infusion. Pt tolerated well with no s/s of adverse reaction. PIV flushed, catheter tip removed intact. Pt discharged in stable, ambulatory condition.

## 2020-02-13 LAB — ACTH PLAS-MCNC: 26.6 PG/ML (ref 7.2–63.3)

## 2020-02-14 DIAGNOSIS — E03.9 HYPOTHYROIDISM, UNSPECIFIED TYPE: ICD-10-CM

## 2020-03-17 ENCOUNTER — HOSPITAL ENCOUNTER (OUTPATIENT)
Dept: LAB | Facility: MEDICAL CENTER | Age: 75
End: 2020-03-17
Attending: INTERNAL MEDICINE
Payer: MEDICARE

## 2020-03-17 DIAGNOSIS — E03.9 HYPOTHYROIDISM, UNSPECIFIED TYPE: ICD-10-CM

## 2020-03-17 LAB
T3 SERPL-MCNC: 86 NG/DL (ref 60–181)
T3FREE SERPL-MCNC: 2.61 PG/ML (ref 2.4–4.2)
T4 FREE SERPL-MCNC: 0.85 NG/DL (ref 0.53–1.43)
THYROPEROXIDASE AB SERPL-ACNC: 0.3 IU/ML (ref 0–9)
TSH SERPL DL<=0.005 MIU/L-ACNC: 3.89 UIU/ML (ref 0.38–5.33)

## 2020-03-17 PROCEDURE — 36415 COLL VENOUS BLD VENIPUNCTURE: CPT

## 2020-03-17 PROCEDURE — 86376 MICROSOMAL ANTIBODY EACH: CPT

## 2020-03-17 PROCEDURE — 84443 ASSAY THYROID STIM HORMONE: CPT

## 2020-03-17 PROCEDURE — 84439 ASSAY OF FREE THYROXINE: CPT

## 2020-03-17 PROCEDURE — 84481 FREE ASSAY (FT-3): CPT

## 2020-03-17 PROCEDURE — 84480 ASSAY TRIIODOTHYRONINE (T3): CPT

## 2020-03-22 NOTE — PROGRESS NOTES
Endocrinology Clinic Progress Note  PCP: Eron Flowers M.D.    HPI:  Bernadette Paul is a 74 y.o. old patient who comes in today for review of endocrine problems.    Parathyroidectomy:  She had a parathyroidectomy in 2009.  Results for BRAD PAUL (MRN [id number]) as of 3/22/2020 08:46   Ref. Range 2/4/2020 08:53   Calcium Latest Ref Range: 8.5 - 10.5 mg/dL 10.2   Results for BRAD PAUL (MRN [id number]) as of 3/22/2020 08:46   Ref. Range 2/12/2020 11:00   Acth Latest Ref Range: 7.2 - 63.3 pg/mL 26.6       Hyponatremia:  Adrenal insufficiency has been ruled out as a contributory factor.  Results for BRAD PAUL (MRN [id number]) as of 3/22/2020 08:46   Ref. Range 2/4/2020 08:53   Sodium Latest Ref Range: 135 - 145 mmol/L 134 (L)         Fatigue:  Continues to have fatigue and is having to nap every day..  She is currently not taking any thyroid medication.    Results for BRAD PAUL (MRN [id number]) as of 3/22/2020 08:46   Ref. Range 2/4/2020 08:53 2/12/2020 11:00 2/12/2020 11:56 2/12/2020 12:35 3/17/2020 08:07   25-Hydroxy   Vitamin D 25 Latest Ref Range: 30 - 100 ng/mL 54       Cortisol Latest Ref Range: 0.0 - 23.0 ug/dL 5.4 5.9 16.3 25.1 (H)    Vitamin B12 -True Cobalamin Latest Ref Range: 211 - 911 pg/mL 764       TSH Latest Ref Range: 0.380 - 5.330 uIU/mL 2.710    3.890   Free T-4 Latest Ref Range: 0.53 - 1.43 ng/dL 0.83    0.85   T3 Latest Ref Range: 60.0 - 181.0 ng/dL 89.1    86.0   T3,Free Latest Ref Range: 2.40 - 4.20 pg/mL 2.84    2.61   Acth Latest Ref Range: 7.2 - 63.3 pg/mL  26.6      Microsomal -Tpo- Abs Latest Ref Range: 0.0 - 9.0 IU/mL     0.3       ROS:  Constitutional: fatigue,No unintentional weight loss  Endo: Denies excessive thirst or frequent urination  All other systems were reviewed and were negative.    Past Medical History:  Patient Active Problem List    Diagnosis Date Noted   • Abnormal CT of the chest 12/08/2019   • Bilateral pleural effusion 12/08/2019   • History MSSA bacteremia  "11/18/2019   • History septic joint of right knee joint (HCC) 11/15/2019   • Hyponatremia 10/11/2019   • Restless leg syndrome 09/10/2018   • Low back pain 02/20/2017   • History wrist fracture 02/12/2013   • Vasovagal syncope 05/02/2012   • Adenomatous polyp of colon 02/11/2011   • S/P laparoscopic cholecystectomy 01/20/2011   • Dyslipidemia 06/17/2009   • S/P parathyroidectomy 06/17/2009   • Allergic rhinitis 06/17/2009   • Preventative health care 06/17/2009       Medications:    Current Outpatient Medications:   •  Cholecalciferol (VITAMIN D) 50 MCG (2000 UT) Cap, Take  by mouth., Disp: , Rfl:   •  Cyanocobalamin (VITAMIN B 12 PO), Take 1,000 mcg by mouth every day., Disp: , Rfl:   •  Calcium Carb-Cholecalciferol (CALCIUM 600-D PO), Take 600 mg by mouth every day., Disp: , Rfl:   •  levothyroxine (SYNTHROID) 75 MCG Tab, Take 1 Tab by mouth Every morning on an empty stomach., Disp: 30 Tab, Rfl: 4  •  Ascorbic Acid (VITAMIN C PO), Take 1,000 mg by mouth every day., Disp: , Rfl:   •  Probiotic Product (PROBIOTIC PO), Take  by mouth every day., Disp: , Rfl:   •  MULTIPLE VITAMIN PO, Take  by mouth., Disp: , Rfl:     Labs: Reviewed    Physical Examination:  Vital signs: /80   Pulse 85   Ht 1.651 m (5' 5\")   Wt 61.2 kg (135 lb)   SpO2 99%   BMI 22.47 kg/m²  Body mass index is 22.47 kg/m². Patient's body mass index is 22.47 kg/m².   General: No apparent distress, cooperative  Eyes: No scleral icterus or discharge  ENMT: Normal on external inspection of nose, lips, normal thyroid exam  Neck: No abnormal masses on inspection  Resp: Normal effort, clear to auscultation bilaterally   CVS: Regular rate and rhythm, S1 S2 normal, no murmur   Extremities: No edema  Abdomen:no  abdominal obesity present  Neuro: Alert and oriented  Skin: No rash  Psych: Normal mood and affect, intact memory and able to make informed decisions      Assessment and Plan:    1. S/P parathyroidectomy  Monitor PTH and calcium closely. "     2. Fatigue, unspecified type  Could be due to hypothyroidism; plan as per assessment no 4.     3. Hyponatremia  Could be due to excessive water intake and or hypothyroidism; plan as per below     4. Hypothyroidism, unspecified type  Start levothyroxine 75 mcg daily; side effects and benefits discussed with patient in detail.     Return in about 2 months (around 5/23/2020).    Thank you for allowing me to participate in the care of this patient.    CC:   Eron Flowers M.D.    This note was created using voice recognition software (Dragon). The accuracy of the dictation is limited by the abilities of the software. I have reviewed the note prior to signing, however some errors in grammar and context are still possible. If you have any questions related to this note please do not hesitate to contact our office.

## 2020-03-23 ENCOUNTER — OFFICE VISIT (OUTPATIENT)
Dept: ENDOCRINOLOGY | Facility: MEDICAL CENTER | Age: 75
End: 2020-03-23
Payer: MEDICARE

## 2020-03-23 VITALS
HEART RATE: 85 BPM | OXYGEN SATURATION: 99 % | WEIGHT: 135 LBS | DIASTOLIC BLOOD PRESSURE: 80 MMHG | SYSTOLIC BLOOD PRESSURE: 122 MMHG | HEIGHT: 65 IN | BODY MASS INDEX: 22.49 KG/M2

## 2020-03-23 DIAGNOSIS — E89.2 S/P PARATHYROIDECTOMY: ICD-10-CM

## 2020-03-23 DIAGNOSIS — E87.1 HYPONATREMIA: ICD-10-CM

## 2020-03-23 DIAGNOSIS — R53.83 FATIGUE, UNSPECIFIED TYPE: ICD-10-CM

## 2020-03-23 DIAGNOSIS — E03.9 HYPOTHYROIDISM, UNSPECIFIED TYPE: ICD-10-CM

## 2020-03-23 PROCEDURE — 99214 OFFICE O/P EST MOD 30 MIN: CPT | Performed by: INTERNAL MEDICINE

## 2020-03-23 RX ORDER — LEVOTHYROXINE SODIUM 0.07 MG/1
75 TABLET ORAL
Qty: 30 TAB | Refills: 4 | Status: SHIPPED | OUTPATIENT
Start: 2020-03-23 | End: 2020-08-18

## 2020-03-23 RX ORDER — MULTIVIT-MIN/IRON/FOLIC ACID/K 18-600-40
2000 CAPSULE ORAL DAILY
COMMUNITY
End: 2021-07-06

## 2020-03-23 ASSESSMENT — FIBROSIS 4 INDEX: FIB4 SCORE: 1.28

## 2020-04-23 PROBLEM — H26.9 CATARACT: Status: ACTIVE | Noted: 2020-04-23

## 2020-05-20 ENCOUNTER — HOSPITAL ENCOUNTER (OUTPATIENT)
Dept: LAB | Facility: MEDICAL CENTER | Age: 75
End: 2020-05-20
Attending: INTERNAL MEDICINE
Payer: MEDICARE

## 2020-05-20 DIAGNOSIS — E87.1 HYPONATREMIA: ICD-10-CM

## 2020-05-20 DIAGNOSIS — R53.83 FATIGUE, UNSPECIFIED TYPE: ICD-10-CM

## 2020-05-20 DIAGNOSIS — E89.2 S/P PARATHYROIDECTOMY: ICD-10-CM

## 2020-05-20 DIAGNOSIS — E03.9 HYPOTHYROIDISM, UNSPECIFIED TYPE: ICD-10-CM

## 2020-05-20 LAB
ANION GAP SERPL CALC-SCNC: 12 MMOL/L (ref 7–16)
BUN SERPL-MCNC: 16 MG/DL (ref 8–22)
CALCIUM SERPL-MCNC: 10.2 MG/DL (ref 8.5–10.5)
CHLORIDE SERPL-SCNC: 94 MMOL/L (ref 96–112)
CO2 SERPL-SCNC: 27 MMOL/L (ref 20–33)
CREAT SERPL-MCNC: 0.67 MG/DL (ref 0.5–1.4)
FASTING STATUS PATIENT QL REPORTED: NORMAL
GLUCOSE SERPL-MCNC: 97 MG/DL (ref 65–99)
POTASSIUM SERPL-SCNC: 4 MMOL/L (ref 3.6–5.5)
SODIUM SERPL-SCNC: 133 MMOL/L (ref 135–145)
T3FREE SERPL-MCNC: 2.46 PG/ML (ref 2–4.4)
T4 FREE SERPL-MCNC: 1.62 NG/DL (ref 0.93–1.7)
TSH SERPL DL<=0.005 MIU/L-ACNC: 0.55 UIU/ML (ref 0.38–5.33)

## 2020-05-20 PROCEDURE — 84439 ASSAY OF FREE THYROXINE: CPT

## 2020-05-20 PROCEDURE — 84443 ASSAY THYROID STIM HORMONE: CPT

## 2020-05-20 PROCEDURE — 80048 BASIC METABOLIC PNL TOTAL CA: CPT

## 2020-05-20 PROCEDURE — 36415 COLL VENOUS BLD VENIPUNCTURE: CPT

## 2020-05-20 PROCEDURE — 84481 FREE ASSAY (FT-3): CPT

## 2020-05-24 NOTE — PROGRESS NOTES
Endocrinology Clinic Progress Note  PCP: Eron Flowers M.D.    HPI:  This encounter was conducted via Zoom.  Verbal consent was obtained. Patient's identity was verified.  Bernadette Paul is a 74 y.o. old patient who comes in today for review of endocrine problems.  She broke her right ankle playing golf.  Her fatigue is much better.    Parathyroidectomy:  She had a parathyroidectomy in 2009.  Results for BRAD PAUL (MRN [id number]) as of 5/24/2020 15:53   Ref. Range 5/20/2020 08:08   Calcium Latest Ref Range: 8.5 - 10.5 mg/dL 10.2     Hypothyroidism:  Currently taking Levothyroxine 75 mcg daily.  Results for BRAD PAUL (MRN [id number]) as of 5/24/2020 15:53   Ref. Range 5/20/2020 08:08   TSH Latest Ref Range: 0.380 - 5.330 uIU/mL 0.547   Free T-4 Latest Ref Range: 0.93 - 1.70 ng/dL 1.62   T3,Free Latest Ref Range: 2.00 - 4.40 pg/mL 2.46     Hyponatremia:  She has cut her water down to 34-36 oz daily. Results for BRAD PAUL (MRN [id number]) as of 5/24/2020 15:53   Ref. Range 5/20/2020 08:08   Sodium Latest Ref Range: 135 - 145 mmol/L 133 (L)      Ref. Range 5/20/2020 08:08   Chloride Latest Ref Range: 96 - 112 mmol/L 94 (L)     Fatigue:  Getting better and was able to not take a nap.  Vitamin D and Vitamin B 12 levels in a good range.  Results for BRAD PAUL (MRN [id number]) as of 5/24/2020 15:53   Ref. Range 2/4/2020 08:53   25-Hydroxy   Vitamin D 25 Latest Ref Range: 30 - 100 ng/mL 54      Ref. Range 2/4/2020 08:53   Vitamin B12 -True Cobalamin Latest Ref Range: 211 - 911 pg/mL 764       ROS:  Constitutional: No unintentional weight loss  Endo: Denies excessive thirst or frequent urination  All other systems were reviewed and were negative.    Past Medical History:  Patient Active Problem List    Diagnosis Date Noted   • Cataract 04/23/2020   • Abnormal CT of the chest 12/08/2019   • Bilateral pleural effusion 12/08/2019   • History MSSA bacteremia 11/18/2019   • History septic joint of right knee joint (HCC)  11/15/2019   • Hyponatremia 10/11/2019   • Restless leg syndrome 09/10/2018   • Low back pain 02/20/2017   • History wrist fracture 02/12/2013   • Vasovagal syncope 05/02/2012   • Adenomatous polyp of colon 02/11/2011   • S/P laparoscopic cholecystectomy 01/20/2011   • Dyslipidemia 06/17/2009   • S/P parathyroidectomy 06/17/2009   • Allergic rhinitis 06/17/2009   • Preventative health care 06/17/2009       Medications:    Current Outpatient Medications:   •  simvastatin (ZOCOR) 20 MG Tab, Take 20 mg by mouth every evening., Disp: , Rfl:   •  Cholecalciferol (VITAMIN D) 50 MCG (2000 UT) Cap, Take  by mouth., Disp: , Rfl:   •  Cyanocobalamin (VITAMIN B 12 PO), Take 1,000 mcg by mouth every day., Disp: , Rfl:   •  Calcium Carb-Cholecalciferol (CALCIUM 600-D PO), Take 600 mg by mouth every day., Disp: , Rfl:   •  levothyroxine (SYNTHROID) 75 MCG Tab, Take 1 Tab by mouth Every morning on an empty stomach., Disp: 30 Tab, Rfl: 4  •  Ascorbic Acid (VITAMIN C PO), Take 1,000 mg by mouth every day., Disp: , Rfl:   •  Probiotic Product (PROBIOTIC PO), Take  by mouth every day., Disp: , Rfl:   •  MULTIPLE VITAMIN PO, Take  by mouth., Disp: , Rfl:     Labs: Reviewed    Physical Examination:  Vital signs: There were no vitals taken for this visit. There is no height or weight on file to calculate BMI. Patient's body mass index is unknown because there is no height or weight on file.   General: No apparent distress, cooperative  Eyes: No scleral icterus or discharge  ENMT: Normal on external inspection of nose, lips, normal thyroid on inspection  Neck: No abnormal masses on inspection  Resp: Normal effort  Extremities: No visible edema  Neuro: Alert and oriented  Skin: No visible rash  Psych: Normal mood and affect, intact memory and able to make informed decisions    Assessment and Plan:    1. S/P parathyroidectomy  Monitor PTH and calcium closely.     2. Hypothyroidism  Continue current dose; fatigue is much better.     3.  Hyponatremia  Monitor sodium closely. Advised to try liquid IV( mix 1 stick in 16 oz of water) and this should help.     4. Fatigue, unspecified type  Much improved.     Return in about 3 months (around 8/26/2020).    Thank you for allowing me to participate in the care of this patient.        CC:   Eron Flowers M.D.    This note was created using voice recognition software (Dragon). The accuracy of the dictation is limited by the abilities of the software. I have reviewed the note prior to signing, however some errors in grammar and context are still possible. If you have any questions related to this note please do not hesitate to contact our office.

## 2020-05-26 ENCOUNTER — TELEMEDICINE (OUTPATIENT)
Dept: ENDOCRINOLOGY | Facility: MEDICAL CENTER | Age: 75
End: 2020-05-26
Payer: MEDICARE

## 2020-05-26 DIAGNOSIS — E55.9 VITAMIN D DEFICIENCY: ICD-10-CM

## 2020-05-26 DIAGNOSIS — E87.1 HYPONATREMIA: ICD-10-CM

## 2020-05-26 DIAGNOSIS — E03.9 HYPOTHYROIDISM, UNSPECIFIED TYPE: ICD-10-CM

## 2020-05-26 DIAGNOSIS — E89.2 S/P PARATHYROIDECTOMY: ICD-10-CM

## 2020-05-26 DIAGNOSIS — R53.83 FATIGUE, UNSPECIFIED TYPE: ICD-10-CM

## 2020-05-26 PROCEDURE — 99214 OFFICE O/P EST MOD 30 MIN: CPT | Mod: 95,CR | Performed by: INTERNAL MEDICINE

## 2020-05-26 RX ORDER — SIMVASTATIN 20 MG
20 TABLET ORAL NIGHTLY
COMMUNITY
End: 2020-06-08

## 2020-06-08 ENCOUNTER — TELEPHONE (OUTPATIENT)
Dept: MEDICAL GROUP | Facility: MEDICAL CENTER | Age: 75
End: 2020-06-08

## 2020-06-08 DIAGNOSIS — E78.5 DYSLIPIDEMIA: Chronic | ICD-10-CM

## 2020-06-08 DIAGNOSIS — E55.9 VITAMIN D DEFICIENCY: ICD-10-CM

## 2020-06-08 RX ORDER — SIMVASTATIN 20 MG
TABLET ORAL
Qty: 90 TAB | Refills: 0 | Status: SHIPPED | OUTPATIENT
Start: 2020-06-08 | End: 2020-09-10

## 2020-06-08 NOTE — TELEPHONE ENCOUNTER
Left a message for patient to call back at [phone].     Ivon Be  [facility] Assistant

## 2020-06-08 NOTE — TELEPHONE ENCOUNTER
Please notify the patient that she is due for her fasting labs including the cholesterol, the orders have been placed in the computer system.

## 2020-06-17 ENCOUNTER — HOSPITAL ENCOUNTER (OUTPATIENT)
Dept: LAB | Facility: MEDICAL CENTER | Age: 75
End: 2020-06-17
Attending: INTERNAL MEDICINE
Payer: MEDICARE

## 2020-06-17 DIAGNOSIS — E78.5 DYSLIPIDEMIA: Chronic | ICD-10-CM

## 2020-06-17 DIAGNOSIS — E55.9 VITAMIN D DEFICIENCY: ICD-10-CM

## 2020-06-17 LAB
BASOPHILS # BLD AUTO: 0.7 % (ref 0–1.8)
BASOPHILS # BLD: 0.03 K/UL (ref 0–0.12)
EOSINOPHIL # BLD AUTO: 0.05 K/UL (ref 0–0.51)
EOSINOPHIL NFR BLD: 1.2 % (ref 0–6.9)
ERYTHROCYTE [DISTWIDTH] IN BLOOD BY AUTOMATED COUNT: 46 FL (ref 35.9–50)
HCT VFR BLD AUTO: 47.2 % (ref 37–47)
HGB BLD-MCNC: 15.7 G/DL (ref 12–16)
IMM GRANULOCYTES # BLD AUTO: 0 K/UL (ref 0–0.11)
IMM GRANULOCYTES NFR BLD AUTO: 0 % (ref 0–0.9)
LYMPHOCYTES # BLD AUTO: 0.84 K/UL (ref 1–4.8)
LYMPHOCYTES NFR BLD: 20.8 % (ref 22–41)
MCH RBC QN AUTO: 30.3 PG (ref 27–33)
MCHC RBC AUTO-ENTMCNC: 33.3 G/DL (ref 33.6–35)
MCV RBC AUTO: 91.1 FL (ref 81.4–97.8)
MONOCYTES # BLD AUTO: 0.26 K/UL (ref 0–0.85)
MONOCYTES NFR BLD AUTO: 6.5 % (ref 0–13.4)
NEUTROPHILS # BLD AUTO: 2.85 K/UL (ref 2–7.15)
NEUTROPHILS NFR BLD: 70.8 % (ref 44–72)
NRBC # BLD AUTO: 0 K/UL
NRBC BLD-RTO: 0 /100 WBC
PLATELET # BLD AUTO: 236 K/UL (ref 164–446)
PMV BLD AUTO: 10.4 FL (ref 9–12.9)
RBC # BLD AUTO: 5.18 M/UL (ref 4.2–5.4)
WBC # BLD AUTO: 4 K/UL (ref 4.8–10.8)

## 2020-06-17 PROCEDURE — 82306 VITAMIN D 25 HYDROXY: CPT

## 2020-06-17 PROCEDURE — 84443 ASSAY THYROID STIM HORMONE: CPT

## 2020-06-17 PROCEDURE — 80053 COMPREHEN METABOLIC PANEL: CPT

## 2020-06-17 PROCEDURE — 36415 COLL VENOUS BLD VENIPUNCTURE: CPT

## 2020-06-17 PROCEDURE — 80061 LIPID PANEL: CPT

## 2020-06-17 PROCEDURE — 85025 COMPLETE CBC W/AUTO DIFF WBC: CPT

## 2020-06-18 PROBLEM — K21.9 GERD (GASTROESOPHAGEAL REFLUX DISEASE): Status: ACTIVE | Noted: 2020-06-18

## 2020-06-18 PROBLEM — Z87.898 HISTORY OF FATIGUE: Status: ACTIVE | Noted: 2020-06-18

## 2020-06-18 PROBLEM — R07.89 ATYPICAL CHEST PAIN: Status: ACTIVE | Noted: 2020-06-18

## 2020-06-18 LAB
25(OH)D3 SERPL-MCNC: 88 NG/ML (ref 30–100)
ALBUMIN SERPL BCP-MCNC: 4.3 G/DL (ref 3.2–4.9)
ALBUMIN/GLOB SERPL: 1.9 G/DL
ALP SERPL-CCNC: 56 U/L (ref 30–99)
ALT SERPL-CCNC: 21 U/L (ref 2–50)
ANION GAP SERPL CALC-SCNC: 8 MMOL/L (ref 7–16)
AST SERPL-CCNC: 22 U/L (ref 12–45)
BILIRUB SERPL-MCNC: 0.6 MG/DL (ref 0.1–1.5)
BUN SERPL-MCNC: 15 MG/DL (ref 8–22)
CALCIUM SERPL-MCNC: 9.8 MG/DL (ref 8.5–10.5)
CHLORIDE SERPL-SCNC: 94 MMOL/L (ref 96–112)
CHOLEST SERPL-MCNC: 172 MG/DL (ref 100–199)
CO2 SERPL-SCNC: 27 MMOL/L (ref 20–33)
CREAT SERPL-MCNC: 0.66 MG/DL (ref 0.5–1.4)
FASTING STATUS PATIENT QL REPORTED: NORMAL
GLOBULIN SER CALC-MCNC: 2.3 G/DL (ref 1.9–3.5)
GLUCOSE SERPL-MCNC: 89 MG/DL (ref 65–99)
HDLC SERPL-MCNC: 99 MG/DL
LDLC SERPL CALC-MCNC: 65 MG/DL
POTASSIUM SERPL-SCNC: 4.3 MMOL/L (ref 3.6–5.5)
PROT SERPL-MCNC: 6.6 G/DL (ref 6–8.2)
SODIUM SERPL-SCNC: 129 MMOL/L (ref 135–145)
TRIGL SERPL-MCNC: 41 MG/DL (ref 0–149)
TSH SERPL DL<=0.005 MIU/L-ACNC: 0.16 UIU/ML (ref 0.38–5.33)

## 2020-06-19 PROBLEM — D70.9 NEUTROPENIA (HCC): Status: ACTIVE | Noted: 2020-06-19

## 2020-06-25 ENCOUNTER — OFFICE VISIT (OUTPATIENT)
Dept: MEDICAL GROUP | Facility: MEDICAL CENTER | Age: 75
End: 2020-06-25
Payer: MEDICARE

## 2020-06-25 ENCOUNTER — TELEPHONE (OUTPATIENT)
Dept: MEDICAL GROUP | Facility: MEDICAL CENTER | Age: 75
End: 2020-06-25

## 2020-06-25 VITALS
SYSTOLIC BLOOD PRESSURE: 114 MMHG | OXYGEN SATURATION: 97 % | HEIGHT: 65 IN | HEART RATE: 70 BPM | BODY MASS INDEX: 22.33 KG/M2 | TEMPERATURE: 97.5 F | DIASTOLIC BLOOD PRESSURE: 68 MMHG | WEIGHT: 134 LBS

## 2020-06-25 DIAGNOSIS — D70.9 NEUTROPENIA, UNSPECIFIED TYPE (HCC): ICD-10-CM

## 2020-06-25 DIAGNOSIS — Z00.00 PREVENTATIVE HEALTH CARE: Chronic | ICD-10-CM

## 2020-06-25 DIAGNOSIS — E89.2 S/P PARATHYROIDECTOMY: Chronic | ICD-10-CM

## 2020-06-25 PROCEDURE — 99213 OFFICE O/P EST LOW 20 MIN: CPT | Performed by: INTERNAL MEDICINE

## 2020-06-25 RX ORDER — CHLORAL HYDRATE 500 MG
2000 CAPSULE ORAL DAILY
Status: ON HOLD | COMMUNITY
End: 2021-10-13

## 2020-06-25 ASSESSMENT — FIBROSIS 4 INDEX: FIB4 SCORE: 1.51

## 2020-06-25 NOTE — LETTER
[facility]  Eron Flowers M.D.  [address]  [place] [address]  Fax: [phone]   Authorization for Release/Disclosure of   Protected Health Information   Name: SAMARA CISNEROS : 1945 SSN: [id number]   Address: [address] [place] [address] Phone:    [phone] (home)    I authorize the entity listed below to release/disclose the PHI below to:   [facility]/Eron Flowers M.D. and Eron Flowers M.D.   Provider or Entity Name:                                                              [facility]   Address   City, State, [facility]   Phone:                   [phone]    Fax:                  [phone]   Reason for request: continuity of care   Information to be released: Past 2 mo of records   [  ] LAST COLONOSCOPY,  including any PATH REPORT and follow-up  [  ] LAST FIT/COLOGUARD RESULT [  ] LAST DEXA  [  ] LAST MAMMOGRAM  [  ] LAST PAP  [  ] LAST LABS [  ] RETINA EXAM REPORT  [  ] IMMUNIZATION RECORDS  [xx  ] Release all info      [  ] Check here and initial the line next to each item to release ALL health information INCLUDING  _____ Care and treatment for drug and / or alcohol abuse  _____ HIV testing, infection status, or AIDS  _____ Genetic Testing    DATES OF SERVICE OR TIME PERIOD TO BE DISCLOSED: _____________  I understand and acknowledge that:  * This Authorization may be revoked at any time by you in writing, except if your health information has already been used or disclosed.  * Your health information that will be used or disclosed as a result of you signing this authorization could be re-disclosed by the recipient. If this occurs, your re-disclosed health information may no longer be protected by State or Federal laws.  * You may refuse to sign this Authorization. Your refusal will not affect your ability to obtain treatment.  * This Authorization becomes effective upon signing and will  on (date) __________.      If no date is indicated, this  Authorization will  one (1) year from the signature date.    Name: Bernadette Storey Humberto    Signature:   Date:     2020       PLEASE FAX REQUESTED RECORDS BACK TO: [phone]

## 2020-06-25 NOTE — PROGRESS NOTES
Subjective:      Cordelia Paul is a 74 y.o. female who presents with labs follow up ankle fracture        HPI     Patient had been doing well almost returning to her baseline level activity until she suffered an ankle fracture six weeks ago, currently seeing [facility] and going to PT today, no cane or walker needed, no falls.  Has not resumed full activity yet, but will start physical therapy as discussed today, no current pain medications, no NSAIDs, no narcotics.  Since the injury she has not been as active, prior to the injury she had been much more active.  Hyponatremia followed by endocrinology, on thyroid supplementation Synthroid 75 mcg daily most recent TSH 0.156, sodium 129, chronic hyponatremia followed by endocrinology  Dyslipidemia on Zocor 20 mg daily without side effects    Current Outpatient Medications   Medication Sig Dispense Refill   • Omega-3 Fatty Acids (FISH OIL) 1000 MG Cap capsule Take 1,000 mg by mouth 2 Times a Day.     • simvastatin (ZOCOR) 20 MG Tab TAKE 1 TAB BY MOUTH EVERY EVENING. 90 Tab 0   • Cholecalciferol (VITAMIN D) 50 MCG (2000 UT) Cap Take  by mouth.     • Cyanocobalamin (VITAMIN B 12 PO) Take 1,000 mcg by mouth every day.     • Calcium Carb-Cholecalciferol (CALCIUM 600-D PO) Take 600 mg by mouth every day.     • levothyroxine (SYNTHROID) 75 MCG Tab Take 1 Tab by mouth Every morning on an empty stomach. 30 Tab 4   • Ascorbic Acid (VITAMIN C PO) Take 1,000 mg by mouth every day.     • Probiotic Product (PROBIOTIC PO) Take  by mouth every day.     • MULTIPLE VITAMIN PO Take  by mouth.       No current facility-administered medications for this visit.      Adenoma  2/11 colon per [facility] adenoma   3/16/16 colon per [facility] tortuous colon, melanosis in colon, repeat colonoscopy 5 years for surveillance     Allergic rhinitis    Atypical chest pain  10/29/19 chest burning sensation, EKG negative, stress test ordered   10/30/19 echo normal LV function EF 60%, normal diastolic function, mild aortic  insufficiency, normal right LV size and function, RSVP 25  11/12/19 myocardial perfusion study no evidence of ischemia, EF 79%     Dyslipidemia  11/08 chol 210,trig 45,hdl 95,ldl 97  6/09 chol 271,trig 113,hdl 74,ldl 163 restart on zocor 40 (1/2)  5/05 aorta abd u/s negative  11/08 p.thallium no ischemia EF 87%  2/09 ERIC negative  2/10 chol 205,trig 55,hdl 100,ldl 94  1/11 chol 197,trig 66,hdl 100,ldl 84 on zocor 20 mg  2/12 chol 205,trig 43,hdl 106,ldl 90 on zocor 20 mg  2/12/13 chol 216,trig 58,hdl 114,ldl 90 on zocor 20 mg  2/13/14 chol 195,trig 30,hdl 106,ldl 83 on zocor 20 mg  3/12/15 chol 205,trig 41,hdl 99,ldl 98 on zocor 20 mg  3/15/16 chol 260,trig 77,hdl 102,ldl 143 on zocor 20 mg repeat lipid panel 2 months  5/25/16 chol 243,trig 51,hdl 104,ldl 129 on zocor 20 mg  2/7/17 chol 203,trig 66,hdl 105,ldl 85 on zocor 20 mg  9/20/17 chol 211,trig 71,hdl 104,ldl 93 on zocor 20 mg  9/10/18 chol 218,trig 48,hdl 106,ldl 102 on zocor 20 mg  11/12/19 myocardial perfusion study no evidence of ischemia, EF 79%  1/9/20 off zocor   6/20/20 chol 172,trig 41,hdl 99,ldl 65 on zocor 20 mg    GERD  11/4/19 [facility] note burning sensation controlled, start pantoprazole 20 mg, labs ordered    History of bilateral pleural effusion  12/4/19 hospital admission, 12/9/19 hospital discharge, admission for fevers and chills while on home infusion IV daptomycin for septic arthritis MSSA    12/4/19 chest x-ray negative  12/5/19  infectious disease hospital consultation recommend NICOLE, repeat blood cultures negative thus far, hold off on discontinuation of PICC line until NICOLE obtained, changed to cefazolin from daptomycin  12/6/19 transesophageal echo structurally normal heart valves  12/6/19 infectious disease hospital note repeat blood cultures negative  12/6/19 PICC line removed, catheter tip culture negative, blood cultures no growth to date  12/6/19 CT abdomen pelvis with contrast, minimal right lower lobe atelectasis or  pneumonia, small bilateral pleural effusions, no loculated fluid collection  12/7/19 CT chest with; trace amounts dependent bilateral pleural effusion, interstitial groundglass opacities  12/8/19 infectious disease hospital note cough ongoing prior to initiation of daptomycin, less likely daptomycin induced eosinophilic pneumonia, will not restart daptomycin, continue cefazolin with end-date 12/15/19, follow-up pulmonary  12/8/19  pulmonary hospital consultation bilateral pleural effusion confirmed on CT, too small for bedside thoracentesis, given acute sickness likely related to hypoalbuminemia versus volume overload status post resuscitation, cannot rule out parapneumonic effusion but clinical picture with negative procalcitonin is not suggestive of this, recommend gentle diuresis, outpatient follow-up CT thorax 4 to 6 weeks, regarding cough differential includes reflux, asthma, volume overload, interstitial lung disease, postnasal drip, eosinophilic granuloma, nonasthmatic eosinophilic bronchitis, given eosinophilia to differentiate would need full pulmonary function test with methacholine challenge, which can be done as an outpatient if cough persists, repeat CT scan as above (if groundglass opacities persist will need further diagnostic intervention bronchoscopy), check REGGIE, rheumatoid factor, IgE level  12/8/19 REGGIE negative,CCP 6,RF negative  12/9/19 hospital discharge per infectious disease recommendation continue cefazolin through 12/15/15 through peripheral IV, follow-up infectious disease outpatient pulmonary outpatient  12/10/19 ESR 70,CRP 3.9,wbc 5.6,hgb 12,hct 36  12/11/19 referral pulmonary placed, repeat CT high-resolution thorax ordered 4 to 6 weeks  12/17/19 infectious disease note transition IV antibiotics to doxycycline  12/18/19 pulmonary note order CT high resolution thorax  1/21/20 CT thorax high resolution; interval resolution of bilateral subpleural interstitial opacities with hazy  groundglass opacities, minimal streaky linear opacity in the lingula may represent subsegmental atelectasis or scar  2/10/20  pulmon note resolution of pleural effusion on follow-up CT scan, suspect pleural effusion and subpleural reticular thickening was related to sepsis induced lung injury from volume overload, follow-up as needed    History MSSA bacteremia  11/17/19  infectious disease hospital consultation admitted 11/15/19 right knee pain after treadmill stress test, seen by orthopedics and had arthrocentesis performed, sent to emergency room with increased cell count on arthrocentesis, presentation ER WBC 17.8, ESR 44, went to the operating room for incision and drainage right septic knee positive staphylococcus aureus MSSA, blood cultures positive for staphylococcus aureus MSSA  11/18/19 echo EF 60% no evidence of endocarditis  11/20/19 ultrasound-guided catheter insertion for intravenous antibiotics  11/22/19 discharged home IV daptomycin and date 12/15/19, follow-up once a week infusion center, follow-up infectious disease 2 weeks  12/4/19 hospital admission, 12/9/19 hospital discharge, admission for fevers and chills while on home infusion IV daptomycin for septic arthritis MSSA    12/4/19 chest x-ray negative  12/5/19  infectious disease \[facility]\"" consultation recommend NICOLE, repeat blood cultures negative thus far, hold off on discontinuation of PICC line until NICOLE obtained, changed to cefazolin from daptomycin  12/6/19 transesophageal echo structurally normal heart valves  12/6/19 infectious disease hospital note repeat blood cultures negative  12/6/19 PICC line removed, catheter tip culture negative, blood cultures no growth to date  12/6/19 CT abdomen pelvis with contrast, minimal right lower lobe atelectasis or pneumonia, small bilateral pleural effusions, no loculated fluid collection  12/7/19 CT chest with; trace amounts dependent bilateral pleural effusion, interstitial  groundglass opacities  12/8/19 infectious disease hospital note cough ongoing prior to initiation of daptomycin, less likely daptomycin induced eosinophilic pneumonia, will not restart daptomycin, continue cefazolin with end-date 12/15/19, follow-up pulmonary  12/8/19  pulmonary hospital consultation bilateral pleural effusion confirmed on CT, too small for bedside thoracentesis, given acute sickness likely related to hypoalbuminemia versus volume overload status post resuscitation, cannot rule out parapneumonic effusion but clinical picture with negative procalcitonin is not suggestive of this, recommend gentle diuresis, outpatient follow-up CT thorax 4 to 6 weeks, regarding cough differential includes reflux, asthma, volume overload, interstitial lung disease, postnasal drip, eosinophilic granuloma, nonasthmatic eosinophilic bronchitis, given eosinophilia to differentiate would need full pulmonary function test with methacholine challenge, which can be done as an outpatient if cough persists, repeat CT scan as above (if groundglass opacities persist will need further diagnostic intervention bronchoscopy), check REGGIE, rheumatoid factor, IgE level  12/8/19 REGGIE negative,CCP 6,RF negative  12/9/19 hospital discharge per infectious disease recommendation continue cefazolin through 12/15/15 through peripheral IV, follow-up infectious disease outpatient pulmonary outpatient  12/10/19 ESR 70,CRP 3.9,wbc 5.6,hgb 12,hct 36  12/11/19 referral pulmonary placed, repeat CT high-resolution thorax ordered 4 to 6 weeks  12/17/19 infectious disease note transition IV antibiotics to doxycycline  12/18/19 pulmonary note order CT high resolution thorax  1/21/20 CT thorax high resolution; interval resolution of bilateral subpleural interstitial opacities with hazy groundglass opacities, minimal streaky linear opacity in the lingula may represent subsegmental atelectasis or scar  1/22/20 wbc 3.2 (62%N,27%L), ESR 7, CRP 0.13 off  antibiotics since 1/16/20  2/10/20 dr.thao maldonado note resolution of pleural effusion on follow-up CT scan, suspect pleural effusion and subpleural reticular thickening was related to sepsis induced lung injury from volume overload, follow-up as needed    History fatigue  10/15/19 b2 1103,iron and zinc normal,HIV negative, REGGIE, SSA and SSB labs negative  10/29/19 chest burning sensation, EKG negative, stress test ordered   10/30/19 echo normal LV function EF 60%, normal diastolic function, mild aortic insufficiency, normal right LV size and function, RSVP 25  11/12/19 myocardial perfusion study no evidence of ischemia, EF 79%    History septic joint right knee  11/17/19  infectious disease [facility] consultation admitted 11/15/19 right knee pain after treadmill stress test, seen by orthopedics and had arthrocentesis performed, sent to emergency room with increased cell count on arthrocentesis, presentation ER WBC 17.8, ESR 44, went to the operating room for incision and drainage right septic knee positive staphylococcus aureus MSSA, blood cultures positive for staphylococcus aureus MSSA  11/18/19 echo EF 60% no evidence of endocarditis  11/20/19 ultrasound-guided catheter insertion for intravenous antibiotics  11/22/19 discharged home IV daptomycin and date 12/15/19, follow-up once a week infusion center, follow-up infectious disease 2 weeks  12/4/19 hospital admission, 12/9/19 hospital discharge, admission for fevers and chills while on home infusion IV daptomycin for septic arthritis MSSA    12/4/19 chest x-ray negative  12/5/19  infectious disease [facility] consultation recommend NICOLE, repeat blood cultures negative thus far, hold off on discontinuation of PICC line until NICOLE obtained, changed to cefazolin from daptomycin  12/6/19 transesophageal echo structurally normal heart valves  12/6/19 infectious disease hospital note repeat blood cultures negative  12/6/19 PICC line removed, catheter tip  culture negative, blood cultures no growth to date  12/6/19 CT abdomen pelvis with contrast, minimal right lower lobe atelectasis or pneumonia, small bilateral pleural effusions, no loculated fluid collection  12/7/19 CT chest with; trace amounts dependent bilateral pleural effusion, interstitial groundglass opacities  12/8/19 infectious disease hospital note cough ongoing prior to initiation of daptomycin, less likely daptomycin induced eosinophilic pneumonia, will not restart daptomycin, continue cefazolin with end-date 12/15/19, follow-up pulmonary  12/8/19  pulmonary hospital consultation bilateral pleural effusion confirmed on CT, too small for bedside thoracentesis, given acute sickness likely related to hypoalbuminemia versus volume overload status post resuscitation, cannot rule out parapneumonic effusion but clinical picture with negative procalcitonin is not suggestive of this, recommend gentle diuresis, outpatient follow-up CT thorax 4 to 6 weeks, regarding cough differential includes reflux, asthma, volume overload, interstitial lung disease, postnasal drip, eosinophilic granuloma, nonasthmatic eosinophilic bronchitis, given eosinophilia to differentiate would need full pulmonary function test with methacholine challenge, which can be done as an outpatient if cough persists, repeat CT scan as above (if groundglass opacities persist will need further diagnostic intervention bronchoscopy), check REGGIE, rheumatoid factor, IgE level  12/8/19 REGGIE negative,CCP 6,RF negative  12/9/19 hospital discharge per infectious disease recommendation continue cefazolin through 12/15/15 through peripheral IV, follow-up infectious disease outpatient pulmonary outpatient  12/10/19 ESR 70,CRP 3.9,wbc 5.6,hgb 12,hct 36  12/11/19 referral pulmonary placed, repeat CT high-resolution thorax ordered 4 to 6 weeks  12/17/19 infectious disease note transition IV antibiotics to doxycycline  12/18/19 pulmonary note order CT high  resolution thorax  1/22/20 wbc 3.2 (62%N,27%L), ESR 7, CRP 0.13 off antibiotics from 1/16/20    History of vasovagal  5/12 one episode, ekg, echo, holter per snca negative, no further workup necessary  10/30/19 echo normal LV function EF 60%, normal diastolic function, mild aortic insufficiency, normal right LV size and function, RSVP 25  11/12/19 myocardial perfusion study no evidence of ischemia, EF 79%    History wrist fracture  12/30/12 left wrist fracture had splint for 6 weeks, no surgery     hyponatremia  2/12/13 Na 136  2/13/14 Na 131  3/15/16 Na 137  5/25/16 Na 134  9/20/17 Na 131  10/2/19 seen ER URI, Na 129 labs ordered to be done in 2 weeks  10/9/19 Na 134, normal cbc,cmp,tsh (consider cortisol, ACTH, HIV, hepatitis serology, REGGIE, B12)   10/10/19 labs ordered dry mouth, fatigue, hyponatremia, echo ordered murmur, ativan as needed for breakthrough panic attack  10/15/19 serum osm 277 (278-298), urine osm 128 (300-900), urine Na 14, cortisol 9.8, ACTH 15  12/6/19 Na 134  12/10/19 Na 132  1/22/20 Na 133  1/29/20  endocrine note hyponatremia rule out adrenal insufficiency, ACTH stim test  2/4/20 ACTH 14,cortisol 5.4, serum osmolality 282, urine osmolality 344, Na 134  2/4/20 tsh 2.7,free t4 0.83,free t3 2.84  2/12/20 ACTH stim test per endocrinology negative, urine and serum osmolality indicates excess water intake  3/17/20 tsh 3.8,free t4 0.85,free t3 2.6,t3 86,TPO 0.3  3/23/20  endocrine note hyponatremia could be excessive water intake hypothyroid, start synthroid 75 mcg daily follow-up 2 months  5/20/20 tsh 0.547,free t4 1.62,free t3 2.46, Na 133 on synthroid 75 mcg  5/26/20  endocrine note recent Na 133, monitor sodium, try liquid IV mixed once taking 16 ounce water, follow-up 3 months  6/20/20 tsh 0.156, Na 129 on synthroid 75 mcg per endocrinology    Low back pain    Neutropenia  9/20/17 wbc 4.3  9/10/18 wbc 4.9  12/10/19 wbc 5.6  1/14/20 wbc 3.2  6/17/20 wbc 4.0      Preventative health  2/10 pap  1/19/16 declines shingles vaccine  1/19/16 pneumovax   3/16/16 colon per [facility] tortuous colon, melanosis in colon, repeat colonoscopy 5 years for surveillance  2/10/17 prevnar at [facility] [place] [facility]  9/7/17 tdap  9/26/18 dexa  9/26/18 LS +1.4, hip -0.2  1/23/20 mammogram  6/20/20 vit d 88     restless leg  9/27/18 restless leg tried mirapex 0.125 mg no benefit  9/11/18 trial neurontin     Status post laparoscopic cholecystectomy     Status post parathyroidectomy  2005 no old records  11/08 calcium 10.3  2/10 calcium 9.9  1/11 calcium 10.1  2/12 calcium 10, vit d 64  2/13 calcium 10.0  2/13/14 calcium 9.9  3/12/15 calcium 10.3  3/15/16 calcium 10.7  5/25/16 calcium 10.5,ionized 1.3,PTH 26  2/7/17 calcium 10.5  9/10/18 calcium 9.4,vit d 58  10/10/19 calcium 10.3  1/22/20 calcium 10.3                 Patient Active Problem List   Diagnosis   • Dyslipidemia   • S/P parathyroidectomy   • Allergic rhinitis   • Preventative health care   • S/P laparoscopic cholecystectomy   • Adenomatous polyp of colon   • History vasovagal syncope   • History wrist fracture   • Low back pain   • Restless leg syndrome   • Hyponatremia   • History septic joint of right knee joint     • History MSSA bacteremia   • History bilateral pleural effusion   • Cataract   • GERD (gastroesophageal reflux disease)   • Atypical chest pain   • History of fatigue   • Neutropenia (HCC)            Health Maintenance Summary                HEPATITIS C SCREENING Overdue 1945     IMM ZOSTER VACCINES Overdue 8/11/1995     Annual Wellness Visit Next Due 10/3/2020      Done 10/3/2019 Visit Dx: Medicare annual wellness visit, subsequent     Patient has more history with this topic...    MAMMOGRAM Next Due 1/23/2021      Done 1/23/2020 MA-SCREENING MAMMO BILAT W/TOMOSYNTHESIS W/CAD     Patient has more history with this topic...    COLONOSCOPY Next Due 3/16/2021      Done 3/16/2016 AMB REFERRAL TO GI FOR COLONOSCOPY    BONE DENSITY  "Next Due 9/26/2023      Done 9/26/2018 DS-BONE DENSITY STUDY (DEXA)     Patient has more history with this topic...    IMM DTaP/Tdap/Td Vaccine Next Due 9/7/2027      Done 9/7/2017 Imm Admin: Tdap Vaccine     Patient has more history with this topic...          Patient Care Team:  Eron Flowers M.D. as PCP - General  Ramin Meadows M.D. as Consulting Physician (Ophthalmology)      ROS       Objective:     /68 (BP Location: Right arm, Patient Position: Sitting, BP Cuff Size: Adult)   Pulse 70   Temp 36.4 °C (97.5 °F)   Ht 1.651 m (5' 5\")   Wt 60.8 kg (134 lb)   SpO2 97%   BMI 22.30 kg/m²      Physical Exam  Vitals signs and nursing note reviewed.   Constitutional:       Appearance: Normal appearance.   HENT:      Head: Normocephalic and atraumatic.      Right Ear: External ear normal.      Left Ear: External ear normal.      Mouth/Throat:      Pharynx: No oropharyngeal exudate.   Eyes:      General:         Right eye: No discharge.         Left eye: No discharge.   Neck:      Musculoskeletal: Neck supple.   Cardiovascular:      Rate and Rhythm: Normal rate and regular rhythm.      Heart sounds: Normal heart sounds. No murmur.   Pulmonary:      Effort: Pulmonary effort is normal. No respiratory distress.      Breath sounds: Normal breath sounds.   Abdominal:      General: There is no distension.   Musculoskeletal:         General: No swelling.   Skin:     General: Skin is warm.   Neurological:      General: No focal deficit present.      Mental Status: She is alert.   Psychiatric:         Mood and Affect: Mood normal.         Behavior: Behavior normal.       Right ankle somewhat limited range of motion dorsiflexion, plantarflexion, inversion eversion, mild lateral edema, normal pulses, normal sensation          Assessment/Plan:     Assessment  #! Hyponatremia sodium 129 previous work-up adrenal insufficiency negative    #2 subclinical hypothyroid on Synthroid 75 mcg per endocrinology most recent TSH " 0.156    #3 vit d level 88 taking 2 over-the-counter supplements per day    #4 history recent right ankle fracture six weeks ago, no surgery, followed by orthopedics we do not have records, but she will start physical therapy today, still some mild swelling and limited range of motion but neurovascular exam intact    #5 dyslipidemia on zocor 6/20/20 chol 172,trig 41,hdl 99,ldl 65 on zocor 20 mg    #6 neutropenia stable 4.0 no recurrent infections        Plan  #!  We will send a note to endocrinology  regarding TSH level    #2 decrease vit d to one otc per day     #3 old records [facility] regarding ankle fracture    #4 follow-up orthopedics, will obtain old records, start physical therapy today, falling precautions    #5 follow-up in 6 months wellness visit    #6 continue simvastatin

## 2020-07-01 PROBLEM — Z87.81 HISTORY OF ANKLE FRACTURE: Status: ACTIVE | Noted: 2020-07-01

## 2020-08-18 DIAGNOSIS — E03.9 HYPOTHYROIDISM, UNSPECIFIED TYPE: ICD-10-CM

## 2020-08-18 RX ORDER — LEVOTHYROXINE SODIUM 0.07 MG/1
75 TABLET ORAL
Qty: 30 TAB | Refills: 4 | Status: SHIPPED | OUTPATIENT
Start: 2020-08-18 | End: 2021-01-15

## 2020-08-19 ENCOUNTER — HOSPITAL ENCOUNTER (OUTPATIENT)
Dept: LAB | Facility: MEDICAL CENTER | Age: 75
End: 2020-08-19
Attending: INTERNAL MEDICINE
Payer: MEDICARE

## 2020-08-19 DIAGNOSIS — E89.2 S/P PARATHYROIDECTOMY: ICD-10-CM

## 2020-08-19 DIAGNOSIS — E03.9 HYPOTHYROIDISM, UNSPECIFIED TYPE: ICD-10-CM

## 2020-08-19 DIAGNOSIS — R53.83 FATIGUE, UNSPECIFIED TYPE: ICD-10-CM

## 2020-08-19 DIAGNOSIS — E87.1 HYPONATREMIA: ICD-10-CM

## 2020-08-19 DIAGNOSIS — E55.9 VITAMIN D DEFICIENCY: ICD-10-CM

## 2020-08-19 LAB
25(OH)D3 SERPL-MCNC: 86 NG/ML (ref 30–100)
ANION GAP SERPL CALC-SCNC: 11 MMOL/L (ref 7–16)
BUN SERPL-MCNC: 14 MG/DL (ref 8–22)
CA-I SERPL-SCNC: 1.3 MMOL/L (ref 1.1–1.3)
CALCIUM SERPL-MCNC: 10.2 MG/DL (ref 8.5–10.5)
CHLORIDE SERPL-SCNC: 96 MMOL/L (ref 96–112)
CO2 SERPL-SCNC: 26 MMOL/L (ref 20–33)
CREAT SERPL-MCNC: 0.68 MG/DL (ref 0.5–1.4)
GLUCOSE SERPL-MCNC: 91 MG/DL (ref 65–99)
PHOSPHATE SERPL-MCNC: 3.3 MG/DL (ref 2.5–4.5)
POTASSIUM SERPL-SCNC: 4 MMOL/L (ref 3.6–5.5)
PTH-INTACT SERPL-MCNC: 28.2 PG/ML (ref 14–72)
SODIUM SERPL-SCNC: 133 MMOL/L (ref 135–145)
T3 SERPL-MCNC: 84.4 NG/DL (ref 60–181)
T3FREE SERPL-MCNC: 2.79 PG/ML (ref 2–4.4)
T4 FREE SERPL-MCNC: 2 NG/DL (ref 0.93–1.7)
TSH SERPL DL<=0.005 MIU/L-ACNC: 0.06 UIU/ML (ref 0.38–5.33)

## 2020-08-19 PROCEDURE — 80048 BASIC METABOLIC PNL TOTAL CA: CPT

## 2020-08-19 PROCEDURE — 84480 ASSAY TRIIODOTHYRONINE (T3): CPT

## 2020-08-19 PROCEDURE — 84443 ASSAY THYROID STIM HORMONE: CPT

## 2020-08-19 PROCEDURE — 82330 ASSAY OF CALCIUM: CPT

## 2020-08-19 PROCEDURE — 84100 ASSAY OF PHOSPHORUS: CPT

## 2020-08-19 PROCEDURE — 83970 ASSAY OF PARATHORMONE: CPT

## 2020-08-19 PROCEDURE — 82306 VITAMIN D 25 HYDROXY: CPT

## 2020-08-19 PROCEDURE — 36415 COLL VENOUS BLD VENIPUNCTURE: CPT

## 2020-08-19 PROCEDURE — 84481 FREE ASSAY (FT-3): CPT

## 2020-08-19 PROCEDURE — 84439 ASSAY OF FREE THYROXINE: CPT

## 2020-08-19 NOTE — TELEPHONE ENCOUNTER
Received request via: Pharmacy    Was the patient seen in the last year in this department? Yes    Does the patient have an active prescription (recently filled or refills available) for medication(s) requested? No       levothyroxine (SYNTHROID) 75 MCG Tab    Sig: Take 1 Tab by mouth Every morning on an empty stomach.

## 2020-08-20 ENCOUNTER — TELEPHONE (OUTPATIENT)
Dept: PULMONOLOGY | Facility: HOSPICE | Age: 75
End: 2020-08-20

## 2020-08-20 NOTE — TELEPHONE ENCOUNTER
Upcoming appt: 08/26/2020 with Dr Alas.    In OV note on 02/10/2020 it says:  Assessment/Plan:   # Abnormal CT chest   -- Repeat CT chest showed resolution of GGO and pleural effusions with trace of subpleural reticular thickening.    -- No residual symptoms noted at this time    -- Suspect etiology to be multifactorial from sepsis induced lung injury and volume overload.   -- Follow up as needed        Does pt need a CT scan prior to OV?

## 2020-08-21 NOTE — TELEPHONE ENCOUNTER
Edgar Alas M.D.  You 28 minutes ago (3:55 PM)     No need for repeat CT chest as there is resolution of GGO. However, if patient is having recurrent respiratory symptoms then recommend getting a CXR.     Thanks,     CT

## 2020-08-26 ENCOUNTER — OFFICE VISIT (OUTPATIENT)
Dept: PULMONOLOGY | Facility: HOSPICE | Age: 75
End: 2020-08-26
Payer: MEDICARE

## 2020-08-26 VITALS
SYSTOLIC BLOOD PRESSURE: 112 MMHG | BODY MASS INDEX: 22.39 KG/M2 | HEIGHT: 65 IN | OXYGEN SATURATION: 97 % | HEART RATE: 66 BPM | DIASTOLIC BLOOD PRESSURE: 56 MMHG | WEIGHT: 134.38 LBS

## 2020-08-26 DIAGNOSIS — G47.19 OTHER HYPERSOMNIA: ICD-10-CM

## 2020-08-26 DIAGNOSIS — R06.83 SNORING: ICD-10-CM

## 2020-08-26 PROCEDURE — 99213 OFFICE O/P EST LOW 20 MIN: CPT | Performed by: INTERNAL MEDICINE

## 2020-08-26 ASSESSMENT — FIBROSIS 4 INDEX: FIB4 SCORE: 1.53

## 2020-08-26 NOTE — PROGRESS NOTES
Chief Complaint   Patient presents with   • Follow-Up     Abnormal CT scan, chest. Last seen 02/10/2020   • Referral Needed     Sleep. Per pt, wakes up with a dry mouth and wakes up taking a deep breathe in .        Problems:   1. Snoring    2. Other hypersomnia         Assessment/Plan:   # Snoring   -- STOP BANG score 4, moderate risk for MAXIMINO   -- Discussed about the pathophysiology of sleep apnea and the increased risk of cardiovascular morbidity if she does have untreated sleep apnea. Patient endorsed understanding and agreed to proceed with it.   -- Check home sleep study   -- RTC 3 months     HPI:  Ms. Paul is a 74 year old female with history of hypothyroidism presents for follow up visit. Patient was previously seen back in February for hospital follow up for abnormal CT findings which has near complete resolution of GGO. She is here today requesting for a sleep study concerning for sleep apnea due to her sleep pattern. Patient goes to bed around 9:15 pm and wake up around 7 am. There are some night she slept well and some nights she would wake up with dry mouth which she needs to wet her mouth. She has been told by her  that she snore from time to time. No witnessed apnea or morning headache. She also feels tire during the daytime. She also has an intermittent dry cough which is improving slowly. Denies fever/chills, N/V, chest pain, shortness of breath.       Past Medical History:   Diagnosis Date   • Allergic rhinitis 6/17/2009   • Dyslipidemia 6/17/2009   • Preventative health care 6/17/2009   • S/P parathyroidectomy 6/17/2009       Past Surgical History:   Procedure Laterality Date   • NICOLE N/A 12/6/2019    Procedure: ECHOCARDIOGRAM, TRANSESOPHAGEAL;  Surgeon: Marion Dykes M.D.;  Location: SURGERY [facility];  Service: Cardiac   • INCISION AND DRAINAGE ORTHOPEDIC Right 11/15/2019    Procedure: INCISION AND DRAINAGE, RIGHT KNEE  BY ORTHOPEDICS;  Surgeon: Ramsey Navas M.D.;   Location: SURGERY [facility];  Service: Orthopedics       ROS:   Constitutional: Denies fevers, chills, night sweats, fatigue or weight loss  Eyes: Denies vision loss, pain, drainage, double vision  Ears, Nose, Throat: Denies earache, tinnitus, hoarseness  Cardiovascular: Denies chest pain, tightness, palpitations  Respiratory: See HPI  Sleep: See HPI   GI: Denies abdominal pain, nausea, vomiting, diarrhea  : Denies frequent urination, hematuria, painful urination  Musculoskeletal: Denies back pain, painful joints, sore muscles  Neurological: Denies headaches, seizures  Skin: Denies rashes, color changes  Psychiatric: Denies depression or thoughts of suicide  Hematologic: Denies bleeding tendency or clotting tendency  Allergic/Immunologic: Denies rhinitis, skin sensitivity    Social History     Socioeconomic History   • Marital status:      Spouse name: Not on file   • Number of children: Not on file   • Years of education: Not on file   • Highest education level: Not on file   Occupational History   • Not on file   Social Needs   • Financial resource strain: Not on file   • Food insecurity     Worry: Not on file     Inability: Not on file   • Transportation needs     Medical: Not on file     Non-medical: Not on file   Tobacco Use   • Smoking status: Never Smoker   • Smokeless tobacco: Never Used   Substance and Sexual Activity   • Alcohol use: Yes     Alcohol/week: 0.0 oz     Frequency: Monthly or less     Comment: occ wine    • Drug use: Never   • Sexual activity: Yes     Partners: Male   Lifestyle   • Physical activity     Days per week: Not on file     Minutes per session: Not on file   • Stress: Not on file   Relationships   • Social connections     Talks on phone: Not on file     Gets together: Not on file     Attends Hindu service: Not on file     Active member of club or organization: Not on file     Attends meetings of clubs or organizations: Not on file     Relationship status: Not on  "file   • Intimate partner violence     Fear of current or ex partner: Not on file     Emotionally abused: Not on file     Physically abused: Not on file     Forced sexual activity: Not on file   Other Topics Concern   • Not on file   Social History Narrative   • Not on file     Asa [aspirin] and Nsaids  Current Outpatient Medications on File Prior to Visit   Medication Sig Dispense Refill   • levothyroxine (SYNTHROID) 75 MCG Tab TAKE 1 TAB BY MOUTH EVERY MORNING ON AN EMPTY STOMACH. 30 Tab 4   • Omega-3 Fatty Acids (FISH OIL) 1000 MG Cap capsule Take 2,000 mg by mouth every day.     • simvastatin (ZOCOR) 20 MG Tab TAKE 1 TAB BY MOUTH EVERY EVENING. 90 Tab 0   • Cholecalciferol (VITAMIN D) 50 MCG (2000 UT) Cap Take  by mouth.     • Cyanocobalamin (VITAMIN B 12 PO) Take 1,000 mcg by mouth every day.     • Calcium Carb-Cholecalciferol (CALCIUM 600-D PO) Take 600 mg by mouth every day.     • Ascorbic Acid (VITAMIN C PO) Take 1,000 mg by mouth every day.     • Probiotic Product (PROBIOTIC PO) Take  by mouth every day.     • MULTIPLE VITAMIN PO Take  by mouth.       No current facility-administered medications on file prior to visit.      /56 (BP Location: Left arm, Patient Position: Sitting, BP Cuff Size: Adult)   Pulse 66   Ht 1.651 m (5' 5\")   Wt 61 kg (134 lb 6 oz)   SpO2 97%   Family History   Problem Relation Age of Onset   • Heart Disease Mother      Immunization History   Administered Date(s) Administered   • Influenza Vaccine Adult HD 01/19/2016, 01/09/2020   • Influenza Vaccine Quad Inj (Pf) 10/21/2014   • Influenza Vaccine Quad Inj (Preserved) 12/07/2018   • Pneumococcal Conjugate Vaccine (Prevnar/PCV-13) 02/10/2017   • Pneumococcal polysaccharide vaccine (PPSV-23) 01/19/2016   • TD Vaccine 02/07/2012   • Tdap Vaccine 09/07/2017         Physical Exam:  General: NAD.   HEENT: PERRLA, EOMI, no scleral icterus, no nasal or oral lesions  Neck: No thyromegaly, no adenopathy, no bruits  M[place]ampatti: Grade " I  Lungs: Equal breath sounds, no wheezes or crackles  Heart: Regular rate and rhythm, no gallops or murmurs  Abdomen: Soft, benign, no organomegaly  Extremities: No clubbing, cyanosis, or edema  Neurologic: Cranial nerve, motor, and sensory exam are normal    Edgar Alas MD   Pulmonary Critical Care   [facility]

## 2020-08-28 ENCOUNTER — OFFICE VISIT (OUTPATIENT)
Dept: ENDOCRINOLOGY | Facility: MEDICAL CENTER | Age: 75
End: 2020-08-28
Attending: INTERNAL MEDICINE
Payer: MEDICARE

## 2020-08-28 VITALS
HEIGHT: 65 IN | OXYGEN SATURATION: 97 % | BODY MASS INDEX: 22.16 KG/M2 | DIASTOLIC BLOOD PRESSURE: 68 MMHG | WEIGHT: 133 LBS | SYSTOLIC BLOOD PRESSURE: 112 MMHG | HEART RATE: 70 BPM

## 2020-08-28 DIAGNOSIS — R53.83 FATIGUE, UNSPECIFIED TYPE: ICD-10-CM

## 2020-08-28 DIAGNOSIS — E03.9 HYPOTHYROIDISM, UNSPECIFIED TYPE: ICD-10-CM

## 2020-08-28 DIAGNOSIS — E53.8 VITAMIN B 12 DEFICIENCY: ICD-10-CM

## 2020-08-28 DIAGNOSIS — E87.1 HYPONATREMIA: ICD-10-CM

## 2020-08-28 DIAGNOSIS — E89.2 S/P PARATHYROIDECTOMY: ICD-10-CM

## 2020-08-28 DIAGNOSIS — E55.9 VITAMIN D DEFICIENCY: ICD-10-CM

## 2020-08-28 PROCEDURE — 99211 OFF/OP EST MAY X REQ PHY/QHP: CPT | Performed by: INTERNAL MEDICINE

## 2020-08-28 PROCEDURE — 99214 OFFICE O/P EST MOD 30 MIN: CPT | Performed by: INTERNAL MEDICINE

## 2020-08-28 ASSESSMENT — FIBROSIS 4 INDEX: FIB4 SCORE: 1.53

## 2020-08-28 NOTE — PROGRESS NOTES
Endocrinology Clinic Progress Note  PCP: Eron Flowers M.D.    HPI:  This encounter was conducted via Zoom.  Verbal consent was obtained. Patient's identity was verified.  Bernadette Paul is a 74 y.o. old patient who comes in today for review of endocrine problems.  She broke her right ankle playing golf.  Her fatigue is much better.    Parathyroidectomy:  She had a parathyroidectomy in 2009.      Hypothyroidism:  Currently taking Levothyroxine 75 mcg daily.      Hyponatremia:  She has cut her water down to 34-36 oz daily.     Fatigue:  Getting better and was able to not take a nap.  Vitamin D and Vitamin B 12 levels in a good range.      All the labs and imaging studies results (if applicable) relevant to today's appointment and my expertise were discussed with patient in great detail.    ROS:  Constitutional: No unintentional weight loss  Endo: Denies excessive thirst or frequent urination  All other systems were reviewed and were negative.    Past Medical History:  Patient Active Problem List    Diagnosis Date Noted   • History of ankle fracture 07/01/2020   • Neutropenia (HCC) 06/19/2020   • GERD (gastroesophageal reflux disease) 06/18/2020   • Atypical chest pain 06/18/2020   • History of fatigue 06/18/2020   • Cataract 04/23/2020   • History bilateral pleural effusion 12/08/2019   • History MSSA bacteremia 11/18/2019   • History septic joint of right knee joint   11/15/2019   • Hyponatremia 10/11/2019   • Restless leg syndrome 09/10/2018   • Low back pain 02/20/2017   • History wrist fracture 02/12/2013   • History vasovagal syncope 05/02/2012   • Adenomatous polyp of colon 02/11/2011   • S/P laparoscopic cholecystectomy 01/20/2011   • Dyslipidemia 06/17/2009   • S/P parathyroidectomy 06/17/2009   • Allergic rhinitis 06/17/2009   • Preventative health care 06/17/2009       Medications:    Current Outpatient Medications:   •  levothyroxine (SYNTHROID) 75 MCG Tab, TAKE 1 TAB BY MOUTH EVERY MORNING ON AN  "EMPTY STOMACH., Disp: 30 Tab, Rfl: 4  •  Omega-3 Fatty Acids (FISH OIL) 1000 MG Cap capsule, Take 2,000 mg by mouth every day., Disp: , Rfl:   •  simvastatin (ZOCOR) 20 MG Tab, TAKE 1 TAB BY MOUTH EVERY EVENING., Disp: 90 Tab, Rfl: 0  •  Cholecalciferol (VITAMIN D) 50 MCG (2000 UT) Cap, Take  by mouth., Disp: , Rfl:   •  Cyanocobalamin (VITAMIN B 12 PO), Take 1,000 mcg by mouth every day., Disp: , Rfl:   •  Calcium Carb-Cholecalciferol (CALCIUM 600-D PO), Take 600 mg by mouth every day., Disp: , Rfl:   •  Ascorbic Acid (VITAMIN C PO), Take 1,000 mg by mouth every day., Disp: , Rfl:   •  Probiotic Product (PROBIOTIC PO), Take  by mouth every day., Disp: , Rfl:   •  MULTIPLE VITAMIN PO, Take  by mouth., Disp: , Rfl:     Labs: Reviewed    Physical Examination:  Vital signs: /68 (BP Location: Left arm, Patient Position: Sitting, BP Cuff Size: Adult)   Pulse 70   Ht 1.651 m (5' 5\")   Wt 60.3 kg (133 lb)   SpO2 97%   BMI 22.13 kg/m²  Body mass index is 22.13 kg/m². Patient's body mass index is 22.13 kg/m².   General: No apparent distress, cooperative  Eyes: No scleral icterus or discharge  ENMT: Normal on external inspection of nose, lips  Neck: No abnormal masses on inspection  Resp: Normal effort  Extremities: No visible edema  Neuro: Alert and oriented  Skin: No visible rash  Psych: Normal mood and affect, intact memory and able to make informed decisions    Assessment and Plan:    1. S/P parathyroidectomy  Monitor PTH and calcium closely.     2. Hypothyroidism  Reduce levothyroxine as per below:    75 mcg of levothyroxine daily on six days of the week and 1/2 tab on one day of the week.     The change is due to high Free T4 and palpitations at night.     3. Hyponatremia  Monitor sodium closely. Advised to continue  liquid IV( mix 1 stick in 16 oz of water) and this should help.     4. Fatigue, unspecified type  Has returned again but no identifiable endocrine cause.    Return in about 3 months (around " 11/28/2020).    Thank you for allowing me to participate in the care of this patient.      CC:   Eron Flowers M.D.    This note was created using voice recognition software (Dragon). The accuracy of the dictation is limited by the abilities of the software. I have reviewed the note prior to signing, however some errors in grammar and context are still possible. If you have any questions related to this note please do not hesitate to contact our office.

## 2020-09-10 RX ORDER — SIMVASTATIN 20 MG
TABLET ORAL
Qty: 90 TAB | Refills: 3 | Status: SHIPPED | OUTPATIENT
Start: 2020-09-10 | End: 2021-06-29

## 2020-09-21 ENCOUNTER — HOME STUDY (OUTPATIENT)
Dept: SLEEP MEDICINE | Facility: MEDICAL CENTER | Age: 75
End: 2020-09-21
Attending: INTERNAL MEDICINE
Payer: MEDICARE

## 2020-09-21 DIAGNOSIS — G47.19 OTHER HYPERSOMNIA: ICD-10-CM

## 2020-09-21 DIAGNOSIS — R06.83 SNORING: ICD-10-CM

## 2020-09-21 PROCEDURE — G0399 HOME SLEEP TEST/TYPE 3 PORTA: HCPCS | Performed by: INTERNAL MEDICINE

## 2020-09-26 NOTE — PROCEDURES
Interpretation:    Ms. Dawsonub with snoring and excessive daytime somnolence.  She does not have medical or sleep diagnoses that would contraindicate a home sleep test.    550 minutes of data are available for review.  There is some apparent loss of heart rate and oximetry signal for more than an hour around midnight.  Otherwise the data appears of reasonable quality for analysis.    The respiratory event index is 21.8 events per hour including hypopnea and obstructive apnea episodes with occasional central apneas.  The index rises to 36.5 events per hour in supine sleep.  The lowest arterial oxygen saturation is 79% with an oxygen desaturation index of 23.6 events per hour.  She spends 20% of the study time, or about 2.5 hours in total, with a saturation less than 90%.  The heart rate varies from 52 to 96 bpm.  Frequent snoring is identified.    Assessment:  Moderate obstructive sleep apnea hypopnea with a respiratory event index of 21.8 events per hour, higher in supine sleep, and a lowest arterial oxygen saturation of 79%.    Recommendations:  Airway pressurization, either guided by a titration polysomnogram or initiated through the use of auto titrating CPAP. Alternative treatments for sleep-disordered breathing include reconstructive otolaryngologic surgery, dental appliances and weight loss. If any these latter treatment options are selected, a follow-up polysomnogram is suggested to assess the efficacy of therapy. Behavioral measures including avoidance of sedatives, alcohol and supine body position may be of additional benefit. Patients with severe sleep apnea are typically advised not to drive a motor vehicle or operate hazardous machinery until their daytime somnolence has been corrected.

## 2020-10-23 PROBLEM — E03.9 HYPOTHYROID: Status: ACTIVE | Noted: 2020-06-18

## 2020-10-23 PROBLEM — G47.30 SLEEP APNEA: Status: ACTIVE | Noted: 2020-10-23

## 2020-10-26 SDOH — ECONOMIC STABILITY: HOUSING INSECURITY: IN THE LAST 12 MONTHS, HOW MANY PLACES HAVE YOU LIVED?: 1

## 2020-10-26 SDOH — HEALTH STABILITY: PHYSICAL HEALTH: ON AVERAGE, HOW MANY DAYS PER WEEK DO YOU ENGAGE IN MODERATE TO STRENUOUS EXERCISE (LIKE A BRISK WALK)?: 4 DAYS

## 2020-10-26 SDOH — ECONOMIC STABILITY: HOUSING INSECURITY
IN THE LAST 12 MONTHS, WAS THERE A TIME WHEN YOU DID NOT HAVE A STEADY PLACE TO SLEEP OR SLEPT IN A SHELTER (INCLUDING NOW)?: NO

## 2020-10-26 SDOH — ECONOMIC STABILITY: INCOME INSECURITY: IN THE LAST 12 MONTHS, WAS THERE A TIME WHEN YOU WERE NOT ABLE TO PAY THE MORTGAGE OR RENT ON TIME?: NO

## 2020-10-26 SDOH — ECONOMIC STABILITY: TRANSPORTATION INSECURITY
IN THE PAST 12 MONTHS, HAS LACK OF RELIABLE TRANSPORTATION KEPT YOU FROM MEDICAL APPOINTMENTS, MEETINGS, WORK OR FROM GETTING THINGS NEEDED FOR DAILY LIVING?: NO

## 2020-10-26 SDOH — ECONOMIC STABILITY: TRANSPORTATION INSECURITY
IN THE PAST 12 MONTHS, HAS THE LACK OF TRANSPORTATION KEPT YOU FROM MEDICAL APPOINTMENTS OR FROM GETTING MEDICATIONS?: NO

## 2020-10-26 SDOH — HEALTH STABILITY: MENTAL HEALTH
STRESS IS WHEN SOMEONE FEELS TENSE, NERVOUS, ANXIOUS, OR CAN'T SLEEP AT NIGHT BECAUSE THEIR MIND IS TROUBLED. HOW STRESSED ARE YOU?: NOT AT ALL

## 2020-10-26 SDOH — HEALTH STABILITY: PHYSICAL HEALTH: ON AVERAGE, HOW MANY MINUTES DO YOU ENGAGE IN EXERCISE AT THIS LEVEL?: 60 MINUTES

## 2020-10-26 ASSESSMENT — SOCIAL DETERMINANTS OF HEALTH (SDOH)
IN A TYPICAL WEEK, HOW MANY TIMES DO YOU TALK ON THE PHONE WITH FAMILY, FRIENDS, OR NEIGHBORS?: MORE THAN THREE TIMES A WEEK
HOW OFTEN DO YOU HAVE SIX OR MORE DRINKS ON ONE OCCASION: NEVER
HOW HARD IS IT FOR YOU TO PAY FOR THE VERY BASICS LIKE FOOD, HOUSING, MEDICAL CARE, AND HEATING?: NOT HARD AT ALL
HOW MANY DRINKS CONTAINING ALCOHOL DO YOU HAVE ON A TYPICAL DAY WHEN YOU ARE DRINKING: 1 OR 2
HOW OFTEN DO YOU ATTENT MEETINGS OF THE CLUB OR ORGANIZATION YOU BELONG TO?: MORE THAN 4 TIMES PER YEAR
WITHIN THE PAST 12 MONTHS, THE FOOD YOU BOUGHT JUST DIDN'T LAST AND YOU DIDN'T HAVE MONEY TO GET MORE: NEVER TRUE
HOW OFTEN DO YOU GET TOGETHER WITH FRIENDS OR RELATIVES?: MORE THAN THREE TIMES A WEEK
HOW OFTEN DO YOU ATTEND CHURCH OR RELIGIOUS SERVICES?: NEVER
HOW OFTEN DO YOU HAVE A DRINK CONTAINING ALCOHOL: 2-3 TIMES A WEEK
DO YOU BELONG TO ANY CLUBS OR ORGANIZATIONS SUCH AS CHURCH GROUPS UNIONS, FRATERNAL OR ATHLETIC GROUPS, OR SCHOOL GROUPS?: YES
WITHIN THE PAST 12 MONTHS, YOU WORRIED THAT YOUR FOOD WOULD RUN OUT BEFORE YOU GOT THE MONEY TO BUY MORE: NEVER TRUE

## 2020-10-29 ENCOUNTER — OFFICE VISIT (OUTPATIENT)
Dept: MEDICAL GROUP | Facility: MEDICAL CENTER | Age: 75
End: 2020-10-29
Payer: MEDICARE

## 2020-10-29 VITALS
HEART RATE: 77 BPM | DIASTOLIC BLOOD PRESSURE: 60 MMHG | OXYGEN SATURATION: 96 % | BODY MASS INDEX: 22.37 KG/M2 | SYSTOLIC BLOOD PRESSURE: 118 MMHG | WEIGHT: 134.4 LBS | TEMPERATURE: 98.1 F

## 2020-10-29 DIAGNOSIS — D70.9 NEUTROPENIA, UNSPECIFIED TYPE (HCC): ICD-10-CM

## 2020-10-29 DIAGNOSIS — Z00.00 MEDICARE ANNUAL WELLNESS VISIT, SUBSEQUENT: ICD-10-CM

## 2020-10-29 DIAGNOSIS — M81.0 POSTMENOPAUSAL BONE LOSS: ICD-10-CM

## 2020-10-29 DIAGNOSIS — Z00.00 PREVENTATIVE HEALTH CARE: Chronic | ICD-10-CM

## 2020-10-29 DIAGNOSIS — G47.30 SLEEP APNEA, UNSPECIFIED TYPE: ICD-10-CM

## 2020-10-29 DIAGNOSIS — E87.1 HYPONATREMIA: ICD-10-CM

## 2020-10-29 DIAGNOSIS — H91.90 HEARING LOSS, UNSPECIFIED HEARING LOSS TYPE, UNSPECIFIED LATERALITY: ICD-10-CM

## 2020-10-29 PROCEDURE — G0439 PPPS, SUBSEQ VISIT: HCPCS | Performed by: INTERNAL MEDICINE

## 2020-10-29 ASSESSMENT — ACTIVITIES OF DAILY LIVING (ADL): BATHING_REQUIRES_ASSISTANCE: 0

## 2020-10-29 ASSESSMENT — ENCOUNTER SYMPTOMS: GENERAL WELL-BEING: GOOD

## 2020-10-29 ASSESSMENT — FIBROSIS 4 INDEX: FIB4 SCORE: 1.53

## 2020-10-29 ASSESSMENT — PATIENT HEALTH QUESTIONNAIRE - PHQ9: CLINICAL INTERPRETATION OF PHQ2 SCORE: 0

## 2020-10-29 NOTE — PROGRESS NOTES
Chief Complaint   Patient presents with   • Annual Wellness Visit         HPI:  Bernadette Paul is a 75 y.o. here for Medicare Annual Wellness Visit   Medications, allergies, medical history, surgical history, social history reviewed and updated  SH , playing golf and keeps active, goes to PT twice per week, trying to eat healthy limiting sweets, candies, processed foods   Followed by endocrinology, hypothyroid on replacement  Eye exam yearly, no vision changes  No hearing changes  Status post ankle fracture related symptoms seen orthopedics, completed physical therapy, no difficulty with gait currently, no falls, no cane or walker for ambulation  Being evaluated for sleep apnea/sleep specialty group  No anxiety or depression  Following social distancing measures with COVID-19 pandemic, mask wearing, avoiding large crowds    Patient Active Problem List    Diagnosis Date Noted   • Sleep apnea 10/23/2020   • History of ankle fracture 07/01/2020   • Neutropenia (HCC) 06/19/2020   • GERD (gastroesophageal reflux disease) 06/18/2020   • Atypical chest pain 06/18/2020   • Hypothyroid 06/18/2020   • Cataract 04/23/2020   • History bilateral pleural effusion 12/08/2019   • History MSSA bacteremia 11/18/2019   • History septic joint of right knee joint   11/15/2019   • Hyponatremia 10/11/2019   • Restless leg syndrome 09/10/2018   • Low back pain 02/20/2017   • History wrist fracture 02/12/2013   • History vasovagal syncope 05/02/2012   • Adenomatous polyp of colon 02/11/2011   • S/P laparoscopic cholecystectomy 01/20/2011   • Dyslipidemia 06/17/2009   • S/P parathyroidectomy 06/17/2009   • Allergic rhinitis 06/17/2009   • Preventative health care 06/17/2009     Adenoma  2/11 colon per [facility] adenoma   3/16/16 colon per [facility] tortuous colon, melanosis in colon, repeat colonoscopy 5 years for surveillance     Allergic rhinitis     Atypical chest pain  10/29/19 chest burning sensation, EKG negative, stress test ordered    10/30/19 echo normal LV function EF 60%, normal diastolic function, mild aortic insufficiency, normal right LV size and function, RSVP 25  11/12/19 myocardial perfusion study no evidence of ischemia, EF 79%     Dyslipidemia  11/08 chol 210,trig 45,hdl 95,ldl 97  6/09 chol 271,trig 113,hdl 74,ldl 163 restart on zocor 40 (1/2)  5/05 aorta abd u/s negative  11/08 p.thallium no ischemia EF 87%  2/09 ERIC negative  2/10 chol 205,trig 55,hdl 100,ldl 94  1/11 chol 197,trig 66,hdl 100,ldl 84 on zocor 20 mg  2/12 chol 205,trig 43,hdl 106,ldl 90 on zocor 20 mg  2/12/13 chol 216,trig 58,hdl 114,ldl 90 on zocor 20 mg  2/13/14 chol 195,trig 30,hdl 106,ldl 83 on zocor 20 mg  3/12/15 chol 205,trig 41,hdl 99,ldl 98 on zocor 20 mg  3/15/16 chol 260,trig 77,hdl 102,ldl 143 on zocor 20 mg repeat lipid panel 2 months  5/25/16 chol 243,trig 51,hdl 104,ldl 129 on zocor 20 mg  2/7/17 chol 203,trig 66,hdl 105,ldl 85 on zocor 20 mg  9/20/17 chol 211,trig 71,hdl 104,ldl 93 on zocor 20 mg  9/10/18 chol 218,trig 48,hdl 106,ldl 102 on zocor 20 mg  11/12/19 myocardial perfusion study no evidence of ischemia, EF 79%  1/9/20 off zocor   6/20/20 chol 172,trig 41,hdl 99,ldl 65 on zocor 20 mg     GERD  11/4/19 [facility] note burning sensation controlled, start pantoprazole 20 mg, labs ordered    History ankle fracture  5/15/20 [facility] orthopedic note x-ray right ankle nondisplaced oblique distal fibula fracture lowry B, placed in walking boot, follow-up 1 week  5/26/20  orthopedic note follow-up right ankle fracture, x-rays demonstrate nondisplaced right lateral malleolus fracture, ASO ankle lacer for stability follow-up 4 weeks  6/16/20  orthopedic note closed treatment ankle fracture 5/15/20 x-ray right ankle healed malleolus fracture, proceed with aggressive physical therapy follow-up 6 weeks     History of bilateral pleural effusion  12/4/19 hospital admission, 12/9/19 hospital discharge, admission for fevers and chills while on  home infusion IV daptomycin for septic arthritis MSSA    12/4/19 chest x-ray negative  12/5/19  infectious disease hospital consultation recommend NICOLE, repeat blood cultures negative thus far, hold off on discontinuation of PICC line until NICOLE obtained, changed to cefazolin from daptomycin  12/6/19 transesophageal echo structurally normal heart valves  12/6/19 infectious disease hospital note repeat blood cultures negative  12/6/19 PICC line removed, catheter tip culture negative, blood cultures no growth to date  12/6/19 CT abdomen pelvis with contrast, minimal right lower lobe atelectasis or pneumonia, small bilateral pleural effusions, no loculated fluid collection  12/7/19 CT chest with; trace amounts dependent bilateral pleural effusion, interstitial groundglass opacities  12/8/19 infectious [facility] hospital note cough ongoing prior to initiation of daptomycin, less likely daptomycin induced eosinophilic pneumonia, will not restart daptomycin, continue cefazolin with end-date 12/15/19, follow-up pulmonary  12/8/19  pulmonary hospital consultation bilateral pleural effusion confirmed on CT, too small for bedside thoracentesis, given acute sickness likely related to hypoalbuminemia versus volume overload status post resuscitation, cannot rule out parapneumonic effusion but clinical picture with negative procalcitonin is not suggestive of this, recommend gentle diuresis, outpatient follow-up CT thorax 4 to 6 weeks, regarding cough differential includes reflux, asthma, volume overload, interstitial lung disease, postnasal drip, eosinophilic granuloma, nonasthmatic eosinophilic bronchitis, given eosinophilia to differentiate would need full pulmonary function test with methacholine challenge, which can be done as an outpatient if cough persists, repeat CT scan as above (if groundglass opacities persist will need further diagnostic intervention bronchoscopy), check REGGIE, rheumatoid factor, IgE  level  12/8/19 REGGIE negative,CCP 6,RF negative  12/9/19 hospital discharge per infectious disease recommendation continue cefazolin through 12/15/15 through peripheral IV, follow-up infectious disease outpatient pulmonary outpatient  12/10/19 ESR 70,CRP 3.9,wbc 5.6,hgb 12,hct 36  12/11/19 referral pulmonary placed, repeat CT high-resolution thorax ordered 4 to 6 weeks  12/17/19 infectious disease note transition IV antibiotics to doxycycline  12/18/19 pulmonary note order CT high resolution thorax  1/21/20 CT thorax high resolution; interval resolution of bilateral subpleural interstitial opacities with hazy groundglass opacities, minimal streaky linear opacity in the lingula may represent subsegmental atelectasis or scar  2/10/20  pulmon note resolution of pleural effusion on follow-up CT scan, suspect pleural effusion and subpleural reticular thickening was related to sepsis induced lung injury from volume overload, follow-up as needed     History MSSA bacteremia  11/17/19  infectious disease hospital consultation admitted 11/15/19 right knee pain after treadmill stress test, seen by orthopedics and had arthrocentesis performed, sent to emergency room with increased cell count on arthrocentesis, presentation ER WBC 17.8, ESR 44, went to the operating room for incision and drainage right septic knee positive staphylococcus aureus MSSA, blood cultures positive for staphylococcus aureus MSSA  11/18/19 echo EF 60% no evidence of endocarditis  11/20/19 ultrasound-guided catheter insertion for intravenous antibiotics  11/22/19 discharged home IV daptomycin and date 12/15/19, follow-up once a week infusion center, follow-up infectious disease 2 weeks  12/4/19 hospital admission, 12/9/19 hospital discharge, admission for fevers and chills while on home infusion IV daptomycin for septic arthritis MSSA    12/4/19 chest x-ray negative  12/5/19  infectious disease hospital consultation recommend  NICOLE, repeat blood cultures negative thus far, hold off on discontinuation of PICC line until NICOLE obtained, changed to cefazolin from daptomycin  12/6/19 transesophageal echo structurally normal heart valves  12/6/19 infectious disease hospital note repeat blood cultures negative  12/6/19 PICC line removed, catheter tip culture negative, blood cultures no growth to date  12/6/19 CT abdomen pelvis with contrast, minimal right lower lobe atelectasis or pneumonia, small bilateral pleural effusions, no loculated fluid collection  12/7/19 CT chest with; trace amounts dependent bilateral pleural effusion, interstitial groundglass opacities  12/8/19 [facility] disease hospital note cough ongoing prior to initiation of daptomycin, less likely daptomycin induced eosinophilic pneumonia, will not restart daptomycin, continue cefazolin with end-date 12/15/19, follow-up pulmonary  12/8/19  pulmonary hospital consultation bilateral pleural effusion confirmed on CT, too small for bedside thoracentesis, given acute sickness likely related to hypoalbuminemia versus volume overload status post resuscitation, cannot rule out parapneumonic effusion but clinical picture with negative procalcitonin is not suggestive of this, recommend gentle diuresis, outpatient follow-up CT thorax 4 to 6 weeks, regarding cough differential includes reflux, asthma, volume overload, interstitial lung disease, postnasal drip, eosinophilic granuloma, nonasthmatic eosinophilic bronchitis, given eosinophilia to differentiate would need full pulmonary function test with methacholine challenge, which can be done as an outpatient if cough persists, repeat CT scan as above (if groundglass opacities persist will need further diagnostic intervention bronchoscopy), check REGGIE, rheumatoid factor, IgE level  12/8/19 REGGIE negative,CCP 6,RF negative  12/9/19 hospital discharge per infectious disease recommendation continue cefazolin through 12/15/15 through  peripheral IV, follow-up infectious disease outpatient pulmonary outpatient  12/10/19 ESR 70,CRP 3.9,wbc 5.6,hgb 12,hct 36  12/11/19 referral pulmonary placed, repeat CT high-resolution thorax ordered 4 to 6 weeks  12/17/19 infectious disease note transition IV antibiotics to doxycycline  12/18/19 pulmonary note order CT high resolution thorax  1/21/20 CT thorax high resolution; interval resolution of bilateral subpleural interstitial opacities with hazy groundglass opacities, minimal streaky linear opacity in the lingula may represent subsegmental atelectasis or scar  1/22/20 wbc 3.2 (62%N,27%L), ESR 7, CRP 0.13 off antibiotics since 1/16/20  2/10/20  pulmon note resolution of pleural effusion on follow-up CT scan, suspect pleural effusion and subpleural reticular thickening was related to sepsis induced lung injury from volume overload, follow-up as needed     History fatigue  10/15/19 b2 1103,iron and zinc normal,HIV negative, REGGIE, SSA and SSB labs negative  10/29/19 chest burning sensation, EKG negative, stress test ordered   10/30/19 echo normal LV function EF 60%, normal diastolic function, mild aortic insufficiency, normal right LV size and function, RSVP 25  11/12/19 myocardial perfusion study no evidence of ischemia, EF 79%     History septic joint right knee  11/17/19  infectious disease hospital consultation admitted 11/15/19 right knee pain after treadmill stress test, seen by orthopedics and had arthrocentesis performed, sent to emergency room with increased cell count on arthrocentesis, presentation ER WBC 17.8, ESR 44, went to the operating room for incision and drainage right septic knee positive staphylococcus aureus MSSA, blood cultures positive for staphylococcus aureus MSSA  11/18/19 echo EF 60% no evidence of endocarditis  11/20/19 ultrasound-guided catheter insertion for intravenous antibiotics  11/22/19 discharged home IV daptomycin and date 12/15/19, follow-up once a week  infusion center, follow-up infectious disease 2 weeks  12/4/19 hospital admission, 12/9/19 hospital discharge, admission for fevers and chills while on home infusion IV daptomycin for septic arthritis MSSA    12/4/19 chest x-ray negative  12/5/19  infectious disease \[facility]\"" consultation recommend NICOLE, repeat blood cultures negative thus far, hold off on discontinuation of PICC line until NICOLE obtained, changed to cefazolin from daptomycin  12/6/19 transesophageal echo structurally normal heart valves  12/6/19 infectious disease hospital note repeat blood cultures negative  12/6/19 PICC line removed, catheter tip culture negative, blood cultures no growth to date  12/6/19 CT abdomen pelvis with contrast, minimal right lower lobe atelectasis or pneumonia, small bilateral pleural effusions, no loculated fluid collection  12/7/19 CT chest with; trace amounts dependent bilateral pleural effusion, interstitial groundglass opacities  12/8/19 infectious disease \[facility]\"" note cough ongoing prior to initiation of daptomycin, less likely daptomycin induced eosinophilic pneumonia, will not restart daptomycin, continue cefazolin with end-date 12/15/19, follow-up pulmonary  12/8/19  pulmonary hospital consultation bilateral pleural effusion confirmed on CT, too small for bedside thoracentesis, given acute sickness likely related to hypoalbuminemia versus volume overload status post resuscitation, cannot rule out parapneumonic effusion but clinical picture with negative procalcitonin is not suggestive of this, recommend gentle diuresis, outpatient follow-up CT thorax 4 to 6 weeks, regarding cough differential includes reflux, asthma, volume overload, interstitial lung disease, postnasal drip, eosinophilic granuloma, nonasthmatic eosinophilic bronchitis, given eosinophilia to differentiate would need full pulmonary function test with methacholine challenge, which can be done as an outpatient if cough persists,  repeat CT scan as above (if groundglass opacities persist will need further diagnostic intervention bronchoscopy), check REGGIE, rheumatoid factor, IgE level  12/8/19 REGGIE negative,CCP 6,RF negative  12/9/19 hospital discharge per infectious disease recommendation continue cefazolin through 12/15/15 through peripheral IV, follow-up infectious disease outpatient pulmonary outpatient  12/10/19 ESR 70,CRP 3.9,wbc 5.6,hgb 12,hct 36  12/11/19 referral pulmonary placed, repeat CT high-resolution thorax ordered 4 to 6 weeks  12/17/19 infectious disease note transition IV antibiotics to doxycycline  12/18/19 pulmonary note order CT high resolution thorax  1/22/20 wbc 3.2 (62%N,27%L), ESR 7, CRP 0.13 off antibiotics from 1/16/20     History of vasovagal  5/12 one episode, ekg, echo, holter per snca negative, no further workup necessary  10/30/19 echo normal LV function EF 60%, normal diastolic function, mild aortic insufficiency, normal right LV size and function, RSVP 25  11/12/19 myocardial perfusion study no evidence of ischemia, EF 79%     History wrist fracture  12/30/12 left wrist fracture had splint for 6 weeks, no surgery     hyponatremia  2/12/13 Na 136  2/13/14 Na 131  3/15/16 Na 137  5/25/16 Na 134  9/20/17 Na 131  10/2/19 seen ER URI, Na 129 labs ordered to be done in 2 weeks  10/9/19 Na 134, normal cbc,cmp,tsh (consider cortisol, ACTH, HIV, hepatitis serology, REGGIE, B12)   10/10/19 labs ordered dry mouth, fatigue, hyponatremia, echo ordered murmur, ativan as needed for breakthrough panic attack  10/15/19 serum osm 277 (278-298), urine osm 128 (300-900), urine Na 14, cortisol 9.8, ACTH 15  12/6/19 Na 134  12/10/19 Na 132  1/22/20 Na 133  1/29/20  endocrine note hyponatremia rule out adrenal insufficiency, ACTH stim test  2/4/20 ACTH 14,cortisol 5.4, serum osmolality 282, urine osmolality 344, Na 134  2/4/20 tsh 2.7,free t4 0.83,free t3 2.84  2/12/20 ACTH stim test per endocrinology negative, urine and serum  osmolality indicates excess water intake  3/17/20 tsh 3.8,free t4 0.85,free t3 2.6,t3 86,TPO 0.3  3/23/20  endocrine note hyponatremia could be excessive water intake hypothyroid, start synthroid 75 mcg daily follow-up 2 months  5/20/20 tsh 0.547,free t4 1.62,free t3 2.46, Na 133 on synthroid 75 mcg  5/26/20  endocrine note recent Na 133, monitor sodium, try liquid IV mixed once taking 16 ounce water, follow-up 3 months  6/20/20 tsh 0.156, Na 129 on synthroid 75 mcg per endocrinology  8/28/20  endocrinology note hyponatremia monitor sodium no identified endocrine cause, follow-up 3 months    Hypothyroid  10/15/19 b2 1103,iron and zinc normal,HIV negative, REGGIE, SSA and SSB labs negative  10/29/19 chest burning sensation, EKG negative, stress test ordered   10/30/19 echo normal LV function EF 60%, normal diastolic function, mild aortic insufficiency, normal right LV size and function, RSVP 25  11/12/19 myocardial perfusion study no evidence of ischemia, EF 79%  3/23/20  endocrinology note fatigue tsh 2.7,free t4 0.83,free t3 2.8 start levothyroxine 75 mcg   5/20/20 tsh 0.54 on levothyroxine 75 mcg   6/17/20 tsh 0.156 on levothyroxine 75 mcg   8/19/20 tsh 0.056   8/28/20  endocrinology note because of palpitations at night decrease levothyroxine to 75 mcg 6 days a week, half a tablet on day 7, hyponatremia monitor sodium no identified endocrine cause, follow-up 3 months     Low back pain     Neutropenia  9/20/17 wbc 4.3  9/10/18 wbc 4.9  12/10/19 wbc 5.6  1/14/20 wbc 3.2  6/17/20 wbc 4.0     Preventative health  2/10 pap  1/19/16 declines shingles vaccine  1/19/16 pneumovax   3/16/16 colon per [facility] tortuous colon, melanosis in colon, repeat colonoscopy 5 years for surveillance  2/10/17 prevnar at [facility] pharmacy  9/7/17 tdap  9/26/18 dexa  9/26/18 LS +1.4, hip -0.2  1/23/20 mammogram  6/20/20 vit d 88     restless leg  9/27/18 restless leg tried mirapex 0.125 mg no  benefit  9/11/18 trial neurontin    Sleep apnea  8/26/20 pulmonary note moderate risk sleep apnea, stopbang score 4, check on sleep study, follow-up 3 months  9/21/20 home sleep study RDI 21.8, minimum saturation 79%     Status post laparoscopic cholecystectomy     Status post parathyroidectomy  2005 no old records  11/08 calcium 10.3  2/10 calcium 9.9  1/11 calcium 10.1  2/12 calcium 10, vit d 64  2/13 calcium 10.0  2/13/14 calcium 9.9  3/12/15 calcium 10.3  3/15/16 calcium 10.7  5/25/16 calcium 10.5,ionized 1.3,PTH 26  2/7/17 calcium 10.5  9/10/18 calcium 9.4,vit d 58  10/10/19 calcium 10.3  1/22/20 calcium 10.3  6/17/20 calcium 9.8  8/19/20 calcium 10.2,ionized calcium 1.3,PTH 28,phos 3.3  8/28/20  endocrinology note                          Current Outpatient Medications   Medication Sig Dispense Refill   • simvastatin (ZOCOR) 20 MG Tab TAKE 1 TAB BY MOUTH EVERY EVENING. 90 Tab 3   • levothyroxine (SYNTHROID) 75 MCG Tab TAKE 1 TAB BY MOUTH EVERY MORNING ON AN EMPTY STOMACH. 30 Tab 4   • Omega-3 Fatty Acids (FISH OIL) 1000 MG Cap capsule Take 2,000 mg by mouth every day.     • Cholecalciferol (VITAMIN D) 50 MCG (2000 UT) Cap Take  by mouth.     • Cyanocobalamin (VITAMIN B 12 PO) Take 1,000 mcg by mouth every day.     • Calcium Carb-Cholecalciferol (CALCIUM 600-D PO) Take 600 mg by mouth every day.     • Ascorbic Acid (VITAMIN C PO) Take 1,000 mg by mouth every day.     • Probiotic Product (PROBIOTIC PO) Take  by mouth every day.     • MULTIPLE VITAMIN PO Take  by mouth.       No current facility-administered medications for this visit.             Current supplements as per medication list.       Allergies: Asa [aspirin] and Nsaids    Current social contact/activities: patient reports golfs with her friends 3 times a week, she calls her grandkids often and sees her family once a month     She  reports that she has never smoked. She has never used smokeless tobacco. She reports current alcohol use. She  reports that she does not use drugs.  Counseling given: Not Answered      DPA/Advanced Directive:  Patient does not have an Advanced Directive.  A packet and workshop information was given on Advanced Directives.    ROS:    Gait: Uses no assistive device  Ostomy: No  Other tubes: No  Amputations: No  Chronic oxygen use: No  Last eye exam: patient reports April 2020   Wears hearing aids: No   : Denies any urinary leakage during the last 6 months      POLST/AD    Does the patient have a POLST or Advanced Directive?  No    Depression Screening    Little interest or pleasure in doing things?  0 - not at all  Feeling down, depressed , or hopeless? 0 - not at all  Patient Health Questionnaire Score: 0     If depressive symptoms identified deferred to follow up visit unless specifically addressed in assessment and plan.    Interpretation of PHQ-9 Total Score   Score Severity   1-4 No Depression   5-9 Mild Depression   10-14 Moderate Depression   15-19 Moderately Severe Depression   20-27 Severe Depression    Screening for Cognitive Impairment    Three Minute Recall (river, reji, finger) 3/3    Madi clock face with all 12 numbers and set the hands to show 10 past 11.  Yes 5/5  Cognitive concerns identified deferred for follow up unless specifically addressed in assessment and plan.    Fall Risk Assessment    Has the patient had two or more falls in the last year or any fall with injury in the last year?  No    Safety Assessment    Throw rugs on floor.  Yes  Handrails on all stairs.  Yes  Good lighting in all hallways.  Yes  Difficulty hearing.  No  Patient counseled about all safety risks that were identified.    Functional Assessment ADLs    Are there any barriers preventing you from cooking for yourself or meeting nutritional needs?  No.    Are there any barriers preventing you from driving safely or obtaining transportation?  No.    Are there any barriers preventing you from using a telephone or calling for help?   No.    Are there any barriers preventing you from shopping?  No.    Are there any barriers preventing you from taking care of your own finances?  No.    Are there any barriers preventing you from managing your medications?  No.    Are there any barriers preventing you from showering, bathing or dressing yourself?  No.    Are you currently engaging in any exercise or physical activity?  Yes.  Patient reports golfs 3 times a week for several hours, Walks on the days she doesn't play golf  What is your perception of your health?  Good.      Health Maintenance Summary                HEPATITIS C SCREENING Overdue 1945     IMM INFLUENZA Overdue 9/1/2020      Done 1/9/2020 Imm Admin: Influenza Vaccine Adult HD     Patient has more history with this topic...    Annual Wellness Visit Overdue 10/3/2020      Done 10/3/2019 Visit Dx: Medicare annual wellness visit, subsequent     Patient has more history with this topic...    IMM ZOSTER VACCINES Next Due 10/30/2020      Done 9/4/2020 Imm Admin: Zoster Vaccine Recombinant (RZV) (SHINGRIX)    MAMMOGRAM Next Due 1/23/2021      Done 1/23/2020 MA-SCREENING MAMMO BILAT W/TOMOSYNTHESIS W/CAD     Patient has more history with this topic...    COLONOSCOPY Next Due 3/16/2021      Done 3/16/2016 AMB REFERRAL TO GI FOR COLONOSCOPY    BONE DENSITY Next Due 9/26/2023      Done 9/26/2018 DS-BONE DENSITY STUDY (DEXA)     Patient has more history with this topic...    IMM DTaP/Tdap/Td Vaccine Next Due 9/7/2027      Done 9/7/2017 Imm Admin: Tdap Vaccine     Patient has more history with this topic...          Patient Care Team:  Eron Flowers M.D. as PCP - General  Ramin Meadows M.D. as Consulting Physician (Ophthalmology)        Social History     Tobacco Use   • Smoking status: Never Smoker   • Smokeless tobacco: Never Used   Substance Use Topics   • Alcohol use: Yes     Alcohol/week: 0.0 oz     Frequency: 2-3 times a week     Drinks per session: 1 or 2     Binge frequency: Never      Comment: occ wine    • Drug use: Never     Family History   Problem Relation Age of Onset   • Heart Disease Mother      She  has a past medical history of Allergic rhinitis (6/17/2009), Dyslipidemia (6/17/2009), Preventative health care (6/17/2009), and S/P parathyroidectomy (6/17/2009).   Past Surgical History:   Procedure Laterality Date   • NICOLE N/A 12/6/2019    Procedure: ECHOCARDIOGRAM, TRANSESOPHAGEAL;  Surgeon: Marion Dykes M.D.;  Location: SURGERY [facility];  Service: Cardiac   • INCISION AND DRAINAGE ORTHOPEDIC Right 11/15/2019    Procedure: INCISION AND DRAINAGE, RIGHT KNEE  BY ORTHOPEDICS;  Surgeon: Ramsey Navas M.D.;  Location: SURGERY [facility] [facility];  Service: Orthopedics       Exam:   /60 (BP Location: Right arm, Patient Position: Sitting, BP Cuff Size: Adult)   Pulse 77   Temp 36.7 °C (98.1 °F) (Temporal)   Wt 61 kg (134 lb 6.4 oz)   SpO2 96%  Body mass index is 22.37 kg/m².    Hearing and dentition fair  Alert, oriented in no acute distress.  Eye contact is good, speech goal directed, affect calm  Lungs clear  Cardiovascular S1-S2 regular    Assessment and Plan. The following treatment and monitoring plan is recommended:    Assessment  #1 Medicare wellness visit    #2 hypothyroid on replacement per endocrinology most recent note 8/28/20  endocrinology note because of palpitations at night decrease levothyroxine to 75 mcg 6 days a week, half a tablet on day 7, hyponatremia monitor sodium no identified endocrine cause, follow-up 3 months    #3 dyslipiemia most recent labs 6/20/20 chol 172,trig 41,hdl 99,ldl 65 on zocor 20 mg     #4 hyponatremia stable 8/19/20 Na 133 work-up per endocrinology negative 8/28/20  endocrinology note hyponatremia monitor sodium no identified endocrine cause, follow-up 3 months    #5 sleep apnea 9/21/20 home sleep study RDI 21.8, minimum saturation 79%    #6 preventative  2/10 pap  1/19/16 declines shingles vaccine  1/19/16  pneumovax   3/16/16 colon per [facility] tortuous colon, melanosis in colon, repeat colonoscopy 5 years for surveillance  2/10/17 prevnar at [facility] pharmacy  9/7/17 tdap  9/26/18 dexa  9/26/18 LS +1.4, hip -0.2  1/23/20 mammogram  6/20/20 vit d 88 on otc two per day, will decrease to one per day    #7 history of ankle fracture (recovered well, going to physical therapy    #8 postmenopausal bone loss    #9 possible sensorineural hearing loss    #10 neutropenia WBC 4.0 no recurrent infections      Plan  #! dexa     #2 follow up sleep apnea    #3 has had flu shot and first shingrix     #4 hearing test      #5 up to date on mammogram, repeat next year    #6 colon due 3/21    #7 up-to-date on other vaccines    #8 follow-up endocrinology    #9 nutrition, diet, exercise discussed    #10 health maintenance reviewed and updated    #11 continue social distancing, mask wearing    #12 follow-up 1 year    Services suggested:    Health Care Screening: Age-appropriate preventive services recommended by USPTF and ACIP covered by Medicare were discussed today. Services ordered if indicated and agreed upon by the patient.  Referrals offered: Community-based lifestyle interventions to reduce health risks and promote self-management and wellness, fall prevention, nutrition, physical activity, tobacco-use cessation, weight loss, and mental health services as per orders if indicated.    Discussion today about general wellness and lifestyle habits:    · Prevent falls and reduce trip hazards; Cautioned about securing or removing rugs.  · Have a working fire alarm and carbon monoxide detector;   · Engage in regular physical activity and social activities

## 2020-11-24 ENCOUNTER — HOSPITAL ENCOUNTER (OUTPATIENT)
Dept: LAB | Facility: MEDICAL CENTER | Age: 75
End: 2020-11-24
Attending: INTERNAL MEDICINE
Payer: MEDICARE

## 2020-11-24 DIAGNOSIS — E03.9 HYPOTHYROIDISM, UNSPECIFIED TYPE: ICD-10-CM

## 2020-11-24 DIAGNOSIS — E89.2 S/P PARATHYROIDECTOMY: ICD-10-CM

## 2020-11-24 DIAGNOSIS — E53.8 VITAMIN B 12 DEFICIENCY: ICD-10-CM

## 2020-11-24 DIAGNOSIS — E55.9 VITAMIN D DEFICIENCY: ICD-10-CM

## 2020-11-24 DIAGNOSIS — E87.1 HYPONATREMIA: ICD-10-CM

## 2020-11-24 DIAGNOSIS — R53.83 FATIGUE, UNSPECIFIED TYPE: ICD-10-CM

## 2020-11-24 LAB
ANION GAP SERPL CALC-SCNC: 7 MMOL/L (ref 7–16)
BUN SERPL-MCNC: 11 MG/DL (ref 8–22)
CALCIUM SERPL-MCNC: 10.2 MG/DL (ref 8.5–10.5)
CHLORIDE SERPL-SCNC: 97 MMOL/L (ref 96–112)
CO2 SERPL-SCNC: 29 MMOL/L (ref 20–33)
CREAT SERPL-MCNC: 0.65 MG/DL (ref 0.5–1.4)
GLUCOSE SERPL-MCNC: 91 MG/DL (ref 65–99)
POTASSIUM SERPL-SCNC: 4.1 MMOL/L (ref 3.6–5.5)
SODIUM SERPL-SCNC: 133 MMOL/L (ref 135–145)
T3FREE SERPL-MCNC: 2.58 PG/ML (ref 2–4.4)
T4 FREE SERPL-MCNC: 1.67 NG/DL (ref 0.93–1.7)
TSH SERPL DL<=0.005 MIU/L-ACNC: 0.52 UIU/ML (ref 0.38–5.33)

## 2020-11-24 PROCEDURE — 84439 ASSAY OF FREE THYROXINE: CPT

## 2020-11-24 PROCEDURE — 84481 FREE ASSAY (FT-3): CPT

## 2020-11-24 PROCEDURE — 36415 COLL VENOUS BLD VENIPUNCTURE: CPT

## 2020-11-24 PROCEDURE — 84443 ASSAY THYROID STIM HORMONE: CPT

## 2020-11-24 PROCEDURE — 80048 BASIC METABOLIC PNL TOTAL CA: CPT

## 2021-01-11 DIAGNOSIS — Z23 NEED FOR VACCINATION: ICD-10-CM

## 2021-01-12 PROBLEM — H90.5 SENSORINEURAL HEARING LOSS: Status: ACTIVE | Noted: 2021-01-12

## 2021-01-15 ENCOUNTER — TELEMEDICINE (OUTPATIENT)
Dept: ENDOCRINOLOGY | Facility: MEDICAL CENTER | Age: 76
End: 2021-01-15
Attending: INTERNAL MEDICINE
Payer: MEDICARE

## 2021-01-15 ENCOUNTER — TELEPHONE (OUTPATIENT)
Dept: ENDOCRINOLOGY | Facility: MEDICAL CENTER | Age: 76
End: 2021-01-15

## 2021-01-15 DIAGNOSIS — E06.3 HASHIMOTO'S THYROIDITIS: ICD-10-CM

## 2021-01-15 DIAGNOSIS — E89.2 S/P PARATHYROIDECTOMY: ICD-10-CM

## 2021-01-15 DIAGNOSIS — E22.2 SIADH (SYNDROME OF INAPPROPRIATE ADH PRODUCTION) (HCC): ICD-10-CM

## 2021-01-15 DIAGNOSIS — E87.1 HYPONATREMIA: ICD-10-CM

## 2021-01-15 DIAGNOSIS — E03.9 ACQUIRED HYPOTHYROIDISM: ICD-10-CM

## 2021-01-15 PROCEDURE — 99214 OFFICE O/P EST MOD 30 MIN: CPT | Mod: 95,CR | Performed by: INTERNAL MEDICINE

## 2021-01-15 RX ORDER — LEVOTHYROXINE SODIUM 0.05 MG/1
50 TABLET ORAL
Qty: 102 TAB | Refills: 3 | Status: SHIPPED | OUTPATIENT
Start: 2021-01-15 | End: 2021-06-29

## 2021-01-15 RX ORDER — SODIUM CHLORIDE 1 G/1
1 TABLET ORAL 2 TIMES DAILY
Qty: 180 TAB | Refills: 1 | Status: SHIPPED | OUTPATIENT
Start: 2021-01-15 | End: 2021-06-29

## 2021-01-15 NOTE — PROGRESS NOTES
Chief Complaint: Follow up for Primary Hypothyroidism, s/p parathyroidectomy and history of hponatremia.  Patient was presented for a telehealth consultation via secure and encrypted videoconferencing technology. This encounter was conducted via Zoom . Verbal consent was obtained. Patient's identity was verified.    HPI:     Bernadette Paul is a 75 y.o. female here for follow up of Primary Hypothyroidism.  She has a family history of hypothyroidism with her sister  She underwent parathyroidectomy many years ago at [facility] for primary hyperparathyroidism and post surgery her calcium levels have remained normal on serial observation.  She also has a history of hyponatremia with baseline sodium of 133-135 with a urine osmolarity of greater than 300 and negative cosyntropin stimulation test for adrenal insufficiency      Since last visit patient reports feeling excellent.  She remains on Levothyroxine 75 daily 5 days out of 7 which has been her dose for the past 3 months.   She reports excellent compliance and reports missing any daily doses.   She takes thyroid hormone prior to breakfast.   She  denies taking any iron, calcium supplements or antacids.      Weight has been stable    She currently reports good energy levels.     She currently denies tremulousness, palpitations, sweating, weight loss, diarrhea, change in skin,  nails, or hair, heat intolerance.       Her TSH was 0.523 with a free T4 of 1.67 on 11/24/2020    Her last calcium was normal at 10.2 with a normal PTH of 28.2 on August 19, 2020      Patient's medications, allergies, and social histories were reviewed and updated as appropriate.      ROS:     CONS:     No fever, no chills   EYES:     No diplopia, no blurry vision   CV:           No chest pain, no palpitations   PULM:     No SOB, no cough, no hemoptysis.   GI:            No nausea, no vomiting, no diarrhea, no constipation   ENDO:     No polyuria, no polydipsia, no heat intolerance, no cold  intolerance       Past Medical History:  Problem List:  2021-01: Sensorineural hearing loss  2020-10: Sleep apnea  2020-07: History of ankle fracture  2020-06: Neutropenia (HCC)  2020-06: GERD (gastroesophageal reflux disease)  2020-06: Atypical chest pain  2020-06: Hypothyroid  2020-04: Cataract  2019-12: Chronic cough  2019-12: History of bilateral pleural effusion  2019-12: FUO (fever of unknown origin)  2019-12: Septic arthritis of knee, right (HCC)  2019-11: History of MSSA bacteremia  2019-11: Transaminitis  2019-11: Chronic fatigue  2019-11: History of septic joint of right knee joint    2019-11: Leukocytosis  2019-10: Hyponatremia  2018-09: Restless leg syndrome  2017-02: Low back pain  2013-02: History of wrist fracture  2012-05: History of vasovagal syncope  2012-04: Syncope  2012-04: Murmur  2011-02: Adenomatous polyp of colon  2011-01: S/P laparoscopic cholecystectomy  2010-02: Vitiligo  2009-06: Dyslipidemia  2009-06: S/P parathyroidectomy  2009-06: Allergic rhinitis  2009-06: Preventative health care      Past Surgical History:  Past Surgical History:   Procedure Laterality Date   • NICOLE N/A 12/6/2019    Procedure: ECHOCARDIOGRAM, TRANSESOPHAGEAL;  Surgeon: Marion Dykes M.D.;  Location: SURGERY [facility];  Service: Cardiac   • INCISION AND DRAINAGE ORTHOPEDIC Right 11/15/2019    Procedure: INCISION AND DRAINAGE, RIGHT KNEE  BY ORTHOPEDICS;  Surgeon: Ramsey Navas M.D.;  Location: SURGERY [facility];  Service: Orthopedics        Allergies:  Asa [aspirin] and Nsaids     Social History:  Social History     Tobacco Use   • Smoking status: Never Smoker   • Smokeless tobacco: Never Used   Substance Use Topics   • Alcohol use: Yes     Alcohol/week: 0.0 oz     Frequency: 2-3 times a week     Drinks per session: 1 or 2     Binge frequency: Never     Comment: occ wine    • Drug use: Never        Family History:   family history includes Heart Disease in her mother.      PHYSICAL EXAM:    Vital signs: There were no vitals taken for this visit.  GENERAL: Well-developed, well-nourished in no apparent distress.   EYE:  No ocular asymmetry, PERRLA  HENT: Pink, moist mucous membranes.    NECK: No thyromegaly.   CARDIOVASCULAR:  No murmurs  LUNGS: Clear breath sounds  ABDOMEN: Soft, nontender   EXTREMITIES: No clubbing, cyanosis, or edema.   NEUROLOGICAL: No gross focal motor abnormalities   LYMPH: No cervical adenopathy seen  SKIN: No rashes, lesions.       Labs:  Lab Results   Component Value Date/Time    SODIUM 133 (L) 11/24/2020 08:42 AM    POTASSIUM 4.1 11/24/2020 08:42 AM    CHLORIDE 97 11/24/2020 08:42 AM    CO2 29 11/24/2020 08:42 AM    ANION 7.0 11/24/2020 08:42 AM    GLUCOSE 91 11/24/2020 08:42 AM    BUN 11 11/24/2020 08:42 AM    CREATININE 0.65 11/24/2020 08:42 AM    CREATININE 0.9 06/01/2005 08:44 AM    CALCIUM 10.2 11/24/2020 08:42 AM    ASTSGOT 22 06/17/2020 08:58 AM    ALTSGPT 21 06/17/2020 08:58 AM    TBILIRUBIN 0.6 06/17/2020 08:58 AM    ALBUMIN 4.3 06/17/2020 08:58 AM    ALBUMIN 5.06 (H) 05/25/2016 07:34 AM    TOTPROTEIN 6.6 06/17/2020 08:58 AM    TOTPROTEIN 7.90 05/25/2016 07:34 AM    GLOBULIN 2.3 06/17/2020 08:58 AM    AGRATIO 1.9 06/17/2020 08:58 AM       Lab Results   Component Value Date/Time    SODIUM 133 (L) 11/24/2020 0842    POTASSIUM 4.1 11/24/2020 0842    CHLORIDE 97 11/24/2020 0842    CO2 29 11/24/2020 0842    GLUCOSE 91 11/24/2020 0842    BUN 11 11/24/2020 0842    CREATININE 0.65 11/24/2020 0842    CALCIUM 10.2 11/24/2020 0842    ANION 7.0 11/24/2020 0842       Lab Results   Component Value Date/Time    CHOLSTRLTOT 172 06/17/2020 0858    TRIGLYCERIDE 41 06/17/2020 0858    HDL 99 06/17/2020 0858    LDL 65 06/17/2020 0858       Lab Results   Component Value Date/Time    TSHULTRASEN 0.523 11/24/2020 0842     Lab Results   Component Value Date/Time    FREET4 1.67 11/24/2020 0842     Lab Results   Component Value Date/Time    FREET3 2.58 11/24/2020 0842     No results found for:  THYSTIMIG    Lab Results   Component Value Date/Time    MICROSOMALA 0.3 03/17/2020 0807         Imaging:      ASSESSMENT/PLAN:     1. Acquired hypothyroidism  Well-controlled to minimize pill cutting I am adjusting her medication to levothyroxine 50 with an extra dose on Sunday which is equivalent to what she is taking right now  We will see her again in 3 months with repeat of her TSH and free T4 levels    2. Hashimoto's thyroiditis  This is the likely etiology of her hypothyroidism explained to the patient that 10% of individuals with Hashimoto's thyroiditis have negative antibodies    3. S/P parathyroidectomy  Durable cure of primary hyperparathyroidism noted.  Her calcium remains normal and PTH levels remain normal   Recommend observation  We will plan to repeat her calcium and intact PTH levels again in 6 months    4. Hyponatremia  Unstable  Sodium is low at 133  Based on her previous work-up it appears that she has SIADH  I want her to try salt tablets because she has a hard time with fluid restriction    5. SIADH (syndrome of inappropriate ADH production) (HCC)  This is the likely etiology of her hyponatremia she has had a negative work-up for adrenal insufficiency and hypothyroidism is well controlled.      Return in about 3 months (around 4/15/2021).      This patient during there office visit today was started on a new medication.  Side effects of the new medication were discussed with the patient today in the office.     Thank you kindly for allowing me to participate in the thyroid care plan for this patient.    Cosmo Garza MD, SKIP, ECNU  01/15/21    CC:   Eron Flowers M.D.

## 2021-01-18 ENCOUNTER — TELEPHONE (OUTPATIENT)
Dept: ENDOCRINOLOGY | Facility: MEDICAL CENTER | Age: 76
End: 2021-01-18

## 2021-01-26 ENCOUNTER — IMMUNIZATION (OUTPATIENT)
Dept: FAMILY PLANNING/WOMEN'S HEALTH CLINIC | Facility: IMMUNIZATION CENTER | Age: 76
End: 2021-01-26
Payer: MEDICARE

## 2021-01-26 ENCOUNTER — APPOINTMENT (OUTPATIENT)
Dept: FAMILY PLANNING/WOMEN'S HEALTH CLINIC | Facility: IMMUNIZATION CENTER | Age: 76
End: 2021-01-26
Attending: INTERNAL MEDICINE
Payer: MEDICARE

## 2021-01-26 DIAGNOSIS — Z23 NEED FOR VACCINATION: ICD-10-CM

## 2021-01-26 DIAGNOSIS — Z23 ENCOUNTER FOR VACCINATION: Primary | ICD-10-CM

## 2021-01-26 PROCEDURE — 0001A PFIZER SARS-COV-2 VACCINE: CPT

## 2021-01-26 PROCEDURE — 91300 PFIZER SARS-COV-2 VACCINE: CPT

## 2021-02-18 ENCOUNTER — IMMUNIZATION (OUTPATIENT)
Dept: FAMILY PLANNING/WOMEN'S HEALTH CLINIC | Facility: IMMUNIZATION CENTER | Age: 76
End: 2021-02-18
Attending: INTERNAL MEDICINE
Payer: MEDICARE

## 2021-02-18 DIAGNOSIS — Z23 ENCOUNTER FOR VACCINATION: Primary | ICD-10-CM

## 2021-02-18 PROCEDURE — 0002A PFIZER SARS-COV-2 VACCINE: CPT

## 2021-02-18 PROCEDURE — 91300 PFIZER SARS-COV-2 VACCINE: CPT

## 2021-03-24 PROBLEM — G25.81 RESTLESS LEG SYNDROME: Status: RESOLVED | Noted: 2018-09-10 | Resolved: 2021-03-24

## 2021-04-07 ENCOUNTER — APPOINTMENT (RX ONLY)
Dept: URBAN - METROPOLITAN AREA CLINIC 4 | Facility: CLINIC | Age: 76
Setting detail: DERMATOLOGY
End: 2021-04-07

## 2021-04-07 DIAGNOSIS — L30.9 DERMATITIS, UNSPECIFIED: ICD-10-CM

## 2021-04-07 DIAGNOSIS — D485 NEOPLASM OF UNCERTAIN BEHAVIOR OF SKIN: ICD-10-CM

## 2021-04-07 DIAGNOSIS — L57.0 ACTINIC KERATOSIS: ICD-10-CM

## 2021-04-07 PROBLEM — D48.5 NEOPLASM OF UNCERTAIN BEHAVIOR OF SKIN: Status: ACTIVE | Noted: 2021-04-07

## 2021-04-07 PROCEDURE — 17003 DESTRUCT PREMALG LES 2-14: CPT

## 2021-04-07 PROCEDURE — 11102 TANGNTL BX SKIN SINGLE LES: CPT

## 2021-04-07 PROCEDURE — ? BIOPSY BY SHAVE METHOD

## 2021-04-07 PROCEDURE — ? COUNSELING

## 2021-04-07 PROCEDURE — ? PRESCRIPTION MEDICATION MANAGEMENT

## 2021-04-07 PROCEDURE — ? LIQUID NITROGEN

## 2021-04-07 PROCEDURE — 17000 DESTRUCT PREMALG LESION: CPT | Mod: 59

## 2021-04-07 PROCEDURE — 99202 OFFICE O/P NEW SF 15 MIN: CPT | Mod: 25

## 2021-04-07 PROCEDURE — ? PRESCRIPTION

## 2021-04-07 RX ORDER — TRIAMCINOLONE ACETONIDE 1 MG/G
1 CREAM TOPICAL BID
Qty: 1 | Refills: 0 | Status: ERX | COMMUNITY
Start: 2021-04-07

## 2021-04-07 RX ADMIN — TRIAMCINOLONE ACETONIDE 1: 1 CREAM TOPICAL at 00:00

## 2021-04-07 ASSESSMENT — LOCATION DETAILED DESCRIPTION DERM
LOCATION DETAILED: RIGHT DISTAL PRETIBIAL REGION
LOCATION DETAILED: SUPERIOR MID FOREHEAD
LOCATION DETAILED: LEFT INFERIOR POSTERIOR NECK

## 2021-04-07 ASSESSMENT — LOCATION SIMPLE DESCRIPTION DERM
LOCATION SIMPLE: RIGHT PRETIBIAL REGION
LOCATION SIMPLE: SUPERIOR FOREHEAD
LOCATION SIMPLE: POSTERIOR NECK

## 2021-04-07 ASSESSMENT — LOCATION ZONE DERM
LOCATION ZONE: FACE
LOCATION ZONE: NECK
LOCATION ZONE: LEG

## 2021-04-07 NOTE — PROCEDURE: BIOPSY BY SHAVE METHOD

## 2021-04-14 ENCOUNTER — HOSPITAL ENCOUNTER (OUTPATIENT)
Dept: LAB | Facility: MEDICAL CENTER | Age: 76
End: 2021-04-14
Attending: INTERNAL MEDICINE
Payer: MEDICARE

## 2021-04-14 LAB
ANION GAP SERPL CALC-SCNC: 9 MMOL/L (ref 7–16)
BUN SERPL-MCNC: 9 MG/DL (ref 8–22)
CALCIUM SERPL-MCNC: 10.2 MG/DL (ref 8.5–10.5)
CHLORIDE SERPL-SCNC: 102 MMOL/L (ref 96–112)
CO2 SERPL-SCNC: 27 MMOL/L (ref 20–33)
CREAT SERPL-MCNC: 0.69 MG/DL (ref 0.5–1.4)
FASTING STATUS PATIENT QL REPORTED: NORMAL
GLUCOSE SERPL-MCNC: 88 MG/DL (ref 65–99)
POTASSIUM SERPL-SCNC: 4.2 MMOL/L (ref 3.6–5.5)
SODIUM SERPL-SCNC: 138 MMOL/L (ref 135–145)
SODIUM UR-SCNC: 102 MMOL/L
T3 SERPL-MCNC: 90.3 NG/DL (ref 60–181)
T3FREE SERPL-MCNC: 2.6 PG/ML (ref 2–4.4)
T4 FREE SERPL-MCNC: 1.53 NG/DL (ref 0.93–1.7)
TSH SERPL DL<=0.005 MIU/L-ACNC: 1.27 UIU/ML (ref 0.38–5.33)

## 2021-04-14 PROCEDURE — 84481 FREE ASSAY (FT-3): CPT

## 2021-04-14 PROCEDURE — 80048 BASIC METABOLIC PNL TOTAL CA: CPT

## 2021-04-14 PROCEDURE — 83930 ASSAY OF BLOOD OSMOLALITY: CPT

## 2021-04-14 PROCEDURE — 84480 ASSAY TRIIODOTHYRONINE (T3): CPT

## 2021-04-14 PROCEDURE — 84439 ASSAY OF FREE THYROXINE: CPT

## 2021-04-14 PROCEDURE — 82306 VITAMIN D 25 HYDROXY: CPT

## 2021-04-14 PROCEDURE — 84300 ASSAY OF URINE SODIUM: CPT

## 2021-04-14 PROCEDURE — 84443 ASSAY THYROID STIM HORMONE: CPT

## 2021-04-14 PROCEDURE — 83935 ASSAY OF URINE OSMOLALITY: CPT

## 2021-04-14 PROCEDURE — 36415 COLL VENOUS BLD VENIPUNCTURE: CPT

## 2021-04-15 LAB
25(OH)D3 SERPL-MCNC: 86 NG/ML (ref 30–80)
OSMOLALITY SERPL: 284 MOSM/KG H2O (ref 278–298)
OSMOLALITY UR: 507 MOSM/KG H2O (ref 300–900)

## 2021-05-12 ENCOUNTER — APPOINTMENT (RX ONLY)
Dept: URBAN - METROPOLITAN AREA CLINIC 36 | Facility: CLINIC | Age: 76
Setting detail: DERMATOLOGY
End: 2021-05-12

## 2021-05-12 PROBLEM — C44.319 BASAL CELL CARCINOMA OF SKIN OF OTHER PARTS OF FACE: Status: ACTIVE | Noted: 2021-05-12

## 2021-05-12 PROCEDURE — ? MOHS SURGERY

## 2021-05-12 PROCEDURE — 13132 CMPLX RPR F/C/C/M/N/AX/G/H/F: CPT

## 2021-05-12 PROCEDURE — 17311 MOHS 1 STAGE H/N/HF/G: CPT

## 2021-05-12 NOTE — HPI: PROCEDURE (MOHS)
Has The Growth Been Previously Biopsied?: has been previously biopsied
Additional History: Infiltrative bcc

## 2021-05-12 NOTE — PROCEDURE: MOHS SURGERY
Special Stains Stage 7 - Results: Base On Clearance Noted Above
Plastic Surgeon Procedure Text (A): After obtaining clear surgical margins the patient was sent to plastics for surgical repair.  The patient understands they will receive post-surgical care and follow-up from the referring physician's office.
Surgical Defect Length In Cm (Optional): 1.5
Show Repair Surgeon Variable: Yes
Bcc Infiltrative Histology Text: There were numerous aggregates of basaloid cells demonstrating an infiltrative pattern.
Repair Hemostasis (Optional): Pinpoint electrocautery
A-T Advancement Flap Text: The defect edges were debeveled with a #15 scalpel blade.  Given the location of the defect, shape of the defect and the proximity to free margins an A-T advancement flap was deemed most appropriate.  Using a sterile surgical marker, an appropriate advancement flap was drawn incorporating the defect and placing the expected incisions within the relaxed skin tension lines where possible.    The area thus outlined was incised deep to adipose tissue with a #15 scalpel blade.  The skin margins were undermined to an appropriate distance in all directions utilizing iris scissors.
No Residual Tumor Seen Histology Text: There were no malignant cells seen in the sections examined.
Number Of Stages: 1
Rhombic Flap Text: The defect edges were debeveled with a #15 scalpel blade.  Given the location of the defect and the proximity to free margins a rhombic flap was deemed most appropriate.  Using a sterile surgical marker, an appropriate rhombic flap was drawn incorporating the defect.    The area thus outlined was incised deep to adipose tissue with a #15 scalpel blade.  The skin margins were undermined to an appropriate distance in all directions utilizing iris scissors.
Graft Donor Site Bandage (Optional-Leave Blank If You Don't Want In Note): Aquaplast was fitted to the graft site and sewn into place. A pressure bandage were applied to the donor site and over the aquaplast bolster.
Stage 5: Number Of Blocks?: 0
Split-Thickness Skin Graft Text: The defect edges were debeveled with a #15 scalpel blade.  Given the location of the defect, shape of the defect and the proximity to free margins a split thickness skin graft was deemed most appropriate.  Using a sterile surgical marker, the primary defect shape was transferred to the donor site. The split thickness graft was then harvested.  The skin graft was then placed in the primary defect and oriented appropriately.
Nostril Rim Text: The closure involved the nostril rim.
Consent (Ear)/Introductory Paragraph: The rationale for Mohs was explained to the patient and consent was obtained. The risks, benefits and alternatives to therapy were discussed in detail. Specifically, the risks of ear deformity, infection, scarring, bleeding, prolonged wound healing, incomplete removal, allergy to anesthesia, nerve injury and recurrence were addressed. Prior to the procedure, the treatment site was clearly identified and confirmed by the patient. All components of Universal Protocol/PAUSE Rule completed.
Ear Wedge Repair Text: A wedge excision was completed by carrying down an excision through the full thickness of the ear and cartilage with an inward facing Burow's triangle. The wound was then closed in a layered fashion.
S Plasty Text: Given the location and shape of the defect, and the orientation of relaxed skin tension lines, an S-plasty was deemed most appropriate for repair.  Using a sterile surgical marker, the appropriate outline of the S-plasty was drawn, incorporating the defect and placing the expected incisions within the relaxed skin tension lines where possible.  The area thus outlined was incised deep to adipose tissue with a #15 scalpel blade.  The skin margins were undermined to an appropriate distance in all directions utilizing iris scissors. The skin flaps were advanced over the defect.  The opposing margins were then approximated with interrupted buried subcutaneous sutures.
Validate That Repair Assistants Are Chosen (Can Hide Repair Assistants In The Settings Tab): No
Mid-Level Procedure Text (A): After obtaining clear surgical margins the patient was sent to a mid-level provider for surgical repair.  The patient understands they will receive post-surgical care and follow-up from the mid-level provider.
Asc Procedure Text (A): After obtaining clear surgical margins the patient was sent to an ASC for surgical repair.  The patient understands they will receive post-surgical care and follow-up from the ASC physician.
Same Histology In Subsequent Stages Text: The pattern and morphology of the tumor is as described in the first stage.
Mart-1 - Positive Histology Text: MART-1 staining demonstrates areas of higher density and clustering of melanocytes with Pagetoid spread upwards within the epidermis. The surgical margins are positive for tumor cells.
Dorsal Nasal Flap Text: The defect edges were debeveled with a #15 scalpel blade.  Given the location of the defect and the proximity to free margins a dorsal nasal flap was deemed most appropriate.  Using a sterile surgical marker, an appropriate dorsal nasal flap was drawn around the defect.    The area thus outlined was incised deep to adipose tissue with a #15 scalpel blade.  The skin margins were undermined to an appropriate distance in all directions utilizing iris scissors.
Provider Procedure Text (E): After obtaining clear surgical margins the defect was repaired by another provider.
Island Pedicle Flap With Canthal Suspension Text: The defect edges were debeveled with a #15 scalpel blade.  Given the location of the defect, shape of the defect and the proximity to free margins an island pedicle advancement flap was deemed most appropriate.  Using a sterile surgical marker, an appropriate advancement flap was drawn incorporating the defect, outlining the appropriate donor tissue and placing the expected incisions within the relaxed skin tension lines where possible. The area thus outlined was incised deep to adipose tissue with a #15 scalpel blade.  The skin margins were undermined to an appropriate distance in all directions around the primary defect and laterally outward around the island pedicle utilizing iris scissors.  There was minimal undermining beneath the pedicle flap. A suspension suture was placed in the canthal tendon to prevent tension and prevent ectropion.
Where Do You Want The Question To Include Opioid Counseling Located?: Case Summary Tab
Wound Care (No Sutures): Petrolatum
Wound Check: 6 weeks
Otolaryngologist Procedure Text (D): After obtaining clear surgical margins the patient was sent to otolaryngology for surgical repair.  The patient understands they will receive post-surgical care and follow-up from the referring physician's office.
Stage 14: Additional Anesthesia Type: 1% lidocaine with epinephrine
Bilobed Transposition Flap Text: The defect edges were debeveled with a #15 scalpel blade.  Given the location of the defect and the proximity to free margins a bilobed transposition flap was deemed most appropriate.  Using a sterile surgical marker, an appropriate bilobe flap drawn around the defect.    The area thus outlined was incised deep to adipose tissue with a #15 scalpel blade.  The skin margins were undermined to an appropriate distance in all directions utilizing iris scissors.
Estimated Blood Loss (Cc): minimal
Donor Site Anesthesia Type: same as repair anesthesia
Mauc Instructions: By selecting yes to the question below the MAUC number will be added into the note.  This will be calculated automatically based on the diagnosis chosen, the size entered, the body zone selected (H,M,L) and the specific indications you chose. You will also have the option to override the Mohs AUC if you disagree with the automatically calculated number and this option is found in the Case Summary tab.
Consent (Marginal Mandibular)/Introductory Paragraph: The rationale for Mohs was explained to the patient and consent was obtained. The risks, benefits and alternatives to therapy were discussed in detail. Specifically, the risks of damage to the marginal mandibular branch of the facial nerve, infection, scarring, bleeding, prolonged wound healing, incomplete removal, allergy to anesthesia, and recurrence were addressed. Prior to the procedure, the treatment site was clearly identified and confirmed by the patient. All components of Universal Protocol/PAUSE Rule completed.
Suturegard Body: The suture ends were repeatedly re-tightened and re-clamped to achieve the desired tissue expansion.
Area H Indication Text: Tumors in this location are included in Area H (eyelids, eyebrows, nose, lips, chin, ear, pre-auricular, post-auricular, temple, genitalia, hands, feet, ankles and areola).  Tissue conservation is critical in these anatomic locations.
Intermediate Repair Preamble Text (Leave Blank If You Do Not Want): Undermining was performed with blunt dissection.
Date Of Previous Biopsy (Optional): 4/7/2021
Tumor Debulked?: curette
Rhomboid Transposition Flap Text: The defect edges were debeveled with a #15 scalpel blade.  Given the location of the defect and the proximity to free margins a rhomboid transposition flap was deemed most appropriate.  Using a sterile surgical marker, an appropriate rhomboid flap was drawn incorporating the defect.    The area thus outlined was incised deep to adipose tissue with a #15 scalpel blade.  The skin margins were undermined to an appropriate distance in all directions utilizing iris scissors.
Island Pedicle Flap Text: The defect edges were debeveled with a #15 scalpel blade.  Given the location of the defect, shape of the defect and the proximity to free margins an island pedicle advancement flap was deemed most appropriate.  Using a sterile surgical marker, an appropriate advancement flap was drawn incorporating the defect, outlining the appropriate donor tissue and placing the expected incisions within the relaxed skin tension lines where possible.    The area thus outlined was incised deep to adipose tissue with a #15 scalpel blade.  The skin margins were undermined to an appropriate distance in all directions around the primary defect and laterally outward around the island pedicle utilizing iris scissors.  There was minimal undermining beneath the pedicle flap.
Epidermal Sutures: 5-0 Surgipro
Pain Refusal Text: I offered to prescribe pain medication but the patient refused to take this medication.
Paramedian Forehead Flap Text: A decision was made to reconstruct the defect utilizing an interpolation axial flap and a staged reconstruction.  A telfa template was made of the defect.  This telfa template was then used to outline the paramedian forehead pedicle flap.  The donor area for the pedicle flap was then injected with anesthesia.  The flap was excised through the skin and subcutaneous tissue down to the layer of the underlying musculature.  The pedicle flap was carefully excised within this deep plane to maintain its blood supply.  The edges of the donor site were undermined.   The donor site was closed in a primary fashion.  The pedicle was then rotated into position and sutured.  Once the tube was sutured into place, adequate blood supply was confirmed with blanching and refill.  The pedicle was then wrapped with xeroform gauze and dressed appropriately with a telfa and gauze bandage to ensure continued blood supply and protect the attached pedicle.
Advancement Flap (Single) Text: The defect edges were debeveled with a #15 scalpel blade.  Given the location of the defect and the proximity to free margins a single advancement flap was deemed most appropriate.  Using a sterile surgical marker, an appropriate advancement flap was drawn incorporating the defect and placing the expected incisions within the relaxed skin tension lines where possible.    The area thus outlined was incised deep to adipose tissue with a #15 scalpel blade.  The skin margins were undermined to an appropriate distance in all directions utilizing iris scissors.
Partial Purse String (Simple) Text: Given the location of the defect and the characteristics of the surrounding skin a simple purse string closure was deemed most appropriate.  Undermining was performed circumfirentially around the surgical defect.  A purse string suture was then placed and tightened. Wound tension only allowed a partial closure of the circular defect.
Modified Advancement Flap Text: The defect edges were debeveled with a #15 scalpel blade.  Given the location of the defect, shape of the defect and the proximity to free margins a modified advancement flap was deemed most appropriate.  Using a sterile surgical marker, an appropriate advancement flap was drawn incorporating the defect and placing the expected incisions within the relaxed skin tension lines where possible.    The area thus outlined was incised deep to adipose tissue with a #15 scalpel blade.  The skin margins were undermined to an appropriate distance in all directions utilizing iris scissors.
Ftsg Text: The defect edges were debeveled with a #15 scalpel blade.  Given the location of the defect, shape of the defect and the proximity to free margins a full thickness skin graft was deemed most appropriate.  Using a sterile surgical marker, the primary defect shape was transferred to the donor site. The area thus outlined was incised deep to adipose tissue with a #15 scalpel blade.  The harvested graft was then trimmed of adipose tissue until only dermis and epidermis was left.  The skin margins of the secondary defect were undermined to an appropriate distance in all directions utilizing iris scissors.  The secondary defect was closed with interrupted buried subcutaneous sutures.  The skin edges were then re-apposed with running  sutures.  The skin graft was then placed in the primary defect and oriented appropriately.
Anesthesia Volume In Cc: 2
Tissue Cultured Epidermal Autograft Text: The defect edges were debeveled with a #15 scalpel blade.  Given the location of the defect, shape of the defect and the proximity to free margins a tissue cultured epidermal autograft was deemed most appropriate.  The graft was then trimmed to fit the size of the defect.  The graft was then placed in the primary defect and oriented appropriately.
Epidermal Autograft Text: The defect edges were debeveled with a #15 scalpel blade.  Given the location of the defect, shape of the defect and the proximity to free margins an epidermal autograft was deemed most appropriate.  Using a sterile surgical marker, the primary defect shape was transferred to the donor site. The epidermal graft was then harvested.  The skin graft was then placed in the primary defect and oriented appropriately.
Consent (Temporal Branch)/Introductory Paragraph: The rationale for Mohs was explained to the patient and consent was obtained. The risks, benefits and alternatives to therapy were discussed in detail. Specifically, the risks of damage to the temporal branch of the facial nerve, infection, scarring, bleeding, prolonged wound healing, incomplete removal, allergy to anesthesia, and recurrence were addressed. Prior to the procedure, the treatment site was clearly identified and confirmed by the patient. All components of Universal Protocol/PAUSE Rule completed.
Initial Size Of Lesion: 1.2
Full Thickness Lip Wedge Repair (Flap) Text: Given the location of the defect and the proximity to free margins a full thickness wedge repair was deemed most appropriate.  Using a sterile surgical marker, the appropriate repair was drawn incorporating the defect and placing the expected incisions perpendicular to the vermilion border.  The vermilion border was also meticulously outlined to ensure appropriate reapproximation during the repair.  The area thus outlined was incised through and through with a #15 scalpel blade.  The muscularis and dermis were reaproximated with deep sutures following hemostasis. Care was taken to realign the vermilion border before proceeding with the superficial closure.  Once the vermilion was realigned the superfical and mucosal closure was finished.
Xenograft Text: The defect edges were debeveled with a #15 scalpel blade.  Given the location of the defect, shape of the defect and the proximity to free margins a xenograft was deemed most appropriate.  The graft was then trimmed to fit the size of the defect.  The graft was then placed in the primary defect and oriented appropriately.
Consent Type: Consent 1 (Standard)
Suturegard Intro: Intraoperative tissue expansion was performed, utilizing the SUTUREGARD device, in order to reduce wound tension.
O-T Advancement Flap Text: The defect edges were debeveled with a #15 scalpel blade.  Given the location of the defect, shape of the defect and the proximity to free margins an O-T advancement flap was deemed most appropriate.  Using a sterile surgical marker, an appropriate advancement flap was drawn incorporating the defect and placing the expected incisions within the relaxed skin tension lines where possible.    The area thus outlined was incised deep to adipose tissue with a #15 scalpel blade.  The skin margins were undermined to an appropriate distance in all directions utilizing iris scissors.
Simple / Intermediate / Complex Repair - Final Wound Length In Cm: 3.3
Tarsorrhaphy Text: A tarsorrhaphy was performed using Frost sutures.
Location Indication Override (Is Already Calculated Based On Selected Body Location): Area M
Purse String (Intermediate) Text: Given the location of the defect and the characteristics of the surrounding skin a purse string intermediate closure was deemed most appropriate.  Undermining was performed circumfirentially around the surgical defect.  A purse string suture was then placed and tightened.
Consent (Near Eyelid Margin)/Introductory Paragraph: The rationale for Mohs was explained to the patient and consent was obtained. The risks, benefits and alternatives to therapy were discussed in detail. Specifically, the risks of ectropion or eyelid deformity, infection, scarring, bleeding, prolonged wound healing, incomplete removal, allergy to anesthesia, nerve injury and recurrence were addressed. Prior to the procedure, the treatment site was clearly identified and confirmed by the patient. All components of Universal Protocol/PAUSE Rule completed.
Dressing (No Sutures): pressure dressing with telfa
Helical Rim Advancement Flap Text: The defect edges were debeveled with a #15 blade scalpel.  Given the location of the defect and the proximity to free margins (helical rim) a double helical rim advancement flap was deemed most appropriate.  Using a sterile surgical marker, the appropriate advancement flaps were drawn incorporating the defect and placing the expected incisions between the helical rim and antihelix where possible.  The area thus outlined was incised through and through with a #15 scalpel blade.  With a skin hook and iris scissors, the flaps were gently and sharply undermined and freed up.
Bcc Histology Text: There were numerous aggregates of basaloid cells.
Post-Care Instructions: I reviewed with the patient in detail post-care instructions. Patient is not to engage in any heavy lifting, exercise, or swimming for the next 14 days. Should the patient develop any fevers, chills, bleeding, severe pain patient will contact the office immediately.
Island Pedicle Flap-Requiring Vessel Identification Text: The defect edges were debeveled with a #15 scalpel blade.  Given the location of the defect, shape of the defect and the proximity to free margins an island pedicle advancement flap was deemed most appropriate.  Using a sterile surgical marker, an appropriate advancement flap was drawn, based on the axial vessel mentioned above, incorporating the defect, outlining the appropriate donor tissue and placing the expected incisions within the relaxed skin tension lines where possible.    The area thus outlined was incised deep to adipose tissue with a #15 scalpel blade.  The skin margins were undermined to an appropriate distance in all directions around the primary defect and laterally outward around the island pedicle utilizing iris scissors.  There was minimal undermining beneath the pedicle flap.
Cheek-To-Nose Interpolation Flap Text: A decision was made to reconstruct the defect utilizing an interpolation axial flap and a staged reconstruction.  A telfa template was made of the defect.  This telfa template was then used to outline the Cheek-To-Nose Interpolation flap.  The donor area for the pedicle flap was then injected with anesthesia.  The flap was excised through the skin and subcutaneous tissue down to the layer of the underlying musculature.  The interpolation flap was carefully excised within this deep plane to maintain its blood supply.  The edges of the donor site were undermined.   The donor site was closed in a primary fashion.  The pedicle was then rotated into position and sutured.  Once the tube was sutured into place, adequate blood supply was confirmed with blanching and refill.  The pedicle was then wrapped with xeroform gauze and dressed appropriately with a telfa and gauze bandage to ensure continued blood supply and protect the attached pedicle.
Complex Repair And Flap Additional Text (Will Appearing After The Standard Complex Repair Text): The complex repair was not sufficient to completely close the primary defect. The remaining additional defect was repaired with the flap mentioned below.
Oculoplastic Surgeon Procedure Text (E): After obtaining clear surgical margins the patient was sent to oculoplastics for surgical repair.  The patient understands they will receive post-surgical care and follow-up from the referring physician's office.
Area L Indication Text: Tumors in this location are included in Area L (trunk and extremities).  Mohs surgery is indicated for larger tumors, or tumors with aggressive histologic features, in these anatomic locations.
O-T Plasty Text: The defect edges were debeveled with a #15 scalpel blade.  Given the location of the defect, shape of the defect and the proximity to free margins an O-T plasty was deemed most appropriate.  Using a sterile surgical marker, an appropriate O-T plasty was drawn incorporating the defect and placing the expected incisions within the relaxed skin tension lines where possible.    The area thus outlined was incised deep to adipose tissue with a #15 scalpel blade.  The skin margins were undermined to an appropriate distance in all directions utilizing iris scissors.
Epidermal Closure: running cuticular
Dermal Autograft Text: The defect edges were debeveled with a #15 scalpel blade.  Given the location of the defect, shape of the defect and the proximity to free margins a dermal autograft was deemed most appropriate.  Using a sterile surgical marker, the primary defect shape was transferred to the donor site. The area thus outlined was incised deep to adipose tissue with a #15 scalpel blade.  The harvested graft was then trimmed of adipose and epidermal tissue until only dermis was left.  The skin graft was then placed in the primary defect and oriented appropriately.
Mercedes Flap Text: The defect edges were debeveled with a #15 scalpel blade.  Given the location of the defect, shape of the defect and the proximity to free margins a Mercedes flap was deemed most appropriate.  Using a sterile surgical marker, an appropriate advancement flap was drawn incorporating the defect and placing the expected incisions within the relaxed skin tension lines where possible. The area thus outlined was incised deep to adipose tissue with a #15 scalpel blade.  The skin margins were undermined to an appropriate distance in all directions utilizing iris scissors.
Localized Dermabrasion With Wire Brush Text: The patient was draped in routine manner.  Localized dermabrasion using 3 x 17 mm wire brush was performed in routine manner to papillary dermis. This spot dermabrasion is being performed to complete skin cancer reconstruction. It also will eliminate the other sun damaged precancerous cells that are known to be part of the regional effect of a lifetime's worth of sun exposure. This localized dermabrasion is therapeutic and should not be considered cosmetic in any regard.
No Repair - Repaired With Adjacent Surgical Defect Text (Leave Blank If You Do Not Want): After obtaining clear surgical margins the defect was repaired concurrently with another surgical defect which was in close approximation.
Chonodrocutaneous Helical Advancement Flap Text: The defect edges were debeveled with a #15 scalpel blade.  Given the location of the defect and the proximity to free margins a chondrocutaneous helical advancement flap was deemed most appropriate.  Using a sterile surgical marker, the appropriate advancement flap was drawn incorporating the defect and placing the expected incisions within the relaxed skin tension lines where possible.    The area thus outlined was incised deep to adipose tissue with a #15 scalpel blade.  The skin margins were undermined to an appropriate distance in all directions utilizing iris scissors.
Composite Graft Text: The defect edges were debeveled with a #15 scalpel blade.  Given the location of the defect, shape of the defect, the proximity to free margins and the fact the defect was full thickness a composite graft was deemed most appropriate.  The defect was outline and then transferred to the donor site.  A full thickness graft was then excised from the donor site. The graft was then placed in the primary defect, oriented appropriately and then sutured into place.  The secondary defect was then repaired using a primary closure.
Double M-Plasty Complex Repair Preamble Text (Leave Blank If You Do Not Want): Extensive wide undermining was performed.
Anesthesia Volume In Cc: 6
Closure 3 Information: This tab is for additional flaps and grafts above and beyond our usual structured repairs.  Please note if you enter information here it will not currently bill and you will need to add the billing information manually.
Repair Type: Complex Repair
Hatchet Flap Text: The defect edges were debeveled with a #15 scalpel blade.  Given the location of the defect, shape of the defect and the proximity to free margins a hatchet flap was deemed most appropriate.  Using a sterile surgical marker, an appropriate hatchet flap was drawn incorporating the defect and placing the expected incisions within the relaxed skin tension lines where possible.    The area thus outlined was incised deep to adipose tissue with a #15 scalpel blade.  The skin margins were undermined to an appropriate distance in all directions utilizing iris scissors.
Suture Removal: 8 days
Information: Selecting Yes will display possible errors in your note based on the variables you have selected. This validation is only offered as a suggestion for you. PLEASE NOTE THAT THE VALIDATION TEXT WILL BE REMOVED WHEN YOU FINALIZE YOUR NOTE. IF YOU WANT TO FAX A PRELIMINARY NOTE YOU WILL NEED TO TOGGLE THIS TO 'NO' IF YOU DO NOT WANT IT IN YOUR FAXED NOTE.
Inflammation Suggestive Of Cancer Camouflage Histology Text: There was a dense lymphocytic infiltrate which prevented adequate histologic evaluation of adjacent structures.
Surgeon/Pathologist Verbiage (Will Incorporate Name Of Surgeon From Intro If Not Blank): operated in two distinct and integrated capacities as the surgeon and pathologist.
Trilobed Flap Text: The defect edges were debeveled with a #15 scalpel blade.  Given the location of the defect and the proximity to free margins a trilobed flap was deemed most appropriate.  Using a sterile surgical marker, an appropriate trilobed flap drawn around the defect.    The area thus outlined was incised deep to adipose tissue with a #15 scalpel blade.  The skin margins were undermined to an appropriate distance in all directions utilizing iris scissors.
Consent (Nose)/Introductory Paragraph: The rationale for Mohs was explained to the patient and consent was obtained. The risks, benefits and alternatives to therapy were discussed in detail. Specifically, the risks of nasal deformity, changes in the flow of air through the nose, infection, scarring, bleeding, prolonged wound healing, incomplete removal, allergy to anesthesia, nerve injury and recurrence were addressed. Prior to the procedure, the treatment site was clearly identified and confirmed by the patient. All components of Universal Protocol/PAUSE Rule completed.
Graft Donor Site Dermal Sutures (Optional): 5-0 Polysorb
Double O-Z Flap Text: The defect edges were debeveled with a #15 scalpel blade.  Given the location of the defect, shape of the defect and the proximity to free margins a Double O-Z flap was deemed most appropriate.  Using a sterile surgical marker, an appropriate transposition flap was drawn incorporating the defect and placing the expected incisions within the relaxed skin tension lines where possible. The area thus outlined was incised deep to adipose tissue with a #15 scalpel blade.  The skin margins were undermined to an appropriate distance in all directions utilizing iris scissors.
V-Y Plasty Text: The defect edges were debeveled with a #15 scalpel blade.  Given the location of the defect, shape of the defect and the proximity to free margins an V-Y advancement flap was deemed most appropriate.  Using a sterile surgical marker, an appropriate advancement flap was drawn incorporating the defect and placing the expected incisions within the relaxed skin tension lines where possible.    The area thus outlined was incised deep to adipose tissue with a #15 scalpel blade.  The skin margins were undermined to an appropriate distance in all directions utilizing iris scissors.
Primary Defect Width In Cm (Final Defect Size - Required For Flaps/Grafts): 1.3
Undermining Location (Optional): in the superficial subcutaneous fat
Non-Graft Cartilage Fenestration Text: The cartilage was fenestrated with a 2mm punch biopsy to help facilitate healing.
Deep Sutures: 5-0 Maxon
Mohs Method Verbiage: An incision at a 45 degree angle following the standard Mohs approach was done and the specimen was harvested as a microscopic controlled layer.
Manual Repair Warning Statement: We plan on removing the manually selected variable below in favor of our much easier automatic structured text blocks found in the previous tab. We decided to do this to help make the flow better and give you the full power of structured data. Manual selection is never going to be ideal in our platform and I would encourage you to avoid using manual selection from this point on, especially since I will be sunsetting this feature. It is important that you do one of two things with the customized text below. First, you can save all of the text in a word file so you can have it for future reference. Second, transfer the text to the appropriate area in the Library tab. Lastly, if there is a flap or graft type which we do not have you need to let us know right away so I can add it in before the variable is hidden. No need to panic, we plan to give you roughly 6 months to make the change.
Epidermal Closure Graft Donor Site (Optional): running
Cheek Interpolation Flap Text: A decision was made to reconstruct the defect utilizing an interpolation axial flap and a staged reconstruction.  A telfa template was made of the defect.  This telfa template was then used to outline the Cheek Interpolation flap.  The donor area for the pedicle flap was then injected with anesthesia.  The flap was excised through the skin and subcutaneous tissue down to the layer of the underlying musculature.  The interpolation flap was carefully excised within this deep plane to maintain its blood supply.  The edges of the donor site were undermined.   The donor site was closed in a primary fashion.  The pedicle was then rotated into position and sutured.  Once the tube was sutured into place, adequate blood supply was confirmed with blanching and refill.  The pedicle was then wrapped with xeroform gauze and dressed appropriately with a telfa and gauze bandage to ensure continued blood supply and protect the attached pedicle.
O-Z Plasty Text: The defect edges were debeveled with a #15 scalpel blade.  Given the location of the defect, shape of the defect and the proximity to free margins an O-Z plasty (double transposition flap) was deemed most appropriate.  Using a sterile surgical marker, the appropriate transposition flaps were drawn incorporating the defect and placing the expected incisions within the relaxed skin tension lines where possible.    The area thus outlined was incised deep to adipose tissue with a #15 scalpel blade.  The skin margins were undermined to an appropriate distance in all directions utilizing iris scissors.  Hemostasis was achieved with electrocautery.  The flaps were then transposed into place, one clockwise and the other counterclockwise, and anchored with interrupted buried subcutaneous sutures.
Cheiloplasty (Less Than 50%) Text: A decision was made to reconstruct the defect with a  cheiloplasty.  The defect was undermined extensively.  Additional obicularis oris muscle was excised with a 15 blade scalpel.  The defect was converted into a full thickness wedge, of less than 50% of the vertical height of the lip, to facilite a better cosmetic result.  Small vessels were then tied off with 5-0 monocyrl. The obicularis oris, superficial fascia, adipose and dermis were then reapproximated.  After the deeper layers were approximated the epidermis was reapproximated with particular care given to realign the vermilion border.
Skin Substitute Text: The defect edges were debeveled with a #15 scalpel blade.  Given the location of the defect, shape of the defect and the proximity to free margins a skin substitute graft was deemed most appropriate.  The graft material was trimmed to fit the size of the defect. The graft was then placed in the primary defect and oriented appropriately.
Consent (Scalp)/Introductory Paragraph: The rationale for Mohs was explained to the patient and consent was obtained. The risks, benefits and alternatives to therapy were discussed in detail. Specifically, the risks of changes in hair growth pattern secondary to repair, infection, scarring, bleeding, prolonged wound healing, incomplete removal, allergy to anesthesia, nerve injury and recurrence were addressed. Prior to the procedure, the treatment site was clearly identified and confirmed by the patient. All components of Universal Protocol/PAUSE Rule completed.
Home Suture Removal Text: Patient was provided instructions on removing sutures and will remove their sutures at home.  If they have any questions or difficulties they will call the office.
Bi-Rhombic Flap Text: The defect edges were debeveled with a #15 scalpel blade.  Given the location of the defect and the proximity to free margins a bi-rhombic flap was deemed most appropriate.  Using a sterile surgical marker, an appropriate rhombic flap was drawn incorporating the defect. The area thus outlined was incised deep to adipose tissue with a #15 scalpel blade.  The skin margins were undermined to an appropriate distance in all directions utilizing iris scissors.
Medical Necessity Statement: Based on my medical judgement, Mohs surgery is the most appropriate treatment for this cancer compared to other treatments.
O-L Flap Text: The defect edges were debeveled with a #15 scalpel blade.  Given the location of the defect, shape of the defect and the proximity to free margins an O-L flap was deemed most appropriate.  Using a sterile surgical marker, an appropriate advancement flap was drawn incorporating the defect and placing the expected incisions within the relaxed skin tension lines where possible.    The area thus outlined was incised deep to adipose tissue with a #15 scalpel blade.  The skin margins were undermined to an appropriate distance in all directions utilizing iris scissors.
Undermining Type: Entire Wound
Subsequent Stages Histo Method Verbiage: Using a similar technique to that described above, a thin layer of tissue was removed from all areas where tumor was visible on the previous stage.  The tissue was again oriented, mapped, dyed, and processed as above.
Melolabial Interpolation Flap Text: A decision was made to reconstruct the defect utilizing an interpolation axial flap and a staged reconstruction.  A telfa template was made of the defect.  This telfa template was then used to outline the melolabial interpolation flap.  The donor area for the pedicle flap was then injected with anesthesia.  The flap was excised through the skin and subcutaneous tissue down to the layer of the underlying musculature.  The pedicle flap was carefully excised within this deep plane to maintain its blood supply.  The edges of the donor site were undermined.   The donor site was closed in a primary fashion.  The pedicle was then rotated into position and sutured.  Once the tube was sutured into place, adequate blood supply was confirmed with blanching and refill.  The pedicle was then wrapped with xeroform gauze and dressed appropriately with a telfa and gauze bandage to ensure continued blood supply and protect the attached pedicle.
Consent 3/Introductory Paragraph: I gave the patient a chance to ask questions they had about the procedure.  Following this I explained the Mohs procedure and consent was obtained. The risks, benefits and alternatives to therapy were discussed in detail. Specifically, the risks of infection, scarring, bleeding, prolonged wound healing, incomplete removal, allergy to anesthesia, nerve injury and recurrence were addressed. Prior to the procedure, the treatment site was clearly identified and confirmed by the patient. All components of Universal Protocol/PAUSE Rule completed.
Consent (Spinal Accessory)/Introductory Paragraph: The rationale for Mohs was explained to the patient and consent was obtained. The risks, benefits and alternatives to therapy were discussed in detail. Specifically, the risks of damage to the spinal accessory nerve, infection, scarring, bleeding, prolonged wound healing, incomplete removal, allergy to anesthesia, and recurrence were addressed. Prior to the procedure, the treatment site was clearly identified and confirmed by the patient. All components of Universal Protocol/PAUSE Rule completed.
Consent (Lip)/Introductory Paragraph: The rationale for Mohs was explained to the patient and consent was obtained. The risks, benefits and alternatives to therapy were discussed in detail. Specifically, the risks of lip deformity, changes in the oral aperture, infection, scarring, bleeding, prolonged wound healing, incomplete removal, allergy to anesthesia, nerve injury and recurrence were addressed. Prior to the procedure, the treatment site was clearly identified and confirmed by the patient. All components of Universal Protocol/PAUSE Rule completed.
Area M Indication Text: Tumors in this location are included in Area M (cheek, forehead, scalp, neck, jawline and pretibial skin).  Mohs surgery is indicated for tumors in these anatomic locations.
Transposition Flap Text: The defect edges were debeveled with a #15 scalpel blade.  Given the location of the defect and the proximity to free margins a transposition flap was deemed most appropriate.  Using a sterile surgical marker, an appropriate transposition flap was drawn incorporating the defect.    The area thus outlined was incised deep to adipose tissue with a #15 scalpel blade.  The skin margins were undermined to an appropriate distance in all directions utilizing iris scissors.
Keystone Flap Text: The defect edges were debeveled with a #15 scalpel blade.  Given the location of the defect, shape of the defect a keystone flap was deemed most appropriate.  Using a sterile surgical marker, an appropriate keystone flap was drawn incorporating the defect, outlining the appropriate donor tissue and placing the expected incisions within the relaxed skin tension lines where possible. The area thus outlined was incised deep to adipose tissue with a #15 scalpel blade.  The skin margins were undermined to an appropriate distance in all directions around the primary defect and laterally outward around the flap utilizing iris scissors.
H Plasty Text: Given the location of the defect, shape of the defect and the proximity to free margins a H-plasty was deemed most appropriate for repair.  Using a sterile surgical marker, the appropriate advancement arms of the H-plasty were drawn incorporating the defect and placing the expected incisions within the relaxed skin tension lines where possible. The area thus outlined was incised deep to adipose tissue with a #15 scalpel blade. The skin margins were undermined to an appropriate distance in all directions utilizing iris scissors.  The opposing advancement arms were then advanced into place in opposite direction and anchored with interrupted buried subcutaneous sutures.
W Plasty Text: The lesion was extirpated to the level of the fat with a #15 scalpel blade.  Given the location of the defect, shape of the defect and the proximity to free margins a W-plasty was deemed most appropriate for repair.  Using a sterile surgical marker, the appropriate transposition arms of the W-plasty were drawn incorporating the defect and placing the expected incisions within the relaxed skin tension lines where possible.    The area thus outlined was incised deep to adipose tissue with a #15 scalpel blade.  The skin margins were undermined to an appropriate distance in all directions utilizing iris scissors.  The opposing transposition arms were then transposed into place in opposite direction and anchored with interrupted buried subcutaneous sutures.
Retention Suture Text: Retention sutures were placed to support the closure and prevent dehiscence.
Vermilion Border Text: The closure involved the vermilion border.
Cheiloplasty (Complex) Text: A decision was made to reconstruct the defect with a  cheiloplasty.  The defect was undermined extensively.  Additional obicularis oris muscle was excised with a 15 blade scalpel.  The defect was converted into a full thickness wedge to facilite a better cosmetic result.  Small vessels were then tied off with 5-0 monocyrl. The obicularis oris, superficial fascia, adipose and dermis were then reapproximated.  After the deeper layers were approximated the epidermis was reapproximated with particular care given to realign the vermilion border.
Mucosal Advancement Flap Text: Given the location of the defect, shape of the defect and the proximity to free margins a mucosal advancement flap was deemed most appropriate. Incisions were made with a 15 blade scalpel in the appropriate fashion along the cutaneous vermilion border and the mucosal lip. The remaining actinically damaged mucosal tissue was excised.  The mucosal advancement flap was then elevated to the gingival sulcus with care taken to preserve the neurovascular structures and advanced into the primary defect. Care was taken to ensure that precise realignment of the vermilion border was achieved.
Partial Purse String (Intermediate) Text: Given the location of the defect and the characteristics of the surrounding skin an intermediate purse string closure was deemed most appropriate.  Undermining was performed circumfirentially around the surgical defect.  A purse string suture was then placed and tightened. Wound tension only allowed a partial closure of the circular defect.
Debridement Text: The wound edges were debrided prior to proceeding with the closure to facilitate wound healing.
Double Island Pedicle Flap Text: The defect edges were debeveled with a #15 scalpel blade.  Given the location of the defect, shape of the defect and the proximity to free margins a double island pedicle advancement flap was deemed most appropriate.  Using a sterile surgical marker, an appropriate advancement flap was drawn incorporating the defect, outlining the appropriate donor tissue and placing the expected incisions within the relaxed skin tension lines where possible.    The area thus outlined was incised deep to adipose tissue with a #15 scalpel blade.  The skin margins were undermined to an appropriate distance in all directions around the primary defect and laterally outward around the island pedicle utilizing iris scissors.  There was minimal undermining beneath the pedicle flap.
Graft Cartilage Fenestration Text: The cartilage was fenestrated with a 2mm punch biopsy to help facilitate graft survival and healing.
Advancement Flap (Double) Text: The defect edges were debeveled with a #15 scalpel blade.  Given the location of the defect and the proximity to free margins a double advancement flap was deemed most appropriate.  Using a sterile surgical marker, the appropriate advancement flaps were drawn incorporating the defect and placing the expected incisions within the relaxed skin tension lines where possible.    The area thus outlined was incised deep to adipose tissue with a #15 scalpel blade.  The skin margins were undermined to an appropriate distance in all directions utilizing iris scissors.
Double O-Z Plasty Text: The defect edges were debeveled with a #15 scalpel blade.  Given the location of the defect, shape of the defect and the proximity to free margins a Double O-Z plasty (double transposition flap) was deemed most appropriate.  Using a sterile surgical marker, the appropriate transposition flaps were drawn incorporating the defect and placing the expected incisions within the relaxed skin tension lines where possible. The area thus outlined was incised deep to adipose tissue with a #15 scalpel blade.  The skin margins were undermined to an appropriate distance in all directions utilizing iris scissors.  Hemostasis was achieved with electrocautery.  The flaps were then transposed into place, one clockwise and the other counterclockwise, and anchored with interrupted buried subcutaneous sutures.
Postop Diagnosis: same
Interpolation Flap Text: A decision was made to reconstruct the defect utilizing an interpolation axial flap and a staged reconstruction.  A telfa template was made of the defect.  This telfa template was then used to outline the interpolation flap.  The donor area for the pedicle flap was then injected with anesthesia.  The flap was excised through the skin and subcutaneous tissue down to the layer of the underlying musculature.  The interpolation flap was carefully excised within this deep plane to maintain its blood supply.  The edges of the donor site were undermined.   The donor site was closed in a primary fashion.  The pedicle was then rotated into position and sutured.  Once the tube was sutured into place, adequate blood supply was confirmed with blanching and refill.  The pedicle was then wrapped with xeroform gauze and dressed appropriately with a telfa and gauze bandage to ensure continued blood supply and protect the attached pedicle.
Unna Boot Text: An Unna boot was placed to help immobilize the limb and facilitate more rapid healing.
Mohs Case Number: [id number]
Mastoid Interpolation Flap Text: A decision was made to reconstruct the defect utilizing an interpolation axial flap and a staged reconstruction.  A telfa template was made of the defect.  This telfa template was then used to outline the mastoid interpolation flap.  The donor area for the pedicle flap was then injected with anesthesia.  The flap was excised through the skin and subcutaneous tissue down to the layer of the underlying musculature.  The pedicle flap was carefully excised within this deep plane to maintain its blood supply.  The edges of the donor site were undermined.   The donor site was closed in a primary fashion.  The pedicle was then rotated into position and sutured.  Once the tube was sutured into place, adequate blood supply was confirmed with blanching and refill.  The pedicle was then wrapped with xeroform gauze and dressed appropriately with a telfa and gauze bandage to ensure continued blood supply and protect the attached pedicle.
V-Y Flap Text: The defect edges were debeveled with a #15 scalpel blade.  Given the location of the defect, shape of the defect and the proximity to free margins a V-Y flap was deemed most appropriate.  Using a sterile surgical marker, an appropriate advancement flap was drawn incorporating the defect and placing the expected incisions within the relaxed skin tension lines where possible.    The area thus outlined was incised deep to adipose tissue with a #15 scalpel blade.  The skin margins were undermined to an appropriate distance in all directions utilizing iris scissors.
Mohs Rapid Report Verbiage: The area of clinically evident tumor was marked with skin marking ink and appropriately hatched.  The initial incision was made following the Mohs approach through the skin.  The specimen was taken to the lab, divided into the necessary number of pieces, chromacoded and processed according to the Mohs protocol.  This was repeated in successive stages until a tumor free defect was achieved.
Banner Transposition Flap Text: The defect edges were debeveled with a #15 scalpel blade.  Given the location of the defect and the proximity to free margins a Banner transposition flap was deemed most appropriate.  Using a sterile surgical marker, an appropriate flap drawn around the defect. The area thus outlined was incised deep to adipose tissue with a #15 scalpel blade.  The skin margins were undermined to an appropriate distance in all directions utilizing iris scissors.
Surgeon: Nevaeh Palafox MD
Cartilage Graft Text: The defect edges were debeveled with a #15 scalpel blade.  Given the location of the defect, shape of the defect, the fact the defect involved a full thickness cartilage defect a cartilage graft was deemed most appropriate.  An appropriate donor site was identified, cleansed, and anesthetized. The cartilage graft was then harvested and transferred to the recipient site, oriented appropriately and then sutured into place.  The secondary defect was then repaired using a primary closure.
Previous Accession (Optional): [id number].
Retention Suture Bite Size: 1 mm
Bilobed Flap Text: The defect edges were debeveled with a #15 scalpel blade.  Given the location of the defect and the proximity to free margins a bilobe flap was deemed most appropriate.  Using a sterile surgical marker, an appropriate bilobe flap drawn around the defect.    The area thus outlined was incised deep to adipose tissue with a #15 scalpel blade.  The skin margins were undermined to an appropriate distance in all directions utilizing iris scissors.
Consent 2/Introductory Paragraph: Mohs surgery was explained to the patient and consent was obtained. The risks, benefits and alternatives to therapy were discussed in detail. Specifically, the risks of infection, scarring, bleeding, prolonged wound healing, incomplete removal, allergy to anesthesia, nerve injury and recurrence were addressed. Prior to the procedure, the treatment site was clearly identified and confirmed by the patient. All components of Universal Protocol/PAUSE Rule completed.
Mohs Histo Method Verbiage: Each section was then chromacoded and processed in the Mohs lab using the Mohs protocol and submitted for frozen section.
Referring Physician (Optional): Dr. Gomez
Crescentic Advancement Flap Text: The defect edges were debeveled with a #15 scalpel blade.  Given the location of the defect and the proximity to free margins a crescentic advancement flap was deemed most appropriate.  Using a sterile surgical marker, the appropriate advancement flap was drawn incorporating the defect and placing the expected incisions within the relaxed skin tension lines where possible.    The area thus outlined was incised deep to adipose tissue with a #15 scalpel blade.  The skin margins were undermined to an appropriate distance in all directions utilizing iris scissors.
Rotation Flap Text: The defect edges were debeveled with a #15 scalpel blade.  Given the location of the defect, shape of the defect and the proximity to free margins a rotation flap was deemed most appropriate.  Using a sterile surgical marker, an appropriate rotation flap was drawn incorporating the defect and placing the expected incisions within the relaxed skin tension lines where possible.    The area thus outlined was incised deep to adipose tissue with a #15 scalpel blade.  The skin margins were undermined to an appropriate distance in all directions utilizing iris scissors.
Z Plasty Text: The lesion was extirpated to the level of the fat with a #15 scalpel blade.  Given the location of the defect, shape of the defect and the proximity to free margins a Z-plasty was deemed most appropriate for repair.  Using a sterile surgical marker, the appropriate transposition arms of the Z-plasty were drawn incorporating the defect and placing the expected incisions within the relaxed skin tension lines where possible.    The area thus outlined was incised deep to adipose tissue with a #15 scalpel blade.  The skin margins were undermined to an appropriate distance in all directions utilizing iris scissors.  The opposing transposition arms were then transposed into place in opposite direction and anchored with interrupted buried subcutaneous sutures.
Purse String (Simple) Text: Given the location of the defect and the characteristics of the surrounding skin a purse string closure was deemed most appropriate.  Undermining was performed circumfirentially around the surgical defect.  A purse string suture was then placed and tightened.
Hemostasis: Electrocautery
Bilateral Helical Rim Advancement Flap Text: The defect edges were debeveled with a #15 blade scalpel.  Given the location of the defect and the proximity to free margins (helical rim) a bilateral helical rim advancement flap was deemed most appropriate.  Using a sterile surgical marker, the appropriate advancement flaps were drawn incorporating the defect and placing the expected incisions between the helical rim and antihelix where possible.  The area thus outlined was incised through and through with a #15 scalpel blade.  With a skin hook and iris scissors, the flaps were gently and sharply undermined and freed up.
Muscle Hinge Flap Text: The defect edges were debeveled with a #15 scalpel blade.  Given the size, depth and location of the defect and the proximity to free margins a muscle hinge flap was deemed most appropriate.  Using a sterile surgical marker, an appropriate hinge flap was drawn incorporating the defect. The area thus outlined was incised with a #15 scalpel blade.  The skin margins were undermined to an appropriate distance in all directions utilizing iris scissors.
Spiral Flap Text: The defect edges were debeveled with a #15 scalpel blade.  Given the location of the defect, shape of the defect and the proximity to free margins a spiral flap was deemed most appropriate.  Using a sterile surgical marker, an appropriate rotation flap was drawn incorporating the defect and placing the expected incisions within the relaxed skin tension lines where possible. The area thus outlined was incised deep to adipose tissue with a #15 scalpel blade.  The skin margins were undermined to an appropriate distance in all directions utilizing iris scissors.
Posterior Auricular Interpolation Flap Text: A decision was made to reconstruct the defect utilizing an interpolation axial flap and a staged reconstruction.  A telfa template was made of the defect.  This telfa template was then used to outline the posterior auricular interpolation flap.  The donor area for the pedicle flap was then injected with anesthesia.  The flap was excised through the skin and subcutaneous tissue down to the layer of the underlying musculature.  The pedicle flap was carefully excised within this deep plane to maintain its blood supply.  The edges of the donor site were undermined.   The donor site was closed in a primary fashion.  The pedicle was then rotated into position and sutured.  Once the tube was sutured into place, adequate blood supply was confirmed with blanching and refill.  The pedicle was then wrapped with xeroform gauze and dressed appropriately with a telfa and gauze bandage to ensure continued blood supply and protect the attached pedicle.
Consent 1/Introductory Paragraph: The rationale for Mohs was explained to the patient and consent was obtained. The risks, benefits and alternatives to therapy were discussed in detail. Specifically, the risks of infection, scarring, bleeding, prolonged wound healing, incomplete removal, allergy to anesthesia, nerve injury and recurrence were addressed. Prior to the procedure, the treatment site was clearly identified and confirmed by the patient. All components of Universal Protocol/PAUSE Rule completed.
Burow's Advancement Flap Text: The defect edges were debeveled with a #15 scalpel blade.  Given the location of the defect and the proximity to free margins a Burow's advancement flap was deemed most appropriate.  Using a sterile surgical marker, the appropriate advancement flap was drawn incorporating the defect and placing the expected incisions within the relaxed skin tension lines where possible.    The area thus outlined was incised deep to adipose tissue with a #15 scalpel blade.  The skin margins were undermined to an appropriate distance in all directions utilizing iris scissors.
Suturegard Retention Suture: 0-0 Nylon
Alternatives Discussed Intro (Do Not Add Period): I discussed alternative treatments to Mohs surgery and specifically discussed the risks and benefits of
Wound Care: Vaseline
Alar Island Pedicle Flap Text: The defect edges were debeveled with a #15 scalpel blade.  Given the location of the defect, shape of the defect and the proximity to the alar rim an island pedicle advancement flap was deemed most appropriate.  Using a sterile surgical marker, an appropriate advancement flap was drawn incorporating the defect, outlining the appropriate donor tissue and placing the expected incisions within the nasal ala running parallel to the alar rim. The area thus outlined was incised with a #15 scalpel blade.  The skin margins were undermined minimally to an appropriate distance in all directions around the primary defect and laterally outward around the island pedicle utilizing iris scissors.  There was minimal undermining beneath the pedicle flap.
Helical Rim Text: The closure involved the helical rim.
Closure 2 Information: This tab is for additional flaps and grafts, including complex repair and grafts and complex repair and flaps. You can also specify a different location for the additional defect, if the location is the same you do not need to select a new one. We will insert the automated text for the repair you select below just as we do for solitary flaps and grafts. Please note that at this time if you select a location with a different insurance zone you will need to override the ICD10 and CPT if appropriate.
Repair Anesthesia Method: local infiltration
Star Wedge Flap Text: The defect edges were debeveled with a #15 scalpel blade.  Given the location of the defect, shape of the defect and the proximity to free margins a star wedge flap was deemed most appropriate.  Using a sterile surgical marker, an appropriate rotation flap was drawn incorporating the defect and placing the expected incisions within the relaxed skin tension lines where possible. The area thus outlined was incised deep to adipose tissue with a #15 scalpel blade.  The skin margins were undermined to an appropriate distance in all directions utilizing iris scissors.
Advancement-Rotation Flap Text: The defect edges were debeveled with a #15 scalpel blade.  Given the location of the defect, shape of the defect and the proximity to free margins an advancement-rotation flap was deemed most appropriate.  Using a sterile surgical marker, an appropriate flap was drawn incorporating the defect and placing the expected incisions within the relaxed skin tension lines where possible. The area thus outlined was incised deep to adipose tissue with a #15 scalpel blade.  The skin margins were undermined to an appropriate distance in all directions utilizing iris scissors.
Complex Repair And Graft Additional Text (Will Appearing After The Standard Complex Repair Text): The complex repair was not sufficient to completely close the primary defect. The remaining additional defect was repaired with the graft mentioned below.
Mart-1 - Negative Histology Text: MART-1 staining demonstrates a normal density and pattern of melanocytes along the dermal-epidermal junction. The surgical margins are negative for tumor cells.
Secondary Intention Text (Leave Blank If You Do Not Want): The defect will heal with secondary intention.
Melolabial Transposition Flap Text: The defect edges were debeveled with a #15 scalpel blade.  Given the location of the defect and the proximity to free margins a melolabial flap was deemed most appropriate.  Using a sterile surgical marker, an appropriate melolabial transposition flap was drawn incorporating the defect.    The area thus outlined was incised deep to adipose tissue with a #15 scalpel blade.  The skin margins were undermined to an appropriate distance in all directions utilizing iris scissors.
O-Z Flap Text: The defect edges were debeveled with a #15 scalpel blade.  Given the location of the defect, shape of the defect and the proximity to free margins an O-Z flap was deemed most appropriate.  Using a sterile surgical marker, an appropriate transposition flap was drawn incorporating the defect and placing the expected incisions within the relaxed skin tension lines where possible. The area thus outlined was incised deep to adipose tissue with a #15 scalpel blade.  The skin margins were undermined to an appropriate distance in all directions utilizing iris scissors.
Detail Level: Detailed
Eye Protection Verbiage: Before proceeding with the stage, a plastic scleral shield was inserted. The globe was anesthetized with a few drops of 1% lidocaine with 1:100,000 epinephrine. Then, an appropriate sized scleral shield was chosen and coated with lacrilube ointment. The shield was gently inserted and left in place for the duration of each stage. After the stage was completed, the shield was gently removed.
Ear Star Wedge Flap Text: The defect edges were debeveled with a #15 blade scalpel.  Given the location of the defect and the proximity to free margins (helical rim) an ear star wedge flap was deemed most appropriate.  Using a sterile surgical marker, the appropriate flap was drawn incorporating the defect and placing the expected incisions between the helical rim and antihelix where possible.  The area thus outlined was incised through and through with a #15 scalpel blade.
Hemigard Intro: Due to skin fragility and wound tension, it was decided to use HEMIGARD adhesive retention suture devices to permit a linear closure. The skin was cleaned and dried for a 6cm distance away from the wound. Excessive hair, if present, was removed to allow for adhesion.
Orbicularis Oris Muscle Flap Text: The defect edges were debeveled with a #15 scalpel blade.  Given that the defect affected the competency of the oral sphincter an orbicularis oris muscle flap was deemed most appropriate to restore this competency and normal muscle function.  Using a sterile surgical marker, an appropriate flap was drawn incorporating the defect. The area thus outlined was incised with a #15 scalpel blade.
Zygomaticofacial Flap Text: Given the location of the defect, shape of the defect and the proximity to free margins a zygomaticofacial flap was deemed most appropriate for repair.  Using a sterile surgical marker, the appropriate flap was drawn incorporating the defect and placing the expected incisions within the relaxed skin tension lines where possible. The area thus outlined was incised deep to adipose tissue with a #15 scalpel blade with preservation of a vascular pedicle.  The skin margins were undermined to an appropriate distance in all directions utilizing iris scissors.  The flap was then placed into the defect and anchored with interrupted buried subcutaneous sutures.
Was The Patient On Physician Recommended Anticoagulation Therapy?: Please Select the Appropriate Response
Burow's Graft Text: The defect edges were debeveled with a #15 scalpel blade.  Given the location of the defect, shape of the defect, the proximity to free margins and the presence of a standing cone deformity a Burow's skin graft was deemed most appropriate. The standing cone was removed and this tissue was then trimmed to the shape of the primary defect. The adipose tissue was also removed until only dermis and epidermis were left.  The skin margins of the secondary defect were undermined to an appropriate distance in all directions utilizing iris scissors.  The secondary defect was closed with interrupted buried subcutaneous sutures.  The skin edges were then re-apposed with running  sutures.  The skin graft was then placed in the primary defect and oriented appropriately.
Nasal Turnover Hinge Flap Text: The defect edges were debeveled with a #15 scalpel blade.  Given the size, depth, location of the defect and the defect being full thickness a nasal turnover hinge flap was deemed most appropriate.  Using a sterile surgical marker, an appropriate hinge flap was drawn incorporating the defect. The area thus outlined was incised with a #15 scalpel blade. The flap was designed to recreate the nasal mucosal lining and the alar rim. The skin margins were undermined to an appropriate distance in all directions utilizing iris scissors.
Brow Lift Text: A midfrontal incision was made medially to the defect to allow access to the tissues just superior to the left eyebrow. Following careful dissection inferiorly in a supraperiosteal plane to the level of the left eyebrow, several 3-0 monocryl sutures were used to resuspend the eyebrow orbicularis oculi muscular unit to the superior frontal bone periosteum. This resulted in an appropriate reapproximation of static eyebrow symmetry and correction of the left brow ptosis.
Staging Info: By selecting yes to the question above you will include information on AJCC 8 tumor staging in your Mohs note. Information on tumor staging will be automatically added for SCCs on the head and neck. AJCC 8 includes tumor size, tumor depth, perineural involvement and bone invasion.
Hemigard Postcare Instructions: The HEMIGARD strips are to remain completely dry for at least 5-7 days.
Peng Advancement Flap Text: The defect edges were debeveled with a #15 scalpel blade.  Given the location of the defect, shape of the defect and the proximity to free margins a Peng advancement flap was deemed most appropriate.  Using a sterile surgical marker, an appropriate advancement flap was drawn incorporating the defect and placing the expected incisions within the relaxed skin tension lines where possible. The area thus outlined was incised deep to adipose tissue with a #15 scalpel blade.  The skin margins were undermined to an appropriate distance in all directions utilizing iris scissors.
Nasalis-Muscle-Based Myocutaneous Island Pedicle Flap Text: Using a #15 blade, an incision was made around the donor flap to the level of the nasalis muscle. Wide lateral undermining was then performed in both the subcutaneous plane above the nasalis muscle, and in a submuscular plane just above periosteum. This allowed the formation of a free nasalis muscle axial pedicle (based on the angular artery) which was still attached to the actual cutaneous flap, increasing its mobility and vascular viability. Hemostasis was obtained with pinpoint electrocoagulation. The flap was mobilized into position and the pivotal anchor points positioned and stabilized with buried interrupted sutures. Subcutaneous and dermal tissues were closed in a multilayered fashion with sutures. Tissue redundancies were excised, and the epidermal edges were apposed without significant tension and sutured with sutures.
Tumor Depth: Less than 6mm from granular layer and no invasion beyond the subcutaneous fat

## 2021-05-20 ENCOUNTER — APPOINTMENT (RX ONLY)
Dept: URBAN - METROPOLITAN AREA CLINIC 36 | Facility: CLINIC | Age: 76
Setting detail: DERMATOLOGY
End: 2021-05-20

## 2021-05-20 DIAGNOSIS — Z48.02 ENCOUNTER FOR REMOVAL OF SUTURES: ICD-10-CM

## 2021-05-20 PROCEDURE — ? SUTURE REMOVAL (GLOBAL PERIOD)

## 2021-05-20 ASSESSMENT — LOCATION SIMPLE DESCRIPTION DERM: LOCATION SIMPLE: LEFT FOREHEAD

## 2021-05-20 ASSESSMENT — LOCATION ZONE DERM: LOCATION ZONE: FACE

## 2021-05-20 ASSESSMENT — LOCATION DETAILED DESCRIPTION DERM: LOCATION DETAILED: LEFT SUPERIOR MEDIAL FOREHEAD

## 2021-05-20 NOTE — PROCEDURE: SUTURE REMOVAL (GLOBAL PERIOD)
Detail Level: Detailed
Add 68263 Cpt? (Important Note: In 2017 The Use Of 14064 Is Being Tracked By Cms To Determine Future Global Period Reimbursement For Global Periods): no

## 2021-06-18 PROBLEM — M79.10 MYALGIA: Status: ACTIVE | Noted: 2021-06-18

## 2021-06-18 PROBLEM — M51.36 LUMBAR DEGENERATIVE DISC DISEASE: Status: ACTIVE | Noted: 2021-06-18

## 2021-06-18 PROBLEM — M54.16 LUMBAR RADICULITIS: Status: ACTIVE | Noted: 2021-06-18

## 2021-06-18 PROBLEM — M51.369 LUMBAR DEGENERATIVE DISC DISEASE: Status: ACTIVE | Noted: 2021-06-18

## 2021-06-23 ENCOUNTER — APPOINTMENT (RX ONLY)
Dept: URBAN - METROPOLITAN AREA CLINIC 36 | Facility: CLINIC | Age: 76
Setting detail: DERMATOLOGY
End: 2021-06-23

## 2021-06-23 DIAGNOSIS — Z48.817 ENCOUNTER FOR SURGICAL AFTERCARE FOLLOWING SURGERY ON THE SKIN AND SUBCUTANEOUS TISSUE: ICD-10-CM

## 2021-06-23 PROCEDURE — 99024 POSTOP FOLLOW-UP VISIT: CPT

## 2021-06-23 PROCEDURE — ? POST-OP WOUND CHECK

## 2021-06-23 ASSESSMENT — LOCATION DETAILED DESCRIPTION DERM: LOCATION DETAILED: LEFT FOREHEAD

## 2021-06-23 ASSESSMENT — LOCATION ZONE DERM: LOCATION ZONE: FACE

## 2021-06-23 ASSESSMENT — LOCATION SIMPLE DESCRIPTION DERM: LOCATION SIMPLE: LEFT FOREHEAD

## 2021-06-23 NOTE — PROCEDURE: POST-OP WOUND CHECK
Detail Level: Simple
Add 94877 Cpt? (Important Note: In 2017 The Use Of 26325 Is Being Tracked By Cms To Determine Future Global Period Reimbursement For Global Periods): yes
Wound Evaluated By: Nevaeh Palafox MD

## 2021-06-29 ENCOUNTER — HOSPITAL ENCOUNTER (EMERGENCY)
Facility: MEDICAL CENTER | Age: 76
End: 2021-06-29
Attending: EMERGENCY MEDICINE
Payer: MEDICARE

## 2021-06-29 VITALS
RESPIRATION RATE: 15 BRPM | BODY MASS INDEX: 21.66 KG/M2 | OXYGEN SATURATION: 96 % | HEIGHT: 65 IN | HEART RATE: 55 BPM | SYSTOLIC BLOOD PRESSURE: 156 MMHG | WEIGHT: 130 LBS | TEMPERATURE: 97.6 F | DIASTOLIC BLOOD PRESSURE: 68 MMHG

## 2021-06-29 DIAGNOSIS — E87.1 HYPONATREMIA: ICD-10-CM

## 2021-06-29 LAB
ALBUMIN SERPL BCP-MCNC: 4.7 G/DL (ref 3.2–4.9)
ALBUMIN/GLOB SERPL: 2 G/DL
ALP SERPL-CCNC: 56 U/L (ref 30–99)
ALT SERPL-CCNC: 27 U/L (ref 2–50)
ANION GAP SERPL CALC-SCNC: 10 MMOL/L (ref 7–16)
ANION GAP SERPL CALC-SCNC: 12 MMOL/L (ref 7–16)
AST SERPL-CCNC: 24 U/L (ref 12–45)
BASOPHILS # BLD AUTO: 0.3 % (ref 0–1.8)
BASOPHILS # BLD: 0.02 K/UL (ref 0–0.12)
BILIRUB SERPL-MCNC: 1.1 MG/DL (ref 0.1–1.5)
BUN SERPL-MCNC: 11 MG/DL (ref 8–22)
BUN SERPL-MCNC: 14 MG/DL (ref 8–22)
CALCIUM SERPL-MCNC: 8.5 MG/DL (ref 8.4–10.2)
CALCIUM SERPL-MCNC: 9.8 MG/DL (ref 8.4–10.2)
CHLORIDE SERPL-SCNC: 87 MMOL/L (ref 96–112)
CHLORIDE SERPL-SCNC: 95 MMOL/L (ref 96–112)
CO2 SERPL-SCNC: 21 MMOL/L (ref 20–33)
CO2 SERPL-SCNC: 27 MMOL/L (ref 20–33)
CREAT SERPL-MCNC: 0.53 MG/DL (ref 0.5–1.4)
CREAT SERPL-MCNC: 0.7 MG/DL (ref 0.5–1.4)
EOSINOPHIL # BLD AUTO: 0.01 K/UL (ref 0–0.51)
EOSINOPHIL NFR BLD: 0.2 % (ref 0–6.9)
ERYTHROCYTE [DISTWIDTH] IN BLOOD BY AUTOMATED COUNT: 43.6 FL (ref 35.9–50)
GLOBULIN SER CALC-MCNC: 2.3 G/DL (ref 1.9–3.5)
GLUCOSE SERPL-MCNC: 88 MG/DL (ref 65–99)
GLUCOSE SERPL-MCNC: 94 MG/DL (ref 65–99)
HCT VFR BLD AUTO: 45.5 % (ref 37–47)
HGB BLD-MCNC: 15.5 G/DL (ref 12–16)
IMM GRANULOCYTES # BLD AUTO: 0.02 K/UL (ref 0–0.11)
IMM GRANULOCYTES NFR BLD AUTO: 0.3 % (ref 0–0.9)
LYMPHOCYTES # BLD AUTO: 0.99 K/UL (ref 1–4.8)
LYMPHOCYTES NFR BLD: 15 % (ref 22–41)
MCH RBC QN AUTO: 31.3 PG (ref 27–33)
MCHC RBC AUTO-ENTMCNC: 34.1 G/DL (ref 33.6–35)
MCV RBC AUTO: 91.7 FL (ref 81.4–97.8)
MONOCYTES # BLD AUTO: 0.37 K/UL (ref 0–0.85)
MONOCYTES NFR BLD AUTO: 5.6 % (ref 0–13.4)
NEUTROPHILS # BLD AUTO: 5.17 K/UL (ref 2–7.15)
NEUTROPHILS NFR BLD: 78.6 % (ref 44–72)
NRBC # BLD AUTO: 0 K/UL
NRBC BLD-RTO: 0 /100 WBC
PLATELET # BLD AUTO: 239 K/UL (ref 164–446)
PMV BLD AUTO: 9.7 FL (ref 9–12.9)
POTASSIUM SERPL-SCNC: 4.1 MMOL/L (ref 3.6–5.5)
POTASSIUM SERPL-SCNC: 4.4 MMOL/L (ref 3.6–5.5)
PROT SERPL-MCNC: 7 G/DL (ref 6–8.2)
RBC # BLD AUTO: 4.96 M/UL (ref 4.2–5.4)
SODIUM SERPL-SCNC: 124 MMOL/L (ref 135–145)
SODIUM SERPL-SCNC: 128 MMOL/L (ref 135–145)
T4 FREE SERPL-MCNC: 1.88 NG/DL (ref 0.93–1.7)
TSH SERPL DL<=0.005 MIU/L-ACNC: 1.42 UIU/ML (ref 0.38–5.33)
WBC # BLD AUTO: 6.6 K/UL (ref 4.8–10.8)

## 2021-06-29 PROCEDURE — 84439 ASSAY OF FREE THYROXINE: CPT

## 2021-06-29 PROCEDURE — 85025 COMPLETE CBC W/AUTO DIFF WBC: CPT

## 2021-06-29 PROCEDURE — 36415 COLL VENOUS BLD VENIPUNCTURE: CPT

## 2021-06-29 PROCEDURE — 99284 EMERGENCY DEPT VISIT MOD MDM: CPT

## 2021-06-29 PROCEDURE — 84443 ASSAY THYROID STIM HORMONE: CPT

## 2021-06-29 PROCEDURE — 700105 HCHG RX REV CODE 258: Performed by: EMERGENCY MEDICINE

## 2021-06-29 PROCEDURE — 80048 BASIC METABOLIC PNL TOTAL CA: CPT

## 2021-06-29 PROCEDURE — 80053 COMPREHEN METABOLIC PANEL: CPT

## 2021-06-29 RX ORDER — MELOXICAM 15 MG/1
15 TABLET ORAL DAILY
Status: SHIPPED | COMMUNITY
End: 2021-07-06

## 2021-06-29 RX ORDER — SIMVASTATIN 20 MG
20 TABLET ORAL NIGHTLY
Status: SHIPPED | COMMUNITY
End: 2021-07-30

## 2021-06-29 RX ORDER — LEVOTHYROXINE SODIUM 0.05 MG/1
50 TABLET ORAL
COMMUNITY

## 2021-06-29 RX ORDER — IBUPROFEN 200 MG
800 TABLET ORAL 3 TIMES DAILY
Status: SHIPPED | COMMUNITY
End: 2021-07-30

## 2021-06-29 RX ORDER — SODIUM CHLORIDE 9 MG/ML
2000 INJECTION, SOLUTION INTRAVENOUS ONCE
Status: COMPLETED | OUTPATIENT
Start: 2021-06-29 | End: 2021-06-29

## 2021-06-29 RX ADMIN — SODIUM CHLORIDE 2000 ML: 9 INJECTION, SOLUTION INTRAVENOUS at 16:15

## 2021-06-29 ASSESSMENT — FIBROSIS 4 INDEX: FIB4 SCORE: 1.53

## 2021-06-29 NOTE — ED TRIAGE NOTES
"Pt c/o \"cotton mouth\" feeling. States has had low electrolytes especially her sodium. Pt said can usually take himalayan pink salt and it helps but today it didn't help and the feeling wont go away. Has leg cramps as well but states she has them so frequently she just ignores them now.   "

## 2021-06-30 NOTE — ED NOTES
1L of 2L bolus infused. Pt went to restroom and back to [facility]. IV fluids infusing.

## 2021-06-30 NOTE — ED NOTES
Med rec updated and complete  Allergies reviewed  Pt reports that she started MELOXICAM 15MG on 6/18/2021 and stopped taking on 6/27/2021 and started IBUPROFEN 200MG 4 tablets TID, per doctors orders.  Pt reports no antibiotics in the last 30 days.    No current facility-administered medications on file prior to encounter.     Current Outpatient Medications on File Prior to Encounter   Medication Sig Dispense Refill   • levothyroxine (SYNTHROID) 50 MCG Tab Take  mcg by mouth every morning on an empty stomach. Pt takes 100 MCG only on Sunday  Al other days 50 MCG     • meloxicam (MOBIC) 15 MG tablet Take 15 mg by mouth every day. Pt started on 6/18/2021     • simvastatin (ZOCOR) 20 MG Tab Take 20 mg by mouth every evening.     • ibuprofen (MOTRIN) 200 MG Tab Take 800 mg by mouth in the morning, at noon, and at bedtime.     • Omega-3 Fatty Acids (FISH OIL) 1000 MG Cap capsule Take 2,000 mg by mouth every day.     • Cholecalciferol (VITAMIN D) 50 MCG (2000 UT) Cap Take 2,000 Units by mouth every day.     • Cyanocobalamin (VITAMIN B 12 PO) Take 1 tablet by mouth every day.     • Calcium Carb-Cholecalciferol (CALCIUM 600-D PO) Take 600 mg by mouth every day.     • Ascorbic Acid (VITAMIN C PO) Take 1 tablet by mouth every day.     • Probiotic Product (PROBIOTIC PO) Take 1 capsule by mouth every evening.     • MULTIPLE VITAMIN PO Take 1 tablet by mouth every day.

## 2021-06-30 NOTE — ED PROVIDER NOTES
ED Provider Note    ED Provider    Means of arrival: Private vehicle  History obtained from: Patient  History limited by: None    CHIEF COMPLAINT  Chief Complaint   Patient presents with   • Other       HPI  Bernadette Paul is a 75 y.o. female who presents with complaints of a dry mouth.  And she wants to drink fluids.  She has associated this before with hyponatremia.    She has been eating smaller amounts of solids in addition to her regular diet.  She states that she gets episodes of wave feeling, and putting salt on her finger her head and looking at resolves that sensation.  She does not have that sensation now.    She had mild fatigue, no shortness of breath no weakness, no headache    REVIEW OF SYSTEMS  See HPI for further details. All other systems are negative.     PAST MEDICAL HISTORY   has a past medical history of Allergic rhinitis (6/17/2009), Dyslipidemia (6/17/2009), Preventative health care (6/17/2009), and S/P parathyroidectomy (HCC) (6/17/2009).    SOCIAL HISTORY  Social History     Tobacco Use   • Smoking status: Never Smoker   • Smokeless tobacco: Never Used   Vaping Use   • Vaping Use: Never used   Substance and Sexual Activity   • Alcohol use: Yes     Alcohol/week: 0.0 oz     Comment: occ wine    • Drug use: Never   • Sexual activity: Yes     Partners: Male       SURGICAL HISTORY   has a past surgical history that includes incision and drainage orthopedic (Right, 11/15/2019) and jose (N/A, 12/6/2019).    CURRENT MEDICATIONS  Home Medications     Reviewed by Samuel Long (Pharmacy Tech) on 06/29/21 at 1705  Med List Status: Complete   Medication Last Dose Status   Ascorbic Acid (VITAMIN C PO) 6/29/2021 Active   Calcium Carb-Cholecalciferol (CALCIUM 600-D PO) 6/29/2021 Active   Cholecalciferol (VITAMIN D) 50 MCG (2000 UT) Cap 6/29/2021 Active   Cyanocobalamin (VITAMIN B 12 PO) 6/29/2021 Active   ibuprofen (MOTRIN) 200 MG Tab 6/29/2021 Active   levothyroxine (SYNTHROID) 50 MCG Tab  "6/29/2021 Active   meloxicam (MOBIC) 15 MG tablet  Active   meloxicam (MOBIC) 15 MG tablet 6/27/2021 Active   MULTIPLE VITAMIN PO 6/29/2021 Active   Omega-3 Fatty Acids (FISH OIL) 1000 MG Cap capsule 6/29/2021 Active   Probiotic Product (PROBIOTIC PO) 6/28/2021 Active   simvastatin (ZOCOR) 20 MG Tab 6/28/2021 Active                ALLERGIES  Allergies   Allergen Reactions   • Asa [Aspirin]      Epistaxis     • Nsaids      Epistaxis         PHYSICAL EXAM  VITAL SIGNS: /68   Pulse (!) 55   Temp 36.4 °C (97.6 °F) (Temporal)   Resp 15   Ht 1.651 m (5' 5\")   Wt 59 kg (130 lb)   SpO2 96%   BMI 21.63 kg/m²   Constitutional: Alert in no apparent distress.  HENT: No signs of trauma, Mucous membranes are moist   Eyes:  Conjunctiva normal, Non-icteric.   Neck: Normal range of motion, No tenderness, Supple,  Lymphatic: No lymphadenopathy noted.   Cardiovascular: Regular rate and rhythm, no murmurs.   Thorax & Lungs: Normal breath sounds, No respiratory distress, No wheezing, No chest tenderness.   Abdomen: Bowel sounds normal, Soft, No tenderness, No masses, No pulsatile masses. No peritoneal signs.  Skin: Warm, Dry,Normal color  Back: No bony tenderness, No CVA tenderness.   Extremities:No edema, No tenderness, No cyanosis,    Musculoskeletal: Good range of motion in all major joints. No tenderness to palpation or major deformities noted.   Neurologic: Alert ,Oriented x4, Normal motor function, Normal sensory function, No focal deficits noted.   Psychiatric: Affect normal, Judgment normal, Mood normal.       MEDICAL DECISION MAKING  This is a 75 y.o. female who presents with symptoms as described above.  Her lab test does show sodium of 124 which is low but not dangerously so.  2 L of normal saline will be given and repeat labs will be done if this resolves her significant proved I anticipate hopeful discharge    DIAGNOSTIC STUDIES / PROCEDURES    EKG      LABS  Results for orders placed or performed during the " hospital encounter of 06/29/21   Comp Metabolic Panel   Result Value Ref Range    Sodium 124 (L) 135 - 145 mmol/L    Potassium 4.4 3.6 - 5.5 mmol/L    Chloride 87 (L) 96 - 112 mmol/L    Co2 27 20 - 33 mmol/L    Anion Gap 10.0 7.0 - 16.0    Glucose 94 65 - 99 mg/dL    Bun 14 8 - 22 mg/dL    Creatinine 0.70 0.50 - 1.40 mg/dL    Calcium 9.8 8.4 - 10.2 mg/dL    AST(SGOT) 24 12 - 45 U/L    ALT(SGPT) 27 2 - 50 U/L    Alkaline Phosphatase 56 30 - 99 U/L    Total Bilirubin 1.1 0.1 - 1.5 mg/dL    Albumin 4.7 3.2 - 4.9 g/dL    Total Protein 7.0 6.0 - 8.2 g/dL    Globulin 2.3 1.9 - 3.5 g/dL    A-G Ratio 2.0 g/dL   ESTIMATED GFR   Result Value Ref Range    GFR If African American >60 >60 mL/min/1.73 m 2    GFR If Non African American >60 >60 mL/min/1.73 m 2   CBC WITH DIFFERENTIAL   Result Value Ref Range    WBC 6.6 4.8 - 10.8 K/uL    RBC 4.96 4.20 - 5.40 M/uL    Hemoglobin 15.5 12.0 - 16.0 g/dL    Hematocrit 45.5 37.0 - 47.0 %    MCV 91.7 81.4 - 97.8 fL    MCH 31.3 27.0 - 33.0 pg    MCHC 34.1 33.6 - 35.0 g/dL    RDW 43.6 35.9 - 50.0 fL    Platelet Count 239 164 - 446 K/uL    MPV 9.7 9.0 - 12.9 fL    Neutrophils-Polys 78.60 (H) 44.00 - 72.00 %    Lymphocytes 15.00 (L) 22.00 - 41.00 %    Monocytes 5.60 0.00 - 13.40 %    Eosinophils 0.20 0.00 - 6.90 %    Basophils 0.30 0.00 - 1.80 %    Immature Granulocytes 0.30 0.00 - 0.90 %    Nucleated RBC 0.00 /100 WBC    Neutrophils (Absolute) 5.17 2.00 - 7.15 K/uL    Lymphs (Absolute) 0.99 (L) 1.00 - 4.80 K/uL    Monos (Absolute) 0.37 0.00 - 0.85 K/uL    Eos (Absolute) 0.01 0.00 - 0.51 K/uL    Baso (Absolute) 0.02 0.00 - 0.12 K/uL    Immature Granulocytes (abs) 0.02 0.00 - 0.11 K/uL    NRBC (Absolute) 0.00 K/uL   TSH   Result Value Ref Range    TSH 1.420 0.380 - 5.330 uIU/mL   FREE THYROXINE   Result Value Ref Range    Free T-4 1.88 (H) 0.93 - 1.70 ng/dL   Basic Metabolic Panel   Result Value Ref Range    Sodium 128 (L) 135 - 145 mmol/L    Potassium 4.1 3.6 - 5.5 mmol/L    Chloride 95 (L)  96 - 112 mmol/L    Co2 21 20 - 33 mmol/L    Glucose 88 65 - 99 mg/dL    Bun 11 8 - 22 mg/dL    Creatinine 0.53 0.50 - 1.40 mg/dL    Calcium 8.5 8.4 - 10.2 mg/dL    Anion Gap 12.0 7.0 - 16.0   ESTIMATED GFR   Result Value Ref Range    GFR If African American >60 >60 mL/min/1.73 m 2    GFR If Non African American >60 >60 mL/min/1.73 m 2         RADIOLOGY  No orders to display       COURSE  Pertinent Labs & Imaging studies reviewed. (See chart for details)    7:19 PM - Patient seen and examined at bedside. Discussed plan of care,     Patient presents with a nonspecific complaint of a dry mouth seems to come in waves.  Along with planes of feeling like something is wrong.  Her sodium was 128, she was given 2 L of normal saline her sodium repeat is 128.  This is significantly improved.    I instructed her to reduce her water intake, and to add additional salt to her foods.  She does have a primary care doctor for follow-up.    I did explain to her that her free T4 is elevated, and she is to call her endocrinologist tomorrow to get instructions on adjustment of medications if needed.    Discharged home in stable condition    FINAL IMPRESSION  1. Hyponatremia        The note accurately reflects work and decisions made by me.  John Patterson D.O.  6/29/2021  9:40 PM

## 2021-06-30 NOTE — DISCHARGE INSTRUCTIONS
Your free T4 was 1.88, your TSH number is normal.  Please call your endocrinologist tomorrow to relate these numbers so they can adjust her medication if they see fit.    Your sodium is improved after IV fluids.  Put some additional salt to your food, and decrease your water intake.  Have your lab test repeated in 1 week.  Return if you develop any change or worsening symptoms.

## 2021-06-30 NOTE — ED NOTES
Discharge instructions provided. PIV removed. Pt verbalized the understanding of discharge instructions to follow up with PCP, endocrinology and to return to ER if condition worsens.  Pt ambulated out of ER without difficulty.

## 2021-07-06 ENCOUNTER — OFFICE VISIT (OUTPATIENT)
Dept: MEDICAL GROUP | Facility: MEDICAL CENTER | Age: 76
End: 2021-07-06
Payer: MEDICARE

## 2021-07-06 VITALS
SYSTOLIC BLOOD PRESSURE: 116 MMHG | BODY MASS INDEX: 21.96 KG/M2 | DIASTOLIC BLOOD PRESSURE: 70 MMHG | HEIGHT: 65 IN | WEIGHT: 131.8 LBS | OXYGEN SATURATION: 96 % | HEART RATE: 67 BPM | TEMPERATURE: 98.3 F

## 2021-07-06 DIAGNOSIS — E87.1 HYPONATREMIA: ICD-10-CM

## 2021-07-06 DIAGNOSIS — R25.2 MUSCLE CRAMPS: ICD-10-CM

## 2021-07-06 PROCEDURE — 99214 OFFICE O/P EST MOD 30 MIN: CPT | Performed by: INTERNAL MEDICINE

## 2021-07-06 RX ORDER — GABAPENTIN 100 MG/1
100 CAPSULE ORAL
Qty: 90 CAPSULE | Refills: 1 | Status: ON HOLD | OUTPATIENT
Start: 2021-07-06 | End: 2021-10-13

## 2021-07-06 ASSESSMENT — PATIENT HEALTH QUESTIONNAIRE - PHQ9: CLINICAL INTERPRETATION OF PHQ2 SCORE: 0

## 2021-07-06 ASSESSMENT — FIBROSIS 4 INDEX: FIB4 SCORE: 1.45

## 2021-07-06 NOTE — ASSESSMENT & PLAN NOTE
Currently drinking 24 oz of water, liquid IV 16oz/ 500 ml a day.  (before, she was drinking water only 64 oz / day). Last Tuesday, she ended up in ER due to 'cotton mouth' which is familiar symptom for hyponatremia for her. Since Er visit, she has been taking salt tabs as well.

## 2021-07-06 NOTE — PROGRESS NOTES
Established Patient    Bernadette presents today with the following:    CC: Hyponatremia    HPI:   Bernadette is a 75 y.o. female who came in for above.    Hyponatremia  Currently drinking 24 oz of water, liquid IV 16oz/ 500 ml a day.  (before, she was drinking water only 64 oz / day). Last Tuesday, she ended up in ER due to 'cotton mouth' which is familiar symptom for hyponatremia for her.  She was given 2 L of normal saline with some improvement of sodium level.  She got discharged from ER with instruction to eat more salt.  Since Er visit, she has been taking salt tabs.      She is also having muscle cramps and would like to try gabapentin 100 mg again which was prescribed by PCP before.      ROS:   As above    Patient Active Problem List    Diagnosis Date Noted   • Lumbar degenerative disc disease 06/18/2021   • Lumbar radiculitis 06/18/2021   • Myalgia 06/18/2021   • Hashimoto's thyroiditis 01/15/2021   • SIADH (syndrome of inappropriate ADH production) ([facility]) 01/15/2021   • Sensorineural hearing loss 01/12/2021   • Sleep apnea 10/23/2020   • History of ankle fracture 07/01/2020   • Neutropenia ([facility]) 06/19/2020   • GERD (gastroesophageal reflux disease) 06/18/2020   • Atypical chest pain 06/18/2020   • Hypothyroid 06/18/2020   • Cataract 04/23/2020   • History of bilateral pleural effusion 12/08/2019   • History of MSSA bacteremia 11/18/2019   • History of septic joint of right knee joint   11/15/2019   • Hyponatremia 10/11/2019   • Low back pain 02/20/2017   • History of wrist fracture 02/12/2013   • History of vasovagal syncope 05/02/2012   • Adenomatous polyp of colon 02/11/2011   • S/P laparoscopic cholecystectomy 01/20/2011   • Dyslipidemia 06/17/2009   • S/P parathyroidectomy 06/17/2009   • Allergic rhinitis 06/17/2009   • Preventative health care 06/17/2009       Current Outpatient Medications   Medication Sig Dispense Refill   • gabapentin (NEURONTIN) 100 MG Cap Take 1 capsule by mouth 1 time a day as  "needed (cramps). 90 capsule 1   • levothyroxine (SYNTHROID) 50 MCG Tab Take  mcg by mouth every morning on an empty stomach. Pt takes 100 MCG only on Sunday  Al other days 50 MCG     • simvastatin (ZOCOR) 20 MG Tab Take 20 mg by mouth every evening.     • ibuprofen (MOTRIN) 200 MG Tab Take 800 mg by mouth in the morning, at noon, and at bedtime.     • Omega-3 Fatty Acids (FISH OIL) 1000 MG Cap capsule Take 2,000 mg by mouth every day.     • Cyanocobalamin (VITAMIN B 12 PO) Take 1 tablet by mouth every day.     • Calcium Carb-Cholecalciferol (CALCIUM 600-D PO) Take 600 mg by mouth every day.     • Ascorbic Acid (VITAMIN C PO) Take 1 tablet by mouth every day.     • Probiotic Product (PROBIOTIC PO) Take 1 capsule by mouth every evening.     • MULTIPLE VITAMIN PO Take 1 tablet by mouth every day.       No current facility-administered medications for this visit.         /70 (BP Location: Left arm, Patient Position: Sitting)   Pulse 67   Temp 36.8 °C (98.3 °F) (Temporal)   Ht 1.651 m (5' 5\")   Wt 59.8 kg (131 lb 12.8 oz)   SpO2 96%   BMI 21.93 kg/m²     Physical Exam  General: Alert and oriented, No apparent distress.  Neuro: A & O x 4. Normal speech and memory.    Assessment and Plan    1. Hyponatremia  Asymptomatic.  10/15/19 serum osm 277 (278-298), urine osm 128 (300-900).  Previous study indicated hyponatremia from polydipsia.   Currently, this is exacerbated during the summer and she has been outside a lot playing golf.  ER labs showed both hyponatremia and hypochloremia which could indicate under replacing with electrolyte solution. Increased thirst could be from dehydration. She may have hypovolemic hyponatremia. BP low normal.   -Increase liquid IV to 2 packets a day (1000 ml). Continue same water intake. Hold off salt tab. Repeat BMP 1 week. If Na < 130, consider nephrology referral.  - Basic Metabolic Panel; Future    2. Muscle cramps  - gabapentin (NEURONTIN) 100 MG Cap; Take 1 capsule by " mouth 1 time a day as needed (cramps).  Dispense: 90 capsule; Refill: 1      Return if symptoms worsen or fail to improve.      Signed by: Rylee Leavitt M.D.

## 2021-07-14 ENCOUNTER — HOSPITAL ENCOUNTER (OUTPATIENT)
Dept: LAB | Facility: MEDICAL CENTER | Age: 76
End: 2021-07-14
Attending: INTERNAL MEDICINE
Payer: MEDICARE

## 2021-07-14 DIAGNOSIS — E87.1 HYPONATREMIA: ICD-10-CM

## 2021-07-14 LAB
25(OH)D3 SERPL-MCNC: 67 NG/ML (ref 30–100)
ANION GAP SERPL CALC-SCNC: 11 MMOL/L (ref 7–16)
ANION GAP SERPL CALC-SCNC: 9 MMOL/L (ref 7–16)
BUN SERPL-MCNC: 13 MG/DL (ref 8–22)
BUN SERPL-MCNC: 14 MG/DL (ref 8–22)
CALCIUM SERPL-MCNC: 9.4 MG/DL (ref 8.5–10.5)
CALCIUM SERPL-MCNC: 9.5 MG/DL (ref 8.5–10.5)
CHLORIDE SERPL-SCNC: 92 MMOL/L (ref 96–112)
CHLORIDE SERPL-SCNC: 92 MMOL/L (ref 96–112)
CO2 SERPL-SCNC: 24 MMOL/L (ref 20–33)
CO2 SERPL-SCNC: 25 MMOL/L (ref 20–33)
CREAT SERPL-MCNC: 0.62 MG/DL (ref 0.5–1.4)
CREAT SERPL-MCNC: 0.66 MG/DL (ref 0.5–1.4)
GLUCOSE SERPL-MCNC: 92 MG/DL (ref 65–99)
GLUCOSE SERPL-MCNC: 93 MG/DL (ref 65–99)
POTASSIUM SERPL-SCNC: 4.6 MMOL/L (ref 3.6–5.5)
POTASSIUM SERPL-SCNC: 4.7 MMOL/L (ref 3.6–5.5)
SODIUM SERPL-SCNC: 126 MMOL/L (ref 135–145)
SODIUM SERPL-SCNC: 127 MMOL/L (ref 135–145)
T3 SERPL-MCNC: 70.7 NG/DL (ref 60–181)
T3FREE SERPL-MCNC: 2.43 PG/ML (ref 2–4.4)
T4 FREE SERPL-MCNC: 1.7 NG/DL (ref 0.93–1.7)
TSH SERPL DL<=0.005 MIU/L-ACNC: 1.94 UIU/ML (ref 0.38–5.33)

## 2021-07-14 PROCEDURE — 84443 ASSAY THYROID STIM HORMONE: CPT

## 2021-07-14 PROCEDURE — 80048 BASIC METABOLIC PNL TOTAL CA: CPT

## 2021-07-14 PROCEDURE — 84481 FREE ASSAY (FT-3): CPT

## 2021-07-14 PROCEDURE — 84439 ASSAY OF FREE THYROXINE: CPT

## 2021-07-14 PROCEDURE — 84480 ASSAY TRIIODOTHYRONINE (T3): CPT

## 2021-07-14 PROCEDURE — 36415 COLL VENOUS BLD VENIPUNCTURE: CPT

## 2021-07-14 PROCEDURE — 80048 BASIC METABOLIC PNL TOTAL CA: CPT | Mod: 91

## 2021-07-14 PROCEDURE — 82306 VITAMIN D 25 HYDROXY: CPT

## 2021-07-16 ENCOUNTER — PATIENT MESSAGE (OUTPATIENT)
Dept: MEDICAL GROUP | Facility: MEDICAL CENTER | Age: 76
End: 2021-07-16

## 2021-07-16 DIAGNOSIS — E87.1 HYPONATREMIA: ICD-10-CM

## 2021-07-17 ENCOUNTER — HOSPITAL ENCOUNTER (EMERGENCY)
Facility: MEDICAL CENTER | Age: 76
End: 2021-07-17
Attending: EMERGENCY MEDICINE
Payer: MEDICARE

## 2021-07-17 ENCOUNTER — APPOINTMENT (OUTPATIENT)
Dept: RADIOLOGY | Facility: MEDICAL CENTER | Age: 76
End: 2021-07-17
Attending: EMERGENCY MEDICINE
Payer: MEDICARE

## 2021-07-17 VITALS
TEMPERATURE: 97.3 F | RESPIRATION RATE: 18 BRPM | DIASTOLIC BLOOD PRESSURE: 79 MMHG | HEART RATE: 71 BPM | HEIGHT: 65 IN | WEIGHT: 127.87 LBS | OXYGEN SATURATION: 98 % | SYSTOLIC BLOOD PRESSURE: 140 MMHG | BODY MASS INDEX: 21.3 KG/M2

## 2021-07-17 DIAGNOSIS — M54.10 RADICULOPATHY, UNSPECIFIED SPINAL REGION: ICD-10-CM

## 2021-07-17 PROCEDURE — 99284 EMERGENCY DEPT VISIT MOD MDM: CPT

## 2021-07-17 PROCEDURE — 72148 MRI LUMBAR SPINE W/O DYE: CPT | Mod: ME

## 2021-07-17 ASSESSMENT — FIBROSIS 4 INDEX: FIB4 SCORE: 1.45

## 2021-07-17 NOTE — ED TRIAGE NOTES
"Presents complaining of recurrence of knee pain bilaterally, persisting for the past 2 weeks, and worsening for the past few days.   Chief Complaint   Patient presents with   • Knee Pain     /85   Pulse 74   Temp 36.3 °C (97.3 °F) (Temporal)   Resp 20   Ht 1.651 m (5' 5\")   Wt 58 kg (127 lb 13.9 oz)   SpO2 98%   BMI 21.28 kg/m²      "

## 2021-07-17 NOTE — ED PROVIDER NOTES
ED Provider Note    CHIEF COMPLAINT  Chief Complaint   Patient presents with   • Knee Pain       HPI  Bernadette Paul is a 75 y.o. female who presents with low back pain and leg pain.  She has a history of pain in the back and the legs she had some pain after she returned from a long trip went to physical therapy and they could not relieve it so she went to see the pain specialist who did trigger points for radiculopathy in the buttock region the pains returned they placed placed her on meloxicam and she was unable to tolerate it now she cannot get any relief the pain is bilateral posterior lateral buttock thigh into the anterior knees and worsening.  Chest no bowel bladder incontinence or retention no weakness no fever chills all other systems are negative    REVIEW OF SYSTEMS  See HPI for further details    PAST MEDICAL HISTORY  Past Medical History:   Diagnosis Date   • Allergic rhinitis 6/17/2009   • Dyslipidemia 6/17/2009   • Preventative health care 6/17/2009   • S/P parathyroidectomy (HCC) 6/17/2009       FAMILY HISTORY  Family History   Problem Relation Age of Onset   • Heart Disease Mother        SOCIAL HISTORY  Social History     Socioeconomic History   • Marital status:      Spouse name: Not on file   • Number of children: Not on file   • Years of education: Not on file   • Highest education level: Bachelor's degree (e.g., BA, AB, BS)   Occupational History   • Not on file   Tobacco Use   • Smoking status: Never Smoker   • Smokeless tobacco: Never Used   Vaping Use   • Vaping Use: Never used   Substance and Sexual Activity   • Alcohol use: Yes     Alcohol/week: 0.0 oz     Comment: Occasionally   • Drug use: Never   • Sexual activity: Yes     Partners: Male   Other Topics Concern   • Not on file   Social History Narrative   • Not on file     Social Determinants of Health     Financial Resource Strain: Low Risk    • Difficulty of Paying Living Expenses: Not hard at all   Food Insecurity: No Food  "Insecurity   • Worried About Running Out of Food in the Last Year: Never true   • Ran Out of Food in the Last Year: Never true   Transportation Needs: No Transportation Needs   • Lack of Transportation (Medical): No   • Lack of Transportation (Non-Medical): No   Physical Activity: Sufficiently Active   • Days of Exercise per Week: 4 days   • Minutes of Exercise per Session: 60 min   Stress: No Stress Concern Present   • Feeling of Stress : Not at all   Social Connections: Moderately Integrated   • Frequency of Communication with Friends and Family: More than three times a week   • Frequency of Social Gatherings with Friends and Family: More than three times a week   • Attends [employer] Services: Never   • Active Member of Clubs or Organizations: Yes   • Attends Club or Organization Meetings: More than 4 times per year   • Marital Status:    Intimate Partner Violence:    • Fear of Current or Ex-Partner:    • Emotionally Abused:    • Physically Abused:    • Sexually Abused:        SURGICAL HISTORY  Past Surgical History:   Procedure Laterality Date   • NICOLE N/A 12/6/2019    Procedure: ECHOCARDIOGRAM, TRANSESOPHAGEAL;  Surgeon: Marion Dykes M.D.;  Location: SURGERY [facility];  Service: Cardiac   • INCISION AND DRAINAGE ORTHOPEDIC Right 11/15/2019    Procedure: INCISION AND DRAINAGE, RIGHT KNEE  BY ORTHOPEDICS;  Surgeon: Ramsey Navas M.D.;  Location: SURGERY [facility];  Service: Orthopedics       CURRENT MEDICATIONS  Home Medications    **Home medications have not yet been reviewed for this encounter**         ALLERGIES  Allergies   Allergen Reactions   • Asa [Aspirin]      Epistaxis     • Nsaids      Epistaxis         PHYSICAL EXAM  VITAL SIGNS: /85   Pulse 74   Temp 36.3 °C (97.3 °F) (Temporal)   Resp 20   Ht 1.651 m (5' 5\")   Wt 58 kg (127 lb 13.9 oz)   SpO2 98%   BMI 21.28 kg/m²     Constitutional: Patient is alert and oriented x3 in mild   distress   HENT: Moist mucous " membranes  Cardiovascular: Normal heart rate    Thorax & Lungs: Clear to auscultation  Back: Nontender   Neurologic: Patient has normal motor ambulation in the lower legs and sensation hyperactive symmetric patellar reflexes  Extremities: No pretibial edema  Psychiatric: Affect normal, Judgment normal, Mood normal.   MR-LUMBAR SPINE-W/O    (Results Pending)     The patient has      COURSE & MEDICAL DECISION MAKING  Pertinent Labs & Imaging studies reviewed. (See chart for details)    Increasing symmetric pain and numbness in the bilateral legs.  I am obtaining MRI Dr. Bass will follow up the result.  FINAL IMPRESSION  1.   2.  Radiculopathy  3.         Electronically signed by: Howard Talbot M.D., 7/17/2021 4:00 PM

## 2021-07-17 NOTE — ED NOTES
Patient getting into gown and given warm blanket. Pt aware of plan of care. No needs at this time.

## 2021-07-18 PROBLEM — E22.2 SIADH (SYNDROME OF INAPPROPRIATE ADH PRODUCTION) (HCC): Status: RESOLVED | Noted: 2021-01-15 | Resolved: 2021-07-18

## 2021-07-18 NOTE — ED NOTES
Patient verbalized understanding to plan of care and discharge information. Patient in stable condition. No signs of distress. Patient pain free. Patient wheeled out of ED to personal vehicle with family.

## 2021-07-18 NOTE — ED PROVIDER NOTES
ED Provider Note    MR-LUMBAR SPINE-W/O   Final Result      1.  Advanced multilevel degenerative changes of the lumbar spine as described above.   2.  No acute abnormality of the lumbar spine.          Case was discussed with the patient and her , results of MRI.  They are planning on following up with  Dr. Friedman, considering a consultation with [facility] neurosurgery.    Patient is well-appearing upon discharge    1. Radiculopathy, unspecified spinal region

## 2021-07-19 ENCOUNTER — HOSPITAL ENCOUNTER (EMERGENCY)
Facility: MEDICAL CENTER | Age: 76
End: 2021-07-19
Attending: EMERGENCY MEDICINE
Payer: MEDICARE

## 2021-07-19 VITALS
SYSTOLIC BLOOD PRESSURE: 163 MMHG | DIASTOLIC BLOOD PRESSURE: 68 MMHG | OXYGEN SATURATION: 97 % | BODY MASS INDEX: 21.66 KG/M2 | WEIGHT: 130 LBS | RESPIRATION RATE: 18 BRPM | TEMPERATURE: 98.3 F | HEIGHT: 65 IN | HEART RATE: 68 BPM

## 2021-07-19 DIAGNOSIS — Z00.00 PREVENTATIVE HEALTH CARE: Chronic | ICD-10-CM

## 2021-07-19 DIAGNOSIS — M54.16 LUMBAR RADICULOPATHY: ICD-10-CM

## 2021-07-19 DIAGNOSIS — E87.1 HYPONATREMIA: ICD-10-CM

## 2021-07-19 DIAGNOSIS — Z86.2 HISTORY OF NEUTROPENIA: ICD-10-CM

## 2021-07-19 DIAGNOSIS — E03.9 ACQUIRED HYPOTHYROIDISM: ICD-10-CM

## 2021-07-19 PROBLEM — M51.36 LUMBAR DEGENERATIVE DISC DISEASE: Status: RESOLVED | Noted: 2021-06-18 | Resolved: 2021-07-19

## 2021-07-19 PROBLEM — M79.10 MYALGIA: Status: RESOLVED | Noted: 2021-06-18 | Resolved: 2021-07-19

## 2021-07-19 PROBLEM — M51.369 LUMBAR DEGENERATIVE DISC DISEASE: Status: RESOLVED | Noted: 2021-06-18 | Resolved: 2021-07-19

## 2021-07-19 LAB
ALBUMIN SERPL BCP-MCNC: 4.6 G/DL (ref 3.2–4.9)
ALBUMIN/GLOB SERPL: 2 G/DL
ALP SERPL-CCNC: 55 U/L (ref 30–99)
ALT SERPL-CCNC: 28 U/L (ref 2–50)
ANION GAP SERPL CALC-SCNC: 9 MMOL/L (ref 7–16)
APPEARANCE UR: CLEAR
AST SERPL-CCNC: 20 U/L (ref 12–45)
BASOPHILS # BLD AUTO: 0.5 % (ref 0–1.8)
BASOPHILS # BLD: 0.03 K/UL (ref 0–0.12)
BILIRUB SERPL-MCNC: 0.5 MG/DL (ref 0.1–1.5)
BILIRUB UR QL STRIP.AUTO: NEGATIVE
BUN SERPL-MCNC: 12 MG/DL (ref 8–22)
CALCIUM SERPL-MCNC: 9.6 MG/DL (ref 8.5–10.5)
CHLORIDE SERPL-SCNC: 94 MMOL/L (ref 96–112)
CO2 SERPL-SCNC: 26 MMOL/L (ref 20–33)
COLOR UR: YELLOW
CREAT SERPL-MCNC: 0.58 MG/DL (ref 0.5–1.4)
EOSINOPHIL # BLD AUTO: 0.02 K/UL (ref 0–0.51)
EOSINOPHIL NFR BLD: 0.4 % (ref 0–6.9)
ERYTHROCYTE [DISTWIDTH] IN BLOOD BY AUTOMATED COUNT: 44.2 FL (ref 35.9–50)
GLOBULIN SER CALC-MCNC: 2.3 G/DL (ref 1.9–3.5)
GLUCOSE SERPL-MCNC: 100 MG/DL (ref 65–99)
GLUCOSE UR STRIP.AUTO-MCNC: NEGATIVE MG/DL
HCT VFR BLD AUTO: 46.6 % (ref 37–47)
HGB BLD-MCNC: 15.8 G/DL (ref 12–16)
IMM GRANULOCYTES # BLD AUTO: 0.01 K/UL (ref 0–0.11)
IMM GRANULOCYTES NFR BLD AUTO: 0.2 % (ref 0–0.9)
KETONES UR STRIP.AUTO-MCNC: NEGATIVE MG/DL
LEUKOCYTE ESTERASE UR QL STRIP.AUTO: NEGATIVE
LYMPHOCYTES # BLD AUTO: 0.56 K/UL (ref 1–4.8)
LYMPHOCYTES NFR BLD: 9.9 % (ref 22–41)
MCH RBC QN AUTO: 31 PG (ref 27–33)
MCHC RBC AUTO-ENTMCNC: 33.9 G/DL (ref 33.6–35)
MCV RBC AUTO: 91.4 FL (ref 81.4–97.8)
MICRO URNS: NORMAL
MONOCYTES # BLD AUTO: 0.33 K/UL (ref 0–0.85)
MONOCYTES NFR BLD AUTO: 5.8 % (ref 0–13.4)
NEUTROPHILS # BLD AUTO: 4.7 K/UL (ref 2–7.15)
NEUTROPHILS NFR BLD: 83.2 % (ref 44–72)
NITRITE UR QL STRIP.AUTO: NEGATIVE
NRBC # BLD AUTO: 0 K/UL
NRBC BLD-RTO: 0 /100 WBC
PH UR STRIP.AUTO: 7.5 [PH] (ref 5–8)
PLATELET # BLD AUTO: 263 K/UL (ref 164–446)
PMV BLD AUTO: 9 FL (ref 9–12.9)
POTASSIUM SERPL-SCNC: 4.5 MMOL/L (ref 3.6–5.5)
PROT SERPL-MCNC: 6.9 G/DL (ref 6–8.2)
PROT UR QL STRIP: NEGATIVE MG/DL
RBC # BLD AUTO: 5.1 M/UL (ref 4.2–5.4)
RBC UR QL AUTO: NEGATIVE
SODIUM SERPL-SCNC: 129 MMOL/L (ref 135–145)
SP GR UR STRIP.AUTO: 1.01
UROBILINOGEN UR STRIP.AUTO-MCNC: 0.2 MG/DL
WBC # BLD AUTO: 5.7 K/UL (ref 4.8–10.8)

## 2021-07-19 PROCEDURE — 96374 THER/PROPH/DIAG INJ IV PUSH: CPT

## 2021-07-19 PROCEDURE — 80053 COMPREHEN METABOLIC PANEL: CPT

## 2021-07-19 PROCEDURE — 99284 EMERGENCY DEPT VISIT MOD MDM: CPT

## 2021-07-19 PROCEDURE — 85025 COMPLETE CBC W/AUTO DIFF WBC: CPT

## 2021-07-19 PROCEDURE — 700111 HCHG RX REV CODE 636 W/ 250 OVERRIDE (IP): Performed by: EMERGENCY MEDICINE

## 2021-07-19 PROCEDURE — 81003 URINALYSIS AUTO W/O SCOPE: CPT

## 2021-07-19 RX ORDER — METHYLPREDNISOLONE 4 MG/1
TABLET ORAL
Qty: 1 EACH | Refills: 0 | Status: SHIPPED | OUTPATIENT
Start: 2021-07-19 | End: 2021-07-19

## 2021-07-19 RX ORDER — DEXAMETHASONE SODIUM PHOSPHATE 4 MG/ML
4 INJECTION, SOLUTION INTRA-ARTICULAR; INTRALESIONAL; INTRAMUSCULAR; INTRAVENOUS; SOFT TISSUE ONCE
Status: COMPLETED | OUTPATIENT
Start: 2021-07-19 | End: 2021-07-19

## 2021-07-19 RX ADMIN — DEXAMETHASONE SODIUM PHOSPHATE 4 MG: 4 INJECTION, SOLUTION INTRA-ARTICULAR; INTRALESIONAL; INTRAMUSCULAR; INTRAVENOUS; SOFT TISSUE at 10:39

## 2021-07-19 ASSESSMENT — PAIN DESCRIPTION - PAIN TYPE: TYPE: ACUTE PAIN

## 2021-07-19 ASSESSMENT — FIBROSIS 4 INDEX: FIB4 SCORE: 1.45

## 2021-07-19 NOTE — ED PROVIDER NOTES
ED Provider Note    CHIEF COMPLAINT  Chief Complaint   Patient presents with   • Buttocks Pain       HPI  Bernadette Paul is a 75 y.o. female who presents for evaluation of ongoing and worsening bilateral low back hip pain radiating down the buttocks.  The patient has a history of chronic low back pain.  She is followed by Dr. Friedman.  She denies any new trauma.  Patient was seen at the emergency department at [facility] just recently and had an MRI of her low back demonstrating DJD but no acute fracture or acute cord compromise.  She reports increasing pain with ambulation.  She specifically denies bowel or bladder incontinence or motor or sensory loss below the waist.  She apparently did quite well after a trigger point and/or steroid injection around 6 weeks ago.  She was able to play golf recently.  No other symptoms reported such as fevers or chills    REVIEW OF SYSTEMS  See HPI for further details.  No night sweats weight loss numbness tingling weakness rash all other systems are negative.     PAST MEDICAL HISTORY  Past Medical History:   Diagnosis Date   • Dyslipidemia 6/17/2009   • S/P parathyroidectomy (HCC) 6/17/2009   • Allergic rhinitis 6/17/2009   • Preventative health care 6/17/2009       FAMILY HISTORY  Noncontributory    SOCIAL HISTORY  Social History     Socioeconomic History   • Marital status:      Spouse name: Not on file   • Number of children: Not on file   • Years of education: Not on file   • Highest education level: Bachelor's degree (e.g., BA, AB, BS)   Occupational History   • Not on file   Tobacco Use   • Smoking status: Never Smoker   • Smokeless tobacco: Never Used   Vaping Use   • Vaping Use: Never used   Substance and Sexual Activity   • Alcohol use: Not Currently     Alcohol/week: 0.0 oz     Comment: Occasionally   • Drug use: Never   • Sexual activity: Yes     Partners: Male   Other Topics Concern   • Not on file   Social History Narrative   • Not on file     Social  Determinants of Health     Financial Resource Strain: Low Risk    • Difficulty of Paying Living Expenses: Not hard at all   Food Insecurity: No Food Insecurity   • Worried About Running Out of Food in the Last Year: Never true   • Ran Out of Food in the Last Year: Never true   Transportation Needs: No Transportation Needs   • Lack of Transportation (Medical): No   • Lack of Transportation (Non-Medical): No   Physical Activity: Sufficiently Active   • Days of Exercise per Week: 4 days   • Minutes of Exercise per Session: 60 min   Stress: No Stress Concern Present   • Feeling of Stress : Not at all   Social Connections: Moderately Integrated   • Frequency of Communication with Friends and Family: More than three times a week   • Frequency of Social Gatherings with Friends and Family: More than three times a week   • Attends Restorationist Services: Never   • Active Member of Clubs or Organizations: Yes   • Attends Club or Organization Meetings: More than 4 times per year   • Marital Status:    Intimate Partner Violence:    • Fear of Current or Ex-Partner:    • Emotionally Abused:    • Physically Abused:    • Sexually Abused:        SURGICAL HISTORY  Past Surgical History:   Procedure Laterality Date   • NICOLE N/A 12/6/2019    Procedure: ECHOCARDIOGRAM, TRANSESOPHAGEAL;  Surgeon: Marion Dykes M.D.;  Location: SURGERY [facility];  Service: Cardiac   • INCISION AND DRAINAGE ORTHOPEDIC Right 11/15/2019    Procedure: INCISION AND DRAINAGE, RIGHT KNEE  BY ORTHOPEDICS;  Surgeon: Ramsey Navas M.D.;  Location: SURGERY [facility];  Service: Orthopedics       CURRENT MEDICATIONS  Home Medications    **Home medications have not yet been reviewed for this encounter**     No current facility-administered medications for this encounter.    Current Outpatient Medications:   •  gabapentin (NEURONTIN) 100 MG Cap, Take 1 capsule by mouth 1 time a day as needed (cramps)., Disp: 90 capsule, Rfl: 1  •   "levothyroxine (SYNTHROID) 50 MCG Tab, Take  mcg by mouth every morning on an empty stomach. Pt takes 100 MCG only on Sunday Al other days 50 MCG, Disp: , Rfl:   •  simvastatin (ZOCOR) 20 MG Tab, Take 20 mg by mouth every evening., Disp: , Rfl:   •  ibuprofen (MOTRIN) 200 MG Tab, Take 800 mg by mouth in the morning, at noon, and at bedtime., Disp: , Rfl:   •  Omega-3 Fatty Acids (FISH OIL) 1000 MG Cap capsule, Take 2,000 mg by mouth every day., Disp: , Rfl:   •  Cyanocobalamin (VITAMIN B 12 PO), Take 1 tablet by mouth every day., Disp: , Rfl:   •  Calcium Carb-Cholecalciferol (CALCIUM 600-D PO), Take 600 mg by mouth every day., Disp: , Rfl:   •  Ascorbic Acid (VITAMIN C PO), Take 1 tablet by mouth every day., Disp: , Rfl:   •  Probiotic Product (PROBIOTIC PO), Take 1 capsule by mouth every evening., Disp: , Rfl:   •  MULTIPLE VITAMIN PO, Take 1 tablet by mouth every day., Disp: , Rfl:       ALLERGIES  Allergies   Allergen Reactions   • Asa [Aspirin]      Epistaxis     • Nsaids      Epistaxis         PHYSICAL EXAM  VITAL SIGNS: BP (!) 166/70   Pulse 66   Temp 36.8 °C (98.3 °F) (Temporal)   Resp 18   Ht 1.651 m (5' 5\")   Wt 59 kg (130 lb)   SpO2 98%   BMI 21.63 kg/m²       Constitutional: Well developed, Well nourished, No acute distress, Non-toxic appearance.   HENT: Normocephalic, Atraumatic, Bilateral external ears normal, Oropharynx moist, No oral exudates, Nose normal.   Eyes: PERRLA, EOMI, Conjunctiva normal, No discharge.   Neck: Normal range of motion, No tenderness, Supple, No stridor.    Cardiovascular: Normal heart rate, Normal rhythm, No murmurs, No rubs, No gallops.   Thorax & Lungs: Normal breath sounds, No respiratory distress, No wheezing, No chest tenderness.   Abdomen: Bowel sounds normal, Soft, No tenderness, No masses, No pulsatile masses.   Skin: Warm, Dry, No erythema, No rash.   Back: No acute midline tenderness, No CVA tenderness.   Genitalia: Groin sensation is normal "   extremities: Intact distal pulses, No edema, No tenderness, No cyanosis, No clubbing.   Neurologic: Alert & oriented x 3, Normal motor function, Normal sensory function, No focal deficits noted.  Positive straight leg test bilaterally  Psychiatric: Affect normal, Judgment normal, Mood normal.       Results for orders placed or performed during the hospital encounter of 07/19/21   URINALYSIS    Specimen: Urine   Result Value Ref Range    Color Yellow     Character Clear     Specific Gravity 1.015 <1.035    Ph 7.5 5.0 - 8.0    Glucose Negative Negative mg/dL    Ketones Negative Negative mg/dL    Protein Negative Negative mg/dL    Bilirubin Negative Negative    Urobilinogen, Urine 0.2 Negative    Nitrite Negative Negative    Leukocyte Esterase Negative Negative    Occult Blood Negative Negative    Micro Urine Req see below    CBC WITH DIFFERENTIAL   Result Value Ref Range    WBC 5.7 4.8 - 10.8 K/uL    RBC 5.10 4.20 - 5.40 M/uL    Hemoglobin 15.8 12.0 - 16.0 g/dL    Hematocrit 46.6 37.0 - 47.0 %    MCV 91.4 81.4 - 97.8 fL    MCH 31.0 27.0 - 33.0 pg    MCHC 33.9 33.6 - 35.0 g/dL    RDW 44.2 35.9 - 50.0 fL    Platelet Count 263 164 - 446 K/uL    MPV 9.0 9.0 - 12.9 fL    Neutrophils-Polys 83.20 (H) 44.00 - 72.00 %    Lymphocytes 9.90 (L) 22.00 - 41.00 %    Monocytes 5.80 0.00 - 13.40 %    Eosinophils 0.40 0.00 - 6.90 %    Basophils 0.50 0.00 - 1.80 %    Immature Granulocytes 0.20 0.00 - 0.90 %    Nucleated RBC 0.00 /100 WBC    Neutrophils (Absolute) 4.70 2.00 - 7.15 K/uL    Lymphs (Absolute) 0.56 (L) 1.00 - 4.80 K/uL    Monos (Absolute) 0.33 0.00 - 0.85 K/uL    Eos (Absolute) 0.02 0.00 - 0.51 K/uL    Baso (Absolute) 0.03 0.00 - 0.12 K/uL    Immature Granulocytes (abs) 0.01 0.00 - 0.11 K/uL    NRBC (Absolute) 0.00 K/uL   Comp Metabolic Panel   Result Value Ref Range    Sodium 129 (L) 135 - 145 mmol/L    Potassium 4.5 3.6 - 5.5 mmol/L    Chloride 94 (L) 96 - 112 mmol/L    Co2 26 20 - 33 mmol/L    Anion Gap 9.0 7.0 - 16.0     Glucose 100 (H) 65 - 99 mg/dL    Bun 12 8 - 22 mg/dL    Creatinine 0.58 0.50 - 1.40 mg/dL    Calcium 9.6 8.5 - 10.5 mg/dL    AST(SGOT) 20 12 - 45 U/L    ALT(SGPT) 28 2 - 50 U/L    Alkaline Phosphatase 55 30 - 99 U/L    Total Bilirubin 0.5 0.1 - 1.5 mg/dL    Albumin 4.6 3.2 - 4.9 g/dL    Total Protein 6.9 6.0 - 8.2 g/dL    Globulin 2.3 1.9 - 3.5 g/dL    A-G Ratio 2.0 g/dL   ESTIMATED GFR   Result Value Ref Range    GFR If African American >60 >60 mL/min/1.73 m 2    GFR If Non African American >60 >60 mL/min/1.73 m 2       COURSE & MEDICAL DECISION MAKING  Pertinent Labs & Imaging studies reviewed. (See chart for details)  An IV is established.  I reviewed the patient's recent visit history as well as her MRI.  The patient has chronic pain but this does not appear to be any evidence of acute cord compression.  Laboratory studies are unremarkable other than slightly low sodium.  This appears to be chronic.  We will start her on a short course of steroids and refer her to [facility] neurosurgery    FINAL IMPRESSION  1.  Lumbar radiculopathy         Electronically signed by: Esa Cavanaugh M.D., 7/19/2021 8:31 AM

## 2021-07-19 NOTE — ED NOTES
Pt to BL-16. Changed in gown. Agree w/ triage note. Pt denies bowel/bladder incontinence. States has been using Advil & ice packs for pain relief. Denies unilateral weakness, n/v, or recent falls.

## 2021-07-19 NOTE — ED NOTES
All DC discussed. Pt verbalized understanding of all DC instructions r/t nerve pain, prescriptions and follow up recommendations w/ spinal surgery. Pt assisted to lobby via wheelchair with family.

## 2021-07-19 NOTE — ED TRIAGE NOTES
Bernadette Paul  75 y.o. female  Chief Complaint   Patient presents with   • Buttocks Pain     Patient states she was recently evaluated for same complaint. Patient had a MRI lumbar and patient was discharged home. Patient states she normally has chronic back pain but on Friday the pain has been progressively unbearable associated with weakness to a point she is no longer able to ambulate without assistance.     Vitals:    07/19/21 0713   BP: 157/82   Pulse: 71   Resp: 18   Temp: 36.8 °C (98.3 °F)   SpO2: 97%

## 2021-07-20 ENCOUNTER — TELEPHONE (OUTPATIENT)
Dept: MEDICAL GROUP | Facility: MEDICAL CENTER | Age: 76
End: 2021-07-20

## 2021-07-20 DIAGNOSIS — M54.30 SCIATIC LEG PAIN: ICD-10-CM

## 2021-07-20 NOTE — TELEPHONE ENCOUNTER
(1) Please call [facility] and let them know that I placed a referral for the patient to see Dr. Friedman from pain management, she has an appointment tomorrow morning, I have placed the referral in epic urgent status.    (2) Please also let the patient know that we have received the notification and submitted the referral to our referral specialist to place the urgent referral request for her appointment tomorrow.

## 2021-07-27 ENCOUNTER — HOSPITAL ENCOUNTER (OUTPATIENT)
Dept: LAB | Facility: MEDICAL CENTER | Age: 76
End: 2021-07-27
Attending: INTERNAL MEDICINE
Payer: MEDICARE

## 2021-07-27 ENCOUNTER — HOSPITAL ENCOUNTER (OUTPATIENT)
Facility: MEDICAL CENTER | Age: 76
End: 2021-07-27
Attending: PHYSICIAN ASSISTANT
Payer: MEDICARE

## 2021-07-27 DIAGNOSIS — E87.1 HYPONATREMIA: ICD-10-CM

## 2021-07-27 LAB
ANION GAP SERPL CALC-SCNC: 10 MMOL/L (ref 7–16)
BUN SERPL-MCNC: 11 MG/DL (ref 8–22)
CALCIUM SERPL-MCNC: 9.5 MG/DL (ref 8.4–10.2)
CHLORIDE SERPL-SCNC: 95 MMOL/L (ref 96–112)
CO2 SERPL-SCNC: 26 MMOL/L (ref 20–33)
CREAT SERPL-MCNC: 0.57 MG/DL (ref 0.5–1.4)
GLUCOSE SERPL-MCNC: 103 MG/DL (ref 65–99)
POTASSIUM SERPL-SCNC: 4.3 MMOL/L (ref 3.6–5.5)
SODIUM SERPL-SCNC: 131 MMOL/L (ref 135–145)
SODIUM UR-SCNC: 78 MMOL/L

## 2021-07-27 PROCEDURE — 80048 BASIC METABOLIC PNL TOTAL CA: CPT

## 2021-07-27 PROCEDURE — 83935 ASSAY OF URINE OSMOLALITY: CPT

## 2021-07-27 PROCEDURE — 84300 ASSAY OF URINE SODIUM: CPT

## 2021-07-27 PROCEDURE — 82533 TOTAL CORTISOL: CPT

## 2021-07-27 PROCEDURE — 86140 C-REACTIVE PROTEIN: CPT

## 2021-07-27 PROCEDURE — 36415 COLL VENOUS BLD VENIPUNCTURE: CPT

## 2021-07-27 PROCEDURE — 82024 ASSAY OF ACTH: CPT

## 2021-07-28 ENCOUNTER — HOSPITAL ENCOUNTER (OUTPATIENT)
Dept: LAB | Facility: MEDICAL CENTER | Age: 76
End: 2021-07-28
Attending: PHYSICIAN ASSISTANT
Payer: MEDICARE

## 2021-07-28 DIAGNOSIS — M54.16 ACUTE RIGHT LUMBAR RADICULOPATHY: ICD-10-CM

## 2021-07-28 LAB
CORTIS SERPL-MCNC: 12.3 UG/DL (ref 0–23)
CRP SERPL HS-MCNC: <0.3 MG/DL (ref 0–0.75)
ERYTHROCYTE [SEDIMENTATION RATE] IN BLOOD BY WESTERGREN METHOD: 1 MM/HOUR (ref 0–25)
OSMOLALITY UR: 475 MOSM/KG H2O (ref 300–900)

## 2021-07-28 PROCEDURE — 36415 COLL VENOUS BLD VENIPUNCTURE: CPT

## 2021-07-28 PROCEDURE — 85652 RBC SED RATE AUTOMATED: CPT

## 2021-07-29 LAB — ACTH PLAS-MCNC: 15.3 PG/ML (ref 7.2–63.3)

## 2021-07-30 ENCOUNTER — OFFICE VISIT (OUTPATIENT)
Dept: MEDICAL GROUP | Facility: MEDICAL CENTER | Age: 76
End: 2021-07-30
Payer: MEDICARE

## 2021-07-30 VITALS
OXYGEN SATURATION: 97 % | HEART RATE: 77 BPM | DIASTOLIC BLOOD PRESSURE: 62 MMHG | WEIGHT: 134 LBS | TEMPERATURE: 99.1 F | HEIGHT: 62 IN | SYSTOLIC BLOOD PRESSURE: 106 MMHG | BODY MASS INDEX: 24.66 KG/M2

## 2021-07-30 DIAGNOSIS — E87.1 HYPONATREMIA: ICD-10-CM

## 2021-07-30 DIAGNOSIS — E89.2 S/P PARATHYROIDECTOMY: Chronic | ICD-10-CM

## 2021-07-30 DIAGNOSIS — E03.9 ACQUIRED HYPOTHYROIDISM: ICD-10-CM

## 2021-07-30 DIAGNOSIS — J90 BILATERAL PLEURAL EFFUSION: ICD-10-CM

## 2021-07-30 PROCEDURE — 99214 OFFICE O/P EST MOD 30 MIN: CPT | Performed by: INTERNAL MEDICINE

## 2021-07-30 RX ORDER — 0.9 % SODIUM CHLORIDE 0.9 %
3 VIAL (ML) INJECTION PRN
Status: CANCELLED | OUTPATIENT
Start: 2021-07-30

## 2021-07-30 RX ORDER — 0.9 % SODIUM CHLORIDE 0.9 %
10 VIAL (ML) INJECTION PRN
Status: CANCELLED | OUTPATIENT
Start: 2021-07-30

## 2021-07-30 RX ORDER — HEPARIN SODIUM (PORCINE) LOCK FLUSH IV SOLN 100 UNIT/ML 100 UNIT/ML
500 SOLUTION INTRAVENOUS PRN
Status: CANCELLED | OUTPATIENT
Start: 2021-07-30

## 2021-07-30 RX ORDER — COSYNTROPIN 0.25 MG/ML
250 INJECTION, POWDER, FOR SOLUTION INTRAMUSCULAR; INTRAVENOUS ONCE
Status: CANCELLED | OUTPATIENT
Start: 2021-07-30 | End: 2021-07-30

## 2021-07-30 RX ORDER — 0.9 % SODIUM CHLORIDE 0.9 %
VIAL (ML) INJECTION PRN
Status: CANCELLED | OUTPATIENT
Start: 2021-07-30

## 2021-07-30 ASSESSMENT — FIBROSIS 4 INDEX: FIB4 SCORE: 1.08

## 2021-07-30 NOTE — PROGRESS NOTES
Established Patient    Bernadette presents today with the following:    CC: Hyponatremia    HPI:   Bernadette is a 75 y.o. female who came in for above.    She is frustrated about not having clear answer about medical issues and recent decline in health.    Her sodium remains low.  Recent blood test drawn by PCP show sodium 131, chloride 95, cortisol 12.3 at 10:30 AM, ACTH 15, urine sodium 78, urine osmolality 475.  Her water intake is limited to 2 glasses a day when she takes medication, the rest of the fluid intake is IV liquid.   She is concerned about high glucose 103 on nonfasting blood test.     She was getting trigger point injection, cortisone injection, 1 Medrol Dosepak for back pain flareup.  She is seen [facility] and pain specialist.  She is wondering if the steroid causes loss in sodium as mentioned by endocrinology?. She did not feel well on oral steroid.    She is lifelong non-smoker.    ROS:   As above    Patient Active Problem List    Diagnosis Date Noted   • Hashimoto's thyroiditis 01/15/2021   • Sensorineural hearing loss 01/12/2021   • Sleep apnea 10/23/2020   • History of ankle fracture 07/01/2020   • History of neutropenia 06/19/2020   • GERD (gastroesophageal reflux disease) 06/18/2020   • Atypical chest pain 06/18/2020   • Hypothyroid 06/18/2020   • Cataract 04/23/2020   • History of bilateral pleural effusion 12/08/2019   • History of MSSA bacteremia 11/18/2019   • History of septic joint of right knee joint   11/15/2019   • Hyponatremia 10/11/2019   • Low back pain 02/20/2017   • History of wrist fracture 02/12/2013   • History of vasovagal syncope 05/02/2012   • Adenomatous polyp of colon 02/11/2011   • S/P laparoscopic cholecystectomy 01/20/2011   • Dyslipidemia 06/17/2009   • S/P parathyroidectomy 06/17/2009   • Allergic rhinitis 06/17/2009   • Preventative health care 06/17/2009       Current Outpatient Medications   Medication Sig Dispense Refill   • tizanidine (ZANAFLEX) 4 MG Tab Take 1 tablet  "by mouth every 8 hours. 20 tablet 0   • naproxen (NAPROSYN) 500 MG Tab 1 po qd-bid prn pain    7day supply 14 tablet 0   • levothyroxine (SYNTHROID) 50 MCG Tab Take  mcg by mouth every morning on an empty stomach. Pt takes 100 MCG only on Sunday  Al other days 50 MCG     • traMADol (ULTRAM) 50 MG Tab 1-2 po qd prn pain    7 day supply 14 tablet 0   • gabapentin (NEURONTIN) 100 MG Cap Take 1 capsule by mouth 1 time a day as needed (cramps). 90 capsule 1   • Omega-3 Fatty Acids (FISH OIL) 1000 MG Cap capsule Take 2,000 mg by mouth every day.     • Cyanocobalamin (VITAMIN B 12 PO) Take 1 tablet by mouth every day.     • Calcium Carb-Cholecalciferol (CALCIUM 600-D PO) Take 600 mg by mouth every day.     • Ascorbic Acid (VITAMIN C PO) Take 1 tablet by mouth every day.     • Probiotic Product (PROBIOTIC PO) Take 1 capsule by mouth every evening.     • MULTIPLE VITAMIN PO Take 1 tablet by mouth every day.       No current facility-administered medications for this visit.         /62 (BP Location: Left arm, Patient Position: Sitting, BP Cuff Size: Adult)   Pulse 77   Temp 37.3 °C (99.1 °F) (Temporal)   Ht 1.575 m (5' 2\")   Wt 60.8 kg (134 lb)   SpO2 97%   BMI 24.51 kg/m²     Physical Exam  General: Alert and oriented, No apparent distress.      Assessment and Plan    1. Hyponatremia  - Previous studies years ago was consistent with polydipsia.  However, this last urine sodium and urine osmolality is consistent with inability to retain sodium.  - since TSH is normal, recommended ACTH stimulation test for adrenal insufficiency.  - Continue with general measures, water restriction, increased salt intake.  - Seeing with nephrology in August for further opinion.   - Reassured that local steroid injection should not affect her sodium.  If any, will only retain sodium in the body..    2. Acquired hypothyroidism  -TSH within normal limit this month.    Other orders  - cosyntropin (CORTROSYN) injection 250 mcg  - " normal saline PF 0.9 % 3 mL  - normal saline PF 0.9 % 10 mL  - normal saline PF 0.9 % 10 mL  - normal saline PF 0.9 %  - heparin lock flush 100 unit/mL injection 500 Units    My total time spent caring for the patient on the day of the encounter was 30 minutes.   This does not include time spent on separately billable procedures/tests.      Return if symptoms worsen or fail to improve.      Signed by: Rylee Leavitt M.D.

## 2021-08-19 ENCOUNTER — OUTPATIENT INFUSION SERVICES (OUTPATIENT)
Dept: ONCOLOGY | Facility: MEDICAL CENTER | Age: 76
End: 2021-08-19
Attending: INTERNAL MEDICINE
Payer: MEDICARE

## 2021-08-19 VITALS
HEIGHT: 64 IN | RESPIRATION RATE: 18 BRPM | BODY MASS INDEX: 22.88 KG/M2 | WEIGHT: 134.04 LBS | TEMPERATURE: 97.4 F | SYSTOLIC BLOOD PRESSURE: 149 MMHG | HEART RATE: 64 BPM | OXYGEN SATURATION: 100 % | DIASTOLIC BLOOD PRESSURE: 69 MMHG

## 2021-08-19 DIAGNOSIS — E87.1 HYPONATREMIA: ICD-10-CM

## 2021-08-19 LAB
CORTIS SERPL-MCNC: 14.5 UG/DL (ref 0–23)
CORTIS SERPL-MCNC: 19.9 UG/DL (ref 0–23)
CORTIS SERPL-MCNC: 27.4 UG/DL (ref 0–23)

## 2021-08-19 PROCEDURE — 36415 COLL VENOUS BLD VENIPUNCTURE: CPT

## 2021-08-19 PROCEDURE — 82533 TOTAL CORTISOL: CPT | Mod: 91

## 2021-08-19 PROCEDURE — 700111 HCHG RX REV CODE 636 W/ 250 OVERRIDE (IP): Performed by: INTERNAL MEDICINE

## 2021-08-19 PROCEDURE — 82024 ASSAY OF ACTH: CPT

## 2021-08-19 PROCEDURE — 96374 THER/PROPH/DIAG INJ IV PUSH: CPT

## 2021-08-19 RX ORDER — 0.9 % SODIUM CHLORIDE 0.9 %
VIAL (ML) INJECTION PRN
Status: CANCELLED | OUTPATIENT
Start: 2021-08-19

## 2021-08-19 RX ORDER — 0.9 % SODIUM CHLORIDE 0.9 %
3 VIAL (ML) INJECTION PRN
Status: CANCELLED | OUTPATIENT
Start: 2021-08-19

## 2021-08-19 RX ORDER — HEPARIN SODIUM (PORCINE) LOCK FLUSH IV SOLN 100 UNIT/ML 100 UNIT/ML
500 SOLUTION INTRAVENOUS PRN
Status: CANCELLED | OUTPATIENT
Start: 2021-08-19

## 2021-08-19 RX ORDER — 0.9 % SODIUM CHLORIDE 0.9 %
10 VIAL (ML) INJECTION PRN
Status: CANCELLED | OUTPATIENT
Start: 2021-08-19

## 2021-08-19 RX ORDER — COSYNTROPIN 0.25 MG/ML
250 INJECTION, POWDER, FOR SOLUTION INTRAMUSCULAR; INTRAVENOUS ONCE
Status: CANCELLED | OUTPATIENT
Start: 2021-08-19 | End: 2021-08-19

## 2021-08-19 RX ORDER — COSYNTROPIN 0.25 MG/ML
250 INJECTION, POWDER, FOR SOLUTION INTRAMUSCULAR; INTRAVENOUS ONCE
Status: COMPLETED | OUTPATIENT
Start: 2021-08-19 | End: 2021-08-19

## 2021-08-19 RX ORDER — NAPROXEN 500 MG/1
500 TABLET ORAL 2 TIMES DAILY WITH MEALS
Status: ON HOLD | COMMUNITY
End: 2021-10-14

## 2021-08-19 RX ADMIN — COSYNTROPIN 250 MCG: 0.25 INJECTION, POWDER, LYOPHILIZED, FOR SOLUTION INTRAVENOUS at 08:37

## 2021-08-19 ASSESSMENT — FIBROSIS 4 INDEX: FIB4 SCORE: 1.09

## 2021-08-19 NOTE — PROGRESS NOTES
Pt arrives to [facility] for STIM test.  Discussed plan of care with pt.  Pt reports she previously had a STIM test and no issues.  Pt denies s/sx of infection.  PIV established to R-AC and baseline ACTH and cortisol level was drawn.  Cosyntropin given.  30 minute post cortisol level drawn.  60 minute cortisol level drawn.  Pt tolerated STIM test without issue.  PIV removed and site wrapped with pressure dressing.  Pt dc home to self care.   
95

## 2021-08-21 LAB — ACTH PLAS-MCNC: 13.7 PG/ML (ref 7.2–63.3)

## 2021-10-06 ENCOUNTER — HOSPITAL ENCOUNTER (OUTPATIENT)
Dept: RADIOLOGY | Facility: MEDICAL CENTER | Age: 76
End: 2021-10-06
Attending: NEUROLOGICAL SURGERY | Admitting: NEUROLOGICAL SURGERY
Payer: MEDICARE

## 2021-10-06 ENCOUNTER — PRE-ADMISSION TESTING (OUTPATIENT)
Dept: ADMISSIONS | Facility: MEDICAL CENTER | Age: 76
End: 2021-10-06
Attending: NEUROLOGICAL SURGERY
Payer: MEDICARE

## 2021-10-06 DIAGNOSIS — Z01.810 PRE-OPERATIVE CARDIOVASCULAR EXAMINATION: ICD-10-CM

## 2021-10-06 DIAGNOSIS — Z01.811 PRE-OPERATIVE RESPIRATORY EXAMINATION: ICD-10-CM

## 2021-10-06 DIAGNOSIS — Z01.812 PRE-OPERATIVE LABORATORY EXAMINATION: ICD-10-CM

## 2021-10-06 LAB
ANION GAP SERPL CALC-SCNC: 12 MMOL/L (ref 7–16)
APTT PPP: 41.3 SEC (ref 24.7–36)
BASOPHILS # BLD AUTO: 0.9 % (ref 0–1.8)
BASOPHILS # BLD: 0.06 K/UL (ref 0–0.12)
BUN SERPL-MCNC: 19 MG/DL (ref 8–22)
CALCIUM SERPL-MCNC: 10.1 MG/DL (ref 8.5–10.5)
CHLORIDE SERPL-SCNC: 98 MMOL/L (ref 96–112)
CO2 SERPL-SCNC: 25 MMOL/L (ref 20–33)
CREAT SERPL-MCNC: 0.6 MG/DL (ref 0.5–1.4)
EKG IMPRESSION: NORMAL
EOSINOPHIL # BLD AUTO: 0.54 K/UL (ref 0–0.51)
EOSINOPHIL NFR BLD: 8.2 % (ref 0–6.9)
ERYTHROCYTE [DISTWIDTH] IN BLOOD BY AUTOMATED COUNT: 43.7 FL (ref 35.9–50)
GLUCOSE SERPL-MCNC: 91 MG/DL (ref 65–99)
HCT VFR BLD AUTO: 43.8 % (ref 37–47)
HGB BLD-MCNC: 14.7 G/DL (ref 12–16)
IMM GRANULOCYTES # BLD AUTO: 0.08 K/UL (ref 0–0.11)
IMM GRANULOCYTES NFR BLD AUTO: 1.2 % (ref 0–0.9)
INR PPP: 0.98 (ref 0.87–1.13)
LYMPHOCYTES # BLD AUTO: 1.11 K/UL (ref 1–4.8)
LYMPHOCYTES NFR BLD: 16.8 % (ref 22–41)
MCH RBC QN AUTO: 30.7 PG (ref 27–33)
MCHC RBC AUTO-ENTMCNC: 33.6 G/DL (ref 33.6–35)
MCV RBC AUTO: 91.4 FL (ref 81.4–97.8)
MONOCYTES # BLD AUTO: 0.46 K/UL (ref 0–0.85)
MONOCYTES NFR BLD AUTO: 7 % (ref 0–13.4)
NEUTROPHILS # BLD AUTO: 4.35 K/UL (ref 2–7.15)
NEUTROPHILS NFR BLD: 65.9 % (ref 44–72)
NRBC # BLD AUTO: 0 K/UL
NRBC BLD-RTO: 0 /100 WBC
PLATELET # BLD AUTO: 337 K/UL (ref 164–446)
PMV BLD AUTO: 9.5 FL (ref 9–12.9)
POTASSIUM SERPL-SCNC: 4.5 MMOL/L (ref 3.6–5.5)
PROTHROMBIN TIME: 12.7 SEC (ref 12–14.6)
RBC # BLD AUTO: 4.79 M/UL (ref 4.2–5.4)
SARS-COV-2 RNA RESP QL NAA+PROBE: NOTDETECTED
SODIUM SERPL-SCNC: 135 MMOL/L (ref 135–145)
SPECIMEN SOURCE: NORMAL
WBC # BLD AUTO: 6.6 K/UL (ref 4.8–10.8)

## 2021-10-06 PROCEDURE — 93010 ELECTROCARDIOGRAM REPORT: CPT | Performed by: INTERNAL MEDICINE

## 2021-10-06 PROCEDURE — 85610 PROTHROMBIN TIME: CPT

## 2021-10-06 PROCEDURE — U0003 INFECTIOUS AGENT DETECTION BY NUCLEIC ACID (DNA OR RNA); SEVERE ACUTE RESPIRATORY SYNDROME CORONAVIRUS 2 (SARS-COV-2) (CORONAVIRUS DISEASE [COVID-19]), AMPLIFIED PROBE TECHNIQUE, MAKING USE OF HIGH THROUGHPUT TECHNOLOGIES AS DESCRIBED BY CMS-2020-01-R: HCPCS

## 2021-10-06 PROCEDURE — 85025 COMPLETE CBC W/AUTO DIFF WBC: CPT

## 2021-10-06 PROCEDURE — 80048 BASIC METABOLIC PNL TOTAL CA: CPT

## 2021-10-06 PROCEDURE — 71046 X-RAY EXAM CHEST 2 VIEWS: CPT

## 2021-10-06 PROCEDURE — 93005 ELECTROCARDIOGRAM TRACING: CPT

## 2021-10-06 PROCEDURE — C9803 HOPD COVID-19 SPEC COLLECT: HCPCS

## 2021-10-06 PROCEDURE — 85730 THROMBOPLASTIN TIME PARTIAL: CPT

## 2021-10-06 PROCEDURE — 36415 COLL VENOUS BLD VENIPUNCTURE: CPT

## 2021-10-06 PROCEDURE — U0005 INFEC AGEN DETEC AMPLI PROBE: HCPCS

## 2021-10-06 RX ORDER — POLYETHYLENE GLYCOL 3350 17 G/17G
17 POWDER, FOR SOLUTION ORAL DAILY
COMMUNITY

## 2021-10-06 RX ORDER — TIZANIDINE 2 MG/1
TABLET ORAL
Status: ON HOLD | COMMUNITY
Start: 2021-09-23 | End: 2021-10-14

## 2021-10-06 ASSESSMENT — FIBROSIS 4 INDEX: FIB4 SCORE: 1.09

## 2021-10-13 ENCOUNTER — APPOINTMENT (OUTPATIENT)
Dept: RADIOLOGY | Facility: MEDICAL CENTER | Age: 76
End: 2021-10-13
Attending: NEUROLOGICAL SURGERY
Payer: MEDICARE

## 2021-10-13 ENCOUNTER — ANESTHESIA (OUTPATIENT)
Dept: SURGERY | Facility: MEDICAL CENTER | Age: 76
End: 2021-10-13
Payer: MEDICARE

## 2021-10-13 ENCOUNTER — HOSPITAL ENCOUNTER (OUTPATIENT)
Facility: MEDICAL CENTER | Age: 76
End: 2021-10-14
Attending: NEUROLOGICAL SURGERY | Admitting: NEUROLOGICAL SURGERY
Payer: MEDICARE

## 2021-10-13 ENCOUNTER — ANESTHESIA EVENT (OUTPATIENT)
Dept: SURGERY | Facility: MEDICAL CENTER | Age: 76
End: 2021-10-13
Payer: MEDICARE

## 2021-10-13 DIAGNOSIS — G89.18 POSTOPERATIVE PAIN: ICD-10-CM

## 2021-10-13 PROCEDURE — 160029 HCHG SURGERY MINUTES - 1ST 30 MINS LEVEL 4: Performed by: NEUROLOGICAL SURGERY

## 2021-10-13 PROCEDURE — 160035 HCHG PACU - 1ST 60 MINS PHASE I: Performed by: NEUROLOGICAL SURGERY

## 2021-10-13 PROCEDURE — 500367 HCHG DRAIN KIT, HEMOVAC: Performed by: NEUROLOGICAL SURGERY

## 2021-10-13 PROCEDURE — 160048 HCHG OR STATISTICAL LEVEL 1-5: Performed by: NEUROLOGICAL SURGERY

## 2021-10-13 PROCEDURE — 700101 HCHG RX REV CODE 250: Performed by: NEUROLOGICAL SURGERY

## 2021-10-13 PROCEDURE — 700102 HCHG RX REV CODE 250 W/ 637 OVERRIDE(OP): Performed by: STUDENT IN AN ORGANIZED HEALTH CARE EDUCATION/TRAINING PROGRAM

## 2021-10-13 PROCEDURE — 700111 HCHG RX REV CODE 636 W/ 250 OVERRIDE (IP): Performed by: NEUROLOGICAL SURGERY

## 2021-10-13 PROCEDURE — 700111 HCHG RX REV CODE 636 W/ 250 OVERRIDE (IP): Performed by: STUDENT IN AN ORGANIZED HEALTH CARE EDUCATION/TRAINING PROGRAM

## 2021-10-13 PROCEDURE — A9270 NON-COVERED ITEM OR SERVICE: HCPCS | Performed by: STUDENT IN AN ORGANIZED HEALTH CARE EDUCATION/TRAINING PROGRAM

## 2021-10-13 PROCEDURE — 160009 HCHG ANES TIME/MIN: Performed by: NEUROLOGICAL SURGERY

## 2021-10-13 PROCEDURE — 700101 HCHG RX REV CODE 250: Performed by: STUDENT IN AN ORGANIZED HEALTH CARE EDUCATION/TRAINING PROGRAM

## 2021-10-13 PROCEDURE — 501838 HCHG SUTURE GENERAL: Performed by: NEUROLOGICAL SURGERY

## 2021-10-13 PROCEDURE — 700102 HCHG RX REV CODE 250 W/ 637 OVERRIDE(OP): Performed by: PHYSICIAN ASSISTANT

## 2021-10-13 PROCEDURE — 72020 X-RAY EXAM OF SPINE 1 VIEW: CPT

## 2021-10-13 PROCEDURE — 96365 THER/PROPH/DIAG IV INF INIT: CPT

## 2021-10-13 PROCEDURE — 700101 HCHG RX REV CODE 250: Performed by: PHYSICIAN ASSISTANT

## 2021-10-13 PROCEDURE — 110454 HCHG SHELL REV 250: Performed by: NEUROLOGICAL SURGERY

## 2021-10-13 PROCEDURE — 700111 HCHG RX REV CODE 636 W/ 250 OVERRIDE (IP): Performed by: PHYSICIAN ASSISTANT

## 2021-10-13 PROCEDURE — 160036 HCHG PACU - EA ADDL 30 MINS PHASE I: Performed by: NEUROLOGICAL SURGERY

## 2021-10-13 PROCEDURE — A9270 NON-COVERED ITEM OR SERVICE: HCPCS | Performed by: PHYSICIAN ASSISTANT

## 2021-10-13 PROCEDURE — 160041 HCHG SURGERY MINUTES - EA ADDL 1 MIN LEVEL 4: Performed by: NEUROLOGICAL SURGERY

## 2021-10-13 PROCEDURE — G0378 HOSPITAL OBSERVATION PER HR: HCPCS

## 2021-10-13 PROCEDURE — 700105 HCHG RX REV CODE 258: Performed by: NEUROLOGICAL SURGERY

## 2021-10-13 PROCEDURE — 96375 TX/PRO/DX INJ NEW DRUG ADDON: CPT

## 2021-10-13 PROCEDURE — 160002 HCHG RECOVERY MINUTES (STAT): Performed by: NEUROLOGICAL SURGERY

## 2021-10-13 RX ORDER — DIPHENHYDRAMINE HCL 25 MG
25 TABLET ORAL EVERY 6 HOURS PRN
Status: DISCONTINUED | OUTPATIENT
Start: 2021-10-13 | End: 2021-10-14 | Stop reason: HOSPADM

## 2021-10-13 RX ORDER — DOCUSATE SODIUM 100 MG/1
100 CAPSULE, LIQUID FILLED ORAL 2 TIMES DAILY
Status: DISCONTINUED | OUTPATIENT
Start: 2021-10-13 | End: 2021-10-14 | Stop reason: HOSPADM

## 2021-10-13 RX ORDER — OXYCODONE HYDROCHLORIDE 10 MG/1
10 TABLET ORAL
Status: DISCONTINUED | OUTPATIENT
Start: 2021-10-13 | End: 2021-10-14 | Stop reason: HOSPADM

## 2021-10-13 RX ORDER — GABAPENTIN 300 MG/1
300 CAPSULE ORAL 3 TIMES DAILY
Status: DISCONTINUED | OUTPATIENT
Start: 2021-10-13 | End: 2021-10-14 | Stop reason: HOSPADM

## 2021-10-13 RX ORDER — ONDANSETRON 4 MG/1
4 TABLET, ORALLY DISINTEGRATING ORAL EVERY 4 HOURS PRN
Status: DISCONTINUED | OUTPATIENT
Start: 2021-10-13 | End: 2021-10-14 | Stop reason: HOSPADM

## 2021-10-13 RX ORDER — BUPIVACAINE HYDROCHLORIDE AND EPINEPHRINE 5; 5 MG/ML; UG/ML
INJECTION, SOLUTION EPIDURAL; INTRACAUDAL; PERINEURAL
Status: DISCONTINUED | OUTPATIENT
Start: 2021-10-13 | End: 2021-10-13 | Stop reason: HOSPADM

## 2021-10-13 RX ORDER — SODIUM CHLORIDE, SODIUM LACTATE, POTASSIUM CHLORIDE, CALCIUM CHLORIDE 600; 310; 30; 20 MG/100ML; MG/100ML; MG/100ML; MG/100ML
INJECTION, SOLUTION INTRAVENOUS CONTINUOUS
Status: ACTIVE | OUTPATIENT
Start: 2021-10-13 | End: 2021-10-13

## 2021-10-13 RX ORDER — POLYETHYLENE GLYCOL 3350 17 G/17G
1 POWDER, FOR SOLUTION ORAL 2 TIMES DAILY PRN
Status: DISCONTINUED | OUTPATIENT
Start: 2021-10-13 | End: 2021-10-14 | Stop reason: HOSPADM

## 2021-10-13 RX ORDER — DIPHENHYDRAMINE HYDROCHLORIDE 50 MG/ML
12.5 INJECTION INTRAMUSCULAR; INTRAVENOUS
Status: DISCONTINUED | OUTPATIENT
Start: 2021-10-13 | End: 2021-10-13 | Stop reason: HOSPADM

## 2021-10-13 RX ORDER — ONDANSETRON 2 MG/ML
INJECTION INTRAMUSCULAR; INTRAVENOUS PRN
Status: DISCONTINUED | OUTPATIENT
Start: 2021-10-13 | End: 2021-10-13 | Stop reason: SURG

## 2021-10-13 RX ORDER — HALOPERIDOL 5 MG/ML
1 INJECTION INTRAMUSCULAR
Status: DISCONTINUED | OUTPATIENT
Start: 2021-10-13 | End: 2021-10-13 | Stop reason: HOSPADM

## 2021-10-13 RX ORDER — ONDANSETRON 2 MG/ML
4 INJECTION INTRAMUSCULAR; INTRAVENOUS EVERY 4 HOURS PRN
Status: DISCONTINUED | OUTPATIENT
Start: 2021-10-13 | End: 2021-10-14 | Stop reason: HOSPADM

## 2021-10-13 RX ORDER — METHOCARBAMOL 750 MG/1
750 TABLET, FILM COATED ORAL EVERY 8 HOURS PRN
Status: DISCONTINUED | OUTPATIENT
Start: 2021-10-13 | End: 2021-10-14 | Stop reason: HOSPADM

## 2021-10-13 RX ORDER — AMOXICILLIN 250 MG
1 CAPSULE ORAL
Status: DISCONTINUED | OUTPATIENT
Start: 2021-10-13 | End: 2021-10-14 | Stop reason: HOSPADM

## 2021-10-13 RX ORDER — DEXTROSE MONOHYDRATE, SODIUM CHLORIDE, AND POTASSIUM CHLORIDE 50; 1.49; 9 G/1000ML; G/1000ML; G/1000ML
INJECTION, SOLUTION INTRAVENOUS CONTINUOUS
Status: DISCONTINUED | OUTPATIENT
Start: 2021-10-13 | End: 2021-10-14 | Stop reason: HOSPADM

## 2021-10-13 RX ORDER — AMOXICILLIN 250 MG
1 CAPSULE ORAL NIGHTLY
Status: DISCONTINUED | OUTPATIENT
Start: 2021-10-13 | End: 2021-10-14 | Stop reason: HOSPADM

## 2021-10-13 RX ORDER — OXYCODONE HCL 5 MG/5 ML
5 SOLUTION, ORAL ORAL
Status: COMPLETED | OUTPATIENT
Start: 2021-10-13 | End: 2021-10-13

## 2021-10-13 RX ORDER — DIAZEPAM 5 MG/1
5 TABLET ORAL EVERY 4 HOURS PRN
Status: DISCONTINUED | OUTPATIENT
Start: 2021-10-13 | End: 2021-10-14 | Stop reason: HOSPADM

## 2021-10-13 RX ORDER — ONDANSETRON 2 MG/ML
4 INJECTION INTRAMUSCULAR; INTRAVENOUS
Status: DISCONTINUED | OUTPATIENT
Start: 2021-10-13 | End: 2021-10-13 | Stop reason: HOSPADM

## 2021-10-13 RX ORDER — CEFAZOLIN SODIUM 2 G/100ML
2 INJECTION, SOLUTION INTRAVENOUS EVERY 8 HOURS
Status: COMPLETED | OUTPATIENT
Start: 2021-10-13 | End: 2021-10-14

## 2021-10-13 RX ORDER — LIDOCAINE HYDROCHLORIDE 20 MG/ML
INJECTION, SOLUTION EPIDURAL; INFILTRATION; INTRACAUDAL; PERINEURAL PRN
Status: DISCONTINUED | OUTPATIENT
Start: 2021-10-13 | End: 2021-10-13 | Stop reason: SURG

## 2021-10-13 RX ORDER — DIPHENHYDRAMINE HYDROCHLORIDE 50 MG/ML
25 INJECTION INTRAMUSCULAR; INTRAVENOUS EVERY 6 HOURS PRN
Status: DISCONTINUED | OUTPATIENT
Start: 2021-10-13 | End: 2021-10-14 | Stop reason: HOSPADM

## 2021-10-13 RX ORDER — POLYETHYLENE GLYCOL 3350 17 G/17G
1 POWDER, FOR SOLUTION ORAL DAILY
Status: DISCONTINUED | OUTPATIENT
Start: 2021-10-13 | End: 2021-10-14 | Stop reason: HOSPADM

## 2021-10-13 RX ORDER — CEFAZOLIN SODIUM 1 G/3ML
INJECTION, POWDER, FOR SOLUTION INTRAMUSCULAR; INTRAVENOUS PRN
Status: DISCONTINUED | OUTPATIENT
Start: 2021-10-13 | End: 2021-10-13 | Stop reason: SURG

## 2021-10-13 RX ORDER — LEVOTHYROXINE SODIUM 0.05 MG/1
50 TABLET ORAL
Status: DISCONTINUED | OUTPATIENT
Start: 2021-10-14 | End: 2021-10-14 | Stop reason: HOSPADM

## 2021-10-13 RX ORDER — ACETAMINOPHEN 500 MG
1000 TABLET ORAL ONCE
Status: COMPLETED | OUTPATIENT
Start: 2021-10-13 | End: 2021-10-13

## 2021-10-13 RX ORDER — ENEMA 19; 7 G/133ML; G/133ML
1 ENEMA RECTAL
Status: DISCONTINUED | OUTPATIENT
Start: 2021-10-13 | End: 2021-10-14 | Stop reason: HOSPADM

## 2021-10-13 RX ORDER — GABAPENTIN 100 MG/1
300 CAPSULE ORAL 3 TIMES DAILY
COMMUNITY
End: 2022-02-08

## 2021-10-13 RX ORDER — HYDRALAZINE HYDROCHLORIDE 20 MG/ML
5 INJECTION INTRAMUSCULAR; INTRAVENOUS
Status: DISCONTINUED | OUTPATIENT
Start: 2021-10-13 | End: 2021-10-13 | Stop reason: HOSPADM

## 2021-10-13 RX ORDER — ACETAMINOPHEN 500 MG
1000 TABLET ORAL EVERY 6 HOURS PRN
Status: DISCONTINUED | OUTPATIENT
Start: 2021-10-18 | End: 2021-10-14 | Stop reason: HOSPADM

## 2021-10-13 RX ORDER — LABETALOL HYDROCHLORIDE 5 MG/ML
5 INJECTION, SOLUTION INTRAVENOUS
Status: DISCONTINUED | OUTPATIENT
Start: 2021-10-13 | End: 2021-10-13 | Stop reason: HOSPADM

## 2021-10-13 RX ORDER — CALCIUM CARBONATE 500 MG/1
500 TABLET, CHEWABLE ORAL 2 TIMES DAILY
Status: DISCONTINUED | OUTPATIENT
Start: 2021-10-13 | End: 2021-10-14 | Stop reason: HOSPADM

## 2021-10-13 RX ORDER — ROCURONIUM BROMIDE 10 MG/ML
INJECTION, SOLUTION INTRAVENOUS PRN
Status: DISCONTINUED | OUTPATIENT
Start: 2021-10-13 | End: 2021-10-13 | Stop reason: SURG

## 2021-10-13 RX ORDER — OXYCODONE HCL 5 MG/5 ML
10 SOLUTION, ORAL ORAL
Status: COMPLETED | OUTPATIENT
Start: 2021-10-13 | End: 2021-10-13

## 2021-10-13 RX ORDER — ACETAMINOPHEN 500 MG
1000 TABLET ORAL EVERY 6 HOURS
Status: DISCONTINUED | OUTPATIENT
Start: 2021-10-13 | End: 2021-10-14 | Stop reason: HOSPADM

## 2021-10-13 RX ORDER — SODIUM CHLORIDE, SODIUM LACTATE, POTASSIUM CHLORIDE, CALCIUM CHLORIDE 600; 310; 30; 20 MG/100ML; MG/100ML; MG/100ML; MG/100ML
INJECTION, SOLUTION INTRAVENOUS CONTINUOUS
Status: DISCONTINUED | OUTPATIENT
Start: 2021-10-13 | End: 2021-10-13 | Stop reason: HOSPADM

## 2021-10-13 RX ORDER — BISACODYL 10 MG
10 SUPPOSITORY, RECTAL RECTAL
Status: DISCONTINUED | OUTPATIENT
Start: 2021-10-13 | End: 2021-10-14 | Stop reason: HOSPADM

## 2021-10-13 RX ORDER — HYDROMORPHONE HYDROCHLORIDE 1 MG/ML
0.5 INJECTION, SOLUTION INTRAMUSCULAR; INTRAVENOUS; SUBCUTANEOUS
Status: DISCONTINUED | OUTPATIENT
Start: 2021-10-13 | End: 2021-10-14 | Stop reason: HOSPADM

## 2021-10-13 RX ORDER — LABETALOL HYDROCHLORIDE 5 MG/ML
10 INJECTION, SOLUTION INTRAVENOUS
Status: DISCONTINUED | OUTPATIENT
Start: 2021-10-13 | End: 2021-10-14 | Stop reason: HOSPADM

## 2021-10-13 RX ORDER — CEFAZOLIN SODIUM 1 G/3ML
INJECTION, POWDER, FOR SOLUTION INTRAMUSCULAR; INTRAVENOUS
Status: DISCONTINUED | OUTPATIENT
Start: 2021-10-13 | End: 2021-10-13 | Stop reason: HOSPADM

## 2021-10-13 RX ORDER — OXYCODONE HYDROCHLORIDE 5 MG/1
5 TABLET ORAL
Status: DISCONTINUED | OUTPATIENT
Start: 2021-10-13 | End: 2021-10-14 | Stop reason: HOSPADM

## 2021-10-13 RX ORDER — DEXAMETHASONE SODIUM PHOSPHATE 4 MG/ML
INJECTION, SOLUTION INTRA-ARTICULAR; INTRALESIONAL; INTRAMUSCULAR; INTRAVENOUS; SOFT TISSUE PRN
Status: DISCONTINUED | OUTPATIENT
Start: 2021-10-13 | End: 2021-10-13 | Stop reason: SURG

## 2021-10-13 RX ORDER — CYCLOBENZAPRINE HCL 10 MG
10 TABLET ORAL EVERY 8 HOURS PRN
Status: DISCONTINUED | OUTPATIENT
Start: 2021-10-13 | End: 2021-10-14 | Stop reason: HOSPADM

## 2021-10-13 RX ORDER — ACETAMINOPHEN 500 MG
500 TABLET ORAL
Status: ON HOLD | COMMUNITY
End: 2021-10-14

## 2021-10-13 RX ADMIN — CEFAZOLIN 2 G: 330 INJECTION, POWDER, FOR SOLUTION INTRAMUSCULAR; INTRAVENOUS at 11:16

## 2021-10-13 RX ADMIN — OXYCODONE HYDROCHLORIDE 10 MG: 5 SOLUTION ORAL at 13:28

## 2021-10-13 RX ADMIN — EPHEDRINE SULFATE 10 MG: 50 INJECTION, SOLUTION INTRAVENOUS at 11:55

## 2021-10-13 RX ADMIN — FENTANYL CITRATE 25 MCG: 50 INJECTION INTRAMUSCULAR; INTRAVENOUS at 13:26

## 2021-10-13 RX ADMIN — ROCURONIUM BROMIDE 60 MG: 10 INJECTION, SOLUTION INTRAVENOUS at 11:16

## 2021-10-13 RX ADMIN — CALCIUM CARBONATE 500 MG: 500 TABLET, CHEWABLE ORAL at 17:06

## 2021-10-13 RX ADMIN — PROPOFOL 120 MG: 10 INJECTION, EMULSION INTRAVENOUS at 11:16

## 2021-10-13 RX ADMIN — ONDANSETRON 4 MG: 2 INJECTION INTRAMUSCULAR; INTRAVENOUS at 12:32

## 2021-10-13 RX ADMIN — LIDOCAINE HYDROCHLORIDE 50 MG: 20 INJECTION, SOLUTION EPIDURAL; INFILTRATION; INTRACAUDAL at 11:16

## 2021-10-13 RX ADMIN — FENTANYL CITRATE 50 MCG: 50 INJECTION, SOLUTION INTRAMUSCULAR; INTRAVENOUS at 12:08

## 2021-10-13 RX ADMIN — DEXAMETHASONE SODIUM PHOSPHATE 4 MG: 4 INJECTION, SOLUTION INTRA-ARTICULAR; INTRALESIONAL; INTRAMUSCULAR; INTRAVENOUS; SOFT TISSUE at 11:16

## 2021-10-13 RX ADMIN — DOCUSATE SODIUM 100 MG: 100 CAPSULE ORAL at 17:06

## 2021-10-13 RX ADMIN — DOCUSATE SODIUM 50 MG AND SENNOSIDES 8.6 MG 1 TABLET: 8.6; 5 TABLET, FILM COATED ORAL at 21:00

## 2021-10-13 RX ADMIN — PROPOFOL 20 MG: 10 INJECTION, EMULSION INTRAVENOUS at 12:08

## 2021-10-13 RX ADMIN — ACETAMINOPHEN 1000 MG: 500 TABLET ORAL at 17:06

## 2021-10-13 RX ADMIN — FENTANYL CITRATE 100 MCG: 50 INJECTION, SOLUTION INTRAMUSCULAR; INTRAVENOUS at 11:16

## 2021-10-13 RX ADMIN — LIDOCAINE HYDROCHLORIDE 0.5 ML: 10 INJECTION, SOLUTION EPIDURAL; INFILTRATION; INTRACAUDAL; PERINEURAL at 09:46

## 2021-10-13 RX ADMIN — GABAPENTIN 300 MG: 300 CAPSULE ORAL at 17:06

## 2021-10-13 RX ADMIN — EPHEDRINE SULFATE 10 MG: 50 INJECTION, SOLUTION INTRAVENOUS at 11:32

## 2021-10-13 RX ADMIN — SUGAMMADEX 200 MG: 100 INJECTION, SOLUTION INTRAVENOUS at 12:36

## 2021-10-13 RX ADMIN — ACETAMINOPHEN 1000 MG: 500 TABLET ORAL at 09:46

## 2021-10-13 RX ADMIN — CEFAZOLIN SODIUM 2 G: 2 INJECTION, SOLUTION INTRAVENOUS at 18:23

## 2021-10-13 RX ADMIN — SODIUM CHLORIDE, POTASSIUM CHLORIDE, SODIUM LACTATE AND CALCIUM CHLORIDE: 600; 310; 30; 20 INJECTION, SOLUTION INTRAVENOUS at 09:47

## 2021-10-13 RX ADMIN — POTASSIUM CHLORIDE, DEXTROSE MONOHYDRATE AND SODIUM CHLORIDE: 150; 5; 900 INJECTION, SOLUTION INTRAVENOUS at 17:10

## 2021-10-13 ASSESSMENT — PAIN DESCRIPTION - PAIN TYPE
TYPE: SURGICAL PAIN
TYPE: ACUTE PAIN;SURGICAL PAIN
TYPE: CHRONIC PAIN

## 2021-10-13 ASSESSMENT — PATIENT HEALTH QUESTIONNAIRE - PHQ9
2. FEELING DOWN, DEPRESSED, IRRITABLE, OR HOPELESS: NOT AT ALL
SUM OF ALL RESPONSES TO PHQ9 QUESTIONS 1 AND 2: 0
1. LITTLE INTEREST OR PLEASURE IN DOING THINGS: NOT AT ALL

## 2021-10-13 ASSESSMENT — COGNITIVE AND FUNCTIONAL STATUS - GENERAL
DRESSING REGULAR LOWER BODY CLOTHING: A LITTLE
SUGGESTED CMS G CODE MODIFIER DAILY ACTIVITY: CJ
CLIMB 3 TO 5 STEPS WITH RAILING: A LITTLE
DAILY ACTIVITIY SCORE: 22
SUGGESTED CMS G CODE MODIFIER MOBILITY: CK
MOVING TO AND FROM BED TO CHAIR: A LITTLE
MOBILITY SCORE: 19
MOVING FROM LYING ON BACK TO SITTING ON SIDE OF FLAT BED: A LITTLE
WALKING IN HOSPITAL ROOM: A LITTLE
TOILETING: A LITTLE
STANDING UP FROM CHAIR USING ARMS: A LITTLE

## 2021-10-13 ASSESSMENT — LIFESTYLE VARIABLES
HAVE PEOPLE ANNOYED YOU BY CRITICIZING YOUR DRINKING: NO
CONSUMPTION TOTAL: NEGATIVE
HOW MANY TIMES IN THE PAST YEAR HAVE YOU HAD 5 OR MORE DRINKS IN A DAY: 0
TOTAL SCORE: 0
TOTAL SCORE: 0
AVERAGE NUMBER OF DAYS PER WEEK YOU HAVE A DRINK CONTAINING ALCOHOL: 0
ALCOHOL_USE: NO
EVER FELT BAD OR GUILTY ABOUT YOUR DRINKING: NO
TOTAL SCORE: 0
EVER HAD A DRINK FIRST THING IN THE MORNING TO STEADY YOUR NERVES TO GET RID OF A HANGOVER: NO
ON A TYPICAL DAY WHEN YOU DRINK ALCOHOL HOW MANY DRINKS DO YOU HAVE: 0
HAVE YOU EVER FELT YOU SHOULD CUT DOWN ON YOUR DRINKING: NO
DOES PATIENT WANT TO STOP DRINKING: NO

## 2021-10-13 ASSESSMENT — PAIN SCALES - GENERAL: PAIN_LEVEL: 1

## 2021-10-13 ASSESSMENT — FIBROSIS 4 INDEX: FIB4 SCORE: 0.85

## 2021-10-13 NOTE — ANESTHESIA PROCEDURE NOTES
Airway    Date/Time: 10/13/2021 11:16 AM  Performed by: Conchita Daniel M.D.  Authorized by: Conchita Daniel M.D.     Location:  OR  Urgency:  Elective  Indications for Airway Management:  Anesthesia      Spontaneous Ventilation: absent    Sedation Level:  Deep  Preoxygenated: Yes    Patient Position:  Sniffing  Final Airway Type:  Endotracheal airway  Final Endotracheal Airway:  ETT  Cuffed: Yes    Technique Used for Successful ETT Placement:  Direct laryngoscopy    Insertion Site:  Oral  Blade Type:  Ashley  Laryngoscope Blade/Videolaryngoscope Blade Size:  3  ETT Size (mm):  7.5  Measured from:  Teeth  ETT to Teeth (cm):  21  Placement Verified by: auscultation and capnometry    Cormack-Lehane Classification:  Grade I - full view of glottis  Number of Attempts at Approach:  1

## 2021-10-13 NOTE — PROGRESS NOTES
Pt is aaox4, resting comfortably in bed in no acute distress. Respirations are equal and unlabored. Her pain is at a tolerable level at this time and is repositioned as needed. She is drinking po fluids without complaint of n/v. CMS is intact and informs she does not have any pain in her lower extremities at this time. Will continue to monitor.

## 2021-10-13 NOTE — ANESTHESIA TIME REPORT
Anesthesia Start and Stop Event Times     Date Time Event    10/13/2021 1058 Ready for Procedure     1112 Anesthesia Start     1256 Anesthesia Stop        Responsible Staff  10/13/21    Name Role Begin End    Conchita Daniel M.D. Anesth 1112 1256        Preop Diagnosis (Free Text):  Pre-op Diagnosis     LUMBAR SPONDYLOSIS        Preop Diagnosis (Codes):    Premium Reason  Non-Premium    Comments:

## 2021-10-13 NOTE — ANESTHESIA PREPROCEDURE EVALUATION
Relevant Problems   ANESTHESIA   (positive) Sleep apnea      NEURO   (positive) History of ankle fracture   (positive) History of neutropenia      GI   (positive) GERD (gastroesophageal reflux disease)      ENDO   (positive) Hypothyroid      Other   (positive) History of septic joint of right knee joint         Physical Exam    Airway   Mallampati: I  TM distance: >3 FB  Neck ROM: full       Cardiovascular - normal exam  Rhythm: regular  Rate: normal  (-) murmur     Dental - normal exam           Pulmonary - normal exam  Breath sounds clear to auscultation     Abdominal    Neurological - normal exam               Anesthesia Plan    ASA 2       Plan - general       Airway plan will be ETT          Induction: intravenous    Postoperative Plan: Postoperative administration of opioids is intended.    Pertinent diagnostic labs and testing reviewed    Informed Consent:    Anesthetic plan and risks discussed with patient.    Use of blood products discussed with: patient whom consented to blood products.

## 2021-10-13 NOTE — PROGRESS NOTES
Patient in pre-op, assessment completed, patient and  Bill updated on plan of care, all questions answered, no further needs at this time, call light within reach.

## 2021-10-13 NOTE — OP REPORT
DATE OF SERVICE:  10/13/2021     NEUROSURGERY OPERATIVE REPORT     SURGEON:  Cosmo Hyatt MD     FIRST ASSISTANT:  Physician assistant, Ariana Escamilla PA-C     PREOPERATIVE DIAGNOSES:  Stable spondylolisthesis at L4-5 with facet   hypertrophy and lumbar stenosis.     POSTOPERATIVE DIAGNOSES:  Stable spondylolisthesis at L4-5 with facet   hypertrophy and lumbar stenosis.     PROCEDURES:  L4-L5 complete laminectomy; L3-4, L4-5 and L5-S1 bilateral medial   facetectomies and bilateral foraminotomies.     COMPLICATIONS:  None.     BLOOD LOSS:  Less than 100 mL     DRAINS:  Medium Hemovac.     BRIEF HISTORY OF PRESENT ILLNESS:  This is a lady that presented to   neurosurgery clinic after extensive conservative management.  She subsequently   had symptoms of neurogenic claudication, which were quite severe for her.    She had a complete effacement of the fat signal around her thecal sac at the   L4-5 level due to significant facet hypertrophy with lateral recess stenosis   at L3-4, L4-5 and L5-S1.  Because of this, she wished to move forward with a   permanent fix for her persistent and progressive disability.     CONSENT:  Consent was obtained from her  apprising the risks,   complications, indications of surgery which included but not limited to, CSF   leak and hypermobility at the hypertrophied segment with need for further   surgery in future.  She agreed and consented.     PROCEDURE IN DETAIL:  The patient was brought to the operating room, she was   placed under general anesthesia with endotracheal intubation.  She was then   placed in a prone position on a Shiraz frame on a Lavon table.  We then   obtained a lateral fluoro shot for localization, clipped, prepped and draped   in sterile usual fashion for a lower lumbar surgery.  We then infiltrated her   back with lidocaine with epinephrine and then performed a surgical time-out   confirming correct side, site, procedure and patient with all  faculty and   staff agreeing.  We then incised in the midline over the L4-L5 spinous process   and dissected an avascular plane, exposing the spinous process of L4-L5, the   superior half of S1, inferior half of L3 and then dissected out the pars and   ensuring that we did not violate the facet joints.  We then obtained a lateral   fluoro shot again with a spinous process clamp between the L3-4 spinous   process.  Once we had localized, we then removed the interspinous ligament at   the inferior half of L3, the spinous process of L4, spinous process of L5 and   then part of the spinous process of S1.  We then sequentially used the AM-8   drill bit to eggshell the lamina at L4 and L5 and then used a series of   Kerrisons to remove the entirety of the lamina at L4 and L5.  There was   significant scarring between the L4-5 disk interspace due to facet hypertrophy   and adhesions between the ligamentum flavum, facet capsules and the dura.  We   spent substantial amounts of time dissecting this off and then removing the   lateral recess stenosis at L4-5.  We did a medial facetectomy at L3-4, L4-5   and L5-S1 and then did a bilateral foraminotomy at L3-4, L4-5 and L5-S1 until   we could palpate the nerve root and see that it was free of debris going out   into the foramen.  At this point, we irrigated the wound out with bacitracin   irrigation, flosealed the lateral gutters, checked for adequate hemostasis,   placed a medium Hemovac out superiorly and closed the wound in layers using 0   Vicryls on fascia, 2-0 Vicryls on dermis and then Monocryl with Dermabond on   the skin.  All counts were correct x2.  My physician assistant was necessary   as she assisted with opening, closing, exposure and then protecting the thecal   sac and neural elements.  I was present for the entirety of the case.        ______________________________  Cosmo Hyatt MD    MKM/JAN    DD:  10/13/2021 13:28  DT:  10/13/2021 15:28    Job#:   [id number]

## 2021-10-13 NOTE — ANESTHESIA POSTPROCEDURE EVALUATION
Patient: Bernadette Paul    Procedure Summary     Date: 10/13/21 Room / Location: [facility] 08 / SURGERY [facility]    Anesthesia Start: 1112 Anesthesia Stop: 1256    Procedures:       POSTERIOR LAMINECTOMY, SPINE, LUMBAR, - L4-5 (N/A Spine Lumbar)      FORAMINOTOMY, SPINE - L4-5, L5-S1 AND MEDIAL FACETECTOMY (N/A Spine Lumbar) Diagnosis: (LUMBAR SPONDYLOSIS)    Surgeons: Cosmo Hyatt M.D. Responsible Provider: Conchita Daniel M.D.    Anesthesia Type: general ASA Status: 2          Final Anesthesia Type: general  Last vitals  BP   Blood Pressure : 157/70    Temp   36.1 °C (96.9 °F)    Pulse   64   Resp   16    SpO2   95 %      Anesthesia Post Evaluation    Patient location during evaluation: PACU  Patient participation: complete - patient participated  Level of consciousness: awake and alert  Pain score: 1    Airway patency: patent  Anesthetic complications: no  Cardiovascular status: hemodynamically stable  Respiratory status: acceptable  Hydration status: euvolemic    PONV: none          No complications documented.     Nurse Pain Score: 3 (NPRS)

## 2021-10-14 ENCOUNTER — PHARMACY VISIT (OUTPATIENT)
Dept: PHARMACY | Facility: MEDICAL CENTER | Age: 76
End: 2021-10-14
Payer: COMMERCIAL

## 2021-10-14 VITALS
HEIGHT: 65 IN | RESPIRATION RATE: 18 BRPM | SYSTOLIC BLOOD PRESSURE: 110 MMHG | WEIGHT: 135.14 LBS | TEMPERATURE: 96.8 F | BODY MASS INDEX: 22.52 KG/M2 | DIASTOLIC BLOOD PRESSURE: 51 MMHG | OXYGEN SATURATION: 98 % | HEART RATE: 62 BPM

## 2021-10-14 PROCEDURE — RXMED WILLOW AMBULATORY MEDICATION CHARGE: Performed by: NURSE PRACTITIONER

## 2021-10-14 PROCEDURE — A9270 NON-COVERED ITEM OR SERVICE: HCPCS | Performed by: PHYSICIAN ASSISTANT

## 2021-10-14 PROCEDURE — 700102 HCHG RX REV CODE 250 W/ 637 OVERRIDE(OP): Performed by: PHYSICIAN ASSISTANT

## 2021-10-14 PROCEDURE — 700111 HCHG RX REV CODE 636 W/ 250 OVERRIDE (IP): Performed by: PHYSICIAN ASSISTANT

## 2021-10-14 PROCEDURE — 700101 HCHG RX REV CODE 250: Performed by: PHYSICIAN ASSISTANT

## 2021-10-14 PROCEDURE — 97535 SELF CARE MNGMENT TRAINING: CPT

## 2021-10-14 PROCEDURE — G0378 HOSPITAL OBSERVATION PER HR: HCPCS

## 2021-10-14 PROCEDURE — 97162 PT EVAL MOD COMPLEX 30 MIN: CPT

## 2021-10-14 PROCEDURE — 97165 OT EVAL LOW COMPLEX 30 MIN: CPT

## 2021-10-14 RX ORDER — TIZANIDINE 4 MG/1
4 TABLET ORAL EVERY 8 HOURS
Qty: 20 TABLET | Refills: 0
Start: 2021-10-14 | End: 2022-01-26

## 2021-10-14 RX ORDER — OXYCODONE HYDROCHLORIDE AND ACETAMINOPHEN 5; 325 MG/1; MG/1
TABLET ORAL
Qty: 42 TABLET | Refills: 0 | OUTPATIENT
Start: 2021-10-14 | End: 2022-01-26

## 2021-10-14 RX ORDER — TIZANIDINE 4 MG/1
TABLET ORAL
Qty: 21 TABLET | Refills: 1 | Status: SHIPPED | OUTPATIENT
Start: 2021-10-14 | End: 2022-02-08

## 2021-10-14 RX ORDER — OXYCODONE HYDROCHLORIDE AND ACETAMINOPHEN 5; 325 MG/1; MG/1
1 TABLET ORAL EVERY 4 HOURS PRN
Qty: 15 TABLET | Refills: 0
Start: 2021-10-14 | End: 2021-10-21

## 2021-10-14 RX ADMIN — POTASSIUM CHLORIDE, DEXTROSE MONOHYDRATE AND SODIUM CHLORIDE: 150; 5; 900 INJECTION, SOLUTION INTRAVENOUS at 08:05

## 2021-10-14 RX ADMIN — DOCUSATE SODIUM 100 MG: 100 CAPSULE ORAL at 05:37

## 2021-10-14 RX ADMIN — LEVOTHYROXINE SODIUM 50 MCG: 0.05 TABLET ORAL at 05:37

## 2021-10-14 RX ADMIN — CALCIUM CARBONATE 500 MG: 500 TABLET, CHEWABLE ORAL at 05:36

## 2021-10-14 RX ADMIN — POLYETHYLENE GLYCOL 3350 1 PACKET: 17 POWDER, FOR SOLUTION ORAL at 05:36

## 2021-10-14 RX ADMIN — ACETAMINOPHEN 1000 MG: 500 TABLET ORAL at 00:11

## 2021-10-14 RX ADMIN — ACETAMINOPHEN 1000 MG: 500 TABLET ORAL at 05:36

## 2021-10-14 RX ADMIN — GABAPENTIN 300 MG: 300 CAPSULE ORAL at 05:36

## 2021-10-14 RX ADMIN — CEFAZOLIN SODIUM 2 G: 2 INJECTION, SOLUTION INTRAVENOUS at 03:12

## 2021-10-14 RX ADMIN — ACETAMINOPHEN 1000 MG: 500 TABLET ORAL at 13:42

## 2021-10-14 RX ADMIN — GABAPENTIN 300 MG: 300 CAPSULE ORAL at 13:42

## 2021-10-14 ASSESSMENT — COGNITIVE AND FUNCTIONAL STATUS - GENERAL
DAILY ACTIVITIY SCORE: 22
MOVING TO AND FROM BED TO CHAIR: A LITTLE
STANDING UP FROM CHAIR USING ARMS: A LITTLE
SUGGESTED CMS G CODE MODIFIER DAILY ACTIVITY: CJ
SUGGESTED CMS G CODE MODIFIER MOBILITY: CK
MOBILITY SCORE: 18
MOVING FROM LYING ON BACK TO SITTING ON SIDE OF FLAT BED: A LITTLE
WALKING IN HOSPITAL ROOM: A LITTLE
TURNING FROM BACK TO SIDE WHILE IN FLAT BAD: A LITTLE
CLIMB 3 TO 5 STEPS WITH RAILING: A LITTLE
DRESSING REGULAR LOWER BODY CLOTHING: A LITTLE
HELP NEEDED FOR BATHING: A LITTLE

## 2021-10-14 ASSESSMENT — PAIN DESCRIPTION - PAIN TYPE
TYPE: ACUTE PAIN;SURGICAL PAIN
TYPE: SURGICAL PAIN

## 2021-10-14 ASSESSMENT — GAIT ASSESSMENTS
GAIT LEVEL OF ASSIST: SUPERVISED
DISTANCE (FEET): 20
DEVIATION: BRADYKINETIC;SHUFFLED GAIT;DECREASED BASE OF SUPPORT
DISTANCE (FEET): 50
ASSISTIVE DEVICE: FRONT WHEEL WALKER

## 2021-10-14 ASSESSMENT — ACTIVITIES OF DAILY LIVING (ADL): TOILETING: INDEPENDENT

## 2021-10-14 NOTE — CARE PLAN
The patient is Stable - Low risk of patient condition declining or worsening    Shift Goals  Clinical Goals: Pain management, rest  Patient Goals: Pain management, rest  Family Goals: No family present    Problem: Knowledge Deficit - Standard  Goal: Patient and family/care givers will demonstrate understanding of plan of care, disease process/condition, diagnostic tests and medications  Outcome: Progressing     Problem: Pain - Standard  Goal: Alleviation of pain or a reduction in pain to the patient’s comfort goal  Outcome: Progressing       Progress made toward(s) clinical / shift goals:      Patient updated on current plan of care and verbalizes understanding.   Pain is controlled with current medications per MAR.     Patient is not progressing towards the following goals:

## 2021-10-14 NOTE — DISCHARGE PLANNING
Meds-to-Beds: Discharge prescription orders listed below delivered to patient in discharge lounge. RN Caren notified. Patient counseled. Patient elected to have co-payment billed to patient account.      Current Outpatient Medications   Medication Sig Dispense Refill   • oxyCODONE-acetaminophen (PERCOCET) 5-325 MG Tab Take 1 tablet every 4-6 hours by oral route as needed for 7 days. 42 Tablet 0   • tizanidine (ZANAFLEX) 4 MG Tab Take 1 tablet every 8 hours by oral route as needed for 7 days. 21 Tablet 1      Anjali Harrison, PharmD

## 2021-10-14 NOTE — CARE PLAN
The patient is Stable - Low risk of patient condition declining or worsening    Shift Goals  Clinical Goals: OOB Activity  Patient Goals: Discharge Home   Family Goals: No family present    Progress made toward(s) clinical / shift goals:  Fall education provided at bedside, pt verbalized understanding. Call light and personal items within reach       Problem: Knowledge Deficit - Standard  Goal: Patient and family/care givers will demonstrate understanding of plan of care, disease process/condition, diagnostic tests and medications  Outcome: Progressing     Problem: Pain - Standard  Goal: Alleviation of pain or a reduction in pain to the patient’s comfort goal  Outcome: Progressing

## 2021-10-14 NOTE — PROGRESS NOTES
Neurosurgery Progress Note    Subjective:  No events over night  BLE radiculopathy improving    Exam:  A/Ox4  BLE 5/5 with encouragement, except left IP/quad 4+5  Dressing CDI  hemovac 0/8hrs    BP  Min: 107/56  Max: 156/72  Pulse  Av.4  Min: 62  Max: 91  Resp  Av.9  Min: 12  Max: 19  Temp  Av.3 °C (97.4 °F)  Min: 36 °C (96.8 °F)  Max: 36.7 °C (98.1 °F)  SpO2  Av.2 %  Min: 92 %  Max: 99 %    No data recorded                      Intake/Output                       10/13/21 0700 - 10/14/21 0659 10/14/21 0700 - 10/15/21 0659      Total  Total                 Intake    P.O.  --  400 400  --  -- --    P.O. -- 400 400 -- -- --    I.V.  700  283.3 983.3  --  -- --    Volume (mL) (dextrose 5 % and 0.9 % NaCl with KCl 20 mEq infusion) -- 283.3 283.3 -- -- --    Volume (mL) (lactated ringers infusion) 700 -- 700 -- -- --    IV Piggyback  100  -- 100  --  -- --    Volume (mL) (ceFAZolin in dextrose (ANCEF) IVPB premix 2 g) 100 -- 100 -- -- --    Total Intake 800 683.3 1483.3 -- -- --       Output    Urine  --  -- --  0  -- 0    Number of Times Voided -- 1 x 1 x -- -- --    Urine Void (mL) -- -- -- 0 -- 0    Drains  30  60 90  --  -- --    Output (mL) (Closed/Suction Drain Back Hemovac) 30 60 90 -- -- --    Stool  --  -- --  --  -- --    Number of Times Stooled -- -- -- 0 x -- 0 x    Total Output 30 60 90 0 -- 0       Net I/O     770 623.3 1393.3 0 -- 0            Intake/Output Summary (Last 24 hours) at 10/14/2021 0905  Last data filed at 10/14/2021 0713  Gross per 24 hour   Intake 1483.33 ml   Output 90 ml   Net 1393.33 ml            • gabapentin  300 mg TID   • levothyroxine  50 mcg AM ES   • polyethylene glycol/lytes  1 Packet DAILY   • Pharmacy Consult Request  1 Each PHARMACY TO DOSE   • MD ALERT...DO NOT ADMINISTER NSAIDS or ASPIRIN unless ORDERED By Neurosurgery  1 Each PRN   • docusate sodium  100 mg BID   • senna-docusate  1 Tablet Nightly   • senna-docusate   1 Tablet Q24HRS PRN   • polyethylene glycol/lytes  1 Packet BID PRN   • magnesium hydroxide  30 mL QDAY PRN   • bisacodyl  10 mg Q24HRS PRN   • sodium phosphate  1 Each Once PRN   • dextrose 5 % and 0.9 % NaCl with KCl 20 mEq   Continuous   • acetaminophen  1,000 mg Q6HRS    Followed by   • [START ON 10/18/2021] acetaminophen  1,000 mg Q6HRS PRN   • oxyCODONE immediate-release  5 mg Q3HRS PRN    Or   • oxyCODONE immediate-release  10 mg Q3HRS PRN    Or   • HYDROmorphone  0.5 mg Q3HRS PRN   • diphenhydrAMINE  25 mg Q6HRS PRN    Or   • diphenhydrAMINE  25 mg Q6HRS PRN   • ondansetron  4 mg Q4HRS PRN   • ondansetron  4 mg Q4HRS PRN   • methocarbamol  750 mg Q8HRS PRN    Or   • cyclobenzaprine  10 mg Q8HRS PRN    Or   • diazePAM  5 mg Q4HRS PRN   • labetalol  10 mg Q HOUR PRN   • benzocaine-menthol  1 Lozenge Q2HRS PRN   • calcium carbonate  500 mg BID       Assessment and Plan:  Hospital day #2  POD #1 L4-5 lami, L4-S1 facet/framinotomies  Prophylactic anticoagulation: no         Start date/time: na    Doing well  Pain controlled  Pending PT/OT eval  Remove drain if still <30ml/8hrs after mobilization  Has a walker at home  VSS    Should be able to dC this afternoon once cleared by PT/OT and drain comes out  Will send prescriptions to [facility] pharmacy electronically

## 2021-10-14 NOTE — DISCHARGE SUMMARY
Discharge Summary    CHIEF COMPLAINT ON ADMISSION  No chief complaint on file.      Reason for Admission  LUMBAR SPONDYLOSIS     Admission Date  10/13/2021    CODE STATUS  Full Code    HPI & HOSPITAL COURSE  This is a 76 y.o. female here with   Lumbar radiculopathy.  Patient presented for elective spine surgery, which proceeded without complication.  She recovered well as anticipated will be discharged home when meeting criteria.  No notes on file    Therefore, she is discharged in good and stable condition to home with close outpatient follow-up.    The patient recovered much more quickly than anticipated on admission.    Discharge Date   10/14/21     FOLLOW UP ITEMS POST DISCHARGE   Keep scheduled appointment at [facility] neurosurgery group    DISCHARGE DIAGNOSES  Active Problems:    * No active hospital problems. *  Resolved Problems:    * No resolved hospital problems. *      FOLLOW UP  Future Appointments   Date Time Provider Department Center   11/29/2021  8:30 AM Eron Flowers M.D. [facility]     No follow-up provider specified.    MEDICATIONS ON DISCHARGE     Medication List      START taking these medications      Instructions   oxyCODONE-acetaminophen 5-325 MG Tabs  Commonly known as: PERCOCET   Take 1 Tablet by mouth every four hours as needed for up to 7 days.  Dose: 1 Tablet        CHANGE how you take these medications      Instructions   tizanidine 4 MG Tabs  What changed: Another medication with the same name was removed. Continue taking this medication, and follow the directions you see here.  Commonly known as: ZANAFLEX   Take 1 Tablet by mouth every 8 hours.  Dose: 4 mg        CONTINUE taking these medications      Instructions   gabapentin 100 MG Caps  Commonly known as: NEURONTIN   Take 300 mg by mouth 3 times a day.  Dose: 300 mg     levothyroxine 50 MCG Tabs  Commonly known as: SYNTHROID   Take 50 mcg by mouth every morning on an empty stomach. Pt takes 100 MCG only on Sunday  Al other  days 50 MCG  Dose: 50 mcg     polyethylene glycol/lytes 17 g Pack  Commonly known as: MIRALAX   Take 17 g by mouth every day.  Dose: 17 g     PROBIOTIC PO   Take 1 capsule by mouth every evening.  Dose: 1 Capsule     VITAMIN B 12 PO   Take 1 tablet by mouth every day.  Dose: 1 Tablet     VITAMIN C PO   Take 1 tablet by mouth every day.  Dose: 1 Tablet        STOP taking these medications    acetaminophen 500 MG Tabs  Commonly known as: TYLENOL     naproxen 500 MG Tabs  Commonly known as: NAPROSYN          Rx for Percocet 5/325 mg 1 tablet every 4 to 6 hours as needed x7 days number 42 tablets no refills and tizanidine 4 mg 3 times daily as needed x7 days were electronically prescribed to [facility] pharmacy in [facility].   reviewed, low risk per ORT, informed consent obtained.    Allergies  Allergies   Allergen Reactions   • Asa [Aspirin]      Epistaxis         DIET  Orders Placed This Encounter   Procedures   • Diet Order Diet: Full Liquid     Standing Status:   Standing     Number of Occurrences:   1     Order Specific Question:   Diet:     Answer:   Full Liquid [11]       ACTIVITY  Light duty.  Weight bearing as tolerated    CONSULTATIONS       PROCEDURES   L4-5 laminectomy, L3-4 through L5-S1 bilateral medial facetectomies and bilateral foraminotomies  Dr. Hyatt, 10-13-21    LABORATORY  Lab Results   Component Value Date    SODIUM 135 10/06/2021    POTASSIUM 4.5 10/06/2021    CHLORIDE 98 10/06/2021    CO2 25 10/06/2021    GLUCOSE 91 10/06/2021    BUN 19 10/06/2021    CREATININE 0.60 10/06/2021    CREATININE 0.9 06/01/2005        Lab Results   Component Value Date    WBC 6.6 10/06/2021    HEMOGLOBIN 14.7 10/06/2021    HEMATOCRIT 43.8 10/06/2021    PLATELETCT 337 10/06/2021        Total time of the discharge process exceeds 30 minutes.

## 2021-10-14 NOTE — THERAPY
"Occupational Therapy   Initial Evaluation     Patient Name: Bernadette Paul  Age:  76 y.o., Sex:  female  Medical Record #: [id number]  Today's Date: 10/14/2021       Precautions: Spinal / Back Precautions   Comments: No brace ordered     Assessment    Patient is 76 y.o. female s/p L4-L5 lami. Seen now for OT eval. Spouse present and supportive. Pt and spouse educated on neutral spine, lifting restriction, safe body mechanics. Trained on log roll technique for bed mobility. Pt unable to comfortably tailor sit either LE for sock management. Educated on option of sock-aid. Pt prefers assist from spouse as need for help should be very temporary. Educated on use of reacher for object retrieval and purchase options for item. Pt has shower chair and hand-held shower head in place. Recommend use of both with supv from spouse for shower transfer. Also educated on night-time toileting safety and fall reduction strategies. Pt has all necessary DME in place as well as support from spouse as needed. No further acute OT needs at this time.     Plan    Recommend Occupational Therapy for Evaluation only     DC Equipment Recommendations: None  Discharge Recommendations: Anticipate that the patient will have no further occupational therapy needs after discharge from the hospital     Subjective    \"My  can help me as needed.\"     Objective       10/14/21 1108   Prior Living Situation   Housing / Facility 3 Story House   Steps In Home 16   Rail Left Rail (Steps in Home)   Bathroom Set up Walk In Shower;Shower Chair   Equipment Owned Tub / Shower Seat;Hand Held Shower;Single Point Cane;4-Wheel Walker   Comments Pt's spouse present and supportive; reports he can assist as needed on DC.    Prior Level of ADL Function   Comments Pt was independent with BADL PTA   Prior Level of IADL Function   Comments Pt is normally independent with I-ADL. Recently needing to use  AD for functional mobility.   Balance Assessment   Sitting Balance " (Static) Good   Sitting Balance (Dynamic) Fair +   Standing Balance (Static) Fair   Standing Balance (Dynamic) Fair   Weight Shift Sitting Fair   Weight Shift Standing Fair   Comments FWW for standing    Bed Mobility    Supine to Sit Supervised   Sit to Supine Supervised   Scooting Supervised  (seated)   Rolling Supervised   Comments trained on log roll    ADL Assessment   Grooming   (declined)   Lower Body Dressing Minimal Assist  (supv to manage underpants, max A for socks )   Toileting   (NT due to no need; completed toilet transfer & sim hygiene )   Functional Mobility   Sit to Stand Supervised   Bed, Chair, Wheelchair Transfer Supervised   Toilet Transfers Supervised   Transfer Method Stand Step  (FWW)   Mobility Supine > < EOB, short gait and transfers in room

## 2021-10-14 NOTE — PROGRESS NOTES
- Stephen Tao, APRN Neurosurg. Notified regarding hemovac output, pt ok to d/c home today  - Hemovac removed, dressing CDI  - Meds to beds requested

## 2021-10-14 NOTE — CARE PLAN
The patient is Stable - Low risk of patient condition declining or worsening    Shift Goals  Clinical Goals: Pain Mgt  Patient Goals: Diet    Progress made toward(s) clinical / shift goals:  Fall education provided at bedside, pt verbalized understanding. Call light and personal items within reach.       Problem: Knowledge Deficit - Standard  Goal: Patient and family/care givers will demonstrate understanding of plan of care, disease process/condition, diagnostic tests and medications  Outcome: Progressing     Problem: Pain - Standard  Goal: Alleviation of pain or a reduction in pain to the patient’s comfort goal  Outcome: Progressing

## 2021-10-14 NOTE — THERAPY
Physical Therapy   Initial Evaluation     Patient Name: Bernadette Paul  Age:  76 y.o., Sex:  female  Medical Record #: [id number]  Today's Date: 10/14/2021     Precautions  Precautions: Spinal / Back Precautions     Assessment  Patient is 76 y.o. female POD #1 L4-L5 lami, L4-S1 facet/foraminotomies.  Today patient initially required min A for supine <> sit due to novelty of log roll however progressed to SPV with further repetitions.  Patient was able to ambulate ~50 ft x 2 with FWW and SPV.  Patient was able to negotiate 2 steps with B rails and CGA.  Patient ascending/descending steps sideways due to not having her SPC.  Patient lives with her  who is very supportive and can help as needed.  Recommend DC home with FWW and outpatient PT once cleared by surgeon.    Plan    Recommend Physical Therapy for Evaluation only.    DC Equipment Recommendations: Front-Wheel Walker  Discharge Recommendations: Recommend outpatient physical therapy services to address higher level deficits       Objective     10/14/21 0920   Prior Living Situation   Prior Services None   Housing / Facility 3 Story House   Steps In Home 16   Rail Left Rail (Steps in Home)   Equipment Owned 4-Wheel Walker;Single Point Cane   Lives with - Patient's Self Care Capacity Spouse   Comments Pt's  present & supportive.  Pt has 16 steps to get to bedroom/bathroom level   Prior Level of Functional Mobility   Bed Mobility Independent   Transfer Status Independent   Ambulation Independent   Assistive Devices Used 4-Wheel Walker   Stairs Independent   Comments Pt's  assisted as needed.  Pt stated she was using 4WW prior to surgery, uses SPC on stairs   Cognition    Cognition / Consciousness WDL   Level of Consciousness Alert   Comments Pleasant & cooperative   Active ROM Lower Body    Active ROM Lower Body  WDL   Strength Lower Body   Lower Body Strength  WDL   Comments B LE grossly 5/5   Sensation Lower Body   Lower Extremity Sensation    X   Comments Reports R foot tingling   Coordination Lower Body    Coordination Lower Body  WDL   Balance Assessment   Sitting Balance (Static) Fair +   Sitting Balance (Dynamic) Fair +   Standing Balance (Static) Fair   Standing Balance (Dynamic) Fair   Weight Shift Sitting Fair   Weight Shift Standing Fair   Comments w/ FWW   Gait Analysis   Gait Level Of Assist Supervised   Assistive Device Front Wheel Walker   Distance (Feet) 50   # of Times Distance was Traveled 2   Deviation Bradykinetic;Shuffled Gait;Decreased Base Of Support   # of Stairs Climbed 2   Level of Assist with Stairs (CGA)   Weight Bearing Status No restrictions   Comments Sideways on steps due to not having SPC   Bed Mobility    Supine to Sit Supervised   Sit to Supine Supervised   Scooting Supervised   Rolling Supervised   Comments Initially min A due to novelty of log roll, progressed to SPV.  HOB flat   Functional Mobility   Sit to Stand Supervised   Bed, Chair, Wheelchair Transfer Supervised   Transfer Method Stand Step   Mobility Ambulation, stairs   Activity Tolerance   Sitting in Chair NT   Sitting Edge of Bed 10 min   Standing 8 min   Session Information   Date / Session Number  10/14 - 1x only

## 2021-10-14 NOTE — PROGRESS NOTES
Bedside report received, assessment completed    A&O x  4, pt calls appropriately  Mobility: Up with SBA, HH  Fall Risk Assessment: low, bed alarm n/a, pt calls appropriately  Pain Assessment: 2/10, medication provided per MAR  Diet: CLD, order to advance as tolerated  LDA:   IV Access: 18G L-hand, CDI/ flushed/ Infusing D51/2NS20K+ @ 100mL/hr  Hemovac, midline posterior, orders to record output q8hr  + void, + flatus, + PTA BM  DVT Prophylaxis: CI, SCD On while in bed    Procedures:    - 10/13 L4-L5 Complete Laminectomy, bilateral medial facetectomies and bilateral foraminotomies  D/C Plan: Pending medical clearance, possible d/c 10/14    Reviewed plan of care with patient, bed in lowest position and locked, pt resting comfortably now, call light within reach, all needs met at this time. Interventions will be executed per plan of care

## 2021-10-14 NOTE — PROGRESS NOTES
Bedside shift report received from day shift RN. Patient is A+Ox4 and is resting comfortably. Patient states 0/10 pain and denies need for pain medication at this time. Patient updated on current plan of care and shift goals established. No further needs at this time. Bed locked in lowest position, upper bed rails up, call light and belongings within reach. Hourly rounding in place.

## 2021-10-14 NOTE — DISCHARGE INSTRUCTIONS
Discharge Instructions    Discharged to home by car with relative. Discharged via wheelchair, hospital escort: Yes.  Special equipment needed: Not Applicable    Be sure to schedule a follow-up appointment with your primary care doctor or any specialists as instructed.     Discharge Plan:   Diet Plan: Discussed  Activity Level: Discussed  Confirmed Follow up Appointment: Patient to Call and Schedule Appointment  Confirmed Symptoms Management: Discussed  Medication Reconciliation Updated: Yes  Influenza Vaccine Indication: Patient Refuses    I understand that a diet low in cholesterol, fat, and sodium is recommended for good health. Unless I have been given specific instructions below for another diet, I accept this instruction as my diet prescription.   Other diet: Regular as tolerated.     Special Instructions: None    · Is patient discharged on Warfarin / Coumadin?   No     Depression / Suicide Risk    As you are discharged from this [facility] facility, it is important to learn how to keep safe from harming yourself.    Recognize the warning signs:  · Abrupt changes in personality, positive or negative- including increase in energy   · Giving away possessions  · Change in eating patterns- significant weight changes-  positive or negative  · Change in sleeping patterns- unable to sleep or sleeping all the time   · Unwillingness or inability to communicate  · Depression  · Unusual sadness, discouragement and loneliness  · Talk of wanting to die  · Neglect of personal appearance   · Rebelliousness- reckless behavior  · Withdrawal from people/activities they love  · Confusion- inability to concentrate     If you or a loved one observes any of these behaviors or has concerns about self-harm, here's what you can do:  · Talk about it- your feelings and reasons for harming yourself  · Remove any means that you might use to hurt yourself (examples: pills, rope, extension cords, firearm)  · Get professional help from the  community (Mental Health, Substance Abuse, psychological counseling)  · Do not be alone:Call your Safe Contact- someone whom you trust who will be there for you.  · Call your local CRISIS HOTLINE 699-7778 or 481-249-6622  · Call your local Children's Mobile Crisis Response Team [place] (088) 436-1795 or www.[employer]  · Call the toll free National Suicide Prevention Hotlines   · National Suicide Prevention Lifeline 469-427-DYGT (7896)  · National Hope Line Network 800-SUICIDE (415-8184)

## 2021-10-14 NOTE — PROGRESS NOTES
Bedside report received, assessment completed    A&O x  4, pt calls appropriately  Mobility: Up with SBA, HH  Fall Risk Assessment: low, bed alarm n/a, pt calls appropriately  Pain Assessment: 2/10, medication provided per MAR  Diet: CLD, order to advance as tolerated  LDA:   IV Access: 18G L-hand, CDI/ flushed/ Infusing D51/2NS20K+ @ 100mL/hr  Hemovac, midline posterior, orders to record output q8hr  + void, + flatus, + PTA BM  DVT Prophylaxis: CI, SCD On while in bed    Procedures:    - 10/13 L4-L5 Complete Laminectomy, bilateral medial facetectomies and bilateral foraminotomies  D/C Plan: Pending Hemovac drainage <30ml/ 8hr, will notifRIAN Evangelista at 1200 with updates regarding drainage     Reviewed plan of care with patient, bed in lowest position and locked, pt resting comfortably now, call light within reach, all needs met at this time. Interventions will be executed per plan of care

## 2021-10-15 ENCOUNTER — HOSPITAL ENCOUNTER (EMERGENCY)
Facility: MEDICAL CENTER | Age: 76
End: 2021-10-15
Attending: EMERGENCY MEDICINE
Payer: MEDICARE

## 2021-10-15 VITALS
OXYGEN SATURATION: 97 % | RESPIRATION RATE: 15 BRPM | BODY MASS INDEX: 22.47 KG/M2 | TEMPERATURE: 96.8 F | HEART RATE: 62 BPM | DIASTOLIC BLOOD PRESSURE: 66 MMHG | WEIGHT: 135 LBS | SYSTOLIC BLOOD PRESSURE: 148 MMHG

## 2021-10-15 DIAGNOSIS — R55 SYNCOPE, UNSPECIFIED SYNCOPE TYPE: ICD-10-CM

## 2021-10-15 DIAGNOSIS — R53.1 GENERALIZED WEAKNESS: ICD-10-CM

## 2021-10-15 LAB
ALBUMIN SERPL BCP-MCNC: 4 G/DL (ref 3.2–4.9)
ALBUMIN/GLOB SERPL: 1.6 G/DL
ALP SERPL-CCNC: 75 U/L (ref 30–99)
ALT SERPL-CCNC: 38 U/L (ref 2–50)
ANION GAP SERPL CALC-SCNC: 14 MMOL/L (ref 7–16)
AST SERPL-CCNC: 30 U/L (ref 12–45)
BASOPHILS # BLD AUTO: 0.4 % (ref 0–1.8)
BASOPHILS # BLD: 0.05 K/UL (ref 0–0.12)
BILIRUB SERPL-MCNC: 0.4 MG/DL (ref 0.1–1.5)
BUN SERPL-MCNC: 13 MG/DL (ref 8–22)
CALCIUM SERPL-MCNC: 10 MG/DL (ref 8.4–10.2)
CHLORIDE SERPL-SCNC: 101 MMOL/L (ref 96–112)
CO2 SERPL-SCNC: 26 MMOL/L (ref 20–33)
CREAT SERPL-MCNC: 0.65 MG/DL (ref 0.5–1.4)
EKG IMPRESSION: NORMAL
EOSINOPHIL # BLD AUTO: 0.04 K/UL (ref 0–0.51)
EOSINOPHIL NFR BLD: 0.3 % (ref 0–6.9)
ERYTHROCYTE [DISTWIDTH] IN BLOOD BY AUTOMATED COUNT: 46.9 FL (ref 35.9–50)
GLOBULIN SER CALC-MCNC: 2.5 G/DL (ref 1.9–3.5)
GLUCOSE SERPL-MCNC: 92 MG/DL (ref 65–99)
HCT VFR BLD AUTO: 39.4 % (ref 37–47)
HGB BLD-MCNC: 12.6 G/DL (ref 12–16)
IMM GRANULOCYTES # BLD AUTO: 0.05 K/UL (ref 0–0.11)
IMM GRANULOCYTES NFR BLD AUTO: 0.4 % (ref 0–0.9)
LYMPHOCYTES # BLD AUTO: 1 K/UL (ref 1–4.8)
LYMPHOCYTES NFR BLD: 8.7 % (ref 22–41)
MCH RBC QN AUTO: 30.9 PG (ref 27–33)
MCHC RBC AUTO-ENTMCNC: 32 G/DL (ref 33.6–35)
MCV RBC AUTO: 96.6 FL (ref 81.4–97.8)
MONOCYTES # BLD AUTO: 0.61 K/UL (ref 0–0.85)
MONOCYTES NFR BLD AUTO: 5.3 % (ref 0–13.4)
NEUTROPHILS # BLD AUTO: 9.71 K/UL (ref 2–7.15)
NEUTROPHILS NFR BLD: 84.9 % (ref 44–72)
NRBC # BLD AUTO: 0 K/UL
NRBC BLD-RTO: 0 /100 WBC
PLATELET # BLD AUTO: 258 K/UL (ref 164–446)
PMV BLD AUTO: 10 FL (ref 9–12.9)
POTASSIUM SERPL-SCNC: 4.1 MMOL/L (ref 3.6–5.5)
PROT SERPL-MCNC: 6.5 G/DL (ref 6–8.2)
RBC # BLD AUTO: 4.08 M/UL (ref 4.2–5.4)
SODIUM SERPL-SCNC: 141 MMOL/L (ref 135–145)
WBC # BLD AUTO: 11.5 K/UL (ref 4.8–10.8)

## 2021-10-15 PROCEDURE — 93005 ELECTROCARDIOGRAM TRACING: CPT | Performed by: EMERGENCY MEDICINE

## 2021-10-15 PROCEDURE — 99284 EMERGENCY DEPT VISIT MOD MDM: CPT

## 2021-10-15 PROCEDURE — 85025 COMPLETE CBC W/AUTO DIFF WBC: CPT

## 2021-10-15 PROCEDURE — 700105 HCHG RX REV CODE 258: Performed by: EMERGENCY MEDICINE

## 2021-10-15 PROCEDURE — 80053 COMPREHEN METABOLIC PANEL: CPT

## 2021-10-15 RX ORDER — SODIUM CHLORIDE 9 MG/ML
1000 INJECTION, SOLUTION INTRAVENOUS ONCE
Status: COMPLETED | OUTPATIENT
Start: 2021-10-15 | End: 2021-10-15

## 2021-10-15 RX ADMIN — SODIUM CHLORIDE 1000 ML: 9 INJECTION, SOLUTION INTRAVENOUS at 15:48

## 2021-10-15 ASSESSMENT — FIBROSIS 4 INDEX: FIB4 SCORE: 0.85

## 2021-10-15 NOTE — ED TRIAGE NOTES
Pt has a low sodium problem and has been in ER for it. Pt had a laminectomy a few days ago and was d/c'd yesterday and doesn't believe that they took care of her salt. Pt states this morning was sitting in her chair and passed out. PEPE saw her and pt was stable. States has been fatigued and sleeping most of today. Feels like her sodium is low.    Pt is vaccinated for COVID

## 2021-10-15 NOTE — ED PROVIDER NOTES
ED Provider Note    CHIEF COMPLAINT  Chief Complaint   Patient presents with   • Other   • Syncope       HPI  Bernadette Paul is a 76 y.o. female who presents for evaluation of suspected hyponatremia.  She has a history of this recurrently.  She does follow the hospital yesterday after a laminectomy.  She states she is been very tired, today she believes she either fell asleep or passed out, although right now she feels totally well.  When EMS got to her house they report that her vital signs were normal.  She had no chest pain or shortness of breath, no abdominal pain.  She does have some back pain related to the surgery but significantly improved symptoms compared to her preoperative state.  She has no fever.     REVIEW OF SYSTEMS  Negative for fever, rash, chest pain, dyspnea, abdominal pain, nausea, vomiting, diarrhea, headache, focal weakness, focal numbness, focal tingling. All other systems are negative.     PAST MEDICAL HISTORY  Past Medical History:   Diagnosis Date   • Allergic rhinitis 6/17/2009   • Bowel habit changes     constipation   • Disorder of thyroid     hypothyroid   • Dyslipidemia 6/17/2009   • Hemorrhagic disorder (HCC)     nose bleeds   • High cholesterol    • Low sodium levels 2021    sodium crashed while taking 5 days of meloxicam and 6 day steroid pack    • Pain     back and legs   • Preventative health care 6/17/2009   • S/P parathyroidectomy (HCC) 6/17/2009   • Sleep apnea     no cpap       FAMILY HISTORY  Family History   Problem Relation Age of Onset   • Heart Disease Mother        SOCIAL HISTORY  Social History     Tobacco Use   • Smoking status: Never Smoker   • Smokeless tobacco: Never Used   Vaping Use   • Vaping Use: Never used   Substance Use Topics   • Alcohol use: Not Currently     Comment: a few a year   • Drug use: Never       SURGICAL HISTORY  Past Surgical History:   Procedure Laterality Date   • PB LAMINOTOMY,LUMBAR DISK,1 INTRSP N/A 10/13/2021    Procedure: POSTERIOR  LAMINECTOMY, SPINE, LUMBAR, - L4-5;  Surgeon: Cosmo Hyatt M.D.;  Location: SURGERY [facility];  Service: Neurosurgery   • FORAMINOTOMY N/A 10/13/2021    Procedure: FORAMINOTOMY, SPINE - L4-5, L5-S1 AND MEDIAL FACETECTOMY;  Surgeon: Cosmo Hyatt M.D.;  Location: SURGERY [facility];  Service: Neurosurgery   • NICOLE N/A 12/6/2019    Procedure: ECHOCARDIOGRAM, TRANSESOPHAGEAL;  Surgeon: Marion Dykes M.D.;  Location: SURGERY [facility];  Service: Cardiac   • INCISION AND DRAINAGE ORTHOPEDIC Right 11/15/2019    Procedure: INCISION AND DRAINAGE, RIGHT KNEE  BY ORTHOPEDICS;  Surgeon: Ramsey Navas M.D.;  Location: SURGERY [facility];  Service: Orthopedics   • CHOLECYSTECTOMY  2014       CURRENT MEDICATIONS  I personally reviewed the medication list in the charting documentation.     ALLERGIES  Allergies   Allergen Reactions   • Asa [Aspirin]      Epistaxis         MEDICAL RECORD  I have reviewed patient's medical record and pertinent results are listed above.      PHYSICAL EXAM  VITAL SIGNS: /70   Pulse 80   Temp 37.2 °C (98.9 °F) (Temporal)   Resp 19   Wt 61.2 kg (135 lb)   SpO2 97%   BMI 22.47 kg/m²    Constitutional: Well appearing patient in no acute distress.  Awake and alert, not toxic nor ill in appearance.  HENT: Normocephalic, no obvious evidence of acute trauma.  Eyes: No scleral icterus. Normal conjunctiva   Neck: Comfortable movement without any obvious restriction in the range of motion.  Cardiovascular: Upon ascultation I appreciate a regular heart rhythm and a normal rate.   Thorax & Lungs: Normal nonlabored respirations.  Upon application of the stethoscope for auscultation I find there to be no associated chest wall tenderness.  I appreciate no wheezing, rhonchi or rales. There is normal air movement.    Abdomen: The abdomen is not visibly distended. Upon palpation, I find it to be without tenderness.  No mass appreciated.  Skin: The exposed portions of skin  reveal no obvious rash or other abnormalities.  Extremities/Musculoskeletal: No obvious sign of acute trauma. No asymmetric calf tenderness or edema.   Neurologic: Alert & oriented. No focal deficits observed.   Psychiatric: Normal affect appropriate for the clinical situation.    DIAGNOSTIC STUDIES / PROCEDURES    LABS/EKGs  Results for orders placed or performed during the hospital encounter of 10/15/21   CBC WITH DIFFERENTIAL   Result Value Ref Range    WBC 11.5 (H) 4.8 - 10.8 K/uL    RBC 4.08 (L) 4.20 - 5.40 M/uL    Hemoglobin 12.6 12.0 - 16.0 g/dL    Hematocrit 39.4 37.0 - 47.0 %    MCV 96.6 81.4 - 97.8 fL    MCH 30.9 27.0 - 33.0 pg    MCHC 32.0 (L) 33.6 - 35.0 g/dL    RDW 46.9 35.9 - 50.0 fL    Platelet Count 258 164 - 446 K/uL    MPV 10.0 9.0 - 12.9 fL    Neutrophils-Polys 84.90 (H) 44.00 - 72.00 %    Lymphocytes 8.70 (L) 22.00 - 41.00 %    Monocytes 5.30 0.00 - 13.40 %    Eosinophils 0.30 0.00 - 6.90 %    Basophils 0.40 0.00 - 1.80 %    Immature Granulocytes 0.40 0.00 - 0.90 %    Nucleated RBC 0.00 /100 WBC    Neutrophils (Absolute) 9.71 (H) 2.00 - 7.15 K/uL    Lymphs (Absolute) 1.00 1.00 - 4.80 K/uL    Monos (Absolute) 0.61 0.00 - 0.85 K/uL    Eos (Absolute) 0.04 0.00 - 0.51 K/uL    Baso (Absolute) 0.05 0.00 - 0.12 K/uL    Immature Granulocytes (abs) 0.05 0.00 - 0.11 K/uL    NRBC (Absolute) 0.00 K/uL   Comp Metabolic Panel   Result Value Ref Range    Sodium 141 135 - 145 mmol/L    Potassium 4.1 3.6 - 5.5 mmol/L    Chloride 101 96 - 112 mmol/L    Co2 26 20 - 33 mmol/L    Anion Gap 14.0 7.0 - 16.0    Glucose 92 65 - 99 mg/dL    Bun 13 8 - 22 mg/dL    Creatinine 0.65 0.50 - 1.40 mg/dL    Calcium 10.0 8.4 - 10.2 mg/dL    AST(SGOT) 30 12 - 45 U/L    ALT(SGPT) 38 2 - 50 U/L    Alkaline Phosphatase 75 30 - 99 U/L    Total Bilirubin 0.4 0.1 - 1.5 mg/dL    Albumin 4.0 3.2 - 4.9 g/dL    Total Protein 6.5 6.0 - 8.2 g/dL    Globulin 2.5 1.9 - 3.5 g/dL    A-G Ratio 1.6 g/dL   ESTIMATED GFR   Result Value Ref Range     GFR If African American >60 >60 mL/min/1.73 m 2    GFR If Non African American >60 >60 mL/min/1.73 m 2   EKG   Result Value Ref Range    Report       [facility]    Test Date:  2021-10-15  Pt Name:    SAMARA CISNEROS               Department: [facility]  MRN:        [id number]                      Room:  Gender:     Female                       Technician:   :        [id number]                   Requested By:BREANNA RECIO  Order #:    [id number]                    Reading MD: BREANNA RECIO MD    Measurements  Intervals                                Axis  Rate:       70                           P:          61  VT:         179                          QRS:        2  QRSD:       97                           T:          34  QT:         395  QTc:        427    Interpretive Statements  12 Lead EKG interpreted by me to show: -- Rate 70 -- Rhythm: Normal sinus  rhythm  -- Axis: Normal -- VT and QRS Intervals: Normal -- T waves: No acute changes  --  ST segments: No acute changes -- Ectopy: None. My impression of this EKG:  Does  not indicate acute ischemia at this time.  Electronically Signed On 10 - 15:40:56 PDT by BREANNA RECIO MD          COURSE & MEDICAL DECISION MAKING  I have reviewed any medical record information, laboratory studies and radiographic results as noted above.    Encounter Summary: This is a very pleasant 76 y.o. female who unfortunately required evaluation in the emergency department today with generalized weakness, fatigue and possible syncope, just discharged from the hospital from a lumbar surgery, the patient states that she has had the symptoms when her sodium gets low with a history of recurring hyponatremia.  She states she feels perfectly well at this time and her examination is unremarkable.  She received some IV fluids here in emergency department, ultimately her chemistry panel resulted with a normal sodium level.  At this point, the  patient is being discharged home in stable condition with strict return instructions provided      DISPOSITION: Discharged home in stable condition      FINAL IMPRESSION  1. Syncope, unspecified syncope type    2. Generalized weakness           This dictation was created using voice recognition software. The accuracy of the dictation is limited to the abilities of the software. I expect there may be some errors of grammar and possibly content. The nursing notes were reviewed and certain aspects of this information were incorporated into this note.    Electronically signed by: Vivek Leon M.D., 10/15/2021 3:35 PM

## 2021-10-15 NOTE — ED NOTES
Fluids completed, D/c pt home,no rx given . Pt aware of f/u instructions , aware to return for any changes or concerns. No further questions upon d/c home from ed

## 2021-10-15 NOTE — ED NOTES
Iv placed, Pt medicated as directed by ER md, poc update given to pt. Further orders and dispo pending at this time. No further questions from pt at this time

## 2021-10-21 ENCOUNTER — PATIENT OUTREACH (OUTPATIENT)
Dept: MEDICAL GROUP | Facility: MEDICAL CENTER | Age: 76
End: 2021-10-21

## 2021-11-10 ENCOUNTER — APPOINTMENT (RX ONLY)
Dept: URBAN - METROPOLITAN AREA CLINIC 4 | Facility: CLINIC | Age: 76
Setting detail: DERMATOLOGY
End: 2021-11-10

## 2021-11-10 DIAGNOSIS — D18.0 HEMANGIOMA: ICD-10-CM

## 2021-11-10 DIAGNOSIS — Z85.828 PERSONAL HISTORY OF OTHER MALIGNANT NEOPLASM OF SKIN: ICD-10-CM | Status: STABLE

## 2021-11-10 DIAGNOSIS — L80 VITILIGO: ICD-10-CM

## 2021-11-10 DIAGNOSIS — Z71.89 OTHER SPECIFIED COUNSELING: ICD-10-CM

## 2021-11-10 DIAGNOSIS — L81.4 OTHER MELANIN HYPERPIGMENTATION: ICD-10-CM

## 2021-11-10 DIAGNOSIS — D22 MELANOCYTIC NEVI: ICD-10-CM

## 2021-11-10 DIAGNOSIS — L57.0 ACTINIC KERATOSIS: ICD-10-CM

## 2021-11-10 PROBLEM — D22.5 MELANOCYTIC NEVI OF TRUNK: Status: ACTIVE | Noted: 2021-11-10

## 2021-11-10 PROBLEM — D18.01 HEMANGIOMA OF SKIN AND SUBCUTANEOUS TISSUE: Status: ACTIVE | Noted: 2021-11-10

## 2021-11-10 PROCEDURE — ? LIQUID NITROGEN

## 2021-11-10 PROCEDURE — 17000 DESTRUCT PREMALG LESION: CPT

## 2021-11-10 PROCEDURE — ? SUNSCREEN TREATMENT REGIMEN

## 2021-11-10 PROCEDURE — ? DEFER

## 2021-11-10 PROCEDURE — ? COUNSELING

## 2021-11-10 PROCEDURE — 99213 OFFICE O/P EST LOW 20 MIN: CPT | Mod: 25

## 2021-11-10 PROCEDURE — 17003 DESTRUCT PREMALG LES 2-14: CPT

## 2021-11-10 ASSESSMENT — LOCATION SIMPLE DESCRIPTION DERM
LOCATION SIMPLE: CHEST
LOCATION SIMPLE: RIGHT FOREHEAD
LOCATION SIMPLE: LEFT FOREHEAD
LOCATION SIMPLE: SUPERIOR FOREHEAD
LOCATION SIMPLE: RIGHT HAND
LOCATION SIMPLE: RIGHT UPPER BACK
LOCATION SIMPLE: RIGHT ZYGOMA
LOCATION SIMPLE: ABDOMEN
LOCATION SIMPLE: LEFT CHEEK
LOCATION SIMPLE: LEFT HAND
LOCATION SIMPLE: RIGHT CHEEK

## 2021-11-10 ASSESSMENT — LOCATION DETAILED DESCRIPTION DERM
LOCATION DETAILED: RIGHT MEDIAL UPPER BACK
LOCATION DETAILED: RIGHT MEDIAL SUPERIOR CHEST
LOCATION DETAILED: RIGHT MEDIAL ZYGOMA
LOCATION DETAILED: LEFT ULNAR DORSAL HAND
LOCATION DETAILED: LEFT INFERIOR FOREHEAD
LOCATION DETAILED: RIGHT INFERIOR FOREHEAD
LOCATION DETAILED: RIGHT RADIAL DORSAL HAND
LOCATION DETAILED: EPIGASTRIC SKIN
LOCATION DETAILED: RIGHT CENTRAL BUCCAL CHEEK
LOCATION DETAILED: LEFT INFERIOR MEDIAL MALAR CHEEK
LOCATION DETAILED: SUPERIOR MID FOREHEAD

## 2021-11-10 ASSESSMENT — LOCATION ZONE DERM
LOCATION ZONE: TRUNK
LOCATION ZONE: HAND
LOCATION ZONE: FACE

## 2021-11-10 NOTE — PROCEDURE: LIQUID NITROGEN
Duration Of Freeze Thaw-Cycle (Seconds): 5
Consent: The patient's consent was obtained including but not limited to risks of crusting, scabbing, blistering, scarring, darker or lighter pigmentary change, recurrence, incomplete removal and infection.
Render Post-Care Instructions In Note?: no
Detail Level: Detailed
Number Of Freeze-Thaw Cycles: 2 freeze-thaw cycles
Show Applicator Variable?: Yes
Post-Care Instructions: I reviewed with the patient in detail post-care instructions. Patient is to wear sunprotection, and avoid picking at any of the treated lesions. Pt may apply Vaseline to crusted or scabbing areas.

## 2021-11-29 ENCOUNTER — APPOINTMENT (OUTPATIENT)
Dept: MEDICAL GROUP | Facility: MEDICAL CENTER | Age: 76
End: 2021-11-29
Payer: MEDICARE

## 2021-12-06 ENCOUNTER — HOSPITAL ENCOUNTER (OUTPATIENT)
Dept: RADIOLOGY | Facility: MEDICAL CENTER | Age: 76
End: 2021-12-06
Attending: PHYSICIAN ASSISTANT
Payer: MEDICARE

## 2021-12-06 DIAGNOSIS — R22.40 LOCALIZED SWELLING, MASS, OR LUMP OF LOWER EXTREMITY, UNSPECIFIED LATERALITY: ICD-10-CM

## 2021-12-06 PROCEDURE — 93970 EXTREMITY STUDY: CPT | Mod: 26 | Performed by: INTERNAL MEDICINE

## 2021-12-06 PROCEDURE — 93970 EXTREMITY STUDY: CPT

## 2022-01-24 ENCOUNTER — TELEPHONE (OUTPATIENT)
Dept: MEDICAL GROUP | Facility: MEDICAL CENTER | Age: 77
End: 2022-01-24

## 2022-01-24 RX ORDER — SIMVASTATIN 20 MG
TABLET ORAL
Qty: 90 TABLET | Refills: 0 | Status: SHIPPED | OUTPATIENT
Start: 2022-01-24 | End: 2022-04-15 | Stop reason: SDUPTHER

## 2022-01-25 NOTE — TELEPHONE ENCOUNTER
Please notify the patient she is due for a fasting cholesterol test, I will place the orders in the computer system for the fasting cholesterol blood test.

## 2022-01-26 DIAGNOSIS — E78.5 DYSLIPIDEMIA: ICD-10-CM

## 2022-02-03 ENCOUNTER — HOSPITAL ENCOUNTER (OUTPATIENT)
Dept: LAB | Facility: MEDICAL CENTER | Age: 77
End: 2022-02-03
Attending: INTERNAL MEDICINE
Payer: MEDICARE

## 2022-02-03 ENCOUNTER — HOSPITAL ENCOUNTER (OUTPATIENT)
Dept: LAB | Facility: MEDICAL CENTER | Age: 77
End: 2022-02-03
Attending: STUDENT IN AN ORGANIZED HEALTH CARE EDUCATION/TRAINING PROGRAM
Payer: MEDICARE

## 2022-02-03 DIAGNOSIS — E78.5 DYSLIPIDEMIA: ICD-10-CM

## 2022-02-03 LAB
25(OH)D3 SERPL-MCNC: 65 NG/ML (ref 30–100)
ALBUMIN SERPL BCP-MCNC: 4.6 G/DL (ref 3.2–4.9)
ALBUMIN SERPL BCP-MCNC: 4.7 G/DL (ref 3.2–4.9)
ALBUMIN/GLOB SERPL: 1.8 G/DL
ALP SERPL-CCNC: 72 U/L (ref 30–99)
ALT SERPL-CCNC: 19 U/L (ref 2–50)
ANION GAP SERPL CALC-SCNC: 9 MMOL/L (ref 7–16)
AST SERPL-CCNC: 22 U/L (ref 12–45)
BASOPHILS # BLD AUTO: 0.6 % (ref 0–1.8)
BASOPHILS # BLD AUTO: 0.9 % (ref 0–1.8)
BASOPHILS # BLD: 0.02 K/UL (ref 0–0.12)
BASOPHILS # BLD: 0.03 K/UL (ref 0–0.12)
BILIRUB SERPL-MCNC: 0.4 MG/DL (ref 0.1–1.5)
BUN SERPL-MCNC: 17 MG/DL (ref 8–22)
BUN SERPL-MCNC: 17 MG/DL (ref 8–22)
CALCIUM SERPL-MCNC: 9.9 MG/DL (ref 8.4–10.2)
CALCIUM SERPL-MCNC: 9.9 MG/DL (ref 8.4–10.2)
CHLORIDE SERPL-SCNC: 100 MMOL/L (ref 96–112)
CHLORIDE SERPL-SCNC: 100 MMOL/L (ref 96–112)
CHOLEST SERPL-MCNC: 201 MG/DL (ref 100–199)
CO2 SERPL-SCNC: 26 MMOL/L (ref 20–33)
CO2 SERPL-SCNC: 26 MMOL/L (ref 20–33)
CREAT SERPL-MCNC: 0.7 MG/DL (ref 0.5–1.4)
CREAT SERPL-MCNC: 0.72 MG/DL (ref 0.5–1.4)
EOSINOPHIL # BLD AUTO: 0.1 K/UL (ref 0–0.51)
EOSINOPHIL # BLD AUTO: 0.1 K/UL (ref 0–0.51)
EOSINOPHIL NFR BLD: 3 % (ref 0–6.9)
EOSINOPHIL NFR BLD: 3 % (ref 0–6.9)
ERYTHROCYTE [DISTWIDTH] IN BLOOD BY AUTOMATED COUNT: 45.1 FL (ref 35.9–50)
ERYTHROCYTE [DISTWIDTH] IN BLOOD BY AUTOMATED COUNT: 45.4 FL (ref 35.9–50)
FASTING STATUS PATIENT QL REPORTED: NORMAL
GLOBULIN SER CALC-MCNC: 2.6 G/DL (ref 1.9–3.5)
GLUCOSE SERPL-MCNC: 94 MG/DL (ref 65–99)
GLUCOSE SERPL-MCNC: 96 MG/DL (ref 65–99)
HCT VFR BLD AUTO: 49.4 % (ref 37–47)
HCT VFR BLD AUTO: 49.9 % (ref 37–47)
HDLC SERPL-MCNC: 91 MG/DL
HGB BLD-MCNC: 16.4 G/DL (ref 12–16)
HGB BLD-MCNC: 16.6 G/DL (ref 12–16)
IMM GRANULOCYTES # BLD AUTO: 0.01 K/UL (ref 0–0.11)
IMM GRANULOCYTES # BLD AUTO: 0.01 K/UL (ref 0–0.11)
IMM GRANULOCYTES NFR BLD AUTO: 0.3 % (ref 0–0.9)
IMM GRANULOCYTES NFR BLD AUTO: 0.3 % (ref 0–0.9)
LDLC SERPL CALC-MCNC: 99 MG/DL
LYMPHOCYTES # BLD AUTO: 0.81 K/UL (ref 1–4.8)
LYMPHOCYTES # BLD AUTO: 0.83 K/UL (ref 1–4.8)
LYMPHOCYTES NFR BLD: 24 % (ref 22–41)
LYMPHOCYTES NFR BLD: 24.7 % (ref 22–41)
MCH RBC QN AUTO: 29.3 PG (ref 27–33)
MCH RBC QN AUTO: 29.7 PG (ref 27–33)
MCHC RBC AUTO-ENTMCNC: 32.9 G/DL (ref 33.6–35)
MCHC RBC AUTO-ENTMCNC: 33.6 G/DL (ref 33.6–35)
MCV RBC AUTO: 88.4 FL (ref 81.4–97.8)
MCV RBC AUTO: 89.1 FL (ref 81.4–97.8)
MONOCYTES # BLD AUTO: 0.2 K/UL (ref 0–0.85)
MONOCYTES # BLD AUTO: 0.22 K/UL (ref 0–0.85)
MONOCYTES NFR BLD AUTO: 6 % (ref 0–13.4)
MONOCYTES NFR BLD AUTO: 6.5 % (ref 0–13.4)
NEUTROPHILS # BLD AUTO: 2.2 K/UL (ref 2–7.15)
NEUTROPHILS # BLD AUTO: 2.2 K/UL (ref 2–7.15)
NEUTROPHILS NFR BLD: 65.3 % (ref 44–72)
NEUTROPHILS NFR BLD: 65.4 % (ref 44–72)
NRBC # BLD AUTO: 0 K/UL
NRBC # BLD AUTO: 0 K/UL
NRBC BLD-RTO: 0 /100 WBC
NRBC BLD-RTO: 0 /100 WBC
OSMOLALITY SERPL: 284 MOSM/KG H2O (ref 278–298)
OSMOLALITY UR: 434 MOSM/KG H2O (ref 300–900)
PHOSPHATE SERPL-MCNC: 3.3 MG/DL (ref 2.5–4.5)
PLATELET # BLD AUTO: 231 K/UL (ref 164–446)
PLATELET # BLD AUTO: 237 K/UL (ref 164–446)
PMV BLD AUTO: 10.1 FL (ref 9–12.9)
PMV BLD AUTO: 10.3 FL (ref 9–12.9)
POTASSIUM SERPL-SCNC: 4.6 MMOL/L (ref 3.6–5.5)
POTASSIUM SERPL-SCNC: 4.7 MMOL/L (ref 3.6–5.5)
PROT SERPL-MCNC: 7.2 G/DL (ref 6–8.2)
PTH-INTACT SERPL-MCNC: 26 PG/ML (ref 14–72)
RBC # BLD AUTO: 5.59 M/UL (ref 4.2–5.4)
RBC # BLD AUTO: 5.6 M/UL (ref 4.2–5.4)
SODIUM SERPL-SCNC: 135 MMOL/L (ref 135–145)
SODIUM SERPL-SCNC: 135 MMOL/L (ref 135–145)
SODIUM UR-SCNC: 54 MMOL/L
TRIGL SERPL-MCNC: 56 MG/DL (ref 0–149)
TSH SERPL DL<=0.005 MIU/L-ACNC: 1.84 UIU/ML (ref 0.38–5.33)
WBC # BLD AUTO: 3.4 K/UL (ref 4.8–10.8)
WBC # BLD AUTO: 3.4 K/UL (ref 4.8–10.8)

## 2022-02-03 PROCEDURE — 80061 LIPID PANEL: CPT

## 2022-02-03 PROCEDURE — 83935 ASSAY OF URINE OSMOLALITY: CPT

## 2022-02-03 PROCEDURE — 85025 COMPLETE CBC W/AUTO DIFF WBC: CPT | Mod: 91

## 2022-02-03 PROCEDURE — 80069 RENAL FUNCTION PANEL: CPT

## 2022-02-03 PROCEDURE — 83930 ASSAY OF BLOOD OSMOLALITY: CPT

## 2022-02-03 PROCEDURE — 84443 ASSAY THYROID STIM HORMONE: CPT

## 2022-02-03 PROCEDURE — 83970 ASSAY OF PARATHORMONE: CPT

## 2022-02-03 PROCEDURE — 85025 COMPLETE CBC W/AUTO DIFF WBC: CPT

## 2022-02-03 PROCEDURE — 84300 ASSAY OF URINE SODIUM: CPT

## 2022-02-03 PROCEDURE — 82306 VITAMIN D 25 HYDROXY: CPT

## 2022-02-03 PROCEDURE — 36415 COLL VENOUS BLD VENIPUNCTURE: CPT

## 2022-02-03 PROCEDURE — 80053 COMPREHEN METABOLIC PANEL: CPT

## 2022-02-05 PROBLEM — E06.3 HASHIMOTO'S THYROIDITIS: Status: RESOLVED | Noted: 2021-01-15 | Resolved: 2022-02-05

## 2022-02-05 SDOH — HEALTH STABILITY: PHYSICAL HEALTH: ON AVERAGE, HOW MANY DAYS PER WEEK DO YOU ENGAGE IN MODERATE TO STRENUOUS EXERCISE (LIKE A BRISK WALK)?: 7 DAYS

## 2022-02-05 SDOH — ECONOMIC STABILITY: INCOME INSECURITY: IN THE LAST 12 MONTHS, WAS THERE A TIME WHEN YOU WERE NOT ABLE TO PAY THE MORTGAGE OR RENT ON TIME?: NO

## 2022-02-05 SDOH — ECONOMIC STABILITY: HOUSING INSECURITY: IN THE LAST 12 MONTHS, HOW MANY PLACES HAVE YOU LIVED?: 1

## 2022-02-05 SDOH — ECONOMIC STABILITY: FOOD INSECURITY: WITHIN THE PAST 12 MONTHS, YOU WORRIED THAT YOUR FOOD WOULD RUN OUT BEFORE YOU GOT MONEY TO BUY MORE.: NEVER TRUE

## 2022-02-05 SDOH — ECONOMIC STABILITY: INCOME INSECURITY: HOW HARD IS IT FOR YOU TO PAY FOR THE VERY BASICS LIKE FOOD, HOUSING, MEDICAL CARE, AND HEATING?: NOT HARD AT ALL

## 2022-02-05 SDOH — HEALTH STABILITY: PHYSICAL HEALTH: ON AVERAGE, HOW MANY MINUTES DO YOU ENGAGE IN EXERCISE AT THIS LEVEL?: 40 MIN

## 2022-02-05 SDOH — ECONOMIC STABILITY: FOOD INSECURITY: WITHIN THE PAST 12 MONTHS, THE FOOD YOU BOUGHT JUST DIDN'T LAST AND YOU DIDN'T HAVE MONEY TO GET MORE.: NEVER TRUE

## 2022-02-05 SDOH — ECONOMIC STABILITY: TRANSPORTATION INSECURITY
IN THE PAST 12 MONTHS, HAS LACK OF TRANSPORTATION KEPT YOU FROM MEETINGS, WORK, OR FROM GETTING THINGS NEEDED FOR DAILY LIVING?: NO

## 2022-02-05 ASSESSMENT — SOCIAL DETERMINANTS OF HEALTH (SDOH)
HOW OFTEN DO YOU ATTENT MEETINGS OF THE CLUB OR ORGANIZATION YOU BELONG TO?: MORE THAN 4 TIMES PER YEAR
DO YOU BELONG TO ANY CLUBS OR ORGANIZATIONS SUCH AS CHURCH GROUPS UNIONS, FRATERNAL OR ATHLETIC GROUPS, OR SCHOOL GROUPS?: YES
HOW OFTEN DO YOU ATTEND CHURCH OR RELIGIOUS SERVICES?: NEVER
HOW OFTEN DO YOU HAVE SIX OR MORE DRINKS ON ONE OCCASION: NEVER
HOW OFTEN DO YOU ATTENT MEETINGS OF THE CLUB OR ORGANIZATION YOU BELONG TO?: MORE THAN 4 TIMES PER YEAR
IN A TYPICAL WEEK, HOW MANY TIMES DO YOU TALK ON THE PHONE WITH FAMILY, FRIENDS, OR NEIGHBORS?: MORE THAN THREE TIMES A WEEK
IN A TYPICAL WEEK, HOW MANY TIMES DO YOU TALK ON THE PHONE WITH FAMILY, FRIENDS, OR NEIGHBORS?: MORE THAN THREE TIMES A WEEK
HOW OFTEN DO YOU GET TOGETHER WITH FRIENDS OR RELATIVES?: MORE THAN THREE TIMES A WEEK
HOW OFTEN DO YOU HAVE A DRINK CONTAINING ALCOHOL: 2-3 TIMES A WEEK
HOW HARD IS IT FOR YOU TO PAY FOR THE VERY BASICS LIKE FOOD, HOUSING, MEDICAL CARE, AND HEATING?: NOT HARD AT ALL
DO YOU BELONG TO ANY CLUBS OR ORGANIZATIONS SUCH AS CHURCH GROUPS UNIONS, FRATERNAL OR ATHLETIC GROUPS, OR SCHOOL GROUPS?: YES
WITHIN THE PAST 12 MONTHS, YOU WORRIED THAT YOUR FOOD WOULD RUN OUT BEFORE YOU GOT THE MONEY TO BUY MORE: NEVER TRUE
HOW OFTEN DO YOU ATTEND CHURCH OR RELIGIOUS SERVICES?: NEVER
HOW MANY DRINKS CONTAINING ALCOHOL DO YOU HAVE ON A TYPICAL DAY WHEN YOU ARE DRINKING: 1 OR 2
HOW OFTEN DO YOU GET TOGETHER WITH FRIENDS OR RELATIVES?: MORE THAN THREE TIMES A WEEK

## 2022-02-05 ASSESSMENT — LIFESTYLE VARIABLES
HOW MANY STANDARD DRINKS CONTAINING ALCOHOL DO YOU HAVE ON A TYPICAL DAY: 1 OR 2
HOW OFTEN DO YOU HAVE SIX OR MORE DRINKS ON ONE OCCASION: NEVER
HOW OFTEN DO YOU HAVE A DRINK CONTAINING ALCOHOL: 2-3 TIMES A WEEK

## 2022-02-08 ENCOUNTER — TELEPHONE (OUTPATIENT)
Dept: MEDICAL GROUP | Facility: MEDICAL CENTER | Age: 77
End: 2022-02-08

## 2022-02-08 ENCOUNTER — OFFICE VISIT (OUTPATIENT)
Dept: MEDICAL GROUP | Facility: MEDICAL CENTER | Age: 77
End: 2022-02-08
Payer: MEDICARE

## 2022-02-08 VITALS
DIASTOLIC BLOOD PRESSURE: 72 MMHG | OXYGEN SATURATION: 97 % | TEMPERATURE: 97.2 F | BODY MASS INDEX: 23.49 KG/M2 | SYSTOLIC BLOOD PRESSURE: 126 MMHG | HEIGHT: 65 IN | HEART RATE: 69 BPM | WEIGHT: 141 LBS

## 2022-02-08 DIAGNOSIS — Z12.39 ENCOUNTER FOR SCREENING FOR MALIGNANT NEOPLASM OF BREAST, UNSPECIFIED SCREENING MODALITY: ICD-10-CM

## 2022-02-08 DIAGNOSIS — E03.9 ACQUIRED HYPOTHYROIDISM: ICD-10-CM

## 2022-02-08 DIAGNOSIS — M81.0 POSTMENOPAUSAL BONE LOSS: ICD-10-CM

## 2022-02-08 DIAGNOSIS — D70.9 NEUTROPENIA, UNSPECIFIED TYPE (HCC): ICD-10-CM

## 2022-02-08 DIAGNOSIS — Z12.31 ENCOUNTER FOR SCREENING MAMMOGRAM FOR BREAST CANCER: ICD-10-CM

## 2022-02-08 DIAGNOSIS — Z00.00 MEDICARE ANNUAL WELLNESS VISIT, SUBSEQUENT: ICD-10-CM

## 2022-02-08 DIAGNOSIS — E89.2 S/P PARATHYROIDECTOMY: ICD-10-CM

## 2022-02-08 PROBLEM — M00.9 SEPTIC JOINT OF RIGHT KNEE JOINT (HCC): Status: RESOLVED | Noted: 2019-11-15 | Resolved: 2022-02-08

## 2022-02-08 PROCEDURE — G0439 PPPS, SUBSEQ VISIT: HCPCS | Performed by: INTERNAL MEDICINE

## 2022-02-08 RX ORDER — TIZANIDINE 2 MG/1
TABLET ORAL
COMMUNITY
Start: 2021-11-29 | End: 2022-02-08

## 2022-02-08 RX ORDER — CALCIUM CARBONATE 260MG(650)
TABLET,CHEWABLE ORAL
COMMUNITY
Start: 2018-01-01

## 2022-02-08 RX ORDER — VITAMIN B COMPLEX
TABLET ORAL
COMMUNITY
Start: 2018-01-01

## 2022-02-08 ASSESSMENT — ENCOUNTER SYMPTOMS: GENERAL WELL-BEING: EXCELLENT

## 2022-02-08 ASSESSMENT — PATIENT HEALTH QUESTIONNAIRE - PHQ9: CLINICAL INTERPRETATION OF PHQ2 SCORE: 0

## 2022-02-08 ASSESSMENT — ACTIVITIES OF DAILY LIVING (ADL): BATHING_REQUIRES_ASSISTANCE: 0

## 2022-02-08 ASSESSMENT — FIBROSIS 4 INDEX: FIB4 SCORE: 1.66

## 2022-02-08 NOTE — PROGRESS NOTES
Lisa Paul is a 76 y.o. female who presents with medicare wellness          HPI     Here for Medicare wellness evaluation  Medications, allergies, medical history, surgical history, social history, family history  reviewed and updated  S/p back surgery in october and has done PT, able to exercise and started golfing again. Off the neurontin. Just taking tylenol as needed, no nsaids.   Has been eating healthy. Taking one iv liquid one packet per day  Current supplements: Reviewed  Chronic narcotic pain medicines: No  Allergies: Morphine and Other food    Screening: Reviewed  Depressive Symptoms: Denies feeling down, depressed or hopeless. Denies loss of interest or pleasure in doing things   ADLs: Denies needing help with using telephone, transportation, shopping, preparing meals, housework, laundry, or managing medication or money.    Independent with bathing, hygiene, feeding, toileting, dressing    Memory concerns: Denies difficulty remembering details of conversations, events and upcoming appointments, does all bill   Hearing problems: Denies.   Recent falls no    Current social contact/activities: Reviewed, continues to keep active with her family and friends, goes golfing on a regular basis      Current Outpatient Medications   Medication Sig Dispense Refill   • Coenzyme Q10 (COQ10) 100 MG Cap      • Zinc 10 MG Lozenge      • CALCIUM PO      • tizanidine (ZANAFLEX) 2 MG tablet TAKE 1-2 TABLETS BY MOUTH UP TO THREE TIMES PER DAY AS NEEDED. 14 DAY SUPPLY     • simvastatin (ZOCOR) 20 MG Tab TAKE 1 TAB BY MOUTH EVERY EVENING. 90 Tablet 0   • polyethylene glycol/lytes (MIRALAX) 17 g Pack Take 17 g by mouth every day.     • levothyroxine (SYNTHROID) 50 MCG Tab Take 50 mcg by mouth every morning on an empty stomach. Pt takes 100 MCG only on Sunday  Al other days 50 MCG      • Cyanocobalamin (VITAMIN B 12 PO) Take 1 tablet by mouth every day.     • Ascorbic Acid (VITAMIN C PO) Take 1 tablet by mouth  every day.     • Probiotic Product (PROBIOTIC PO) Take 1 capsule by mouth every evening.       No current facility-administered medications for this visit.     Adenoma  2/11 colon per [facility] adenoma   3/16/16 colon per [facility] tortuous colon, melanosis in colon, repeat colonoscopy 5 years for surveillance  3/24/21 colon per [facility] negative, no further surveillance colonoscopy necessary      Allergic rhinitis     Atypical chest pain  10/29/19 chest burning sensation, EKG negative, stress test ordered   10/30/19 echo normal LV function EF 60%, normal diastolic function, mild aortic insufficiency, normal right LV size and function, RSVP 25  11/12/19 myocardial perfusion study no evidence of ischemia, EF 79%     Dyslipidemia  11/08 chol 210,trig 45,hdl 95,ldl 97  6/09 chol 271,trig 113,hdl 74,ldl 163 restart on zocor 40 (1/2)  5/05 aorta abd u/s negative  11/08 p.thallium no ischemia EF 87%  2/09 ERIC negative  2/10 chol 205,trig 55,hdl 100,ldl 94  1/11 chol 197,trig 66,hdl 100,ldl 84 on zocor 20 mg  2/12 chol 205,trig 43,hdl 106,ldl 90 on zocor 20 mg  2/12/13 chol 216,trig 58,hdl 114,ldl 90 on zocor 20 mg  2/13/14 chol 195,trig 30,hdl 106,ldl 83 on zocor 20 mg  3/12/15 chol 205,trig 41,hdl 99,ldl 98 on zocor 20 mg  3/15/16 chol 260,trig 77,hdl 102,ldl 143 on zocor 20 mg repeat lipid panel 2 months  5/25/16 chol 243,trig 51,hdl 104,ldl 129 on zocor 20 mg  2/7/17 chol 203,trig 66,hdl 105,ldl 85 on zocor 20 mg  9/20/17 chol 211,trig 71,hdl 104,ldl 93 on zocor 20 mg  9/10/18 chol 218,trig 48,hdl 106,ldl 102 on zocor 20 mg  11/12/19 myocardial perfusion study no evidence of ischemia, EF 79%  1/9/20 off zocor   6/20/20 chol 172,trig 41,hdl 99,ldl 65 on zocor 20 mg  2/3/22 chol 201,trig 56,hdl 91,ldl 99 on zocor 20 mg     GERD  11/4/19 [facility] note burning sensation controlled, start pantoprazole 20 mg, labs ordered     History ankle fracture  5/15/20 [facility] orthopedic note x-ray right ankle nondisplaced oblique distal fibula fracture lowry B,  placed in walking boot, follow-up 1 week  5/26/20  orthopedic note follow-up right ankle fracture, x-rays demonstrate nondisplaced right lateral malleolus fracture, ASO ankle lacer for stability follow-up 4 weeks  6/16/20  orthopedic note closed treatment ankle fracture 5/15/20 x-ray right ankle healed malleolus fracture, proceed with aggressive physical therapy follow-up 6 weeks     History of bilateral pleural effusion  12/4/19 hospital admission, 12/9/19 hospital discharge, admission for fevers and chills while on home infusion IV daptomycin for septic arthritis MSSA    12/4/19 chest x-ray negative  12/5/19  infectious disease hospital consultation recommend NICOLE, repeat blood cultures negative thus far, hold off on discontinuation of PICC line until NICOLE obtained, changed to cefazolin from daptomycin  12/6/19 transesophageal echo structurally normal heart valves  12/6/19 infectious disease hospital note repeat blood cultures negative  12/6/19 PICC line removed, catheter tip culture negative, blood cultures no growth to date  12/6/19 CT abdomen pelvis with contrast, minimal right lower lobe atelectasis or pneumonia, small bilateral pleural effusions, no loculated fluid collection  12/7/19 CT chest with; trace amounts dependent bilateral pleural effusion, interstitial groundglass opacities  12/8/19 infectious disease [facility] note cough ongoing prior to initiation of daptomycin, less likely daptomycin induced eosinophilic pneumonia, will not restart daptomycin, continue cefazolin with end-date 12/15/19, follow-up pulmonary  12/8/19  pulmonary hospital consultation bilateral pleural effusion confirmed on CT, too small for bedside thoracentesis, given acute sickness likely related to hypoalbuminemia versus volume overload status post resuscitation, cannot rule out parapneumonic effusion but clinical picture with negative procalcitonin is not suggestive of this, recommend gentle  diuresis, outpatient follow-up CT thorax 4 to 6 weeks, regarding cough differential includes reflux, asthma, volume overload, interstitial lung disease, postnasal drip, eosinophilic granuloma, nonasthmatic eosinophilic bronchitis, given eosinophilia to differentiate would need full pulmonary function test with methacholine challenge, which can be done as an outpatient if cough persists, repeat CT scan as above (if groundglass opacities persist will need further diagnostic intervention bronchoscopy), check REGGIE, rheumatoid factor, IgE level  12/8/19 REGGIE negative,CCP 6,RF negative  12/9/19 hospital discharge per infectious disease recommendation continue cefazolin through 12/15/15 through peripheral IV, follow-up infectious disease outpatient pulmonary outpatient  12/10/19 ESR 70,CRP 3.9,wbc 5.6,hgb 12,hct 36  12/11/19 referral pulmonary placed, repeat CT high-resolution thorax ordered 4 to 6 weeks  12/17/19 infectious disease note transition IV antibiotics to doxycycline  12/18/19 pulmonary note order CT high resolution thorax  1/21/20 CT thorax high resolution; interval resolution of bilateral subpleural interstitial opacities with hazy groundglass opacities, minimal streaky linear opacity in the lingula may represent subsegmental atelectasis or scar  2/10/20  pulmon note resolution of pleural effusion on follow-up CT scan, suspect pleural effusion and subpleural reticular thickening was related to sepsis induced lung injury from volume overload, follow-up as needed     History MSSA bacteremia  11/17/19  infectious disease hospital consultation admitted 11/15/19 right knee pain after treadmill stress test, seen by orthopedics and had arthrocentesis performed, sent to emergency room with increased cell count on arthrocentesis, presentation ER WBC 17.8, ESR 44, went to the operating room for incision and drainage right septic knee positive staphylococcus aureus MSSA, blood cultures positive for  staphylococcus aureus MSSA  11/18/19 echo EF 60% no evidence of endocarditis  11/20/19 ultrasound-guided catheter insertion for intravenous antibiotics  11/22/19 discharged home IV daptomycin and date 12/15/19, follow-up once a week infusion center, follow-up infectious disease 2 weeks  12/4/19 hospital admission, 12/9/19 hospital discharge, admission for fevers and chills while on home infusion IV daptomycin for septic arthritis MSSA    12/4/19 chest x-ray negative  12/5/19  infectious disease \[facility]\"" consultation recommend NICOLE, repeat blood cultures negative thus far, hold off on discontinuation of PICC line until NICOLE obtained, changed to cefazolin from daptomycin  12/6/19 transesophageal echo structurally normal heart valves  12/6/19 infectious disease hospital note repeat blood cultures negative  12/6/19 PICC line removed, catheter tip culture negative, blood cultures no growth to date  12/6/19 CT abdomen pelvis with contrast, minimal right lower lobe atelectasis or pneumonia, small bilateral pleural effusions, no loculated fluid collection  12/7/19 CT chest with; trace amounts dependent bilateral pleural effusion, interstitial groundglass opacities  12/8/19 infectious disease hospital note cough ongoing prior to initiation of daptomycin, less likely daptomycin induced eosinophilic pneumonia, will not restart daptomycin, continue cefazolin with end-date 12/15/19, follow-up pulmonary  12/8/19  pulmonary hospital consultation bilateral pleural effusion confirmed on CT, too small for bedside thoracentesis, given acute sickness likely related to hypoalbuminemia versus volume overload status post resuscitation, cannot rule out parapneumonic effusion but clinical picture with negative procalcitonin is not suggestive of this, recommend gentle diuresis, outpatient follow-up CT thorax 4 to 6 weeks, regarding cough differential includes reflux, asthma, volume overload, interstitial lung disease,  postnasal drip, eosinophilic granuloma, nonasthmatic eosinophilic bronchitis, given eosinophilia to differentiate would need full pulmonary function test with methacholine challenge, which can be done as an outpatient if cough persists, repeat CT scan as above (if groundglass opacities persist will need further diagnostic intervention bronchoscopy), check REGGIE, rheumatoid factor, IgE level  12/8/19 REGGIE negative,CCP 6,RF negative  12/9/19 hospital discharge per infectious disease recommendation continue cefazolin through 12/15/15 through peripheral IV, follow-up infectious disease outpatient pulmonary outpatient  12/10/19 ESR 70,CRP 3.9,wbc 5.6,hgb 12,hct 36  12/11/19 referral pulmonary placed, repeat CT high-resolution thorax ordered 4 to 6 weeks  12/17/19 infectious disease note transition IV antibiotics to doxycycline  12/18/19 pulmonary note order CT high resolution thorax  1/21/20 CT thorax high resolution; interval resolution of bilateral subpleural interstitial opacities with hazy groundglass opacities, minimal streaky linear opacity in the lingula may represent subsegmental atelectasis or scar  1/22/20 wbc 3.2 (62%N,27%L), ESR 7, CRP 0.13 off antibiotics since 1/16/20  2/10/20  pulmon note resolution of pleural effusion on follow-up CT scan, suspect pleural effusion and subpleural reticular thickening was related to sepsis induced lung injury from volume overload, follow-up as needed     History fatigue  10/15/19 b2 1103,iron and zinc normal,HIV negative, REGGIE, SSA and SSB labs negative  10/29/19 chest burning sensation, EKG negative, stress test ordered   10/30/19 echo normal LV function EF 60%, normal diastolic function, mild aortic insufficiency, normal right LV size and function, RSVP 25  11/12/19 myocardial perfusion study no evidence of ischemia, EF 79%     History septic joint right knee  11/17/19  infectious disease hospital consultation admitted 11/15/19 right knee pain after  treadmill stress test, seen by orthopedics and had arthrocentesis performed, sent to emergency room with increased cell count on arthrocentesis, presentation ER WBC 17.8, ESR 44, went to the operating room for incision and drainage right septic knee positive staphylococcus aureus MSSA, blood cultures positive for staphylococcus aureus MSSA  11/18/19 echo EF 60% no evidence of endocarditis  11/20/19 ultrasound-guided catheter insertion for intravenous antibiotics  11/22/19 discharged home IV daptomycin and date 12/15/19, follow-up once a week infusion center, follow-up infectious disease 2 weeks  12/4/19 hospital admission, 12/9/19 hospital discharge, admission for fevers and chills while on home infusion IV daptomycin for septic arthritis MSSA    12/4/19 chest x-ray negative  12/5/19  infectious disease hospital consultation recommend NICOLE, repeat blood cultures negative thus far, hold off on discontinuation of PICC line until NICOLE obtained, changed to cefazolin from daptomycin  12/6/19 transesophageal echo structurally normal heart valves  12/6/19 infectious disease hospital note repeat blood cultures negative  12/6/19 PICC line removed, catheter tip culture negative, blood cultures no growth to date  12/6/19 CT abdomen pelvis with contrast, minimal right lower lobe atelectasis or pneumonia, small bilateral pleural effusions, no loculated fluid collection  12/7/19 CT chest with; trace amounts dependent bilateral pleural effusion, interstitial groundglass opacities  12/8/19 infectious disease hospital note cough ongoing prior to initiation of daptomycin, less likely daptomycin induced eosinophilic pneumonia, will not restart daptomycin, continue cefazolin with end-date 12/15/19, follow-up pulmonary  12/8/19  pulmonary hospital consultation bilateral pleural effusion confirmed on CT, too small for bedside thoracentesis, given acute sickness likely related to hypoalbuminemia versus volume overload  status post resuscitation, cannot rule out parapneumonic effusion but clinical picture with negative procalcitonin is not suggestive of this, recommend gentle diuresis, outpatient follow-up CT thorax 4 to 6 weeks, regarding cough differential includes reflux, asthma, volume overload, interstitial lung disease, postnasal drip, eosinophilic granuloma, nonasthmatic eosinophilic bronchitis, given eosinophilia to differentiate would need full pulmonary function test with methacholine challenge, which can be done as an outpatient if cough persists, repeat CT scan as above (if groundglass opacities persist will need further diagnostic intervention bronchoscopy), check REGGIE, rheumatoid factor, IgE level  12/8/19 REGGIE negative,CCP 6,RF negative  12/9/19 hospital discharge per infectious disease recommendation continue cefazolin through 12/15/15 through peripheral IV, follow-up infectious disease outpatient pulmonary outpatient  12/10/19 ESR 70,CRP 3.9,wbc 5.6,hgb 12,hct 36  12/11/19 referral pulmonary placed, repeat CT high-resolution thorax ordered 4 to 6 weeks  12/17/19 infectious disease note transition IV antibiotics to doxycycline  12/18/19 pulmonary note order CT high resolution thorax  1/22/20 wbc 3.2 (62%N,27%L), ESR 7, CRP 0.13 off antibiotics from 1/16/20     History of vasovagal  5/12 one episode, ekg, echo, holter per [facility] negative, no further workup necessary  10/30/19 echo normal LV function EF 60%, normal diastolic function, mild aortic insufficiency, normal right LV size and function, RSVP 25  11/12/19 myocardial perfusion study no evidence of ischemia, EF 79%     History wrist fracture  12/30/12 left wrist fracture had splint for 6 weeks, no surgery     hyponatremia  2/12/13 Na 136  2/13/14 Na 131  3/15/16 Na 137  5/25/16 Na 134  9/20/17 Na 131  10/2/19 seen ER URI, Na 129 labs ordered to be done in 2 weeks  10/9/19 Na 134, normal cbc,cmp,tsh (consider cortisol, ACTH, HIV, hepatitis serology, REGGIE, B12)    10/10/19 labs ordered dry mouth, fatigue, hyponatremia, echo ordered murmur, ativan as needed for breakthrough panic attack  10/15/19 serum osm 277 (278-298), urine osm 128 (300-900), urine Na 14, cortisol 9.8, ACTH 15  12/6/19 Na 134  12/10/19 Na 132  1/22/20 Na 133  1/29/20  endocrine note hyponatremia rule out adrenal insufficiency, ACTH stim test  2/4/20 ACTH 14,cortisol 5.4, serum osmolality 282, urine osmolality 344, Na 134  2/4/20 tsh 2.7,free t4 0.83,free t3 2.84  2/12/20 ACTH stim test per endocrinology negative, urine and serum osmolality indicates excess water intake  3/17/20 tsh 3.8,free t4 0.85,free t3 2.6,t3 86,TPO 0.3  3/23/20  endocrine note hyponatremia could be excessive water intake hypothyroid, start synthroid 75 mcg daily follow-up 2 months  5/20/20 tsh 0.547,free t4 1.62,free t3 2.46, Na 133 on synthroid 75 mcg  5/26/20  endocrine note recent Na 133, monitor sodium, try liquid IV mixed once taking 16 ounce water, follow-up 3 months  8/19/20 Na 133   8/28/20  endocrinology note hyponatremia monitor sodium no identified endocrine cause, follow-up 3 months  4/14/21 Na 138, serum osm 284, urine sodium 102 mmol/L, urine osmol 507 mOsm/kg  6/29/21 Na 124  7/14/21 Na 126  7/19/21 Na 129  7/24/21 tsh 1.9, free t4 1.78,free t3 2.43 on levothyroxine 50 mcg daily per  endocrinology   9/20/21  nephrology note hyponatremia, appears euvolemic on exam, likely SIADH, high urine osm 540, urine sodium 102, serum Na improved with water restriction, continue home blood pressure readings, minimize use of NSAIDs, continue work-up for hyponatremia 135  10/15/21 Na 141  2/3/22 Na 135, serum osmolality 284, urine osmolality 434, urine sodium 54     Hypothyroid  10/15/19 b2 1103,iron and zinc normal,HIV negative, REGGIE, SSA and SSB labs negative  10/29/19 chest burning sensation, EKG negative, stress test ordered   10/30/19 echo normal LV function EF 60%,  normal diastolic function, mild aortic insufficiency, normal right LV size and function, RSVP 25  11/12/19 myocardial perfusion study no evidence of ischemia, EF 79%  3/23/20  endocrinology note fatigue tsh 2.7,free t4 0.83,free t3 2.8 start levothyroxine 75 mcg   5/20/20 tsh 0.54 on levothyroxine 75 mcg   6/17/20 tsh 0.156 on levothyroxine 75 mcg   8/19/20 tsh 0.056   8/28/20  endocrinology note because of palpitations at night decrease levothyroxine to 75 mcg 6 days a week, half a tablet on day 7, hyponatremia monitor sodium no identified endocrine cause, follow-up 3 months  7/24/21 tsh 1.9, free t4 1.78,free t3 2.43 on levothyroxine 50 mcg daily per  endocrinology   2/3/22 tsh 1.8 on levothyroxine per endocrineology      Neutropenia  9/20/17 wbc 4.3  9/10/18 wbc 4.9  12/10/19 wbc 5.6  1/14/20 wbc 3.2  6/17/20 wbc 4.0  6/29/21 wbc 6.6  10/15/21 wbc 11.5  2/3/22 wbc 3.4 (65%N,24%L)     Preventative health  2/10 pap  1/19/16 pneumovax   2/10/17 [facility]  9/7/17 tdap  9/26/18 dexa LS +1.4, hip -0.2  1/23/20 mammogram  1/7/21 shingrix second  2/18/21 covid pfizer second  3/24/21 colon per [facility] negative, no further surveillance colonoscopy necessary  9/15/21 flu   9/27/21 covid pfizer booster  2/3/22 vit d 65    Sensorineural hearing loss  1/6/21  cardiology note, sensorineural hearing loss candidate for amplification, patient will check with her insurance regarding coverage     Sleep apnea  8/26/20 pulmonary note moderate risk sleep apnea, stopbang score 4, check on sleep study, follow-up 3 months  9/21/20 home sleep study RDI 21.8, minimum saturation 79%     Status post laparoscopic cholecystectomy    s/p lumbar surgery  6/20/11 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  2/14/17 [employer] note   6/18/21 [facility] note back pain, trigger point injection with steroids performed  7/17/21 MRI lumbar spine, L1-L2 moderate facet  arthropathy, moderate right and moderate to severe left foraminal stenosis, L2-L3 moderate to severe facet arthropathy, moderate left neuroforaminal stenosis, L4-5 moderate disc bulge, severe facet arthropathy, severe central canal stenosis with mild to moderate right foraminal stenosis, no acute abnormality  9/13/21  neurosurgery note, discussed surgical intervention, likely related to L4-L5 level, having tried physical therapy and epidural which was not effective  9/17/21  neurosurgery note, flexion-extension films shows no instability, discussed lumbar decompression with complete laminectomy L5, possible laminectomy L4, she would like to proceed  10/13/21  neurosurgery operative note L4-5 complete laminectomy, L3-S1 bilateral medial facetectomy and bilateral foraminoty  10/26/21  neurosurgery postoperative note follow-up L4-L5 laminectomy, L3-S1 medial facetectomy and foraminotomy, taper gabapentin to 300 mg bid x one week then 300 mg qday x one week  11/29/21 [facility] neurosurgery note postoperative visit, increase activity as tolerated, ultrasound rule out DVT right ankle swelling, refill tizanidine  12/16/21 [facility] neurosurgery note, 6-month postop visit, symptoms of neurogenic claudication have improved     Status post parathyroidectomy  2005 no old records  11/08 calcium 10.3  2/10 calcium 9.9  1/11 calcium 10.1  2/12 calcium 10, vit d 64  2/13 calcium 10.0  2/13/14 calcium 9.9  3/12/15 calcium 10.3  3/15/16 calcium 10.7  5/25/16 calcium 10.5,ionized 1.3,PTH 26  2/7/17 calcium 10.5  9/10/18 calcium 9.4,vit d 58  10/10/19 calcium 10.3  1/22/20 calcium 10.3  6/17/20 calcium 9.8  8/19/20 calcium 10.2,ionized calcium 1.3,PTH 28,phos 3.3  8/28/20  endocrinology note   2/3/22 calcium 9.9,PTH 26                       Patient Active Problem List   Diagnosis   • Dyslipidemia   • S/p parathyroidectomy   • Allergic rhinitis   • Preventative health care   • S/P laparoscopic  cholecystectomy   • Adenomatous polyp of colon   • History of vasovagal syncope   • History of wrist fracture   • S/p lumbar surgery   • Hyponatremia   • History of septic joint of right knee joint     • History of MSSA bacteremia   • History of bilateral pleural effusion   • Cataract   • GERD (gastroesophageal reflux disease)   • Atypical chest pain   • Hypothyroid   • History of neutropenia   • History of ankle fracture   • Sleep apnea   • Sensorineural hearing loss         ROS:    Ostomy or other tubes or amputations no  Chronic oxygen use no     Gait: normal. Uses assistive device :  no       Health Care Screening recommendations reviewed with patient today and updated or ordered.  DPA/Advanced directive: Completed/Information provided.   Referrals for PT/OT/Nutrition counseling/Behavioral Health/Smoking cessation as above if indicated  Discussion today about general wellness and lifestyle habits:    Prevent falls and reduce trip hazards;   Have a working fire alarm and carbon monoxide detector;   Engage in regular physical activity and social activities;   Use sun protection when outdoors.       Depression Screening    Little interest or pleasure in doing things?  0 - not at all  Feeling down, depressed , or hopeless? 0 - not at all  Patient Health Questionnaire Score: 0     If depressive symptoms identified deferred to follow up visit unless specifically addressed in assessment and plan.    Interpretation of PHQ-9 Total Score   Score Severity   1-4 No Depression   5-9 Mild Depression   10-14 Moderate Depression   15-19 Moderately Severe Depression   20-27 Severe Depression    Screening for Cognitive Impairment    Three Minute Recall (captain, garden, picture) 3/3    Madi clock face with all 12 numbers and set the hands to show 5 past 8.  Yes    Cognitive concerns identified deferred for follow up unless specifically addressed in assessment and plan.    Fall Risk Assessment    Has the patient had two or more  "falls in the last year or any fall with injury in the last year?  No    Safety Assessment    Throw rugs on floor.  Yes  Handrails on all stairs.  Yes  Good lighting in all hallways.  Yes  Difficulty hearing.  Yes  Patient counseled about all safety risks that were identified.    Functional Assessment ADLs    Are there any barriers preventing you from cooking for yourself or meeting nutritional needs?  No.    Are there any barriers preventing you from driving safely or obtaining transportation?  No.    Are there any barriers preventing you from using a telephone or calling for help?  No.    Are there any barriers preventing you from shopping?  No.    Are there any barriers preventing you from taking care of your own finances?  No.    Are there any barriers preventing you from managing your medications?  No.    Are there any barriers preventing you from showering, bathing or dressing yourself?  No.    Are you currently engaging in any exercise or physical activity?  Yes.     What is your perception of your health?  Excellent.       Patient Care Team:  Eron Flowers M.D. as PCP - General  Ramin Meadows M.D. as Consulting Physician (Ophthalmology)      ROS     Occasional right first toe numbness and tingling at times      Objective     /72 (BP Location: Right arm, Patient Position: Sitting, BP Cuff Size: Adult)   Pulse 69   Temp 36.2 °C (97.2 °F)   Ht 1.651 m (5' 5\")   Wt 64 kg (141 lb)   SpO2 97%   BMI 23.46 kg/m²      Physical Exam  Vitals and nursing note reviewed.   Constitutional:       Appearance: Normal appearance.   HENT:      Head: Normocephalic and atraumatic.      Right Ear: External ear normal.      Left Ear: External ear normal.   Eyes:      Conjunctiva/sclera: Conjunctivae normal.   Cardiovascular:      Rate and Rhythm: Normal rate and regular rhythm.      Heart sounds: Normal heart sounds.   Pulmonary:      Effort: Pulmonary effort is normal.      Breath sounds: Normal breath sounds. "   Abdominal:      General: There is no distension.   Skin:     General: Skin is warm.   Neurological:      General: No focal deficit present.      Mental Status: She is alert.   Psychiatric:         Mood and Affect: Mood normal.                             Assessment & Plan        Assessment  #1 Medicare wellness    #2 status post L4-5 laminectomy in October 2021, recovering well, has been to physical therapy, doing exercises regularly, occasional right first toe numbness    #3 dyslipidemia 2/3/22 chol 201,trig 56,hdl 91,ldl 99 on zocor 20 mg, has been exercising consistently since her back surgery    #4 hypothyroid on levothyroxine 1.8 mcg per endocrinology    #5 Neutropenia WBC 3.4 on February 3, no recurrent infections    #6 History of hyponatremia most recent sodium 135 followed by nephrology     #7 status post parathyroidectomy 2005, recent calcium February 39.9, PTH 26     #8 preventive health  2/10 pap  1/19/16 pneumovax   2/10/17 prevnar at [facility] pharmacy  9/7/17 tdap  9/26/18 dexa LS +1.4, hip -0.2  1/23/20 mammogram  1/7/21 shingrix second  2/18/21 covid pfizer second  3/24/21 colon per [facility] negative, no further surveillance colonoscopy necessary  9/15/21 flu   9/27/21 covid pfizer booster  2/3/22 vit d 65 preventative health    #9 sensorineural hearing loss has hearing aids per 's group    #10 postmenopausal bone loss    Plan  #1 old records  endocrinology, continue levothyroxine per endocrinology    #2 mammogram     #3 dexa     #4  reviewed laboratory testing from February 3, normal TSH, normal sodium, normal PTH and calcium    #5 follow-up nephrology    #6  No further colonoscopy    #7 up-to-date on vaccines     #8 continue work on stretching, back exercises, she has recovered well from her back surgery, has been to physical therapy    #9 health maintenance reviewed and updated    #10 follow-up 1 year

## 2022-02-08 NOTE — LETTER
[facility]  Eron Flowers M.D.  [address]  [place] [address]  Fax: [phone]   Authorization for Release/Disclosure of   Protected Health Information   Name: SAMARA CISNEROS : 1945 SSN: [id number]   Address: [address] [place] [id number] Phone:    [phone] (home)    I authorize the entity listed below to release/disclose the PHI below to:   [facility]/Eron Flowers M.D. and Eron Flowers M.D.   Provider or Entity Name:                                                      Dr Schwarz ([facility])   Address   City, [facility], [facility]   Phone:      Fax:                  [phone]   Reason for request: continuity of care   Information to be released: OLD RECORDS   [  ] LAST COLONOSCOPY,  including any PATH REPORT and follow-up  [  ] LAST FIT/COLOGUARD RESULT [  ] LAST DEXA  [  ] LAST MAMMOGRAM  [  ] LAST PAP  [  ] LAST LABS [  ] RETINA EXAM REPORT  [  ] IMMUNIZATION RECORDS  [ x ] Release all info      [  ] Check here and initial the line next to each item to release ALL health information INCLUDING  _____ Care and treatment for drug and / or alcohol abuse  _____ HIV testing, infection status, or AIDS  _____ Genetic Testing    DATES OF SERVICE OR TIME PERIOD TO BE DISCLOSED: _____________  I understand and acknowledge that:  * This Authorization may be revoked at any time by you in writing, except if your health information has already been used or disclosed.  * Your health information that will be used or disclosed as a result of you signing this authorization could be re-disclosed by the recipient. If this occurs, your re-disclosed health information may no longer be protected by State or Federal laws.  * You may refuse to sign this Authorization. Your refusal will not affect your ability to obtain treatment.  * This Authorization becomes effective upon signing and will  on (date) __________.      If no date is indicated, this Authorization will   one (1) year from the signature date.    Name: Bernadette Storey Humberto    Signature:                   Continuity of Care   Date:     2/10/2022       PLEASE FAX REQUESTED RECORDS BACK TO: [phone]

## 2022-02-08 NOTE — LETTER
[facility]  Eron Flowers M.D.  [address]  [place] [address]  Fax: [phone]   Authorization for Release/Disclosure of   Protected Health Information   Name: SAMARA CISNEROS : 1945 SSN: [id number]   Address: [address] [place] [address] Phone:    [phone] (home)    I authorize the entity listed below to release/disclose the PHI below to:   [facility]/Eron Flowers M.D. and Eron Flowers M.D.   Provider or Entity Name:                                                          Dr Schwarz ([facility])   Address   City, [facility], [facility]   Phone:               [phone]    Fax:                      Reason for request: continuity of care   Information to be released:    [  ] LAST COLONOSCOPY,  including any PATH REPORT and follow-up  [  ] LAST FIT/COLOGUARD RESULT [  ] LAST DEXA  [  ] LAST MAMMOGRAM  [  ] LAST PAP  [  ] LAST LABS [  ] RETINA EXAM REPORT  [  ] IMMUNIZATION RECORDS  [ x ] Release all info      [  ] Check here and initial the line next to each item to release ALL health information INCLUDING  _____ Care and treatment for drug and / or alcohol abuse  _____ HIV testing, infection status, or AIDS  _____ Genetic Testing    DATES OF SERVICE OR TIME PERIOD TO BE DISCLOSED: _____________  I understand and acknowledge that:  * This Authorization may be revoked at any time by you in writing, except if your health information has already been used or disclosed.  * Your health information that will be used or disclosed as a result of you signing this authorization could be re-disclosed by the recipient. If this occurs, your re-disclosed health information may no longer be protected by State or Federal laws.  * You may refuse to sign this Authorization. Your refusal will not affect your ability to obtain treatment.  * This Authorization becomes effective upon signing and will  on (date) __________.      If no date is indicated, this Authorization  will  one (1) year from the signature date.    Name: Bernadette Paul    Signature:                     Continuity of Care   Date:     2022       PLEASE FAX REQUESTED RECORDS BACK TO: [phone]

## 2022-03-01 ENCOUNTER — HOSPITAL ENCOUNTER (OUTPATIENT)
Dept: LAB | Facility: MEDICAL CENTER | Age: 77
End: 2022-03-01
Attending: INTERNAL MEDICINE
Payer: MEDICARE

## 2022-03-01 LAB
25(OH)D3 SERPL-MCNC: 60 NG/ML (ref 30–100)
ANION GAP SERPL CALC-SCNC: 8 MMOL/L (ref 7–16)
BUN SERPL-MCNC: 14 MG/DL (ref 8–22)
CALCIUM SERPL-MCNC: 10.1 MG/DL (ref 8.4–10.2)
CHLORIDE SERPL-SCNC: 101 MMOL/L (ref 96–112)
CO2 SERPL-SCNC: 27 MMOL/L (ref 20–33)
CREAT SERPL-MCNC: 0.66 MG/DL (ref 0.5–1.4)
FASTING STATUS PATIENT QL REPORTED: NORMAL
GLUCOSE SERPL-MCNC: 97 MG/DL (ref 65–99)
POTASSIUM SERPL-SCNC: 4.2 MMOL/L (ref 3.6–5.5)
SODIUM SERPL-SCNC: 136 MMOL/L (ref 135–145)
T3FREE SERPL-MCNC: 2.56 PG/ML (ref 2–4.4)
T4 FREE SERPL-MCNC: 1.42 NG/DL (ref 0.93–1.7)
TSH SERPL DL<=0.005 MIU/L-ACNC: 1.33 UIU/ML (ref 0.38–5.33)

## 2022-03-01 PROCEDURE — 36415 COLL VENOUS BLD VENIPUNCTURE: CPT

## 2022-03-01 PROCEDURE — 82306 VITAMIN D 25 HYDROXY: CPT | Mod: GA

## 2022-03-01 PROCEDURE — 80048 BASIC METABOLIC PNL TOTAL CA: CPT

## 2022-03-01 PROCEDURE — 84439 ASSAY OF FREE THYROXINE: CPT

## 2022-03-01 PROCEDURE — 84443 ASSAY THYROID STIM HORMONE: CPT

## 2022-03-01 PROCEDURE — 84481 FREE ASSAY (FT-3): CPT

## 2022-03-08 ENCOUNTER — HOSPITAL ENCOUNTER (OUTPATIENT)
Dept: RADIOLOGY | Facility: MEDICAL CENTER | Age: 77
End: 2022-03-08
Attending: INTERNAL MEDICINE
Payer: MEDICARE

## 2022-03-08 ENCOUNTER — TELEPHONE (OUTPATIENT)
Dept: MEDICAL GROUP | Facility: MEDICAL CENTER | Age: 77
End: 2022-03-08
Payer: MEDICARE

## 2022-03-08 DIAGNOSIS — M81.0 POSTMENOPAUSAL BONE LOSS: ICD-10-CM

## 2022-03-08 PROCEDURE — 77080 DXA BONE DENSITY AXIAL: CPT

## 2022-03-09 ENCOUNTER — HOSPITAL ENCOUNTER (OUTPATIENT)
Dept: LAB | Facility: MEDICAL CENTER | Age: 77
End: 2022-03-09
Attending: INTERNAL MEDICINE
Payer: MEDICARE

## 2022-03-09 LAB
BASOPHILS # BLD AUTO: 0.7 % (ref 0–1.8)
BASOPHILS # BLD: 0.03 K/UL (ref 0–0.12)
EOSINOPHIL # BLD AUTO: 0.09 K/UL (ref 0–0.51)
EOSINOPHIL NFR BLD: 2.1 % (ref 0–6.9)
ERYTHROCYTE [DISTWIDTH] IN BLOOD BY AUTOMATED COUNT: 48.7 FL (ref 35.9–50)
HCT VFR BLD AUTO: 49.5 % (ref 37–47)
HGB BLD-MCNC: 16.3 G/DL (ref 12–16)
HIV 1+2 AB+HIV1 P24 AG SERPL QL IA: NORMAL
IMM GRANULOCYTES # BLD AUTO: 0.01 K/UL (ref 0–0.11)
IMM GRANULOCYTES NFR BLD AUTO: 0.2 % (ref 0–0.9)
LYMPHOCYTES # BLD AUTO: 0.98 K/UL (ref 1–4.8)
LYMPHOCYTES NFR BLD: 22.6 % (ref 22–41)
MCH RBC QN AUTO: 29.5 PG (ref 27–33)
MCHC RBC AUTO-ENTMCNC: 32.9 G/DL (ref 33.6–35)
MCV RBC AUTO: 89.7 FL (ref 81.4–97.8)
MONOCYTES # BLD AUTO: 0.29 K/UL (ref 0–0.85)
MONOCYTES NFR BLD AUTO: 6.7 % (ref 0–13.4)
NEUTROPHILS # BLD AUTO: 2.94 K/UL (ref 2–7.15)
NEUTROPHILS NFR BLD: 67.7 % (ref 44–72)
NRBC # BLD AUTO: 0 K/UL
NRBC BLD-RTO: 0 /100 WBC
PLATELET # BLD AUTO: 240 K/UL (ref 164–446)
PMV BLD AUTO: 9.9 FL (ref 9–12.9)
RBC # BLD AUTO: 5.52 M/UL (ref 4.2–5.4)
WBC # BLD AUTO: 4.3 K/UL (ref 4.8–10.8)

## 2022-03-09 PROCEDURE — 82784 ASSAY IGA/IGD/IGG/IGM EACH: CPT

## 2022-03-09 PROCEDURE — 88185 FLOWCYTOMETRY/TC ADD-ON: CPT | Mod: 91

## 2022-03-09 PROCEDURE — 85025 COMPLETE CBC W/AUTO DIFF WBC: CPT

## 2022-03-09 PROCEDURE — 88184 FLOWCYTOMETRY/ TC 1 MARKER: CPT

## 2022-03-09 PROCEDURE — 82785 ASSAY OF IGE: CPT

## 2022-03-09 PROCEDURE — 36415 COLL VENOUS BLD VENIPUNCTURE: CPT | Mod: GA

## 2022-03-09 PROCEDURE — G0475 HIV COMBINATION ASSAY: HCPCS | Mod: GA

## 2022-03-09 NOTE — TELEPHONE ENCOUNTER
Called the patient and left a message, please notify her that her bone density test shows normal bone strength of her back and hip, have her continue her good exercise routine, continue taking vitamin D and we can repeat her bone density test in 2 years.

## 2022-03-10 ENCOUNTER — TELEPHONE (OUTPATIENT)
Dept: MEDICAL GROUP | Facility: MEDICAL CENTER | Age: 77
End: 2022-03-10
Payer: MEDICARE

## 2022-03-10 NOTE — TELEPHONE ENCOUNTER
----- Message from Eron Flowers M.D. sent at 3/8/2022  6:36 PM PST -----  Called the patient and left a message, please notify her that her bone density test shows normal bone strength of her back and hip, have her continue her good exercise routine, continue taking vitamin D and we can repeat her bone density test in 2 years.

## 2022-03-11 LAB
ACUTE LEUKEMIA MARKERS SPEC-IMP: NORMAL
EVENTS COUNTED SPEC: 26 MARKERS
IGA SERPL-MCNC: 91 MG/DL (ref 68–408)
IGG SERPL-MCNC: 1018 MG/DL (ref 768–1632)
IGM SERPL-MCNC: <10 MG/DL (ref 35–263)
SOURCE 9121: NORMAL

## 2022-03-14 LAB — IGE SERPL-ACNC: 39 KU/L

## 2022-05-24 ENCOUNTER — APPOINTMENT (RX ONLY)
Dept: URBAN - METROPOLITAN AREA CLINIC 6 | Facility: CLINIC | Age: 77
Setting detail: DERMATOLOGY
End: 2022-05-24

## 2022-05-24 DIAGNOSIS — Z85.828 PERSONAL HISTORY OF OTHER MALIGNANT NEOPLASM OF SKIN: ICD-10-CM

## 2022-05-24 DIAGNOSIS — D22 MELANOCYTIC NEVI: ICD-10-CM

## 2022-05-24 DIAGNOSIS — L57.0 ACTINIC KERATOSIS: ICD-10-CM

## 2022-05-24 DIAGNOSIS — D18.0 HEMANGIOMA: ICD-10-CM

## 2022-05-24 DIAGNOSIS — L81.4 OTHER MELANIN HYPERPIGMENTATION: ICD-10-CM

## 2022-05-24 DIAGNOSIS — L82.1 OTHER SEBORRHEIC KERATOSIS: ICD-10-CM

## 2022-05-24 DIAGNOSIS — Z71.89 OTHER SPECIFIED COUNSELING: ICD-10-CM

## 2022-05-24 DIAGNOSIS — Z00.8 ENCOUNTER FOR OTHER GENERAL EXAMINATION: ICD-10-CM

## 2022-05-24 PROBLEM — D48.5 NEOPLASM OF UNCERTAIN BEHAVIOR OF SKIN: Status: ACTIVE | Noted: 2022-05-24

## 2022-05-24 PROBLEM — D22.5 MELANOCYTIC NEVI OF TRUNK: Status: ACTIVE | Noted: 2022-05-24

## 2022-05-24 PROBLEM — D18.01 HEMANGIOMA OF SKIN AND SUBCUTANEOUS TISSUE: Status: ACTIVE | Noted: 2022-05-24

## 2022-05-24 PROCEDURE — 17003 DESTRUCT PREMALG LES 2-14: CPT

## 2022-05-24 PROCEDURE — 99213 OFFICE O/P EST LOW 20 MIN: CPT | Mod: 25

## 2022-05-24 PROCEDURE — 11102 TANGNTL BX SKIN SINGLE LES: CPT

## 2022-05-24 PROCEDURE — ? SUNSCREEN RECOMMENDATIONS

## 2022-05-24 PROCEDURE — 17000 DESTRUCT PREMALG LESION: CPT | Mod: 59

## 2022-05-24 PROCEDURE — ? LIQUID NITROGEN

## 2022-05-24 PROCEDURE — ? BIOPSY BY SHAVE METHOD

## 2022-05-24 PROCEDURE — ? COUNSELING

## 2022-05-24 PROCEDURE — ? SUNSCREEN TREATMENT REGIMEN

## 2022-05-24 PROCEDURE — ? REFERRAL CORRESPONDENCE

## 2022-05-24 ASSESSMENT — LOCATION ZONE DERM
LOCATION ZONE: FACE
LOCATION ZONE: TRUNK
LOCATION ZONE: HAND
LOCATION ZONE: ARM
LOCATION ZONE: LEG

## 2022-05-24 ASSESSMENT — LOCATION SIMPLE DESCRIPTION DERM
LOCATION SIMPLE: SUPERIOR FOREHEAD
LOCATION SIMPLE: LEFT FOREARM
LOCATION SIMPLE: RIGHT HAND
LOCATION SIMPLE: CHEST
LOCATION SIMPLE: RIGHT ANKLE
LOCATION SIMPLE: LEFT HAND
LOCATION SIMPLE: ABDOMEN
LOCATION SIMPLE: RIGHT CALF

## 2022-05-24 ASSESSMENT — LOCATION DETAILED DESCRIPTION DERM
LOCATION DETAILED: SUPERIOR MID FOREHEAD
LOCATION DETAILED: RIGHT RADIAL DORSAL HAND
LOCATION DETAILED: SUBXIPHOID
LOCATION DETAILED: RIGHT DISTAL CALF
LOCATION DETAILED: MIDDLE STERNUM
LOCATION DETAILED: RIGHT ULNAR DORSAL HAND
LOCATION DETAILED: LEFT PROXIMAL DORSAL FOREARM
LOCATION DETAILED: LEFT ULNAR DORSAL HAND
LOCATION DETAILED: RIGHT POSTERIOR ANKLE
LOCATION DETAILED: EPIGASTRIC SKIN

## 2022-05-24 NOTE — PROCEDURE: MIPS QUALITY
Quality 130: Documentation Of Current Medications In The Medical Record: Current Medications Documented
Quality 226: Preventive Care And Screening: Tobacco Use: Screening And Cessation Intervention: Patient screened for tobacco use and is an ex/non-smoker
Detail Level: Detailed
Quality 111:Pneumonia Vaccination Status For Older Adults: Pneumococcal vaccine administered on or after patient’s 60th birthday and before the end of the measurement period
Quality 431: Preventive Care And Screening: Unhealthy Alcohol Use - Screening: Patient not identified as an unhealthy alcohol user when screened for unhealthy alcohol use using a systematic screening method

## 2022-05-24 NOTE — PROCEDURE: LIQUID NITROGEN
Show Aperture Variable?: Yes
Consent: The patient's consent was obtained including but not limited to risks of crusting, scabbing, blistering, scarring, darker or lighter pigmentary change, recurrence, incomplete removal and infection.
Number Of Freeze-Thaw Cycles: 2 freeze-thaw cycles
Render Post-Care Instructions In Note?: no
Duration Of Freeze Thaw-Cycle (Seconds): 10
Post-Care Instructions: I reviewed with the patient in detail post-care instructions. Patient is to wear sunprotection, and avoid picking at any of the treated lesions. Pt may apply Vaseline to crusted or scabbing areas.
Detail Level: Detailed

## 2022-06-02 ENCOUNTER — APPOINTMENT (RX ONLY)
Dept: URBAN - METROPOLITAN AREA CLINIC 4 | Facility: CLINIC | Age: 77
Setting detail: DERMATOLOGY
End: 2022-06-02

## 2022-06-02 DIAGNOSIS — T07XXXA ABRASION OR FRICTION BURN OF OTHER, MULTIPLE, AND UNSPECIFIED SITES, WITHOUT MENTION OF INFECTION: ICD-10-CM

## 2022-06-02 PROBLEM — S00.30XA UNSPECIFIED SUPERFICIAL INJURY OF NOSE, INITIAL ENCOUNTER: Status: ACTIVE | Noted: 2022-06-02

## 2022-06-02 PROCEDURE — ? ADDITIONAL NOTES

## 2022-06-02 PROCEDURE — ? REFERRAL

## 2022-06-02 PROCEDURE — ? DIAGNOSIS COMMENT

## 2022-06-02 ASSESSMENT — LOCATION ZONE DERM: LOCATION ZONE: NOSE

## 2022-06-02 ASSESSMENT — LOCATION SIMPLE DESCRIPTION DERM: LOCATION SIMPLE: LEFT NOSE

## 2022-06-02 ASSESSMENT — LOCATION DETAILED DESCRIPTION DERM: LOCATION DETAILED: LEFT NARIS

## 2022-06-02 NOTE — PROCEDURE: ADDITIONAL NOTES
Detail Level: Simple
Render Risk Assessment In Note?: no
Additional Notes: unable to properly evaluate lesion, referral to ENT for further evaluation and w/u if necessary

## 2022-06-02 NOTE — PROCEDURE: DIAGNOSIS COMMENT
Render Risk Assessment In Note?: no
Comment: non-healing wound, mucosal surface/nostril
Detail Level: Simple

## 2022-06-06 ENCOUNTER — TELEPHONE (OUTPATIENT)
Dept: MEDICAL GROUP | Facility: MEDICAL CENTER | Age: 77
End: 2022-06-06

## 2022-06-06 ENCOUNTER — OFFICE VISIT (OUTPATIENT)
Dept: MEDICAL GROUP | Facility: MEDICAL CENTER | Age: 77
End: 2022-06-06
Payer: MEDICARE

## 2022-06-06 VITALS
DIASTOLIC BLOOD PRESSURE: 72 MMHG | OXYGEN SATURATION: 96 % | WEIGHT: 136 LBS | SYSTOLIC BLOOD PRESSURE: 130 MMHG | TEMPERATURE: 97.6 F | BODY MASS INDEX: 23.22 KG/M2 | HEIGHT: 64 IN | HEART RATE: 72 BPM

## 2022-06-06 DIAGNOSIS — G25.81 RESTLESS LEG SYNDROME: ICD-10-CM

## 2022-06-06 PROCEDURE — 99213 OFFICE O/P EST LOW 20 MIN: CPT | Performed by: INTERNAL MEDICINE

## 2022-06-06 RX ORDER — SIMVASTATIN 20 MG
1 TABLET ORAL EVERY EVENING
COMMUNITY
Start: 2022-01-25 | End: 2022-06-06

## 2022-06-06 RX ORDER — IBUPROFEN 200 MG
400 TABLET ORAL
COMMUNITY
End: 2024-03-23

## 2022-06-06 RX ORDER — POLYETHYLENE GLYCOL 3350 17 G/17G
17 POWDER, FOR SOLUTION ORAL
COMMUNITY
End: 2023-03-06

## 2022-06-06 RX ORDER — GABAPENTIN 100 MG/1
100 CAPSULE ORAL EVERY EVENING
Qty: 90 CAPSULE | Refills: 1 | Status: SHIPPED | OUTPATIENT
Start: 2022-06-06 | End: 2022-11-19

## 2022-06-06 ASSESSMENT — FIBROSIS 4 INDEX: FIB4 SCORE: 1.6

## 2022-06-06 NOTE — LETTER
Counts include 234 beds at the [facility]  Eron Flowers M.D.  [address]  [place] [address]  Fax: [phone]   Authorization for Release/Disclosure of   Protected Health Information   Name: SAMARA CISNEROS : 1945 SSN: [id number]   Address: [address] [place] [address] Phone:    [phone] (home)    I authorize the entity listed below to release/disclose the PHI below to:   Counts include 234 beds at the [facility]/Eron Flowers M.D. and Eron Flowers M.D.   Provider or Entity Name:                                                        [facility]   Address   City, State, [facility]   Phone:      Fax:            [phone]   Reason for request: continuity of care   Information to be released:  Past 1 year of records   [  ] LAST COLONOSCOPY,  including any PATH REPORT and follow-up  [  ] LAST FIT/COLOGUARD RESULT [  ] LAST DEXA  [  ] LAST MAMMOGRAM  [  ] LAST PAP  [  ] LAST LABS [  ] RETINA EXAM REPORT  [  ] IMMUNIZATION RECORDS  [ x ] Release all info      [  ] Check here and initial the line next to each item to release ALL health information INCLUDING  _____ Care and treatment for drug and / or alcohol abuse  _____ HIV testing, infection status, or AIDS  _____ Genetic Testing    DATES OF SERVICE OR TIME PERIOD TO BE DISCLOSED: _____________  I understand and acknowledge that:  * This Authorization may be revoked at any time by you in writing, except if your health information has already been used or disclosed.  * Your health information that will be used or disclosed as a result of you signing this authorization could be re-disclosed by the recipient. If this occurs, your re-disclosed health information may no longer be protected by State or Federal laws.  * You may refuse to sign this Authorization. Your refusal will not affect your ability to obtain treatment.  * This Authorization becomes effective upon signing and will  on (date) __________.      If no date is indicated, this Authorization will   one (1) year from the signature date.    Name: Bernadette Storey Humberto    Signature:                     Continuity of Care              Date:     2022       PLEASE FAX REQUESTED RECORDS BACK TO: [phone]

## 2022-06-06 NOTE — PROGRESS NOTES
Lisa Paul is a 76 y.o. female who presents with Medication Management (Gabapentin RF)            HPI   Patient is here to have her gabapentin refilled.  I had started her on this medication back in 2018, initially we tried Mirapex which was not effective and subsequently she was changed to gabapentin 100 mg, and she eventually tapered off that medication.  She was placed back on gabapentin during the time when she was diagnosed with foraminal stenosis, she is status post L4-L5 laminectomy, L3-S1 facetectomy and foraminotomy October of last year.  She had been on gabapentin 300 mg twice daily after the surgery.  She completed physical therapy, and is recovering well with her back, no limitations as far as activities, in fact has started golfing again 3 times a week.  When she started golfing back in April, she did notice that she began having more recurrent restless leg type cramping at night.  She will get up in the middle of the night at 230 or 400 in the morning with leg cramping pain mostly in her calves, she will wait for a few minutes and the cramping will dissipate. She does not have to get up and move around.  She does not notice any calf cramping during the day.  She is limited to 64 ounces of water per day from her nephrologist secondary to hyponatremia.  The gabapentin did not cause side effects of drowsiness, sedation, balance issues or memory issues.  She will be seeing dermatology later this week for skin cancer right anterior pretibial region, and will be having a Mohs procedure               Current Outpatient Medications   Medication Sig Dispense Refill   • simvastatin (ZOCOR) 20 MG Tab Take 1 Tablet by mouth every evening.     • ibuprofen (MOTRIN) 200 MG Tab Take 400 mg by mouth.     • polyethylene glycol 3350 (MIRALAX) 17 GM/SCOOP Powder Take 17 g by mouth.     • simvastatin (ZOCOR) 20 MG Tab Take 1 Tablet by mouth every evening. 90 Tablet 3   • Coenzyme Q10 (COQ10) 100 MG Cap     "  • Zinc 10 MG Lozenge      • CALCIUM PO      • polyethylene glycol/lytes (MIRALAX) 17 g Pack Take 17 g by mouth every day.     • levothyroxine (SYNTHROID) 50 MCG Tab Take 50 mcg by mouth every morning on an empty stomach. Pt takes 100 MCG only on Sunday  Al other days 50 MCG      • Cyanocobalamin (VITAMIN B 12 PO) Take 1 tablet by mouth every day.     • Ascorbic Acid (VITAMIN C PO) Take 1 tablet by mouth every day.     • Probiotic Product (PROBIOTIC PO) Take 1 capsule by mouth every evening.       No current facility-administered medications for this visit.     Patient Active Problem List   Diagnosis   • Dyslipidemia   • S/p parathyroidectomy   • Allergic rhinitis   • Preventative health care   • S/P laparoscopic cholecystectomy   • Adenomatous polyp of colon   • History of vasovagal syncope   • History of wrist fracture   • S/p lumbar surgery   • Hyponatremia   • History of MSSA bacteremia   • History of bilateral pleural effusion   • Cataract   • GERD (gastroesophageal reflux disease)   • Atypical chest pain   • Hypothyroid   • History of neutropenia   • History of ankle fracture   • Sleep apnea   • Sensorineural hearing loss           Patient Care Team:  Eron Flowers M.D. as PCP - General  Ramin Meadows M.D. as Consulting Physician (Ophthalmology)      ROS           Objective     /72 (BP Location: Right arm, Patient Position: Sitting, BP Cuff Size: Adult)   Pulse 72   Temp 36.4 °C (97.6 °F)   Ht 1.626 m (5' 4\")   Wt 61.7 kg (136 lb)   SpO2 96%   BMI 23.34 kg/m²      Physical Exam  Vitals and nursing note reviewed.   Constitutional:       Appearance: Normal appearance.   HENT:      Head: Normocephalic and atraumatic.      Right Ear: External ear normal.      Left Ear: External ear normal.   Eyes:      Conjunctiva/sclera: Conjunctivae normal.   Cardiovascular:      Rate and Rhythm: Normal rate and regular rhythm.      Heart sounds: Normal heart sounds.   Pulmonary:      Effort: Pulmonary effort " is normal.      Breath sounds: Normal breath sounds.   Abdominal:      General: There is no distension.   Skin:     General: Skin is warm.   Neurological:      Mental Status: She is alert.   Psychiatric:         Mood and Affect: Mood normal.         Behavior: Behavior normal.     Lower extremities no edema    Assessment & Plan         Assessment  #1 restless leg symptoms at night, has had this previously and was able to taper off gabapentin initially on 100 mg at night.  The medication was beneficial without side effects, symptoms have started to recur since she has been more active after her back surgery.  No history of diabetes or iron overload.  She has tried Mirapex previously without benefit    #2 status post lumbar surgery in October recovering well completed physical therapy, no current back restrictions, she was on a higher dose of gabapentin 200 mg twice a day after surgery and was able to taper off the gabapentin, she has done well after surgery with no restrictions    #3 skin cancer followed by dermatology    Plan  #!  Resume gabapentin 100 milligrams every evening monitor for daytime drowsiness, try that for at least 2 weeks if not effective at that dose to let me know, she has had this medication previously without side effects    #2 mammogram call to get that done already ordered       #3 recommend she obtain the covid 4th vaccine in series, she will get that done at the pharmacy    #4 old records skin cancer dermatology [facility], use sunscreen    #5  Follow-up with endocrinology, nephrology    #6 continue regular exercise and stretching for her back

## 2022-06-07 PROBLEM — Z85.828 HISTORY OF SQUAMOUS CELL CARCINOMA OF SKIN: Status: ACTIVE | Noted: 2022-06-07

## 2022-06-09 ENCOUNTER — APPOINTMENT (RX ONLY)
Dept: URBAN - METROPOLITAN AREA CLINIC 36 | Facility: CLINIC | Age: 77
Setting detail: DERMATOLOGY
End: 2022-06-09

## 2022-06-09 VITALS — DIASTOLIC BLOOD PRESSURE: 68 MMHG | SYSTOLIC BLOOD PRESSURE: 102 MMHG

## 2022-06-09 PROBLEM — C44.722 SQUAMOUS CELL CARCINOMA OF SKIN OF RIGHT LOWER LIMB, INCLUDING HIP: Status: ACTIVE | Noted: 2022-06-09

## 2022-06-09 PROCEDURE — 17313 MOHS 1 STAGE T/A/L: CPT

## 2022-06-09 PROCEDURE — 17314 MOHS ADDL STAGE T/A/L: CPT

## 2022-06-09 PROCEDURE — ? MOHS SURGERY

## 2022-06-09 PROCEDURE — ? PRESCRIPTION

## 2022-06-09 PROCEDURE — 13121 CMPLX RPR S/A/L 2.6-7.5 CM: CPT

## 2022-06-09 RX ORDER — DOXYCYCLINE HYCLATE 100 MG/1
CAPSULE, GELATIN COATED ORAL
Qty: 20 | Refills: 0 | Status: ERX | COMMUNITY
Start: 2022-06-09

## 2022-06-09 RX ADMIN — DOXYCYCLINE HYCLATE: 100 CAPSULE, GELATIN COATED ORAL at 00:00

## 2022-06-09 NOTE — PROCEDURE: MOHS SURGERY
Closure 2 Information: This tab is for additional flaps and grafts, including complex repair and grafts and complex repair and flaps. You can also specify a different location for the additional defect, if the location is the same you do not need to select a new one. We will insert the automated text for the repair you select below just as we do for solitary flaps and grafts. Please note that at this time if you select a location with a different insurance zone you will need to override the ICD10 and CPT if appropriate.
Stage 2: Number Of Blocks?: 1
Did You Provide Opioid Counseling: No
Closure 3 Information: This tab is for additional flaps and grafts above and beyond our usual structured repairs.  Please note if you enter information here it will not currently bill and you will need to add the billing information manually.
Display The Frozen Section And Histology As A Separate Paragraph: Yes
Graft Cartilage Fenestration Text: The cartilage was fenestrated with a 2mm punch biopsy to help facilitate graft survival and healing.
Tissue Cultured Epidermal Autograft Text: The defect edges were debeveled with a #15 scalpel blade.  Given the location of the defect, shape of the defect and the proximity to free margins a tissue cultured epidermal autograft was deemed most appropriate.  The graft was then trimmed to fit the size of the defect.  The graft was then placed in the primary defect and oriented appropriately.
Stage 13: Number Of Blocks?: 0
Oculoplastic Surgeon Procedure Text (F): After obtaining clear surgical margins the patient was sent to oculoplastics for surgical repair.  The patient understands they will receive post-surgical care and follow-up from the referring physician's office.
Tarsorrhaphy Text: A tarsorrhaphy was performed using Frost sutures.
Why Was The Change Made?: Please Select the Appropriate Response
Surgical Defect Width In Cm (Optional): 1.8
Posterior Auricular Interpolation Flap Text: A decision was made to reconstruct the defect utilizing an interpolation axial flap and a staged reconstruction.  A telfa template was made of the defect.  This telfa template was then used to outline the posterior auricular interpolation flap.  The donor area for the pedicle flap was then injected with anesthesia.  The flap was excised through the skin and subcutaneous tissue down to the layer of the underlying musculature.  The pedicle flap was carefully excised within this deep plane to maintain its blood supply.  The edges of the donor site were undermined.   The donor site was closed in a primary fashion.  The pedicle was then rotated into position and sutured.  Once the tube was sutured into place, adequate blood supply was confirmed with blanching and refill.  The pedicle was then wrapped with xeroform gauze and dressed appropriately with a telfa and gauze bandage to ensure continued blood supply and protect the attached pedicle.
Zygomaticofacial Flap Text: Given the location of the defect, shape of the defect and the proximity to free margins a zygomaticofacial flap was deemed most appropriate for repair.  Using a sterile surgical marker, the appropriate flap was drawn incorporating the defect and placing the expected incisions within the relaxed skin tension lines where possible. The area thus outlined was incised deep to adipose tissue with a #15 scalpel blade with preservation of a vascular pedicle.  The skin margins were undermined to an appropriate distance in all directions utilizing iris scissors.  The flap was then placed into the defect and anchored with interrupted buried subcutaneous sutures.
Mauc Instructions: By selecting yes to the question below the MAUC number will be added into the note.  This will be calculated automatically based on the diagnosis chosen, the size entered, the body zone selected (H,M,L) and the specific indications you chose. You will also have the option to override the Mohs AUC if you disagree with the automatically calculated number and this option is found in the Case Summary tab.
Mart-1 - Negative Histology Text: MART-1 staining demonstrates a normal density and pattern of melanocytes along the dermal-epidermal junction. The surgical margins are negative for tumor cells.
Repair Anesthesia Type: 1% lidocaine with epinephrine
Eye Protection Verbiage: Before proceeding with the stage, a plastic scleral shield was inserted. The globe was anesthetized with a few drops of 1% lidocaine with 1:100,000 epinephrine. Then, an appropriate sized scleral shield was chosen and coated with lacrilube ointment. The shield was gently inserted and left in place for the duration of each stage. After the stage was completed, the shield was gently removed.
Hemigard Intro: Due to skin fragility and wound tension, it was decided to use HEMIGARD adhesive retention suture devices to permit a linear closure. The skin was cleaned and dried for a 6cm distance away from the wound. Excessive hair, if present, was removed to allow for adhesion.
Asc Procedure Text (A): After obtaining clear surgical margins the patient was sent to an ASC for surgical repair.  The patient understands they will receive post-surgical care and follow-up from the ASC physician.
Brow Lift Text: A midfrontal incision was made medially to the defect to allow access to the tissues just superior to the left eyebrow. Following careful dissection inferiorly in a supraperiosteal plane to the level of the left eyebrow, several 3-0 monocryl sutures were used to resuspend the eyebrow orbicularis oculi muscular unit to the superior frontal bone periosteum. This resulted in an appropriate reapproximation of static eyebrow symmetry and correction of the left brow ptosis.
Hemostasis: Electrocautery
Spiral Flap Text: The defect edges were debeveled with a #15 scalpel blade.  Given the location of the defect, shape of the defect and the proximity to free margins a spiral flap was deemed most appropriate.  Using a sterile surgical marker, an appropriate rotation flap was drawn incorporating the defect and placing the expected incisions within the relaxed skin tension lines where possible. The area thus outlined was incised deep to adipose tissue with a #15 scalpel blade.  The skin margins were undermined to an appropriate distance in all directions utilizing iris scissors.
Ear Wedge Repair Text: A wedge excision was completed by carrying down an excision through the full thickness of the ear and cartilage with an inward facing Burow's triangle. The wound was then closed in a layered fashion.
H Plasty Text: Given the location of the defect, shape of the defect and the proximity to free margins a H-plasty was deemed most appropriate for repair.  Using a sterile surgical marker, the appropriate advancement arms of the H-plasty were drawn incorporating the defect and placing the expected incisions within the relaxed skin tension lines where possible. The area thus outlined was incised deep to adipose tissue with a #15 scalpel blade. The skin margins were undermined to an appropriate distance in all directions utilizing iris scissors.  The opposing advancement arms were then advanced into place in opposite direction and anchored with interrupted buried subcutaneous sutures.
Consent 3/Introductory Paragraph: I gave the patient a chance to ask questions they had about the procedure.  Following this I explained the Mohs procedure and consent was obtained. The risks, benefits and alternatives to therapy were discussed in detail. Specifically, the risks of infection, scarring, bleeding, prolonged wound healing, incomplete removal, allergy to anesthesia, nerve injury and recurrence were addressed. Prior to the procedure, the treatment site was clearly identified and confirmed by the patient. All components of Universal Protocol/PAUSE Rule completed.
Bilateral Helical Rim Advancement Flap Text: The defect edges were debeveled with a #15 blade scalpel.  Given the location of the defect and the proximity to free margins (helical rim) a bilateral helical rim advancement flap was deemed most appropriate.  Using a sterile surgical marker, the appropriate advancement flaps were drawn incorporating the defect and placing the expected incisions between the helical rim and antihelix where possible.  The area thus outlined was incised through and through with a #15 scalpel blade.  With a skin hook and iris scissors, the flaps were gently and sharply undermined and freed up.
Mid-Level Procedure Text (F): After obtaining clear surgical margins the patient was sent to a mid-level provider for surgical repair.  The patient understands they will receive post-surgical care and follow-up from the mid-level provider.
Complex Repair And Graft Additional Text (Will Appearing After The Standard Complex Repair Text): The complex repair was not sufficient to completely close the primary defect. The remaining additional defect was repaired with the graft mentioned below.
Ear Star Wedge Flap Text: The defect edges were debeveled with a #15 blade scalpel.  Given the location of the defect and the proximity to free margins (helical rim) an ear star wedge flap was deemed most appropriate.  Using a sterile surgical marker, the appropriate flap was drawn incorporating the defect and placing the expected incisions between the helical rim and antihelix where possible.  The area thus outlined was incised through and through with a #15 scalpel blade.
Primary Defect Length In Cm (Final Defect Size - Required For Flaps/Grafts): 2.1
Otolaryngologist Procedure Text (A): After obtaining clear surgical margins the patient was sent to otolaryngology for surgical repair.  The patient understands they will receive post-surgical care and follow-up from the referring physician's office.
Provider Procedure Text (E): After obtaining clear surgical margins the defect was repaired by another provider.
Postop Diagnosis: same
Complex Repair And Flap Additional Text (Will Appearing After The Standard Complex Repair Text): The complex repair was not sufficient to completely close the primary defect. The remaining additional defect was repaired with the flap mentioned below.
Plastic Surgeon Procedure Text (A): After obtaining clear surgical margins the patient was sent to plastics for surgical repair.  The patient understands they will receive post-surgical care and follow-up from the referring physician's office.
Undermining Location (Optional): in the superficial subcutaneous fat
Epidermal Sutures: 3-0 Surgipro
Cheek-To-Nose Interpolation Flap Text: A decision was made to reconstruct the defect utilizing an interpolation axial flap and a staged reconstruction.  A telfa template was made of the defect.  This telfa template was then used to outline the Cheek-To-Nose Interpolation flap.  The donor area for the pedicle flap was then injected with anesthesia.  The flap was excised through the skin and subcutaneous tissue down to the layer of the underlying musculature.  The interpolation flap was carefully excised within this deep plane to maintain its blood supply.  The edges of the donor site were undermined.   The donor site was closed in a primary fashion.  The pedicle was then rotated into position and sutured.  Once the tube was sutured into place, adequate blood supply was confirmed with blanching and refill.  The pedicle was then wrapped with xeroform gauze and dressed appropriately with a telfa and gauze bandage to ensure continued blood supply and protect the attached pedicle.
Consent (Near Eyelid Margin)/Introductory Paragraph: The rationale for Mohs was explained to the patient and consent was obtained. The risks, benefits and alternatives to therapy were discussed in detail. Specifically, the risks of ectropion or eyelid deformity, infection, scarring, bleeding, prolonged wound healing, incomplete removal, allergy to anesthesia, nerve injury and recurrence were addressed. Prior to the procedure, the treatment site was clearly identified and confirmed by the patient. All components of Universal Protocol/PAUSE Rule completed.
Chonodrocutaneous Helical Advancement Flap Text: The defect edges were debeveled with a #15 scalpel blade.  Given the location of the defect and the proximity to free margins a chondrocutaneous helical advancement flap was deemed most appropriate.  Using a sterile surgical marker, the appropriate advancement flap was drawn incorporating the defect and placing the expected incisions within the relaxed skin tension lines where possible.    The area thus outlined was incised deep to adipose tissue with a #15 scalpel blade.  The skin margins were undermined to an appropriate distance in all directions utilizing iris scissors.
Double M-Plasty Complex Repair Preamble Text (Leave Blank If You Do Not Want): Extensive wide undermining was performed.
Surgeon: Nevaeh Palafox MD
Dermal Autograft Text: The defect edges were debeveled with a #15 scalpel blade.  Given the location of the defect, shape of the defect and the proximity to free margins a dermal autograft was deemed most appropriate.  Using a sterile surgical marker, the primary defect shape was transferred to the donor site. The area thus outlined was incised deep to adipose tissue with a #15 scalpel blade.  The harvested graft was then trimmed of adipose and epidermal tissue until only dermis was left.  The skin graft was then placed in the primary defect and oriented appropriately.
Transposition Flap Text: The defect edges were debeveled with a #15 scalpel blade.  Given the location of the defect and the proximity to free margins a transposition flap was deemed most appropriate.  Using a sterile surgical marker, an appropriate transposition flap was drawn incorporating the defect.    The area thus outlined was incised deep to adipose tissue with a #15 scalpel blade.  The skin margins were undermined to an appropriate distance in all directions utilizing iris scissors.
Undermining Type: Entire Wound
Graft Donor Site Dermal Sutures (Optional): 5-0 Polysorb
No Residual Tumor Seen Histology Text: There were no malignant cells seen in the sections examined.
Special Stains Stage 4 - Results: Base On Clearance Noted Above
Mohs Case Number: [id number]
Manual Repair Warning Statement: We plan on removing the manually selected variable below in favor of our much easier automatic structured text blocks found in the previous tab. We decided to do this to help make the flow better and give you the full power of structured data. Manual selection is never going to be ideal in our platform and I would encourage you to avoid using manual selection from this point on, especially since I will be sunsetting this feature. It is important that you do one of two things with the customized text below. First, you can save all of the text in a word file so you can have it for future reference. Second, transfer the text to the appropriate area in the Library tab. Lastly, if there is a flap or graft type which we do not have you need to let us know right away so I can add it in before the variable is hidden. No need to panic, we plan to give you roughly 6 months to make the change.
Wound Care (No Sutures): Petrolatum
Initial Size Of Lesion: 1.6
A-T Advancement Flap Text: The defect edges were debeveled with a #15 scalpel blade.  Given the location of the defect, shape of the defect and the proximity to free margins an A-T advancement flap was deemed most appropriate.  Using a sterile surgical marker, an appropriate advancement flap was drawn incorporating the defect and placing the expected incisions within the relaxed skin tension lines where possible.    The area thus outlined was incised deep to adipose tissue with a #15 scalpel blade.  The skin margins were undermined to an appropriate distance in all directions utilizing iris scissors.
Repair Anesthesia Method: local infiltration
Double Island Pedicle Flap Text: The defect edges were debeveled with a #15 scalpel blade.  Given the location of the defect, shape of the defect and the proximity to free margins a double island pedicle advancement flap was deemed most appropriate.  Using a sterile surgical marker, an appropriate advancement flap was drawn incorporating the defect, outlining the appropriate donor tissue and placing the expected incisions within the relaxed skin tension lines where possible.    The area thus outlined was incised deep to adipose tissue with a #15 scalpel blade.  The skin margins were undermined to an appropriate distance in all directions around the primary defect and laterally outward around the island pedicle utilizing iris scissors.  There was minimal undermining beneath the pedicle flap.
Donor Site Anesthesia Type: same as repair anesthesia
Alar Island Pedicle Flap Text: The defect edges were debeveled with a #15 scalpel blade.  Given the location of the defect, shape of the defect and the proximity to the alar rim an island pedicle advancement flap was deemed most appropriate.  Using a sterile surgical marker, an appropriate advancement flap was drawn incorporating the defect, outlining the appropriate donor tissue and placing the expected incisions within the nasal ala running parallel to the alar rim. The area thus outlined was incised with a #15 scalpel blade.  The skin margins were undermined minimally to an appropriate distance in all directions around the primary defect and laterally outward around the island pedicle utilizing iris scissors.  There was minimal undermining beneath the pedicle flap.
Double M-Plasty Intermediate Repair Preamble Text (Leave Blank If You Do Not Want): Undermining was performed with blunt dissection.
Rhombic Flap Text: The defect edges were debeveled with a #15 scalpel blade.  Given the location of the defect and the proximity to free margins a rhombic flap was deemed most appropriate.  Using a sterile surgical marker, an appropriate rhombic flap was drawn incorporating the defect.    The area thus outlined was incised deep to adipose tissue with a #15 scalpel blade.  The skin margins were undermined to an appropriate distance in all directions utilizing iris scissors.
Wound Care: Vaseline
Bi-Rhombic Flap Text: The defect edges were debeveled with a #15 scalpel blade.  Given the location of the defect and the proximity to free margins a bi-rhombic flap was deemed most appropriate.  Using a sterile surgical marker, an appropriate rhombic flap was drawn incorporating the defect. The area thus outlined was incised deep to adipose tissue with a #15 scalpel blade.  The skin margins were undermined to an appropriate distance in all directions utilizing iris scissors.
Localized Dermabrasion With Wire Brush Text: The patient was draped in routine manner.  Localized dermabrasion using 3 x 17 mm wire brush was performed in routine manner to papillary dermis. This spot dermabrasion is being performed to complete skin cancer reconstruction. It also will eliminate the other sun damaged precancerous cells that are known to be part of the regional effect of a lifetime's worth of sun exposure. This localized dermabrasion is therapeutic and should not be considered cosmetic in any regard.
Anesthesia Type: 2% lidocaine with epinephrine and a 1:10 solution of 8.4% sodium bicarbonate
Helical Rim Advancement Flap Text: The defect edges were debeveled with a #15 blade scalpel.  Given the location of the defect and the proximity to free margins (helical rim) a double helical rim advancement flap was deemed most appropriate.  Using a sterile surgical marker, the appropriate advancement flaps were drawn incorporating the defect and placing the expected incisions between the helical rim and antihelix where possible.  The area thus outlined was incised through and through with a #15 scalpel blade.  With a skin hook and iris scissors, the flaps were gently and sharply undermined and freed up.
Staging Info: By selecting yes to the question above you will include information on AJCC 8 tumor staging in your Mohs note. Information on tumor staging will be automatically added for SCCs on the head and neck. AJCC 8 includes tumor size, tumor depth, perineural involvement and bone invasion.
Mercedes Flap Text: The defect edges were debeveled with a #15 scalpel blade.  Given the location of the defect, shape of the defect and the proximity to free margins a Mercedes flap was deemed most appropriate.  Using a sterile surgical marker, an appropriate advancement flap was drawn incorporating the defect and placing the expected incisions within the relaxed skin tension lines where possible. The area thus outlined was incised deep to adipose tissue with a #15 scalpel blade.  The skin margins were undermined to an appropriate distance in all directions utilizing iris scissors.
Double O-Z Flap Text: The defect edges were debeveled with a #15 scalpel blade.  Given the location of the defect, shape of the defect and the proximity to free margins a Double O-Z flap was deemed most appropriate.  Using a sterile surgical marker, an appropriate transposition flap was drawn incorporating the defect and placing the expected incisions within the relaxed skin tension lines where possible. The area thus outlined was incised deep to adipose tissue with a #15 scalpel blade.  The skin margins were undermined to an appropriate distance in all directions utilizing iris scissors.
Island Pedicle Flap-Requiring Vessel Identification Text: The defect edges were debeveled with a #15 scalpel blade.  Given the location of the defect, shape of the defect and the proximity to free margins an island pedicle advancement flap was deemed most appropriate.  Using a sterile surgical marker, an appropriate advancement flap was drawn, based on the axial vessel mentioned above, incorporating the defect, outlining the appropriate donor tissue and placing the expected incisions within the relaxed skin tension lines where possible.    The area thus outlined was incised deep to adipose tissue with a #15 scalpel blade.  The skin margins were undermined to an appropriate distance in all directions around the primary defect and laterally outward around the island pedicle utilizing iris scissors.  There was minimal undermining beneath the pedicle flap.
Referring Physician (Optional): Isis Gomez
No Repair - Repaired With Adjacent Surgical Defect Text (Leave Blank If You Do Not Want): After obtaining clear surgical margins the defect was repaired concurrently with another surgical defect which was in close approximation.
Keystone Flap Text: The defect edges were debeveled with a #15 scalpel blade.  Given the location of the defect, shape of the defect a keystone flap was deemed most appropriate.  Using a sterile surgical marker, an appropriate keystone flap was drawn incorporating the defect, outlining the appropriate donor tissue and placing the expected incisions within the relaxed skin tension lines where possible. The area thus outlined was incised deep to adipose tissue with a #15 scalpel blade.  The skin margins were undermined to an appropriate distance in all directions around the primary defect and laterally outward around the flap utilizing iris scissors.
Consent (Marginal Mandibular)/Introductory Paragraph: The rationale for Mohs was explained to the patient and consent was obtained. The risks, benefits and alternatives to therapy were discussed in detail. Specifically, the risks of damage to the marginal mandibular branch of the facial nerve, infection, scarring, bleeding, prolonged wound healing, incomplete removal, allergy to anesthesia, and recurrence were addressed. Prior to the procedure, the treatment site was clearly identified and confirmed by the patient. All components of Universal Protocol/PAUSE Rule completed.
Tumor Debulked?: curette
Same Histology In Subsequent Stages Text: The pattern and morphology of the tumor is as described in the first stage.
Composite Graft Text: The defect edges were debeveled with a #15 scalpel blade.  Given the location of the defect, shape of the defect, the proximity to free margins and the fact the defect was full thickness a composite graft was deemed most appropriate.  The defect was outline and then transferred to the donor site.  A full thickness graft was then excised from the donor site. The graft was then placed in the primary defect, oriented appropriately and then sutured into place.  The secondary defect was then repaired using a primary closure.
Hemigard Retention Suture: 0-0 Nylon
Retention Suture Text: Retention sutures were placed to support the closure and prevent dehiscence.
Epidermal Autograft Text: The defect edges were debeveled with a #15 scalpel blade.  Given the location of the defect, shape of the defect and the proximity to free margins an epidermal autograft was deemed most appropriate.  Using a sterile surgical marker, the primary defect shape was transferred to the donor site. The epidermal graft was then harvested.  The skin graft was then placed in the primary defect and oriented appropriately.
Island Pedicle Flap Text: The defect edges were debeveled with a #15 scalpel blade.  Given the location of the defect, shape of the defect and the proximity to free margins an island pedicle advancement flap was deemed most appropriate.  Using a sterile surgical marker, an appropriate advancement flap was drawn incorporating the defect, outlining the appropriate donor tissue and placing the expected incisions within the relaxed skin tension lines where possible.    The area thus outlined was incised deep to adipose tissue with a #15 scalpel blade.  The skin margins were undermined to an appropriate distance in all directions around the primary defect and laterally outward around the island pedicle utilizing iris scissors.  There was minimal undermining beneath the pedicle flap.
O-Z Flap Text: The defect edges were debeveled with a #15 scalpel blade.  Given the location of the defect, shape of the defect and the proximity to free margins an O-Z flap was deemed most appropriate.  Using a sterile surgical marker, an appropriate transposition flap was drawn incorporating the defect and placing the expected incisions within the relaxed skin tension lines where possible. The area thus outlined was incised deep to adipose tissue with a #15 scalpel blade.  The skin margins were undermined to an appropriate distance in all directions utilizing iris scissors.
Repair Hemostasis (Optional): Pinpoint electrocautery
Consent (Scalp)/Introductory Paragraph: The rationale for Mohs was explained to the patient and consent was obtained. The risks, benefits and alternatives to therapy were discussed in detail. Specifically, the risks of changes in hair growth pattern secondary to repair, infection, scarring, bleeding, prolonged wound healing, incomplete removal, allergy to anesthesia, nerve injury and recurrence were addressed. Prior to the procedure, the treatment site was clearly identified and confirmed by the patient. All components of Universal Protocol/PAUSE Rule completed.
Modified Advancement Flap Text: The defect edges were debeveled with a #15 scalpel blade.  Given the location of the defect, shape of the defect and the proximity to free margins a modified advancement flap was deemed most appropriate.  Using a sterile surgical marker, an appropriate advancement flap was drawn incorporating the defect and placing the expected incisions within the relaxed skin tension lines where possible.    The area thus outlined was incised deep to adipose tissue with a #15 scalpel blade.  The skin margins were undermined to an appropriate distance in all directions utilizing iris scissors.
Epidermal Closure: running and interrupted
Mucosal Advancement Flap Text: Given the location of the defect, shape of the defect and the proximity to free margins a mucosal advancement flap was deemed most appropriate. Incisions were made with a 15 blade scalpel in the appropriate fashion along the cutaneous vermilion border and the mucosal lip. The remaining actinically damaged mucosal tissue was excised.  The mucosal advancement flap was then elevated to the gingival sulcus with care taken to preserve the neurovascular structures and advanced into the primary defect. Care was taken to ensure that precise realignment of the vermilion border was achieved.
Z Plasty Text: The lesion was extirpated to the level of the fat with a #15 scalpel blade.  Given the location of the defect, shape of the defect and the proximity to free margins a Z-plasty was deemed most appropriate for repair.  Using a sterile surgical marker, the appropriate transposition arms of the Z-plasty were drawn incorporating the defect and placing the expected incisions within the relaxed skin tension lines where possible.    The area thus outlined was incised deep to adipose tissue with a #15 scalpel blade.  The skin margins were undermined to an appropriate distance in all directions utilizing iris scissors.  The opposing transposition arms were then transposed into place in opposite direction and anchored with interrupted buried subcutaneous sutures.
Cheek Interpolation Flap Text: A decision was made to reconstruct the defect utilizing an interpolation axial flap and a staged reconstruction.  A telfa template was made of the defect.  This telfa template was then used to outline the Cheek Interpolation flap.  The donor area for the pedicle flap was then injected with anesthesia.  The flap was excised through the skin and subcutaneous tissue down to the layer of the underlying musculature.  The interpolation flap was carefully excised within this deep plane to maintain its blood supply.  The edges of the donor site were undermined.   The donor site was closed in a primary fashion.  The pedicle was then rotated into position and sutured.  Once the tube was sutured into place, adequate blood supply was confirmed with blanching and refill.  The pedicle was then wrapped with xeroform gauze and dressed appropriately with a telfa and gauze bandage to ensure continued blood supply and protect the attached pedicle.
Secondary Intention Text (Leave Blank If You Do Not Want): The defect will heal with secondary intention.
Consent Type: Consent 1 (Standard)
Consent (Spinal Accessory)/Introductory Paragraph: The rationale for Mohs was explained to the patient and consent was obtained. The risks, benefits and alternatives to therapy were discussed in detail. Specifically, the risks of damage to the spinal accessory nerve, infection, scarring, bleeding, prolonged wound healing, incomplete removal, allergy to anesthesia, and recurrence were addressed. Prior to the procedure, the treatment site was clearly identified and confirmed by the patient. All components of Universal Protocol/PAUSE Rule completed.
Consent (Lip)/Introductory Paragraph: The rationale for Mohs was explained to the patient and consent was obtained. The risks, benefits and alternatives to therapy were discussed in detail. Specifically, the risks of lip deformity, changes in the oral aperture, infection, scarring, bleeding, prolonged wound healing, incomplete removal, allergy to anesthesia, nerve injury and recurrence were addressed. Prior to the procedure, the treatment site was clearly identified and confirmed by the patient. All components of Universal Protocol/PAUSE Rule completed.
Graft Donor Site Bandage (Optional-Leave Blank If You Don't Want In Note): Aquaplast was fitted to the graft site and sewn into place. A pressure bandage were applied to the donor site and over the aquaplast bolster.
Suture Removal: 13 days
Surgeon/Pathologist Verbiage (Will Incorporate Name Of Surgeon From Intro If Not Blank): operated in two distinct and integrated capacities as the surgeon and pathologist.
Anesthesia Volume In Cc: 9
Melolabial Transposition Flap Text: The defect edges were debeveled with a #15 scalpel blade.  Given the location of the defect and the proximity to free margins a melolabial flap was deemed most appropriate.  Using a sterile surgical marker, an appropriate melolabial transposition flap was drawn incorporating the defect.    The area thus outlined was incised deep to adipose tissue with a #15 scalpel blade.  The skin margins were undermined to an appropriate distance in all directions utilizing iris scissors.
Suturegard Intro: Intraoperative tissue expansion was performed, utilizing the SUTUREGARD device, in order to reduce wound tension.
V-Y Plasty Text: The defect edges were debeveled with a #15 scalpel blade.  Given the location of the defect, shape of the defect and the proximity to free margins an V-Y advancement flap was deemed most appropriate.  Using a sterile surgical marker, an appropriate advancement flap was drawn incorporating the defect and placing the expected incisions within the relaxed skin tension lines where possible.    The area thus outlined was incised deep to adipose tissue with a #15 scalpel blade.  The skin margins were undermined to an appropriate distance in all directions utilizing iris scissors.
Bilobed Transposition Flap Text: The defect edges were debeveled with a #15 scalpel blade.  Given the location of the defect and the proximity to free margins a bilobed transposition flap was deemed most appropriate.  Using a sterile surgical marker, an appropriate bilobe flap drawn around the defect.    The area thus outlined was incised deep to adipose tissue with a #15 scalpel blade.  The skin margins were undermined to an appropriate distance in all directions utilizing iris scissors.
Inflammation Suggestive Of Cancer Camouflage Histology Text: There was a dense lymphocytic infiltrate which prevented adequate histologic evaluation of adjacent structures.
Dressing: pressure dressing with telfa
Cheiloplasty (Complex) Text: A decision was made to reconstruct the defect with a  cheiloplasty.  The defect was undermined extensively.  Additional obicularis oris muscle was excised with a 15 blade scalpel.  The defect was converted into a full thickness wedge to facilite a better cosmetic result.  Small vessels were then tied off with 5-0 monocyrl. The obicularis oris, superficial fascia, adipose and dermis were then reapproximated.  After the deeper layers were approximated the epidermis was reapproximated with particular care given to realign the vermilion border.
Non-Graft Cartilage Fenestration Text: The cartilage was fenestrated with a 2mm punch biopsy to help facilitate healing.
Nasalis-Muscle-Based Myocutaneous Island Pedicle Flap Text: Using a #15 blade, an incision was made around the donor flap to the level of the nasalis muscle. Wide lateral undermining was then performed in both the subcutaneous plane above the nasalis muscle, and in a submuscular plane just above periosteum. This allowed the formation of a free nasalis muscle axial pedicle (based on the angular artery) which was still attached to the actual cutaneous flap, increasing its mobility and vascular viability. Hemostasis was obtained with pinpoint electrocoagulation. The flap was mobilized into position and the pivotal anchor points positioned and stabilized with buried interrupted sutures. Subcutaneous and dermal tissues were closed in a multilayered fashion with sutures. Tissue redundancies were excised, and the epidermal edges were apposed without significant tension and sutured with sutures.
Debridement Text: The wound edges were debrided prior to proceeding with the closure to facilitate wound healing.
Advancement Flap (Double) Text: The defect edges were debeveled with a #15 scalpel blade.  Given the location of the defect and the proximity to free margins a double advancement flap was deemed most appropriate.  Using a sterile surgical marker, the appropriate advancement flaps were drawn incorporating the defect and placing the expected incisions within the relaxed skin tension lines where possible.    The area thus outlined was incised deep to adipose tissue with a #15 scalpel blade.  The skin margins were undermined to an appropriate distance in all directions utilizing iris scissors.
Consent (Temporal Branch)/Introductory Paragraph: The rationale for Mohs was explained to the patient and consent was obtained. The risks, benefits and alternatives to therapy were discussed in detail. Specifically, the risks of damage to the temporal branch of the facial nerve, infection, scarring, bleeding, prolonged wound healing, incomplete removal, allergy to anesthesia, and recurrence were addressed. Prior to the procedure, the treatment site was clearly identified and confirmed by the patient. All components of Universal Protocol/PAUSE Rule completed.
Deep Sutures: 3-0 Maxon
Consent (Ear)/Introductory Paragraph: The rationale for Mohs was explained to the patient and consent was obtained. The risks, benefits and alternatives to therapy were discussed in detail. Specifically, the risks of ear deformity, infection, scarring, bleeding, prolonged wound healing, incomplete removal, allergy to anesthesia, nerve injury and recurrence were addressed. Prior to the procedure, the treatment site was clearly identified and confirmed by the patient. All components of Universal Protocol/PAUSE Rule completed.
Tumor Depth: Less than 6mm from granular layer and no invasion beyond the subcutaneous fat
Advancement-Rotation Flap Text: The defect edges were debeveled with a #15 scalpel blade.  Given the location of the defect, shape of the defect and the proximity to free margins an advancement-rotation flap was deemed most appropriate.  Using a sterile surgical marker, an appropriate flap was drawn incorporating the defect and placing the expected incisions within the relaxed skin tension lines where possible. The area thus outlined was incised deep to adipose tissue with a #15 scalpel blade.  The skin margins were undermined to an appropriate distance in all directions utilizing iris scissors.
Rhomboid Transposition Flap Text: The defect edges were debeveled with a #15 scalpel blade.  Given the location of the defect and the proximity to free margins a rhomboid transposition flap was deemed most appropriate.  Using a sterile surgical marker, an appropriate rhomboid flap was drawn incorporating the defect.    The area thus outlined was incised deep to adipose tissue with a #15 scalpel blade.  The skin margins were undermined to an appropriate distance in all directions utilizing iris scissors.
Cartilage Graft Text: The defect edges were debeveled with a #15 scalpel blade.  Given the location of the defect, shape of the defect, the fact the defect involved a full thickness cartilage defect a cartilage graft was deemed most appropriate.  An appropriate donor site was identified, cleansed, and anesthetized. The cartilage graft was then harvested and transferred to the recipient site, oriented appropriately and then sutured into place.  The secondary defect was then repaired using a primary closure.
Wound Check: 6 weeks
Purse String (Simple) Text: Given the location of the defect and the characteristics of the surrounding skin a purse string closure was deemed most appropriate.  Undermining was performed circumfirentially around the surgical defect.  A purse string suture was then placed and tightened.
Number Of Stages: 2
Banner Transposition Flap Text: The defect edges were debeveled with a #15 scalpel blade.  Given the location of the defect and the proximity to free margins a Banner transposition flap was deemed most appropriate.  Using a sterile surgical marker, an appropriate flap drawn around the defect. The area thus outlined was incised deep to adipose tissue with a #15 scalpel blade.  The skin margins were undermined to an appropriate distance in all directions utilizing iris scissors.
O-L Flap Text: The defect edges were debeveled with a #15 scalpel blade.  Given the location of the defect, shape of the defect and the proximity to free margins an O-L flap was deemed most appropriate.  Using a sterile surgical marker, an appropriate advancement flap was drawn incorporating the defect and placing the expected incisions within the relaxed skin tension lines where possible.    The area thus outlined was incised deep to adipose tissue with a #15 scalpel blade.  The skin margins were undermined to an appropriate distance in all directions utilizing iris scissors.
Repair Type: Complex Repair
Alternatives Discussed Intro (Do Not Add Period): I discussed alternative treatments to Mohs surgery and specifically discussed the risks and benefits of
Mohs Rapid Report Verbiage: The area of clinically evident tumor was marked with skin marking ink and appropriately hatched.  The initial incision was made following the Mohs approach through the skin.  The specimen was taken to the lab, divided into the necessary number of pieces, chromacoded and processed according to the Mohs protocol.  This was repeated in successive stages until a tumor free defect was achieved.
Xenograft Text: The defect edges were debeveled with a #15 scalpel blade.  Given the location of the defect, shape of the defect and the proximity to free margins a xenograft was deemed most appropriate.  The graft was then trimmed to fit the size of the defect.  The graft was then placed in the primary defect and oriented appropriately.
Pain Refusal Text: I offered to prescribe pain medication but the patient refused to take this medication.
Graft Donor Site Epidermal Sutures (Optional): 5-0 Surgipro
W Plasty Text: The lesion was extirpated to the level of the fat with a #15 scalpel blade.  Given the location of the defect, shape of the defect and the proximity to free margins a W-plasty was deemed most appropriate for repair.  Using a sterile surgical marker, the appropriate transposition arms of the W-plasty were drawn incorporating the defect and placing the expected incisions within the relaxed skin tension lines where possible.    The area thus outlined was incised deep to adipose tissue with a #15 scalpel blade.  The skin margins were undermined to an appropriate distance in all directions utilizing iris scissors.  The opposing transposition arms were then transposed into place in opposite direction and anchored with interrupted buried subcutaneous sutures.
Suturegard Body: The suture ends were repeatedly re-tightened and re-clamped to achieve the desired tissue expansion.
Partial Purse String (Simple) Text: Given the location of the defect and the characteristics of the surrounding skin a simple purse string closure was deemed most appropriate.  Undermining was performed circumfirentially around the surgical defect.  A purse string suture was then placed and tightened. Wound tension only allowed a partial closure of the circular defect.
Information: Selecting Yes will display possible errors in your note based on the variables you have selected. This validation is only offered as a suggestion for you. PLEASE NOTE THAT THE VALIDATION TEXT WILL BE REMOVED WHEN YOU FINALIZE YOUR NOTE. IF YOU WANT TO FAX A PRELIMINARY NOTE YOU WILL NEED TO TOGGLE THIS TO 'NO' IF YOU DO NOT WANT IT IN YOUR FAXED NOTE.
Island Pedicle Flap With Canthal Suspension Text: The defect edges were debeveled with a #15 scalpel blade.  Given the location of the defect, shape of the defect and the proximity to free margins an island pedicle advancement flap was deemed most appropriate.  Using a sterile surgical marker, an appropriate advancement flap was drawn incorporating the defect, outlining the appropriate donor tissue and placing the expected incisions within the relaxed skin tension lines where possible. The area thus outlined was incised deep to adipose tissue with a #15 scalpel blade.  The skin margins were undermined to an appropriate distance in all directions around the primary defect and laterally outward around the island pedicle utilizing iris scissors.  There was minimal undermining beneath the pedicle flap. A suspension suture was placed in the canthal tendon to prevent tension and prevent ectropion.
Home Suture Removal Text: Patient was provided instructions on removing sutures and will remove their sutures at home.  If they have any questions or difficulties they will call the office.
Burow's Graft Text: The defect edges were debeveled with a #15 scalpel blade.  Given the location of the defect, shape of the defect, the proximity to free margins and the presence of a standing cone deformity a Burow's skin graft was deemed most appropriate. The standing cone was removed and this tissue was then trimmed to the shape of the primary defect. The adipose tissue was also removed until only dermis and epidermis were left.  The skin margins of the secondary defect were undermined to an appropriate distance in all directions utilizing iris scissors.  The secondary defect was closed with interrupted buried subcutaneous sutures.  The skin edges were then re-apposed with running  sutures.  The skin graft was then placed in the primary defect and oriented appropriately.
Peng Advancement Flap Text: The defect edges were debeveled with a #15 scalpel blade.  Given the location of the defect, shape of the defect and the proximity to free margins a Peng advancement flap was deemed most appropriate.  Using a sterile surgical marker, an appropriate advancement flap was drawn incorporating the defect and placing the expected incisions within the relaxed skin tension lines where possible. The area thus outlined was incised deep to adipose tissue with a #15 scalpel blade.  The skin margins were undermined to an appropriate distance in all directions utilizing iris scissors.
Retention Suture Bite Size: 2 mm
Dorsal Nasal Flap Text: The defect edges were debeveled with a #15 scalpel blade.  Given the location of the defect and the proximity to free margins a dorsal nasal flap was deemed most appropriate.  Using a sterile surgical marker, an appropriate dorsal nasal flap was drawn around the defect.    The area thus outlined was incised deep to adipose tissue with a #15 scalpel blade.  The skin margins were undermined to an appropriate distance in all directions utilizing iris scissors.
Partial Purse String (Intermediate) Text: Given the location of the defect and the characteristics of the surrounding skin an intermediate purse string closure was deemed most appropriate.  Undermining was performed circumfirentially around the surgical defect.  A purse string suture was then placed and tightened. Wound tension only allowed a partial closure of the circular defect.
Muscle Hinge Flap Text: The defect edges were debeveled with a #15 scalpel blade.  Given the size, depth and location of the defect and the proximity to free margins a muscle hinge flap was deemed most appropriate.  Using a sterile surgical marker, an appropriate hinge flap was drawn incorporating the defect. The area thus outlined was incised with a #15 scalpel blade.  The skin margins were undermined to an appropriate distance in all directions utilizing iris scissors.
O-Z Plasty Text: The defect edges were debeveled with a #15 scalpel blade.  Given the location of the defect, shape of the defect and the proximity to free margins an O-Z plasty (double transposition flap) was deemed most appropriate.  Using a sterile surgical marker, the appropriate transposition flaps were drawn incorporating the defect and placing the expected incisions within the relaxed skin tension lines where possible.    The area thus outlined was incised deep to adipose tissue with a #15 scalpel blade.  The skin margins were undermined to an appropriate distance in all directions utilizing iris scissors.  Hemostasis was achieved with electrocautery.  The flaps were then transposed into place, one clockwise and the other counterclockwise, and anchored with interrupted buried subcutaneous sutures.
Bcc Histology Text: There were numerous aggregates of basaloid cells.
Mohs Method Verbiage: An incision at a 45 degree angle following the standard Mohs approach was done and the specimen was harvested as a microscopic controlled layer.
Simple / Intermediate / Complex Repair - Final Wound Length In Cm: 4.2
Cheiloplasty (Less Than 50%) Text: A decision was made to reconstruct the defect with a  cheiloplasty.  The defect was undermined extensively.  Additional obicularis oris muscle was excised with a 15 blade scalpel.  The defect was converted into a full thickness wedge, of less than 50% of the vertical height of the lip, to facilite a better cosmetic result.  Small vessels were then tied off with 5-0 monocyrl. The obicularis oris, superficial fascia, adipose and dermis were then reapproximated.  After the deeper layers were approximated the epidermis was reapproximated with particular care given to realign the vermilion border.
Epidermal Closure Graft Donor Site (Optional): running
Double O-Z Plasty Text: The defect edges were debeveled with a #15 scalpel blade.  Given the location of the defect, shape of the defect and the proximity to free margins a Double O-Z plasty (double transposition flap) was deemed most appropriate.  Using a sterile surgical marker, the appropriate transposition flaps were drawn incorporating the defect and placing the expected incisions within the relaxed skin tension lines where possible. The area thus outlined was incised deep to adipose tissue with a #15 scalpel blade.  The skin margins were undermined to an appropriate distance in all directions utilizing iris scissors.  Hemostasis was achieved with electrocautery.  The flaps were then transposed into place, one clockwise and the other counterclockwise, and anchored with interrupted buried subcutaneous sutures.
Length To Time In Minutes Device Was In Place: 60
Advancement Flap (Single) Text: The defect edges were debeveled with a #15 scalpel blade.  Given the location of the defect and the proximity to free margins a single advancement flap was deemed most appropriate.  Using a sterile surgical marker, an appropriate advancement flap was drawn incorporating the defect and placing the expected incisions within the relaxed skin tension lines where possible.    The area thus outlined was incised deep to adipose tissue with a #15 scalpel blade.  The skin margins were undermined to an appropriate distance in all directions utilizing iris scissors.
Vermilion Border Text: The closure involved the vermilion border.
Star Wedge Flap Text: The defect edges were debeveled with a #15 scalpel blade.  Given the location of the defect, shape of the defect and the proximity to free margins a star wedge flap was deemed most appropriate.  Using a sterile surgical marker, an appropriate rotation flap was drawn incorporating the defect and placing the expected incisions within the relaxed skin tension lines where possible. The area thus outlined was incised deep to adipose tissue with a #15 scalpel blade.  The skin margins were undermined to an appropriate distance in all directions utilizing iris scissors.
Estimated Blood Loss (Cc): minimal
Where Do You Want The Question To Include Opioid Counseling Located?: Case Summary Tab
Split-Thickness Skin Graft Text: The defect edges were debeveled with a #15 scalpel blade.  Given the location of the defect, shape of the defect and the proximity to free margins a split thickness skin graft was deemed most appropriate.  Using a sterile surgical marker, the primary defect shape was transferred to the donor site. The split thickness graft was then harvested.  The skin graft was then placed in the primary defect and oriented appropriately.
Previous Accession (Optional): [id number]
Crescentic Advancement Flap Text: The defect edges were debeveled with a #15 scalpel blade.  Given the location of the defect and the proximity to free margins a crescentic advancement flap was deemed most appropriate.  Using a sterile surgical marker, the appropriate advancement flap was drawn incorporating the defect and placing the expected incisions within the relaxed skin tension lines where possible.    The area thus outlined was incised deep to adipose tissue with a #15 scalpel blade.  The skin margins were undermined to an appropriate distance in all directions utilizing iris scissors.
Consent 1/Introductory Paragraph: The rationale for Mohs was explained to the patient and consent was obtained. The risks, benefits and alternatives to therapy were discussed in detail. Specifically, the risks of infection, scarring, bleeding, prolonged wound healing, incomplete removal, allergy to anesthesia, nerve injury and recurrence were addressed. Prior to the procedure, the treatment site was clearly identified and confirmed by the patient. All components of Universal Protocol/PAUSE Rule completed.
Location Indication Override (Is Already Calculated Based On Selected Body Location): Area M
Orbicularis Oris Muscle Flap Text: The defect edges were debeveled with a #15 scalpel blade.  Given that the defect affected the competency of the oral sphincter an orbicularis oris muscle flap was deemed most appropriate to restore this competency and normal muscle function.  Using a sterile surgical marker, an appropriate flap was drawn incorporating the defect. The area thus outlined was incised with a #15 scalpel blade.
Additional Anesthesia Volume In Cc: 6
S Plasty Text: Given the location and shape of the defect, and the orientation of relaxed skin tension lines, an S-plasty was deemed most appropriate for repair.  Using a sterile surgical marker, the appropriate outline of the S-plasty was drawn, incorporating the defect and placing the expected incisions within the relaxed skin tension lines where possible.  The area thus outlined was incised deep to adipose tissue with a #15 scalpel blade.  The skin margins were undermined to an appropriate distance in all directions utilizing iris scissors. The skin flaps were advanced over the defect.  The opposing margins were then approximated with interrupted buried subcutaneous sutures.
Post-Care Instructions: I reviewed with the patient in detail post-care instructions. Patient is not to engage in any heavy lifting, exercise, or swimming for the next 14 days. Should the patient develop any fevers, chills, bleeding, severe pain patient will contact the office immediately.
Bilobed Flap Text: The defect edges were debeveled with a #15 scalpel blade.  Given the location of the defect and the proximity to free margins a bilobe flap was deemed most appropriate.  Using a sterile surgical marker, an appropriate bilobe flap drawn around the defect.    The area thus outlined was incised deep to adipose tissue with a #15 scalpel blade.  The skin margins were undermined to an appropriate distance in all directions utilizing iris scissors.
Bcc Infiltrative Histology Text: There were numerous aggregates of basaloid cells demonstrating an infiltrative pattern.
Medical Necessity Statement: Based on my medical judgement, Mohs surgery is the most appropriate treatment for this cancer compared to other treatments.
Area L Indication Text: Tumors in this location are included in Area L (trunk and extremities).  Mohs surgery is indicated for larger tumors, or tumors with aggressive histologic features, in these anatomic locations.
Melolabial Interpolation Flap Text: A decision was made to reconstruct the defect utilizing an interpolation axial flap and a staged reconstruction.  A telfa template was made of the defect.  This telfa template was then used to outline the melolabial interpolation flap.  The donor area for the pedicle flap was then injected with anesthesia.  The flap was excised through the skin and subcutaneous tissue down to the layer of the underlying musculature.  The pedicle flap was carefully excised within this deep plane to maintain its blood supply.  The edges of the donor site were undermined.   The donor site was closed in a primary fashion.  The pedicle was then rotated into position and sutured.  Once the tube was sutured into place, adequate blood supply was confirmed with blanching and refill.  The pedicle was then wrapped with xeroform gauze and dressed appropriately with a telfa and gauze bandage to ensure continued blood supply and protect the attached pedicle.
Trilobed Flap Text: The defect edges were debeveled with a #15 scalpel blade.  Given the location of the defect and the proximity to free margins a trilobed flap was deemed most appropriate.  Using a sterile surgical marker, an appropriate trilobed flap drawn around the defect.    The area thus outlined was incised deep to adipose tissue with a #15 scalpel blade.  The skin margins were undermined to an appropriate distance in all directions utilizing iris scissors.
Burow's Advancement Flap Text: The defect edges were debeveled with a #15 scalpel blade.  Given the location of the defect and the proximity to free margins a Burow's advancement flap was deemed most appropriate.  Using a sterile surgical marker, the appropriate advancement flap was drawn incorporating the defect and placing the expected incisions within the relaxed skin tension lines where possible.    The area thus outlined was incised deep to adipose tissue with a #15 scalpel blade.  The skin margins were undermined to an appropriate distance in all directions utilizing iris scissors.
O-T Plasty Text: The defect edges were debeveled with a #15 scalpel blade.  Given the location of the defect, shape of the defect and the proximity to free margins an O-T plasty was deemed most appropriate.  Using a sterile surgical marker, an appropriate O-T plasty was drawn incorporating the defect and placing the expected incisions within the relaxed skin tension lines where possible.    The area thus outlined was incised deep to adipose tissue with a #15 scalpel blade.  The skin margins were undermined to an appropriate distance in all directions utilizing iris scissors.
O-T Advancement Flap Text: The defect edges were debeveled with a #15 scalpel blade.  Given the location of the defect, shape of the defect and the proximity to free margins an O-T advancement flap was deemed most appropriate.  Using a sterile surgical marker, an appropriate advancement flap was drawn incorporating the defect and placing the expected incisions within the relaxed skin tension lines where possible.    The area thus outlined was incised deep to adipose tissue with a #15 scalpel blade.  The skin margins were undermined to an appropriate distance in all directions utilizing iris scissors.
Surgical Defect Width In Cm (Optional): 2.0
Detail Level: Detailed
Mohs Histo Method Verbiage: Each section was then chromacoded and processed in the Mohs lab using the Mohs protocol and submitted for frozen section.
Area H Indication Text: Tumors in this location are included in Area H (eyelids, eyebrows, nose, lips, chin, ear, pre-auricular, post-auricular, temple, genitalia, hands, feet, ankles and areola).  Tissue conservation is critical in these anatomic locations.
Subsequent Stages Histo Method Verbiage: Using a similar technique to that described above, a thin layer of tissue was removed from all areas where tumor was visible on the previous stage.  The tissue was again oriented, mapped, dyed, and processed as above.
Hatchet Flap Text: The defect edges were debeveled with a #15 scalpel blade.  Given the location of the defect, shape of the defect and the proximity to free margins a hatchet flap was deemed most appropriate.  Using a sterile surgical marker, an appropriate hatchet flap was drawn incorporating the defect and placing the expected incisions within the relaxed skin tension lines where possible.    The area thus outlined was incised deep to adipose tissue with a #15 scalpel blade.  The skin margins were undermined to an appropriate distance in all directions utilizing iris scissors.
Nasal Turnover Hinge Flap Text: The defect edges were debeveled with a #15 scalpel blade.  Given the size, depth, location of the defect and the defect being full thickness a nasal turnover hinge flap was deemed most appropriate.  Using a sterile surgical marker, an appropriate hinge flap was drawn incorporating the defect. The area thus outlined was incised with a #15 scalpel blade. The flap was designed to recreate the nasal mucosal lining and the alar rim. The skin margins were undermined to an appropriate distance in all directions utilizing iris scissors.
Unna Boot Text: An Unna boot was placed to help immobilize the limb and facilitate more rapid healing.
Area M Indication Text: Tumors in this location are included in Area M (cheek, forehead, scalp, neck, jawline and pretibial skin).  Mohs surgery is indicated for tumors in these anatomic locations.
V-Y Flap Text: The defect edges were debeveled with a #15 scalpel blade.  Given the location of the defect, shape of the defect and the proximity to free margins a V-Y flap was deemed most appropriate.  Using a sterile surgical marker, an appropriate advancement flap was drawn incorporating the defect and placing the expected incisions within the relaxed skin tension lines where possible.    The area thus outlined was incised deep to adipose tissue with a #15 scalpel blade.  The skin margins were undermined to an appropriate distance in all directions utilizing iris scissors.
Interpolation Flap Text: A decision was made to reconstruct the defect utilizing an interpolation axial flap and a staged reconstruction.  A telfa template was made of the defect.  This telfa template was then used to outline the interpolation flap.  The donor area for the pedicle flap was then injected with anesthesia.  The flap was excised through the skin and subcutaneous tissue down to the layer of the underlying musculature.  The interpolation flap was carefully excised within this deep plane to maintain its blood supply.  The edges of the donor site were undermined.   The donor site was closed in a primary fashion.  The pedicle was then rotated into position and sutured.  Once the tube was sutured into place, adequate blood supply was confirmed with blanching and refill.  The pedicle was then wrapped with xeroform gauze and dressed appropriately with a telfa and gauze bandage to ensure continued blood supply and protect the attached pedicle.
Full Thickness Lip Wedge Repair (Flap) Text: Given the location of the defect and the proximity to free margins a full thickness wedge repair was deemed most appropriate.  Using a sterile surgical marker, the appropriate repair was drawn incorporating the defect and placing the expected incisions perpendicular to the vermilion border.  The vermilion border was also meticulously outlined to ensure appropriate reapproximation during the repair.  The area thus outlined was incised through and through with a #15 scalpel blade.  The muscularis and dermis were reaproximated with deep sutures following hemostasis. Care was taken to realign the vermilion border before proceeding with the superficial closure.  Once the vermilion was realigned the superfical and mucosal closure was finished.
Purse String (Intermediate) Text: Given the location of the defect and the characteristics of the surrounding skin a purse string intermediate closure was deemed most appropriate.  Undermining was performed circumfirentially around the surgical defect.  A purse string suture was then placed and tightened.
Paramedian Forehead Flap Text: A decision was made to reconstruct the defect utilizing an interpolation axial flap and a staged reconstruction.  A telfa template was made of the defect.  This telfa template was then used to outline the paramedian forehead pedicle flap.  The donor area for the pedicle flap was then injected with anesthesia.  The flap was excised through the skin and subcutaneous tissue down to the layer of the underlying musculature.  The pedicle flap was carefully excised within this deep plane to maintain its blood supply.  The edges of the donor site were undermined.   The donor site was closed in a primary fashion.  The pedicle was then rotated into position and sutured.  Once the tube was sutured into place, adequate blood supply was confirmed with blanching and refill.  The pedicle was then wrapped with xeroform gauze and dressed appropriately with a telfa and gauze bandage to ensure continued blood supply and protect the attached pedicle.
Mart-1 - Positive Histology Text: MART-1 staining demonstrates areas of higher density and clustering of melanocytes with Pagetoid spread upwards within the epidermis. The surgical margins are positive for tumor cells.
Date Of Previous Biopsy (Optional): 5-24-22
Nostril Rim Text: The closure involved the nostril rim.
Skin Substitute Text: The defect edges were debeveled with a #15 scalpel blade.  Given the location of the defect, shape of the defect and the proximity to free margins a skin substitute graft was deemed most appropriate.  The graft material was trimmed to fit the size of the defect. The graft was then placed in the primary defect and oriented appropriately.
Consent (Nose)/Introductory Paragraph: The rationale for Mohs was explained to the patient and consent was obtained. The risks, benefits and alternatives to therapy were discussed in detail. Specifically, the risks of nasal deformity, changes in the flow of air through the nose, infection, scarring, bleeding, prolonged wound healing, incomplete removal, allergy to anesthesia, nerve injury and recurrence were addressed. Prior to the procedure, the treatment site was clearly identified and confirmed by the patient. All components of Universal Protocol/PAUSE Rule completed.
Ftsg Text: The defect edges were debeveled with a #15 scalpel blade.  Given the location of the defect, shape of the defect and the proximity to free margins a full thickness skin graft was deemed most appropriate.  Using a sterile surgical marker, the primary defect shape was transferred to the donor site. The area thus outlined was incised deep to adipose tissue with a #15 scalpel blade.  The harvested graft was then trimmed of adipose tissue until only dermis and epidermis was left.  The skin margins of the secondary defect were undermined to an appropriate distance in all directions utilizing iris scissors.  The secondary defect was closed with interrupted buried subcutaneous sutures.  The skin edges were then re-apposed with running  sutures.  The skin graft was then placed in the primary defect and oriented appropriately.
Helical Rim Text: The closure involved the helical rim.
Additional Postop Diagnosis: Squamous Cell Carcinoma in situ
Hemigard Postcare Instructions: The HEMIGARD strips are to remain completely dry for at least 5-7 days.
Rotation Flap Text: The defect edges were debeveled with a #15 scalpel blade.  Given the location of the defect, shape of the defect and the proximity to free margins a rotation flap was deemed most appropriate.  Using a sterile surgical marker, an appropriate rotation flap was drawn incorporating the defect and placing the expected incisions within the relaxed skin tension lines where possible.    The area thus outlined was incised deep to adipose tissue with a #15 scalpel blade.  The skin margins were undermined to an appropriate distance in all directions utilizing iris scissors.
Suturegard Retention Suture: 2-0 Nylon
Mastoid Interpolation Flap Text: A decision was made to reconstruct the defect utilizing an interpolation axial flap and a staged reconstruction.  A telfa template was made of the defect.  This telfa template was then used to outline the mastoid interpolation flap.  The donor area for the pedicle flap was then injected with anesthesia.  The flap was excised through the skin and subcutaneous tissue down to the layer of the underlying musculature.  The pedicle flap was carefully excised within this deep plane to maintain its blood supply.  The edges of the donor site were undermined.   The donor site was closed in a primary fashion.  The pedicle was then rotated into position and sutured.  Once the tube was sutured into place, adequate blood supply was confirmed with blanching and refill.  The pedicle was then wrapped with xeroform gauze and dressed appropriately with a telfa and gauze bandage to ensure continued blood supply and protect the attached pedicle.
Consent 2/Introductory Paragraph: Mohs surgery was explained to the patient and consent was obtained. The risks, benefits and alternatives to therapy were discussed in detail. Specifically, the risks of infection, scarring, bleeding, prolonged wound healing, incomplete removal, allergy to anesthesia, nerve injury and recurrence were addressed. Prior to the procedure, the treatment site was clearly identified and confirmed by the patient. All components of Universal Protocol/PAUSE Rule completed.
Staged Advancement Flap Text: The defect edges were debeveled with a #15 scalpel blade.  Given the location of the defect, shape of the defect and the proximity to free margins a staged advancement flap was deemed most appropriate.  Using a sterile surgical marker, an appropriate advancement flap was drawn incorporating the defect and placing the expected incisions within the relaxed skin tension lines where possible. The area thus outlined was incised deep to adipose tissue with a #15 scalpel blade.  The skin margins were undermined to an appropriate distance in all directions utilizing iris scissors.
Mustarde Flap Text: The defect edges were debeveled with a #15 scalpel blade.  Given the size, depth and location of the defect and the proximity to free margins a Mustarde flap was deemed most appropriate.  Using a sterile surgical marker, an appropriate flap was drawn incorporating the defect. The area thus outlined was incised with a #15 scalpel blade.  The skin margins were undermined to an appropriate distance in all directions utilizing iris scissors.
Adjacent Tissue Transfer Text: The defect edges were debeveled with a #15 scalpel blade.  Given the location of the defect and the proximity to free margins an adjacent tissue transfer was deemed most appropriate.  Using a sterile surgical marker, an appropriate flap was drawn incorporating the defect and placing the expected incisions within the relaxed skin tension lines where possible.    The area thus outlined was incised deep to adipose tissue with a #15 scalpel blade.  The skin margins were undermined to an appropriate distance in all directions utilizing iris scissors.

## 2022-06-16 NOTE — PROGRESS NOTES
Benadryl - 1/2 tsp (6 25mg) every 6 hours  Or   Zyrtec or Claritin 1 25 - 2 5 mg daily  Humidifier at bed  Croup in [address] [address]:   Croup is a respiratory infection  It causes your child's throat and upper airways to swell and narrow  It is also called laryngotracheobronchitis  Croup is most common in children ages 7 months to 3 years  Your child may get croup more than once  DISCHARGE INSTRUCTIONS:   Call your local emergency number (911 in the [address] [facility]) if:   Your child stops breathing or breathing becomes difficult  Your child faints  Your child's lips or fingernails turn blue, gray, or white  The skin between your child's ribs or around his or her neck goes in with every breath  Your child is dizzy or sleeping more than what is normal for him or her  Your child drools or has trouble swallowing his or her saliva  Return to the emergency department if:   Your child has no tears when he or she cries  The soft spot on the top of your baby's head is sunken in  Your child has wrinkled skin, cracked lips, or a dry mouth  Your child urinates less than what is normal for him or her  Call your child's doctor if:   Your child has a fever  Your child does not get better after sitting in a steamy bathroom for 10 to 15 minutes  Your child's cough does not go away  You have questions or concerns about your child's condition or care  Medicines: Your child may need any of the following:  Cough medicine  helps loosen mucus in your child's lungs and makes it easier to cough up  Do  not  give cold or cough medicines to children under 3years of age  Ask your child's healthcare provider if you can give cough medicine to your child  Acetaminophen  decreases pain and fever  It is available without a doctor's order  Ask how much to give your child and how often to give it  Follow directions   Read the labels of all other medicines your child uses to see if they also contain [facility]    Subjective:     Chief Complaint   Patient presents with   • Follow-Up     Right knee septic arthritis     Interval History: 74 y.o. female with no significant past medical history.  Hospitalized from 11/15-11/22/2019, admitted with complaints of right knee pain and several weeks of fatigue.  Orthopedics was consulted at the time and she went to the OR on 11/15 for I&D of right septic knee.  A large amount of purulent fluid was encountered per op note.  Cultures were sent +MSSA.  She was also found to have an MSSA bacteremia with blood cultures positive on 11/15.  Repeat blood cultures on 11/17 were negative.  She underwent TTE on 11/18 which was negative for endocarditis.  Plan was to continue IV antibiotics until 12/15/19 with daptomycin 8 mg/kg home infusion and come into [facility] weekly for labs and dressing changes.  Unfortunately, she was readmitted from 12/4-12/9/2019 due to complaints of fever and chills x1 day.  She denied any new right pain at that time or issues with her PICC line.  ID was consulted and she was transitioned to Cefazolin.  IDaptomycin was transitioned to ertapenem and fevers improved. PICC line infection was a possible cause for the fever, PICC line was removed.  Cath tip sent for culture and no growth.  Imaging was unremarkable other than CT chest on 12/7 with bilateral subpleural interstitial opacities with hazy groundglass opacities. This could represent changes of mild interstitial edema versus atypical pneumonitis or underlying chronic interstitial lung disease. No acute pneumonia.  She was asymptomatic other than a mild persistent cough that she has had for several months as well as fatigue that she also described as present for several months.  Discharged home on IV ertapenem through 12/15/2019.     12/17/2019: Seen by Dr. Richards.  She completed her antibiotics as planned on 12/15.  She states that she still has some ongoing fatigue and takes frequent naps  "but this is somewhat improved.  Patient is doing well overall no significant symptoms.  However, some elevation in ESR and CRP in the right knee is still warm to touch so will transition her IV antibiotics to doxycycline 100 mg twice daily to complete another 4 weeks.    Records reviewed    Today, 1/16/2020: Patient reports feeling well and has been tolerating the p.o. doxycycline without adverse effect, but notes persistent fatigue that is slightly improved since transitioning from IV to p.o. antibiotics.  Denies feeling generally ill, fevers/chills, general malaise, headache, n/v/d, abdominal pain, chest pain or shortness of breath.  Pt stating that the right knee surgical site is well-healed.  She denies any drainage, odor, redness or swelling.  Has occasional \"twinges\" of right knee pain that are sharp, severe and quick, they typically resolve as soon as they come on.  Has a repeat CT of her lungs next Tuesday and follow-up with pulmonary on 2/10.  Notes she has been more active, working on walking around, going up and down hills.  States her right knee overall feels pretty good.    Review of Systems   Constitutional: Positive for malaise/fatigue. Negative for chills and fever.   HENT: Negative for congestion and sore throat.    Respiratory: Negative for cough and shortness of breath.    Cardiovascular: Negative for chest pain and leg swelling.   Gastrointestinal: Negative for abdominal pain, constipation, diarrhea, nausea and vomiting.   Genitourinary: Negative for dysuria and hematuria.   Musculoskeletal: Positive for myalgias. Negative for joint pain.   Skin: Negative for rash.   Neurological: Negative for weakness and headaches.   Psychiatric/Behavioral: The patient is not nervous/anxious.        Past Medical History:   Diagnosis Date   • Allergic rhinitis 6/17/2009   • Dyslipidemia 6/17/2009   • Preventative health care 6/17/2009   • S/P parathyroidectomy 6/17/2009       Family History   Problem Relation " acetaminophen, or ask your child's doctor or pharmacist  Acetaminophen can cause liver damage if not taken correctly  NSAIDs , such as ibuprofen, help decrease swelling, pain, and fever  This medicine is available with or without a doctor's order  NSAIDs can cause stomach bleeding or kidney problems in certain people  If your child takes blood thinner medicine, always ask if NSAIDs are safe for him or her  Always read the medicine label and follow directions  Do not give these medicines to children under 10months of age without direction from your child's healthcare provider  Do not give aspirin to children under 25years of age  Your child could develop Reye syndrome if he takes aspirin  Reye syndrome can cause life-threatening brain and liver damage  Check your child's medicine labels for aspirin, salicylates, or oil of wintergreen  Give your child's medicine as directed  Contact your child's healthcare provider if you think the medicine is not working as expected  Tell him or her if your child is allergic to any medicine  Keep a current list of the medicines, vitamins, and herbs your child takes  Include the amounts, and when, how, and why they are taken  Bring the list or the medicines in their containers to follow-up visits  Carry your child's medicine list with you in case of an emergency  Manage your child's symptoms:   Help your child rest and keep calm  as much as possible  Stress can make your child's cough worse  Moist air  may help your child breathe easier and decrease his or her cough  Take your child outside for 5 minutes if it is humid  Or, take your child into the bathroom and turn on a hot shower or bathtub  Do not  put your child into the shower or bathtub  Sit with your child in the warm, moist air for 15 to 20 minutes  Use a cool mist humidifier  to increase air moisture in your home   This may make it easier for your child to breathe and help decrease his or her "Age of Onset   • Heart Disease Mother        Social History     Tobacco Use   • Smoking status: Never Smoker   • Smokeless tobacco: Never Used   Substance Use Topics   • Alcohol use: Yes     Alcohol/week: 0.0 oz     Frequency: Monthly or less     Comment: occ wine    • Drug use: Never       Allergies: Asa [aspirin] and Nsaids    Pt's medication and problem list reviewed.     Objective:     /62 (BP Location: Right arm, Patient Position: Sitting, BP Cuff Size: Adult)   Pulse 74   Temp 36.6 °C (97.9 °F) (Temporal)   Ht 1.651 m (5' 5\")   Wt 59.9 kg (132 lb)   SpO2 97%   Breastfeeding? No   BMI 21.97 kg/m²     Physical Exam   Constitutional: She is oriented to person, place, and time and well-developed, well-nourished, and in no distress. No distress.   HENT:   Head: Normocephalic and atraumatic.   Mouth/Throat: Oropharynx is clear and moist. No oropharyngeal exudate.   Eyes: Pupils are equal, round, and reactive to light. Conjunctivae and EOM are normal. No scleral icterus.   Neck: Normal range of motion. Neck supple. No tracheal deviation present.   Cardiovascular: Normal rate, regular rhythm, normal heart sounds and intact distal pulses.   No murmur heard.  Pulmonary/Chest: Effort normal and breath sounds normal. No respiratory distress. She has no wheezes.   Abdominal: Soft. Bowel sounds are normal. She exhibits no distension. There is no tenderness.   Musculoskeletal: Normal range of motion.         General: No tenderness or edema.      Comments: Right knee- surgical site well-healed and approximated, no erythema or breakdown, no induration or fluctuance, nontender to palpation.   Neurological: She is alert and oriented to person, place, and time. No cranial nerve deficit.   Skin: Skin is warm and dry. No rash noted. She is not diaphoretic. No erythema.   Psychiatric: Mood, memory, affect and judgment normal.   Pleasant   Vitals reviewed.      Labs:  WBC   Date/Time Value Ref Range Status   01/14/2020 " cough     Prevent the spread of croup:       Have your child wash his or her hands often with soap and water  Carry germ-killing hand lotion or gel with you  Have your child use the lotion or gel to clean his or her hands when soap and water are not available  Remind your child to cover his or her mouth while coughing or sneezing  Have your child cough or sneeze into a tissue or the bend of his or her arm  Ask those around your child to cover their mouths when they cough or sneeze  Do not let your child share  cups, silverware, or dishes with others  Keep your child home  from school or   Get the vaccinations your child needs  Take your child to get a flu vaccine as soon as recommended each year, usually in September or October  Ask your child's healthcare provider if your child needs other vaccines  Follow up with your child's doctor as directed:  Write down your questions so you remember to ask them during your visits  © [employer] 2022 Information is for End User's use only and may not be sold, redistributed or otherwise used for commercial purposes  All illustrations and images included in CareNotes® are the copyrighted property of A D A M , Inc  or [address] [place] [address]  The above information is an  only  It is not intended as medical advice for individual conditions or treatments  Talk to your doctor, nurse or pharmacist before following any medical regimen to see if it is safe and effective for you  Ear Infection in Children   WHAT YOU NEED TO KNOW:   An ear infection is also called otitis media  Ear infections can happen any time during the year  They are most common during the winter and spring months  Your child may have an ear infection more than once  DISCHARGE INSTRUCTIONS:   Return to the emergency department if:   Your child seems confused or cannot stay awake  Your child has a stiff neck, headache, and a fever      Call your child's 08:25 AM 3.2 (L) 4.8 - 10.8 K/uL Final     RBC   Date/Time Value Ref Range Status   01/14/2020 08:25 AM 5.12 4.20 - 5.40 M/uL Final     Hemoglobin   Date/Time Value Ref Range Status   01/14/2020 08:25 AM 15.1 12.0 - 16.0 g/dL Final     Hematocrit   Date/Time Value Ref Range Status   01/14/2020 08:25 AM 46.6 37.0 - 47.0 % Final     MCV   Date/Time Value Ref Range Status   01/14/2020 08:25 AM 91.0 81.4 - 97.8 fL Final     MCH   Date/Time Value Ref Range Status   01/14/2020 08:25 AM 29.5 27.0 - 33.0 pg Final     MCHC   Date/Time Value Ref Range Status   01/14/2020 08:25 AM 32.4 (L) 33.6 - 35.0 g/dL Final     MPV   Date/Time Value Ref Range Status   01/14/2020 08:25 AM 10.2 9.0 - 12.9 fL Final        Sodium   Date/Time Value Ref Range Status   01/14/2020 08:25  (L) 135 - 145 mmol/L Final     Potassium   Date/Time Value Ref Range Status   01/14/2020 08:25 AM 4.1 3.6 - 5.5 mmol/L Final     Chloride   Date/Time Value Ref Range Status   01/14/2020 08:25 AM 98 96 - 112 mmol/L Final     Co2   Date/Time Value Ref Range Status   01/14/2020 08:25 AM 29 20 - 33 mmol/L Final     Glucose   Date/Time Value Ref Range Status   01/14/2020 08:25 AM 88 65 - 99 mg/dL Final     Bun   Date/Time Value Ref Range Status   01/14/2020 08:25 AM 12 8 - 22 mg/dL Final     Creatinine   Date/Time Value Ref Range Status   01/14/2020 08:25 AM 0.69 0.50 - 1.40 mg/dL Final   06/01/2005 08:44 AM 0.9 0.5 - 1.4 mg/dL Final       Alkaline Phosphatase   Date/Time Value Ref Range Status   01/14/2020 08:25 AM 65 30 - 99 U/L Final     AST(SGOT)   Date/Time Value Ref Range Status   01/14/2020 08:25 AM 25 12 - 45 U/L Final     ALT(SGPT)   Date/Time Value Ref Range Status   01/14/2020 08:25 AM 25 2 - 50 U/L Final     Total Bilirubin   Date/Time Value Ref Range Status   01/14/2020 08:25 AM 0.6 0.1 - 1.5 mg/dL Final      ESR 7  CRP 0.13    Assessment and Plan:   The following treatment plan was discussed with patient at length:    1. Staphylococcal arthritis of  doctor if:   You see blood or pus draining from your child's ear  Your child has a fever  Your child is still not eating or drinking 24 hours after he or she takes medicine  Your child has pain behind his or her ear or when you move the earlobe  Your child's ear is sticking out from his or her head  Your child still has signs and symptoms of an ear infection 48 hours after he or she takes medicine  You have questions or concerns about your child's condition or care  Treatment for an ear infection  may include any of the following:  Medicines:      Acetaminophen  decreases pain and fever  It is available without a doctor's order  Ask how much to give your child and how often to give it  Follow directions  Read the labels of all other medicines your child uses to see if they also contain acetaminophen, or ask your child's doctor or pharmacist  Acetaminophen can cause liver damage if not taken correctly  NSAIDs , such as ibuprofen, help decrease swelling, pain, and fever  This medicine is available with or without a doctor's order  NSAIDs can cause stomach bleeding or kidney problems in certain people  If your child takes blood thinner medicine, always ask if NSAIDs are safe for him or her  Always read the medicine label and follow directions  Do not give these medicines to children under 10months of age without direction from your child's healthcare provider  Ear drops  help treat your child's ear pain  Antibiotics  help treat a bacterial infection  Give your child's medicine as directed  Contact your child's healthcare provider if you think the medicine is not working as expected  Tell him or her if your child is allergic to any medicine  Keep a current list of the medicines, vitamins, and herbs your child takes  Include the amounts, and when, how, and why they are taken  Bring the list or the medicines in their containers to follow-up visits   Carry your child's medicine list with right knee (HCC)      -Finished p.o. doxycycline this AM.  No need for additional antibiotics as inflammatory markers are well within normal limits and there are no signs of active infection.  -Monitor for s/sx of worsening off abx: increased redness, pain, swelling, drainage, breakdown of surgical site, fevers, chills, general malaise, etc.  Notify ID or go to ER should these s/sx occur.   2. Elevated sed rate      Resolved     Follow up: PRN, RTC sooner if needed. FU with PCP for ongoing chronic medical conditions.     MICHAEL Burgos.    Patient was seen for 15 minutes face to face of which > 50% of appointment time was spent on counseling and coordination of care regarding the above.       Please note that this dictation was created using voice recognition software. I have  worked with technical experts from [facility] to optimize the interface.  I have made every reasonable attempt to correct obvious errors, but there may be errors of grammar and possibly content that I did not discover before finalizing the note.   you in case of an emergency  Ear tubes  are used to keep fluid from collecting in your child's ears  Your child may need these to help prevent ear infections or hearing loss  Ask your child's healthcare provider for more information on ear tubes  Care for your child at home:   Have your child lie with his or her infected ear facing down  to allow fluid to drain from the ear  Apply heat  on your child's ear for 15 to 20 minutes, 3 to 4 times a day or as directed  You can apply heat with an electric heating pad, hot water bottle, or warm compress  Always put a cloth between your child's skin and the heat pack to prevent burns  Heat helps decrease pain  Apply ice  on your child's ear for 15 to 20 minutes, 3 to 4 times a day for 2 days or as directed  Use an ice pack, or put crushed ice in a plastic bag  Cover it with a towel before you apply it to your child's ear  Ice decreases swelling and pain  Ask about ways to keep water out of your child's ears  when he or she bathes or swims  Prevent an ear infection:   Wash your and your child's hands often  to help prevent the spread of germs  Ask everyone in your house to wash their hands with soap and water  Ask them to wash after they use the bathroom or change a diaper  Remind them to wash before they prepare or eat food  Keep your child away from people who are ill, such as sick playmates  Germs spread easily and quickly in  centers  If possible, breastfeed your baby  Your baby may be less likely to get an ear infection if he or she is   Do not give your child a bottle while he or she is lying down  This may cause liquid from the sinuses to leak into his or her eustachian tube  Keep your child away from cigarette smoke  Smoke can make an ear infection worse  Move your child away from a person who is smoking  If you currently smoke, do not smoke near your child   Ask your healthcare provider for information if you want help to quit smoking  Ask about vaccines  Vaccines may help prevent infections that can cause an ear infection  Have your child get a yearly flu vaccine as soon as recommended, usually in September or October  Ask about other vaccines your child needs and when he or she should get them  Follow up with your child's doctor as directed:  Write down your questions so you remember to ask them during your visits  © Copyright [employer] 2022 Information is for End User's use only and may not be sold, redistributed or otherwise used for commercial purposes  All illustrations and images included in CareNotes® are the copyrighted property of A D A M , Inc  or [facility] Ivan Mosqueda   The above information is an  only  It is not intended as medical advice for individual conditions or treatments  Talk to your doctor, nurse or pharmacist before following any medical regimen to see if it is safe and effective for you

## 2022-06-22 ENCOUNTER — APPOINTMENT (RX ONLY)
Dept: URBAN - METROPOLITAN AREA CLINIC 36 | Facility: CLINIC | Age: 77
Setting detail: DERMATOLOGY
End: 2022-06-22

## 2022-06-22 DIAGNOSIS — Z48.02 ENCOUNTER FOR REMOVAL OF SUTURES: ICD-10-CM

## 2022-06-22 PROCEDURE — ? SUTURE REMOVAL (GLOBAL PERIOD)

## 2022-06-22 ASSESSMENT — LOCATION DETAILED DESCRIPTION DERM: LOCATION DETAILED: RIGHT PROXIMAL PRETIBIAL REGION

## 2022-06-22 ASSESSMENT — LOCATION ZONE DERM: LOCATION ZONE: LEG

## 2022-06-22 ASSESSMENT — LOCATION SIMPLE DESCRIPTION DERM: LOCATION SIMPLE: RIGHT PRETIBIAL REGION

## 2022-06-22 NOTE — PROCEDURE: SUTURE REMOVAL (GLOBAL PERIOD)
Detail Level: Detailed
Add 28373 Cpt? (Important Note: In 2017 The Use Of 58387 Is Being Tracked By Cms To Determine Future Global Period Reimbursement For Global Periods): no

## 2022-08-09 ENCOUNTER — HOSPITAL ENCOUNTER (OUTPATIENT)
Dept: LAB | Facility: MEDICAL CENTER | Age: 77
End: 2022-08-09
Attending: STUDENT IN AN ORGANIZED HEALTH CARE EDUCATION/TRAINING PROGRAM
Payer: MEDICARE

## 2022-08-09 LAB
ALBUMIN SERPL BCP-MCNC: 4.8 G/DL (ref 3.2–4.9)
APPEARANCE UR: CLEAR
BACTERIA #/AREA URNS HPF: ABNORMAL /HPF
BASOPHILS # BLD AUTO: 0.9 % (ref 0–1.8)
BASOPHILS # BLD: 0.04 K/UL (ref 0–0.12)
BILIRUB UR QL STRIP.AUTO: NEGATIVE
BUN SERPL-MCNC: 19 MG/DL (ref 8–22)
CALCIUM SERPL-MCNC: 10.1 MG/DL (ref 8.4–10.2)
CHLORIDE SERPL-SCNC: 99 MMOL/L (ref 96–112)
CO2 SERPL-SCNC: 27 MMOL/L (ref 20–33)
COLOR UR: YELLOW
CREAT SERPL-MCNC: 0.75 MG/DL (ref 0.5–1.4)
CREAT UR-MCNC: 54.73 MG/DL
CREAT UR-MCNC: 55.02 MG/DL
EOSINOPHIL # BLD AUTO: 0.07 K/UL (ref 0–0.51)
EOSINOPHIL NFR BLD: 1.6 % (ref 0–6.9)
EPI CELLS #/AREA URNS HPF: ABNORMAL /HPF
ERYTHROCYTE [DISTWIDTH] IN BLOOD BY AUTOMATED COUNT: 47 FL (ref 35.9–50)
GFR SERPLBLD CREATININE-BSD FMLA CKD-EPI: 82 ML/MIN/1.73 M 2
GLUCOSE SERPL-MCNC: 92 MG/DL (ref 65–99)
GLUCOSE UR STRIP.AUTO-MCNC: NEGATIVE MG/DL
HCT VFR BLD AUTO: 47.7 % (ref 37–47)
HGB BLD-MCNC: 16 G/DL (ref 12–16)
IMM GRANULOCYTES # BLD AUTO: 0.01 K/UL (ref 0–0.11)
IMM GRANULOCYTES NFR BLD AUTO: 0.2 % (ref 0–0.9)
KETONES UR STRIP.AUTO-MCNC: NEGATIVE MG/DL
LEUKOCYTE ESTERASE UR QL STRIP.AUTO: ABNORMAL
LYMPHOCYTES # BLD AUTO: 1.19 K/UL (ref 1–4.8)
LYMPHOCYTES NFR BLD: 26.4 % (ref 22–41)
MCH RBC QN AUTO: 30.9 PG (ref 27–33)
MCHC RBC AUTO-ENTMCNC: 33.5 G/DL (ref 33.6–35)
MCV RBC AUTO: 92.1 FL (ref 81.4–97.8)
MICRO URNS: ABNORMAL
MICROALBUMIN UR-MCNC: <1.2 MG/DL
MICROALBUMIN/CREAT UR: NORMAL MG/G (ref 0–30)
MONOCYTES # BLD AUTO: 0.33 K/UL (ref 0–0.85)
MONOCYTES NFR BLD AUTO: 7.3 % (ref 0–13.4)
NEUTROPHILS # BLD AUTO: 2.86 K/UL (ref 2–7.15)
NEUTROPHILS NFR BLD: 63.6 % (ref 44–72)
NITRITE UR QL STRIP.AUTO: NEGATIVE
NRBC # BLD AUTO: 0 K/UL
NRBC BLD-RTO: 0 /100 WBC
PH UR STRIP.AUTO: 6.5 [PH] (ref 5–8)
PHOSPHATE SERPL-MCNC: 3.7 MG/DL (ref 2.5–4.5)
PLATELET # BLD AUTO: 239 K/UL (ref 164–446)
PMV BLD AUTO: 10.2 FL (ref 9–12.9)
POTASSIUM SERPL-SCNC: 4 MMOL/L (ref 3.6–5.5)
PROT UR QL STRIP: NEGATIVE MG/DL
PROT UR-MCNC: 7 MG/DL (ref 0–15)
PROT/CREAT UR: 128 MG/G (ref 10–107)
RBC # BLD AUTO: 5.18 M/UL (ref 4.2–5.4)
RBC # URNS HPF: ABNORMAL /HPF
RBC UR QL AUTO: NEGATIVE
SODIUM SERPL-SCNC: 137 MMOL/L (ref 135–145)
SP GR UR STRIP.AUTO: <=1.005
WBC # BLD AUTO: 4.5 K/UL (ref 4.8–10.8)
WBC #/AREA URNS HPF: ABNORMAL /HPF

## 2022-08-09 PROCEDURE — 84156 ASSAY OF PROTEIN URINE: CPT

## 2022-08-09 PROCEDURE — 85025 COMPLETE CBC W/AUTO DIFF WBC: CPT

## 2022-08-09 PROCEDURE — 82570 ASSAY OF URINE CREATININE: CPT | Mod: 91

## 2022-08-09 PROCEDURE — 81001 URINALYSIS AUTO W/SCOPE: CPT

## 2022-08-09 PROCEDURE — 80069 RENAL FUNCTION PANEL: CPT

## 2022-08-09 PROCEDURE — 82043 UR ALBUMIN QUANTITATIVE: CPT

## 2022-08-09 PROCEDURE — 36415 COLL VENOUS BLD VENIPUNCTURE: CPT

## 2022-09-12 PROBLEM — Z87.898 HISTORY OF EPISTAXIS: Status: ACTIVE | Noted: 2022-09-12

## 2022-11-07 ENCOUNTER — PATIENT MESSAGE (OUTPATIENT)
Dept: HEALTH INFORMATION MANAGEMENT | Facility: OTHER | Age: 77
End: 2022-11-07

## 2022-11-16 ENCOUNTER — HOSPITAL ENCOUNTER (OUTPATIENT)
Dept: LAB | Facility: MEDICAL CENTER | Age: 77
End: 2022-11-16
Attending: INTERNAL MEDICINE
Payer: MEDICARE

## 2022-11-16 LAB
25(OH)D3 SERPL-MCNC: 72 NG/ML (ref 30–100)
ANION GAP SERPL CALC-SCNC: 8 MMOL/L (ref 7–16)
BUN SERPL-MCNC: 13 MG/DL (ref 8–22)
CALCIUM SERPL-MCNC: 9.9 MG/DL (ref 8.4–10.2)
CHLORIDE SERPL-SCNC: 98 MMOL/L (ref 96–112)
CO2 SERPL-SCNC: 27 MMOL/L (ref 20–33)
CREAT SERPL-MCNC: 0.73 MG/DL (ref 0.5–1.4)
FASTING STATUS PATIENT QL REPORTED: NORMAL
GFR SERPLBLD CREATININE-BSD FMLA CKD-EPI: 85 ML/MIN/1.73 M 2
GLUCOSE SERPL-MCNC: 97 MG/DL (ref 65–99)
POTASSIUM SERPL-SCNC: 4.7 MMOL/L (ref 3.6–5.5)
SODIUM SERPL-SCNC: 133 MMOL/L (ref 135–145)
T3FREE SERPL-MCNC: 2.56 PG/ML (ref 2–4.4)
T4 FREE SERPL-MCNC: 1.38 NG/DL (ref 0.93–1.7)
TSH SERPL DL<=0.005 MIU/L-ACNC: 1.63 UIU/ML (ref 0.38–5.33)
VIT B12 SERPL-MCNC: 1294 PG/ML (ref 211–911)

## 2022-11-16 PROCEDURE — 36415 COLL VENOUS BLD VENIPUNCTURE: CPT

## 2022-11-16 PROCEDURE — 84439 ASSAY OF FREE THYROXINE: CPT

## 2022-11-16 PROCEDURE — 80048 BASIC METABOLIC PNL TOTAL CA: CPT

## 2022-11-16 PROCEDURE — 84481 FREE ASSAY (FT-3): CPT

## 2022-11-16 PROCEDURE — 82306 VITAMIN D 25 HYDROXY: CPT

## 2022-11-16 PROCEDURE — 84443 ASSAY THYROID STIM HORMONE: CPT

## 2022-11-16 PROCEDURE — 82607 VITAMIN B-12: CPT

## 2022-11-30 ENCOUNTER — APPOINTMENT (RX ONLY)
Dept: URBAN - METROPOLITAN AREA CLINIC 6 | Facility: CLINIC | Age: 77
Setting detail: DERMATOLOGY
End: 2022-11-30

## 2022-11-30 DIAGNOSIS — Z85.828 PERSONAL HISTORY OF OTHER MALIGNANT NEOPLASM OF SKIN: ICD-10-CM | Status: STABLE

## 2022-11-30 DIAGNOSIS — L82.1 OTHER SEBORRHEIC KERATOSIS: ICD-10-CM

## 2022-11-30 DIAGNOSIS — D18.0 HEMANGIOMA: ICD-10-CM

## 2022-11-30 DIAGNOSIS — L80 VITILIGO: ICD-10-CM

## 2022-11-30 DIAGNOSIS — L57.0 ACTINIC KERATOSIS: ICD-10-CM

## 2022-11-30 DIAGNOSIS — D22 MELANOCYTIC NEVI: ICD-10-CM

## 2022-11-30 DIAGNOSIS — D485 NEOPLASM OF UNCERTAIN BEHAVIOR OF SKIN: ICD-10-CM

## 2022-11-30 DIAGNOSIS — Z71.89 OTHER SPECIFIED COUNSELING: ICD-10-CM

## 2022-11-30 DIAGNOSIS — L81.4 OTHER MELANIN HYPERPIGMENTATION: ICD-10-CM

## 2022-11-30 PROBLEM — D22.5 MELANOCYTIC NEVI OF TRUNK: Status: ACTIVE | Noted: 2022-11-30

## 2022-11-30 PROBLEM — D48.5 NEOPLASM OF UNCERTAIN BEHAVIOR OF SKIN: Status: ACTIVE | Noted: 2022-11-30

## 2022-11-30 PROBLEM — D18.01 HEMANGIOMA OF SKIN AND SUBCUTANEOUS TISSUE: Status: ACTIVE | Noted: 2022-11-30

## 2022-11-30 PROCEDURE — 99213 OFFICE O/P EST LOW 20 MIN: CPT | Mod: 25

## 2022-11-30 PROCEDURE — ? COUNSELING

## 2022-11-30 PROCEDURE — ? BIOPSY BY SHAVE METHOD

## 2022-11-30 PROCEDURE — 17000 DESTRUCT PREMALG LESION: CPT | Mod: 59

## 2022-11-30 PROCEDURE — ? DEFER

## 2022-11-30 PROCEDURE — 11102 TANGNTL BX SKIN SINGLE LES: CPT

## 2022-11-30 PROCEDURE — ? LIQUID NITROGEN

## 2022-11-30 PROCEDURE — ? SUNSCREEN TREATMENT REGIMEN

## 2022-11-30 ASSESSMENT — LOCATION DETAILED DESCRIPTION DERM
LOCATION DETAILED: NASAL DORSUM
LOCATION DETAILED: RIGHT RADIAL DORSAL HAND
LOCATION DETAILED: EPIGASTRIC SKIN
LOCATION DETAILED: LEFT RADIAL DORSAL HAND
LOCATION DETAILED: MIDDLE STERNUM
LOCATION DETAILED: PERIUMBILICAL SKIN
LOCATION DETAILED: RIGHT INFERIOR FOREHEAD
LOCATION DETAILED: LEFT INFERIOR FOREHEAD
LOCATION DETAILED: INFERIOR THORACIC SPINE
LOCATION DETAILED: RIGHT MEDIAL UPPER BACK
LOCATION DETAILED: RIGHT POPLITEAL SKIN
LOCATION DETAILED: RIGHT PROXIMAL PRETIBIAL REGION
LOCATION DETAILED: SUPERIOR MID FOREHEAD

## 2022-11-30 ASSESSMENT — LOCATION SIMPLE DESCRIPTION DERM
LOCATION SIMPLE: RIGHT FOREHEAD
LOCATION SIMPLE: LEFT FOREHEAD
LOCATION SIMPLE: RIGHT HAND
LOCATION SIMPLE: ABDOMEN
LOCATION SIMPLE: UPPER BACK
LOCATION SIMPLE: RIGHT PRETIBIAL REGION
LOCATION SIMPLE: SUPERIOR FOREHEAD
LOCATION SIMPLE: RIGHT POPLITEAL SKIN
LOCATION SIMPLE: CHEST
LOCATION SIMPLE: LEFT HAND
LOCATION SIMPLE: NOSE
LOCATION SIMPLE: RIGHT UPPER BACK

## 2022-11-30 ASSESSMENT — LOCATION ZONE DERM
LOCATION ZONE: NOSE
LOCATION ZONE: FACE
LOCATION ZONE: HAND
LOCATION ZONE: TRUNK
LOCATION ZONE: LEG

## 2022-11-30 NOTE — PROCEDURE: LIQUID NITROGEN
Render Note In Bullet Format When Appropriate: No
Post-Care Instructions: I reviewed with the patient in detail post-care instructions. Patient is to wear sunprotection, and avoid picking at any of the treated lesions. Pt may apply Vaseline to crusted or scabbing areas.
Duration Of Freeze Thaw-Cycle (Seconds): 10
Detail Level: Detailed
Number Of Freeze-Thaw Cycles: 2 freeze-thaw cycles
Show Aperture Variable?: Yes
Consent: The patient's consent was obtained including but not limited to risks of crusting, scabbing, blistering, scarring, darker or lighter pigmentary change, recurrence, incomplete removal and infection.

## 2023-01-04 ENCOUNTER — APPOINTMENT (RX ONLY)
Dept: URBAN - METROPOLITAN AREA CLINIC 6 | Facility: CLINIC | Age: 78
Setting detail: DERMATOLOGY
End: 2023-01-04

## 2023-01-04 PROBLEM — C44.722 SQUAMOUS CELL CARCINOMA OF SKIN OF RIGHT LOWER LIMB, INCLUDING HIP: Status: ACTIVE | Noted: 2023-01-04

## 2023-01-04 PROCEDURE — ? EXCISION

## 2023-01-04 PROCEDURE — 11602 EXC TR-EXT MAL+MARG 1.1-2 CM: CPT

## 2023-01-04 PROCEDURE — 12032 INTMD RPR S/A/T/EXT 2.6-7.5: CPT

## 2023-01-04 NOTE — PROCEDURE: EXCISION
Surgeon (Optional): Dr. Gomez
Biopsy Photograph Reviewed: Yes
Date Of Previous Biopsy (Optional): 11/30/22
Size Of Lesion In Cm: 0.4
X Size Of Lesion In Cm (Optional): 0
Anesthesia Volume In Cc: 6
Was An Eye Clamp Used?: No
Eye Clamp Note Details: An eye clamp was used during the procedure.
Excision Method: Elliptical
Saucerization Depth: dermis and superficial adipose tissue
Repair Type: Intermediate
Suturegard Retention Suture: 2-0 Nylon
Retention Suture Bite Size: 3 mm
Length To Time In Minutes Device Was In Place: 10
Number Of Hemigard Strips Per Side: 1
Intermediate / Complex Repair - Final Wound Length In Cm: 3.5
Undermining Type: Entire Wound
Debridement Text: The wound edges were debrided prior to proceeding with the closure to facilitate wound healing.
Helical Rim Text: The closure involved the helical rim.
Vermilion Border Text: The closure involved the vermilion border.
Nostril Rim Text: The closure involved the nostril rim.
Retention Suture Text: Retention sutures were placed to support the closure and prevent dehiscence.
Lab: [id number]
Lab Facility: 
Graft Donor Site Bandage (Optional-Leave Blank If You Don't Want In Note): Steri-strips and a pressure bandage were applied to the donor site.
Epidermal Closure Graft Donor Site (Optional): simple interrupted
Billing Type: Third-Party Bill
Excision Depth: adipose tissue
Scalpel Size: 15 blade
Anesthesia Type: 1% lidocaine with epinephrine and a 1:10 solution of 8.4% sodium bicarbonate
Hemostasis: Electrodesiccation
Estimated Blood Loss (Cc): minimal
Detail Level: Detailed
Deep Sutures: 4-0 Monocryl
Dermal Closure: buried vertical mattress
Epidermal Sutures: 4-0 Caprosyn
Epidermal Closure: running subcuticular
Wound Care: Vaseline
Dressing: pressure dressing
Suturegard Intro: Intraoperative tissue expansion was performed, utilizing the SUTUREGARD device, in order to reduce wound tension.
Suturegard Body: The suture ends were repeatedly re-tightened and re-clamped to achieve the desired tissue expansion.
Hemigard Intro: Due to skin fragility and wound tension, it was decided to use HEMIGARD adhesive retention suture devices to permit a linear closure. The skin was cleaned and dried for a 6cm distance away from the wound. Excessive hair, if present, was removed to allow for adhesion.
Hemigard Postcare Instructions: The HEMIGARD strips are to remain completely dry for at least 5-7 days.
Positioning (Leave Blank If You Do Not Want): The patient was placed in a comfortable position exposing the surgical site.
Pre-Excision Curettage Text (Leave Blank If You Do Not Want): Prior to drawing the surgical margin the visible lesion was removed with electrodesiccation and curettage to clearly define the lesion size.
Complex Repair Preamble Text (Leave Blank If You Do Not Want): Extensive wide undermining was performed.
Intermediate Repair Preamble Text (Leave Blank If You Do Not Want): Undermining was performed with blunt dissection.
Curvilinear Excision Additional Text (Leave Blank If You Do Not Want): The margin was drawn around the clinically apparent lesion.  A curvilinear shape was then drawn on the skin incorporating the lesion and margins.  Incisions were then made along these lines to the appropriate tissue plane and the lesion was extirpated.
Fusiform Excision Additional Text (Leave Blank If You Do Not Want): The margin was drawn around the clinically apparent lesion.  A fusiform shape was then drawn on the skin incorporating the lesion and margins.  Incisions were then made along these lines to the appropriate tissue plane and the lesion was extirpated.
Elliptical Excision Additional Text (Leave Blank If You Do Not Want): The margin was drawn around the clinically apparent lesion.  An elliptical shape was then drawn on the skin incorporating the lesion and margins.  Incisions were then made along these lines to the appropriate tissue plane and the lesion was extirpated.
Saucerization Excision Additional Text (Leave Blank If You Do Not Want): The margin was drawn around the clinically apparent lesion.  Incisions were then made along these lines, in a tangential fashion, to the appropriate tissue plane and the lesion was extirpated.
Slit Excision Additional Text (Leave Blank If You Do Not Want): A linear line was drawn on the skin overlying the lesion. An incision was made slowly until the lesion was visualized.  Once visualized, the lesion was removed with blunt dissection.
Excisional Biopsy Additional Text (Leave Blank If You Do Not Want): The margin was drawn around the clinically apparent lesion. An elliptical shape was then drawn on the skin incorporating the lesion and margins.  Incisions were then made along these lines to the appropriate tissue plane and the lesion was extirpated.
Perilesional Excision Additional Text (Leave Blank If You Do Not Want): The margin was drawn around the clinically apparent lesion. Incisions were then made along these lines to the appropriate tissue plane and the lesion was extirpated.
Repair Performed By Another Provider Text (Leave Blank If You Do Not Want): After the tissue was excised the defect was repaired by another provider.
No Repair - Repaired With Adjacent Surgical Defect Text (Leave Blank If You Do Not Want): After the excision the defect was repaired concurrently with another surgical defect which was in close approximation.
Adjacent Tissue Transfer Text: The defect edges were debeveled with a #15 scalpel blade.  Given the location of the defect and the proximity to free margins an adjacent tissue transfer was deemed most appropriate.  Using a sterile surgical marker, an appropriate flap was drawn incorporating the defect and placing the expected incisions within the relaxed skin tension lines where possible.    The area thus outlined was incised deep to adipose tissue with a #15 scalpel blade.  The skin margins were undermined to an appropriate distance in all directions utilizing iris scissors.
Advancement Flap (Single) Text: The defect edges were debeveled with a #15 scalpel blade.  Given the location of the defect and the proximity to free margins a single advancement flap was deemed most appropriate.  Using a sterile surgical marker, an appropriate advancement flap was drawn incorporating the defect and placing the expected incisions within the relaxed skin tension lines where possible.    The area thus outlined was incised deep to adipose tissue with a #15 scalpel blade.  The skin margins were undermined to an appropriate distance in all directions utilizing iris scissors.
Advancement Flap (Double) Text: The defect edges were debeveled with a #15 scalpel blade.  Given the location of the defect and the proximity to free margins a double advancement flap was deemed most appropriate.  Using a sterile surgical marker, the appropriate advancement flaps were drawn incorporating the defect and placing the expected incisions within the relaxed skin tension lines where possible.    The area thus outlined was incised deep to adipose tissue with a #15 scalpel blade.  The skin margins were undermined to an appropriate distance in all directions utilizing iris scissors.
Burow's Advancement Flap Text: The defect edges were debeveled with a #15 scalpel blade.  Given the location of the defect and the proximity to free margins a Burow's advancement flap was deemed most appropriate.  Using a sterile surgical marker, the appropriate advancement flap was drawn incorporating the defect and placing the expected incisions within the relaxed skin tension lines where possible.    The area thus outlined was incised deep to adipose tissue with a #15 scalpel blade.  The skin margins were undermined to an appropriate distance in all directions utilizing iris scissors.
Chonodrocutaneous Helical Advancement Flap Text: The defect edges were debeveled with a #15 scalpel blade.  Given the location of the defect and the proximity to free margins a chondrocutaneous helical advancement flap was deemed most appropriate.  Using a sterile surgical marker, the appropriate advancement flap was drawn incorporating the defect and placing the expected incisions within the relaxed skin tension lines where possible.    The area thus outlined was incised deep to adipose tissue with a #15 scalpel blade.  The skin margins were undermined to an appropriate distance in all directions utilizing iris scissors.
Crescentic Advancement Flap Text: The defect edges were debeveled with a #15 scalpel blade.  Given the location of the defect and the proximity to free margins a crescentic advancement flap was deemed most appropriate.  Using a sterile surgical marker, the appropriate advancement flap was drawn incorporating the defect and placing the expected incisions within the relaxed skin tension lines where possible.    The area thus outlined was incised deep to adipose tissue with a #15 scalpel blade.  The skin margins were undermined to an appropriate distance in all directions utilizing iris scissors.
A-T Advancement Flap Text: The defect edges were debeveled with a #15 scalpel blade.  Given the location of the defect, shape of the defect and the proximity to free margins an A-T advancement flap was deemed most appropriate.  Using a sterile surgical marker, an appropriate advancement flap was drawn incorporating the defect and placing the expected incisions within the relaxed skin tension lines where possible.    The area thus outlined was incised deep to adipose tissue with a #15 scalpel blade.  The skin margins were undermined to an appropriate distance in all directions utilizing iris scissors.
O-T Advancement Flap Text: The defect edges were debeveled with a #15 scalpel blade.  Given the location of the defect, shape of the defect and the proximity to free margins an O-T advancement flap was deemed most appropriate.  Using a sterile surgical marker, an appropriate advancement flap was drawn incorporating the defect and placing the expected incisions within the relaxed skin tension lines where possible.    The area thus outlined was incised deep to adipose tissue with a #15 scalpel blade.  The skin margins were undermined to an appropriate distance in all directions utilizing iris scissors.
O-L Flap Text: The defect edges were debeveled with a #15 scalpel blade.  Given the location of the defect, shape of the defect and the proximity to free margins an O-L flap was deemed most appropriate.  Using a sterile surgical marker, an appropriate advancement flap was drawn incorporating the defect and placing the expected incisions within the relaxed skin tension lines where possible.    The area thus outlined was incised deep to adipose tissue with a #15 scalpel blade.  The skin margins were undermined to an appropriate distance in all directions utilizing iris scissors.
O-Z Flap Text: The defect edges were debeveled with a #15 scalpel blade.  Given the location of the defect, shape of the defect and the proximity to free margins an O-Z flap was deemed most appropriate.  Using a sterile surgical marker, an appropriate transposition flap was drawn incorporating the defect and placing the expected incisions within the relaxed skin tension lines where possible. The area thus outlined was incised deep to adipose tissue with a #15 scalpel blade.  The skin margins were undermined to an appropriate distance in all directions utilizing iris scissors.
Double O-Z Flap Text: The defect edges were debeveled with a #15 scalpel blade.  Given the location of the defect, shape of the defect and the proximity to free margins a Double O-Z flap was deemed most appropriate.  Using a sterile surgical marker, an appropriate transposition flap was drawn incorporating the defect and placing the expected incisions within the relaxed skin tension lines where possible. The area thus outlined was incised deep to adipose tissue with a #15 scalpel blade.  The skin margins were undermined to an appropriate distance in all directions utilizing iris scissors.
V-Y Flap Text: The defect edges were debeveled with a #15 scalpel blade.  Given the location of the defect, shape of the defect and the proximity to free margins a V-Y flap was deemed most appropriate.  Using a sterile surgical marker, an appropriate advancement flap was drawn incorporating the defect and placing the expected incisions within the relaxed skin tension lines where possible.    The area thus outlined was incised deep to adipose tissue with a #15 scalpel blade.  The skin margins were undermined to an appropriate distance in all directions utilizing iris scissors.
Advancement-Rotation Flap Text: The defect edges were debeveled with a #15 scalpel blade.  Given the location of the defect, shape of the defect and the proximity to free margins an advancement-rotation flap was deemed most appropriate.  Using a sterile surgical marker, an appropriate flap was drawn incorporating the defect and placing the expected incisions within the relaxed skin tension lines where possible. The area thus outlined was incised deep to adipose tissue with a #15 scalpel blade.  The skin margins were undermined to an appropriate distance in all directions utilizing iris scissors.
Mercedes Flap Text: The defect edges were debeveled with a #15 scalpel blade.  Given the location of the defect, shape of the defect and the proximity to free margins a Mercedes flap was deemed most appropriate.  Using a sterile surgical marker, an appropriate advancement flap was drawn incorporating the defect and placing the expected incisions within the relaxed skin tension lines where possible. The area thus outlined was incised deep to adipose tissue with a #15 scalpel blade.  The skin margins were undermined to an appropriate distance in all directions utilizing iris scissors.
Modified Advancement Flap Text: The defect edges were debeveled with a #15 scalpel blade.  Given the location of the defect, shape of the defect and the proximity to free margins a modified advancement flap was deemed most appropriate.  Using a sterile surgical marker, an appropriate advancement flap was drawn incorporating the defect and placing the expected incisions within the relaxed skin tension lines where possible.    The area thus outlined was incised deep to adipose tissue with a #15 scalpel blade.  The skin margins were undermined to an appropriate distance in all directions utilizing iris scissors.
Mucosal Advancement Flap Text: Given the location of the defect, shape of the defect and the proximity to free margins a mucosal advancement flap was deemed most appropriate. Incisions were made with a 15 blade scalpel in the appropriate fashion along the cutaneous vermilion border and the mucosal lip. The remaining actinically damaged mucosal tissue was excised.  The mucosal advancement flap was then elevated to the gingival sulcus with care taken to preserve the neurovascular structures and advanced into the primary defect. Care was taken to ensure that precise realignment of the vermilion border was achieved.
Peng Advancement Flap Text: The defect edges were debeveled with a #15 scalpel blade.  Given the location of the defect, shape of the defect and the proximity to free margins a Peng advancement flap was deemed most appropriate.  Using a sterile surgical marker, an appropriate advancement flap was drawn incorporating the defect and placing the expected incisions within the relaxed skin tension lines where possible. The area thus outlined was incised deep to adipose tissue with a #15 scalpel blade.  The skin margins were undermined to an appropriate distance in all directions utilizing iris scissors.
Hatchet Flap Text: The defect edges were debeveled with a #15 scalpel blade.  Given the location of the defect, shape of the defect and the proximity to free margins a hatchet flap was deemed most appropriate.  Using a sterile surgical marker, an appropriate hatchet flap was drawn incorporating the defect and placing the expected incisions within the relaxed skin tension lines where possible.    The area thus outlined was incised deep to adipose tissue with a #15 scalpel blade.  The skin margins were undermined to an appropriate distance in all directions utilizing iris scissors.
Rotation Flap Text: The defect edges were debeveled with a #15 scalpel blade.  Given the location of the defect, shape of the defect and the proximity to free margins a rotation flap was deemed most appropriate.  Using a sterile surgical marker, an appropriate rotation flap was drawn incorporating the defect and placing the expected incisions within the relaxed skin tension lines where possible.    The area thus outlined was incised deep to adipose tissue with a #15 scalpel blade.  The skin margins were undermined to an appropriate distance in all directions utilizing iris scissors.
Spiral Flap Text: The defect edges were debeveled with a #15 scalpel blade.  Given the location of the defect, shape of the defect and the proximity to free margins a spiral flap was deemed most appropriate.  Using a sterile surgical marker, an appropriate rotation flap was drawn incorporating the defect and placing the expected incisions within the relaxed skin tension lines where possible. The area thus outlined was incised deep to adipose tissue with a #15 scalpel blade.  The skin margins were undermined to an appropriate distance in all directions utilizing iris scissors.
Staged Advancement Flap Text: The defect edges were debeveled with a #15 scalpel blade.  Given the location of the defect, shape of the defect and the proximity to free margins a staged advancement flap was deemed most appropriate.  Using a sterile surgical marker, an appropriate advancement flap was drawn incorporating the defect and placing the expected incisions within the relaxed skin tension lines where possible. The area thus outlined was incised deep to adipose tissue with a #15 scalpel blade.  The skin margins were undermined to an appropriate distance in all directions utilizing iris scissors.
Star Wedge Flap Text: The defect edges were debeveled with a #15 scalpel blade.  Given the location of the defect, shape of the defect and the proximity to free margins a star wedge flap was deemed most appropriate.  Using a sterile surgical marker, an appropriate rotation flap was drawn incorporating the defect and placing the expected incisions within the relaxed skin tension lines where possible. The area thus outlined was incised deep to adipose tissue with a #15 scalpel blade.  The skin margins were undermined to an appropriate distance in all directions utilizing iris scissors.
Transposition Flap Text: The defect edges were debeveled with a #15 scalpel blade.  Given the location of the defect and the proximity to free margins a transposition flap was deemed most appropriate.  Using a sterile surgical marker, an appropriate transposition flap was drawn incorporating the defect.    The area thus outlined was incised deep to adipose tissue with a #15 scalpel blade.  The skin margins were undermined to an appropriate distance in all directions utilizing iris scissors.
Muscle Hinge Flap Text: The defect edges were debeveled with a #15 scalpel blade.  Given the size, depth and location of the defect and the proximity to free margins a muscle hinge flap was deemed most appropriate.  Using a sterile surgical marker, an appropriate hinge flap was drawn incorporating the defect. The area thus outlined was incised with a #15 scalpel blade.  The skin margins were undermined to an appropriate distance in all directions utilizing iris scissors.
Mustarde Flap Text: The defect edges were debeveled with a #15 scalpel blade.  Given the size, depth and location of the defect and the proximity to free margins a Mustarde flap was deemed most appropriate.  Using a sterile surgical marker, an appropriate flap was drawn incorporating the defect. The area thus outlined was incised with a #15 scalpel blade.  The skin margins were undermined to an appropriate distance in all directions utilizing iris scissors.
Nasal Turnover Hinge Flap Text: The defect edges were debeveled with a #15 scalpel blade.  Given the size, depth, location of the defect and the defect being full thickness a nasal turnover hinge flap was deemed most appropriate.  Using a sterile surgical marker, an appropriate hinge flap was drawn incorporating the defect. The area thus outlined was incised with a #15 scalpel blade. The flap was designed to recreate the nasal mucosal lining and the alar rim. The skin margins were undermined to an appropriate distance in all directions utilizing iris scissors.
Nasalis-Muscle-Based Myocutaneous Island Pedicle Flap Text: Using a #15 blade, an incision was made around the donor flap to the level of the nasalis muscle. Wide lateral undermining was then performed in both the subcutaneous plane above the nasalis muscle, and in a submuscular plane just above periosteum. This allowed the formation of a free nasalis muscle axial pedicle (based on the angular artery) which was still attached to the actual cutaneous flap, increasing its mobility and vascular viability. Hemostasis was obtained with pinpoint electrocoagulation. The flap was mobilized into position and the pivotal anchor points positioned and stabilized with buried interrupted sutures. Subcutaneous and dermal tissues were closed in a multilayered fashion with sutures. Tissue redundancies were excised, and the epidermal edges were apposed without significant tension and sutured with sutures.
Orbicularis Oris Muscle Flap Text: The defect edges were debeveled with a #15 scalpel blade.  Given that the defect affected the competency of the oral sphincter an obicularis oris muscle flap was deemed most appropriate to restore this competency and normal muscle function.  Using a sterile surgical marker, an appropriate flap was drawn incorporating the defect. The area thus outlined was incised with a #15 scalpel blade.
Melolabial Transposition Flap Text: The defect edges were debeveled with a #15 scalpel blade.  Given the location of the defect and the proximity to free margins a melolabial flap was deemed most appropriate.  Using a sterile surgical marker, an appropriate melolabial transposition flap was drawn incorporating the defect.    The area thus outlined was incised deep to adipose tissue with a #15 scalpel blade.  The skin margins were undermined to an appropriate distance in all directions utilizing iris scissors.
Rhombic Flap Text: The defect edges were debeveled with a #15 scalpel blade.  Given the location of the defect and the proximity to free margins a rhombic flap was deemed most appropriate.  Using a sterile surgical marker, an appropriate rhombic flap was drawn incorporating the defect.    The area thus outlined was incised deep to adipose tissue with a #15 scalpel blade.  The skin margins were undermined to an appropriate distance in all directions utilizing iris scissors.
Rhomboid Transposition Flap Text: The defect edges were debeveled with a #15 scalpel blade.  Given the location of the defect and the proximity to free margins a rhomboid transposition flap was deemed most appropriate.  Using a sterile surgical marker, an appropriate rhomboid flap was drawn incorporating the defect.    The area thus outlined was incised deep to adipose tissue with a #15 scalpel blade.  The skin margins were undermined to an appropriate distance in all directions utilizing iris scissors.
Bi-Rhombic Flap Text: The defect edges were debeveled with a #15 scalpel blade.  Given the location of the defect and the proximity to free margins a bi-rhombic flap was deemed most appropriate.  Using a sterile surgical marker, an appropriate rhombic flap was drawn incorporating the defect. The area thus outlined was incised deep to adipose tissue with a #15 scalpel blade.  The skin margins were undermined to an appropriate distance in all directions utilizing iris scissors.
Helical Rim Advancement Flap Text: The defect edges were debeveled with a #15 blade scalpel.  Given the location of the defect and the proximity to free margins (helical rim) a double helical rim advancement flap was deemed most appropriate.  Using a sterile surgical marker, the appropriate advancement flaps were drawn incorporating the defect and placing the expected incisions between the helical rim and antihelix where possible.  The area thus outlined was incised through and through with a #15 scalpel blade.  With a skin hook and iris scissors, the flaps were gently and sharply undermined and freed up.
Bilateral Helical Rim Advancement Flap Text: The defect edges were debeveled with a #15 blade scalpel.  Given the location of the defect and the proximity to free margins (helical rim) a bilateral helical rim advancement flap was deemed most appropriate.  Using a sterile surgical marker, the appropriate advancement flaps were drawn incorporating the defect and placing the expected incisions between the helical rim and antihelix where possible.  The area thus outlined was incised through and through with a #15 scalpel blade.  With a skin hook and iris scissors, the flaps were gently and sharply undermined and freed up.
Ear Star Wedge Flap Text: The defect edges were debeveled with a #15 blade scalpel.  Given the location of the defect and the proximity to free margins (helical rim) an ear star wedge flap was deemed most appropriate.  Using a sterile surgical marker, the appropriate flap was drawn incorporating the defect and placing the expected incisions between the helical rim and antihelix where possible.  The area thus outlined was incised through and through with a #15 scalpel blade.
Banner Transposition Flap Text: The defect edges were debeveled with a #15 scalpel blade.  Given the location of the defect and the proximity to free margins a Banner transposition flap was deemed most appropriate.  Using a sterile surgical marker, an appropriate flap drawn around the defect. The area thus outlined was incised deep to adipose tissue with a #15 scalpel blade.  The skin margins were undermined to an appropriate distance in all directions utilizing iris scissors.
Bilobed Flap Text: The defect edges were debeveled with a #15 scalpel blade.  Given the location of the defect and the proximity to free margins a bilobe flap was deemed most appropriate.  Using a sterile surgical marker, an appropriate bilobe flap drawn around the defect.    The area thus outlined was incised deep to adipose tissue with a #15 scalpel blade.  The skin margins were undermined to an appropriate distance in all directions utilizing iris scissors.
Bilobed Transposition Flap Text: The defect edges were debeveled with a #15 scalpel blade.  Given the location of the defect and the proximity to free margins a bilobed transposition flap was deemed most appropriate.  Using a sterile surgical marker, an appropriate bilobe flap drawn around the defect.    The area thus outlined was incised deep to adipose tissue with a #15 scalpel blade.  The skin margins were undermined to an appropriate distance in all directions utilizing iris scissors.
Trilobed Flap Text: The defect edges were debeveled with a #15 scalpel blade.  Given the location of the defect and the proximity to free margins a trilobed flap was deemed most appropriate.  Using a sterile surgical marker, an appropriate trilobed flap drawn around the defect.    The area thus outlined was incised deep to adipose tissue with a #15 scalpel blade.  The skin margins were undermined to an appropriate distance in all directions utilizing iris scissors.
Dorsal Nasal Flap Text: The defect edges were debeveled with a #15 scalpel blade.  Given the location of the defect and the proximity to free margins a dorsal nasal flap was deemed most appropriate.  Using a sterile surgical marker, an appropriate dorsal nasal flap was drawn around the defect.    The area thus outlined was incised deep to adipose tissue with a #15 scalpel blade.  The skin margins were undermined to an appropriate distance in all directions utilizing iris scissors.
Island Pedicle Flap Text: The defect edges were debeveled with a #15 scalpel blade.  Given the location of the defect, shape of the defect and the proximity to free margins an island pedicle advancement flap was deemed most appropriate.  Using a sterile surgical marker, an appropriate advancement flap was drawn incorporating the defect, outlining the appropriate donor tissue and placing the expected incisions within the relaxed skin tension lines where possible.    The area thus outlined was incised deep to adipose tissue with a #15 scalpel blade.  The skin margins were undermined to an appropriate distance in all directions around the primary defect and laterally outward around the island pedicle utilizing iris scissors.  There was minimal undermining beneath the pedicle flap.
Island Pedicle Flap With Canthal Suspension Text: The defect edges were debeveled with a #15 scalpel blade.  Given the location of the defect, shape of the defect and the proximity to free margins an island pedicle advancement flap was deemed most appropriate.  Using a sterile surgical marker, an appropriate advancement flap was drawn incorporating the defect, outlining the appropriate donor tissue and placing the expected incisions within the relaxed skin tension lines where possible. The area thus outlined was incised deep to adipose tissue with a #15 scalpel blade.  The skin margins were undermined to an appropriate distance in all directions around the primary defect and laterally outward around the island pedicle utilizing iris scissors.  There was minimal undermining beneath the pedicle flap. A suspension suture was placed in the canthal tendon to prevent tension and prevent ectropion.
Alar Island Pedicle Flap Text: The defect edges were debeveled with a #15 scalpel blade.  Given the location of the defect, shape of the defect and the proximity to the alar rim an island pedicle advancement flap was deemed most appropriate.  Using a sterile surgical marker, an appropriate advancement flap was drawn incorporating the defect, outlining the appropriate donor tissue and placing the expected incisions within the nasal ala running parallel to the alar rim. The area thus outlined was incised with a #15 scalpel blade.  The skin margins were undermined minimally to an appropriate distance in all directions around the primary defect and laterally outward around the island pedicle utilizing iris scissors.  There was minimal undermining beneath the pedicle flap.
Double Island Pedicle Flap Text: The defect edges were debeveled with a #15 scalpel blade.  Given the location of the defect, shape of the defect and the proximity to free margins a double island pedicle advancement flap was deemed most appropriate.  Using a sterile surgical marker, an appropriate advancement flap was drawn incorporating the defect, outlining the appropriate donor tissue and placing the expected incisions within the relaxed skin tension lines where possible.    The area thus outlined was incised deep to adipose tissue with a #15 scalpel blade.  The skin margins were undermined to an appropriate distance in all directions around the primary defect and laterally outward around the island pedicle utilizing iris scissors.  There was minimal undermining beneath the pedicle flap.
Island Pedicle Flap-Requiring Vessel Identification Text: The defect edges were debeveled with a #15 scalpel blade.  Given the location of the defect, shape of the defect and the proximity to free margins an island pedicle advancement flap was deemed most appropriate.  Using a sterile surgical marker, an appropriate advancement flap was drawn, based on the axial vessel mentioned above, incorporating the defect, outlining the appropriate donor tissue and placing the expected incisions within the relaxed skin tension lines where possible.    The area thus outlined was incised deep to adipose tissue with a #15 scalpel blade.  The skin margins were undermined to an appropriate distance in all directions around the primary defect and laterally outward around the island pedicle utilizing iris scissors.  There was minimal undermining beneath the pedicle flap.
Keystone Flap Text: The defect edges were debeveled with a #15 scalpel blade.  Given the location of the defect, shape of the defect a keystone flap was deemed most appropriate.  Using a sterile surgical marker, an appropriate keystone flap was drawn incorporating the defect, outlining the appropriate donor tissue and placing the expected incisions within the relaxed skin tension lines where possible. The area thus outlined was incised deep to adipose tissue with a #15 scalpel blade.  The skin margins were undermined to an appropriate distance in all directions around the primary defect and laterally outward around the flap utilizing iris scissors.
O-T Plasty Text: The defect edges were debeveled with a #15 scalpel blade.  Given the location of the defect, shape of the defect and the proximity to free margins an O-T plasty was deemed most appropriate.  Using a sterile surgical marker, an appropriate O-T plasty was drawn incorporating the defect and placing the expected incisions within the relaxed skin tension lines where possible.    The area thus outlined was incised deep to adipose tissue with a #15 scalpel blade.  The skin margins were undermined to an appropriate distance in all directions utilizing iris scissors.
O-Z Plasty Text: The defect edges were debeveled with a #15 scalpel blade.  Given the location of the defect, shape of the defect and the proximity to free margins an O-Z plasty (double transposition flap) was deemed most appropriate.  Using a sterile surgical marker, the appropriate transposition flaps were drawn incorporating the defect and placing the expected incisions within the relaxed skin tension lines where possible.    The area thus outlined was incised deep to adipose tissue with a #15 scalpel blade.  The skin margins were undermined to an appropriate distance in all directions utilizing iris scissors.  Hemostasis was achieved with electrocautery.  The flaps were then transposed into place, one clockwise and the other counterclockwise, and anchored with interrupted buried subcutaneous sutures.
Double O-Z Plasty Text: The defect edges were debeveled with a #15 scalpel blade.  Given the location of the defect, shape of the defect and the proximity to free margins a Double O-Z plasty (double transposition flap) was deemed most appropriate.  Using a sterile surgical marker, the appropriate transposition flaps were drawn incorporating the defect and placing the expected incisions within the relaxed skin tension lines where possible. The area thus outlined was incised deep to adipose tissue with a #15 scalpel blade.  The skin margins were undermined to an appropriate distance in all directions utilizing iris scissors.  Hemostasis was achieved with electrocautery.  The flaps were then transposed into place, one clockwise and the other counterclockwise, and anchored with interrupted buried subcutaneous sutures.
V-Y Plasty Text: The defect edges were debeveled with a #15 scalpel blade.  Given the location of the defect, shape of the defect and the proximity to free margins an V-Y advancement flap was deemed most appropriate.  Using a sterile surgical marker, an appropriate advancement flap was drawn incorporating the defect and placing the expected incisions within the relaxed skin tension lines where possible.    The area thus outlined was incised deep to adipose tissue with a #15 scalpel blade.  The skin margins were undermined to an appropriate distance in all directions utilizing iris scissors.
H Plasty Text: Given the location of the defect, shape of the defect and the proximity to free margins a H-plasty was deemed most appropriate for repair.  Using a sterile surgical marker, the appropriate advancement arms of the H-plasty were drawn incorporating the defect and placing the expected incisions within the relaxed skin tension lines where possible. The area thus outlined was incised deep to adipose tissue with a #15 scalpel blade. The skin margins were undermined to an appropriate distance in all directions utilizing iris scissors.  The opposing advancement arms were then advanced into place in opposite direction and anchored with interrupted buried subcutaneous sutures.
W Plasty Text: The lesion was extirpated to the level of the fat with a #15 scalpel blade.  Given the location of the defect, shape of the defect and the proximity to free margins a W-plasty was deemed most appropriate for repair.  Using a sterile surgical marker, the appropriate transposition arms of the W-plasty were drawn incorporating the defect and placing the expected incisions within the relaxed skin tension lines where possible.    The area thus outlined was incised deep to adipose tissue with a #15 scalpel blade.  The skin margins were undermined to an appropriate distance in all directions utilizing iris scissors.  The opposing transposition arms were then transposed into place in opposite direction and anchored with interrupted buried subcutaneous sutures.
Z Plasty Text: The lesion was extirpated to the level of the fat with a #15 scalpel blade.  Given the location of the defect, shape of the defect and the proximity to free margins a Z-plasty was deemed most appropriate for repair.  Using a sterile surgical marker, the appropriate transposition arms of the Z-plasty were drawn incorporating the defect and placing the expected incisions within the relaxed skin tension lines where possible.    The area thus outlined was incised deep to adipose tissue with a #15 scalpel blade.  The skin margins were undermined to an appropriate distance in all directions utilizing iris scissors.  The opposing transposition arms were then transposed into place in opposite direction and anchored with interrupted buried subcutaneous sutures.
Zygomaticofacial Flap Text: Given the location of the defect, shape of the defect and the proximity to free margins a zygomaticofacial flap was deemed most appropriate for repair.  Using a sterile surgical marker, the appropriate flap was drawn incorporating the defect and placing the expected incisions within the relaxed skin tension lines where possible. The area thus outlined was incised deep to adipose tissue with a #15 scalpel blade with preservation of a vascular pedicle.  The skin margins were undermined to an appropriate distance in all directions utilizing iris scissors.  The flap was then placed into the defect and anchored with interrupted buried subcutaneous sutures.
Cheek Interpolation Flap Text: A decision was made to reconstruct the defect utilizing an interpolation axial flap and a staged reconstruction.  A telfa template was made of the defect.  This telfa template was then used to outline the Cheek Interpolation flap.  The donor area for the pedicle flap was then injected with anesthesia.  The flap was excised through the skin and subcutaneous tissue down to the layer of the underlying musculature.  The interpolation flap was carefully excised within this deep plane to maintain its blood supply.  The edges of the donor site were undermined.   The donor site was closed in a primary fashion.  The pedicle was then rotated into position and sutured.  Once the tube was sutured into place, adequate blood supply was confirmed with blanching and refill.  The pedicle was then wrapped with xeroform gauze and dressed appropriately with a telfa and gauze bandage to ensure continued blood supply and protect the attached pedicle.
Cheek-To-Nose Interpolation Flap Text: A decision was made to reconstruct the defect utilizing an interpolation axial flap and a staged reconstruction.  A telfa template was made of the defect.  This telfa template was then used to outline the Cheek-To-Nose Interpolation flap.  The donor area for the pedicle flap was then injected with anesthesia.  The flap was excised through the skin and subcutaneous tissue down to the layer of the underlying musculature.  The interpolation flap was carefully excised within this deep plane to maintain its blood supply.  The edges of the donor site were undermined.   The donor site was closed in a primary fashion.  The pedicle was then rotated into position and sutured.  Once the tube was sutured into place, adequate blood supply was confirmed with blanching and refill.  The pedicle was then wrapped with xeroform gauze and dressed appropriately with a telfa and gauze bandage to ensure continued blood supply and protect the attached pedicle.
Interpolation Flap Text: A decision was made to reconstruct the defect utilizing an interpolation axial flap and a staged reconstruction.  A telfa template was made of the defect.  This telfa template was then used to outline the interpolation flap.  The donor area for the pedicle flap was then injected with anesthesia.  The flap was excised through the skin and subcutaneous tissue down to the layer of the underlying musculature.  The interpolation flap was carefully excised within this deep plane to maintain its blood supply.  The edges of the donor site were undermined.   The donor site was closed in a primary fashion.  The pedicle was then rotated into position and sutured.  Once the tube was sutured into place, adequate blood supply was confirmed with blanching and refill.  The pedicle was then wrapped with xeroform gauze and dressed appropriately with a telfa and gauze bandage to ensure continued blood supply and protect the attached pedicle.
Melolabial Interpolation Flap Text: A decision was made to reconstruct the defect utilizing an interpolation axial flap and a staged reconstruction.  A telfa template was made of the defect.  This telfa template was then used to outline the melolabial interpolation flap.  The donor area for the pedicle flap was then injected with anesthesia.  The flap was excised through the skin and subcutaneous tissue down to the layer of the underlying musculature.  The pedicle flap was carefully excised within this deep plane to maintain its blood supply.  The edges of the donor site were undermined.   The donor site was closed in a primary fashion.  The pedicle was then rotated into position and sutured.  Once the tube was sutured into place, adequate blood supply was confirmed with blanching and refill.  The pedicle was then wrapped with xeroform gauze and dressed appropriately with a telfa and gauze bandage to ensure continued blood supply and protect the attached pedicle.
Mastoid Interpolation Flap Text: A decision was made to reconstruct the defect utilizing an interpolation axial flap and a staged reconstruction.  A telfa template was made of the defect.  This telfa template was then used to outline the mastoid interpolation flap.  The donor area for the pedicle flap was then injected with anesthesia.  The flap was excised through the skin and subcutaneous tissue down to the layer of the underlying musculature.  The pedicle flap was carefully excised within this deep plane to maintain its blood supply.  The edges of the donor site were undermined.   The donor site was closed in a primary fashion.  The pedicle was then rotated into position and sutured.  Once the tube was sutured into place, adequate blood supply was confirmed with blanching and refill.  The pedicle was then wrapped with xeroform gauze and dressed appropriately with a telfa and gauze bandage to ensure continued blood supply and protect the attached pedicle.
Posterior Auricular Interpolation Flap Text: A decision was made to reconstruct the defect utilizing an interpolation axial flap and a staged reconstruction.  A telfa template was made of the defect.  This telfa template was then used to outline the posterior auricular interpolation flap.  The donor area for the pedicle flap was then injected with anesthesia.  The flap was excised through the skin and subcutaneous tissue down to the layer of the underlying musculature.  The pedicle flap was carefully excised within this deep plane to maintain its blood supply.  The edges of the donor site were undermined.   The donor site was closed in a primary fashion.  The pedicle was then rotated into position and sutured.  Once the tube was sutured into place, adequate blood supply was confirmed with blanching and refill.  The pedicle was then wrapped with xeroform gauze and dressed appropriately with a telfa and gauze bandage to ensure continued blood supply and protect the attached pedicle.
Paramedian Forehead Flap Text: A decision was made to reconstruct the defect utilizing an interpolation axial flap and a staged reconstruction.  A telfa template was made of the defect.  This telfa template was then used to outline the paramedian forehead pedicle flap.  The donor area for the pedicle flap was then injected with anesthesia.  The flap was excised through the skin and subcutaneous tissue down to the layer of the underlying musculature.  The pedicle flap was carefully excised within this deep plane to maintain its blood supply.  The edges of the donor site were undermined.   The donor site was closed in a primary fashion.  The pedicle was then rotated into position and sutured.  Once the tube was sutured into place, adequate blood supply was confirmed with blanching and refill.  The pedicle was then wrapped with xeroform gauze and dressed appropriately with a telfa and gauze bandage to ensure continued blood supply and protect the attached pedicle.
Abbe Flap (Upper To Lower Lip) Text: The defect of the lower lip was assessed and measured.  Given the location and size of the defect, an Abbe flap was deemed most appropriate.  Using a sterile surgical marker, an appropriate Abbe flap was measured and drawn on the upper lip. Local anesthesia was then infiltrated.  A scalpel was then used to incise the upper lip through and through the skin, vermilion, muscle and mucosa, leaving the flap pedicled on the opposite side.  The flap was then rotated and transferred to the lower lip defect.  The flap was then sutured into place with a three layer technique, closing the orbicularis oris muscle layer with subcutaneous buried sutures, followed by a mucosal layer and an epidermal layer.
Abbe Flap (Lower To Upper Lip) Text: The defect of the upper lip was assessed and measured.  Given the location and size of the defect, an Abbe flap was deemed most appropriate.  Using a sterile surgical marker, an appropriate Abbe flap was measured and drawn on the lower lip. Local anesthesia was then infiltrated. A scalpel was then used to incise the upper lip through and through the skin, vermilion, muscle and mucosa, leaving the flap pedicled on the opposite side.  The flap was then rotated and transferred to the lower lip defect.  The flap was then sutured into place with a three layer technique, closing the orbicularis oris muscle layer with subcutaneous buried sutures, followed by a mucosal layer and an epidermal layer.
Estlander Flap (Upper To Lower Lip) Text: The defect of the lower lip was assessed and measured.  Given the location and size of the defect, an Estlander flap was deemed most appropriate.  Using a sterile surgical marker, an appropriate Estlander flap was measured and drawn on the upper lip. Local anesthesia was then infiltrated. A scalpel was then used to incise the lateral aspect of the flap, through skin, muscle and mucosa, leaving the flap pedicled medially.  The flap was then rotated and positioned to fill the lower lip defect.  The flap was then sutured into place with a three layer technique, closing the orbicularis oris muscle layer with subcutaneous buried sutures, followed by a mucosal layer and an epidermal layer.
Lip Wedge Excision Repair Text: Given the location of the defect and the proximity to free margins a full thickness wedge repair was deemed most appropriate.  Using a sterile surgical marker, the appropriate repair was drawn incorporating the defect and placing the expected incisions perpendicular to the vermilion border.  The vermilion border was also meticulously outlined to ensure appropriate reapproximation during the repair.  The area thus outlined was incised through and through with a #15 scalpel blade.  The muscularis and dermis were reaproximated with deep sutures following hemostasis. Care was taken to realign the vermilion border before proceeding with the superficial closure.  Once the vermilion was realigned the superfical and mucosal closure was finished.
Ftsg Text: The defect edges were debeveled with a #15 scalpel blade.  Given the location of the defect, shape of the defect and the proximity to free margins a full thickness skin graft was deemed most appropriate.  Using a sterile surgical marker, the primary defect shape was transferred to the donor site. The area thus outlined was incised deep to adipose tissue with a #15 scalpel blade.  The harvested graft was then trimmed of adipose tissue until only dermis and epidermis was left.  The skin margins of the secondary defect were undermined to an appropriate distance in all directions utilizing iris scissors.  The secondary defect was closed with interrupted buried subcutaneous sutures.  The skin edges were then re-apposed with running  sutures.  The skin graft was then placed in the primary defect and oriented appropriately.
Split-Thickness Skin Graft Text: The defect edges were debeveled with a #15 scalpel blade.  Given the location of the defect, shape of the defect and the proximity to free margins a split thickness skin graft was deemed most appropriate.  Using a sterile surgical marker, the primary defect shape was transferred to the donor site. The split thickness graft was then harvested.  The skin graft was then placed in the primary defect and oriented appropriately.
Burow's Graft Text: The defect edges were debeveled with a #15 scalpel blade.  Given the location of the defect, shape of the defect, the proximity to free margins and the presence of a standing cone deformity a Burow's skin graft was deemed most appropriate. The standing cone was removed and this tissue was then trimmed to the shape of the primary defect. The adipose tissue was also removed until only dermis and epidermis were left.  The skin margins of the secondary defect were undermined to an appropriate distance in all directions utilizing iris scissors.  The secondary defect was closed with interrupted buried subcutaneous sutures.  The skin edges were then re-apposed with running  sutures.  The skin graft was then placed in the primary defect and oriented appropriately.
Cartilage Graft Text: The defect edges were debeveled with a #15 scalpel blade.  Given the location of the defect, shape of the defect, the fact the defect involved a full thickness cartilage defect a cartilage graft was deemed most appropriate.  An appropriate donor site was identified, cleansed, and anesthetized. The cartilage graft was then harvested and transferred to the recipient site, oriented appropriately and then sutured into place.  The secondary defect was then repaired using a primary closure.
Composite Graft Text: The defect edges were debeveled with a #15 scalpel blade.  Given the location of the defect, shape of the defect, the proximity to free margins and the fact the defect was full thickness a composite graft was deemed most appropriate.  The defect was outline and then transferred to the donor site.  A full thickness graft was then excised from the donor site. The graft was then placed in the primary defect, oriented appropriately and then sutured into place.  The secondary defect was then repaired using a primary closure.
Epidermal Autograft Text: The defect edges were debeveled with a #15 scalpel blade.  Given the location of the defect, shape of the defect and the proximity to free margins an epidermal autograft was deemed most appropriate.  Using a sterile surgical marker, the primary defect shape was transferred to the donor site. The epidermal graft was then harvested.  The skin graft was then placed in the primary defect and oriented appropriately.
Dermal Autograft Text: The defect edges were debeveled with a #15 scalpel blade.  Given the location of the defect, shape of the defect and the proximity to free margins a dermal autograft was deemed most appropriate.  Using a sterile surgical marker, the primary defect shape was transferred to the donor site. The area thus outlined was incised deep to adipose tissue with a #15 scalpel blade.  The harvested graft was then trimmed of adipose and epidermal tissue until only dermis was left.  The skin graft was then placed in the primary defect and oriented appropriately.
Skin Substitute Text: The defect edges were debeveled with a #15 scalpel blade.  Given the location of the defect, shape of the defect and the proximity to free margins a skin substitute graft was deemed most appropriate.  The graft material was trimmed to fit the size of the defect. The graft was then placed in the primary defect and oriented appropriately.
Tissue Cultured Epidermal Autograft Text: The defect edges were debeveled with a #15 scalpel blade.  Given the location of the defect, shape of the defect and the proximity to free margins a tissue cultured epidermal autograft was deemed most appropriate.  The graft was then trimmed to fit the size of the defect.  The graft was then placed in the primary defect and oriented appropriately.
Xenograft Text: The defect edges were debeveled with a #15 scalpel blade.  Given the location of the defect, shape of the defect and the proximity to free margins a xenograft was deemed most appropriate.  The graft was then trimmed to fit the size of the defect.  The graft was then placed in the primary defect and oriented appropriately.
Purse String (Intermediate) Text: Given the location of the defect and the characteristics of the surrounding skin a purse string intermediate closure was deemed most appropriate.  Undermining was performed circumfirentially around the surgical defect.  A purse string suture was then placed and tightened.
Purse String (Simple) Text: Given the location of the defect and the characteristics of the surrounding skin a purse string simple closure was deemed most appropriate.  Undermining was performed circumferentially around the surgical defect.  A purse string suture was then placed and tightened.
Partial Purse String (Intermediate) Text: Given the location of the defect and the characteristics of the surrounding skin an intermediate purse string closure was deemed most appropriate.  Undermining was performed circumferentially around the surgical defect.  A purse string suture was then placed and tightened. Wound tension of the circular defect prevented complete closure of the wound.
Partial Purse String (Simple) Text: Given the location of the defect and the characteristics of the surrounding skin a simple purse string closure was deemed most appropriate.  Undermining was performed circumferentially around the surgical defect.  A purse string suture was then placed and tightened. Wound tension of the circular defect prevented complete closure of the wound.
Complex Repair And Single Advancement Flap Text: The defect edges were debeveled with a #15 scalpel blade.  The primary defect was closed partially with a complex linear closure.  Given the location of the remaining defect, shape of the defect and the proximity to free margins a single advancement flap was deemed most appropriate for complete closure of the defect.  Using a sterile surgical marker, an appropriate advancement flap was drawn incorporating the defect and placing the expected incisions within the relaxed skin tension lines where possible.    The area thus outlined was incised deep to adipose tissue with a #15 scalpel blade.  The skin margins were undermined to an appropriate distance in all directions utilizing iris scissors.
Complex Repair And Double Advancement Flap Text: The defect edges were debeveled with a #15 scalpel blade.  The primary defect was closed partially with a complex linear closure.  Given the location of the remaining defect, shape of the defect and the proximity to free margins a double advancement flap was deemed most appropriate for complete closure of the defect.  Using a sterile surgical marker, an appropriate advancement flap was drawn incorporating the defect and placing the expected incisions within the relaxed skin tension lines where possible.    The area thus outlined was incised deep to adipose tissue with a #15 scalpel blade.  The skin margins were undermined to an appropriate distance in all directions utilizing iris scissors.
Complex Repair And Modified Advancement Flap Text: The defect edges were debeveled with a #15 scalpel blade.  The primary defect was closed partially with a complex linear closure.  Given the location of the remaining defect, shape of the defect and the proximity to free margins a modified advancement flap was deemed most appropriate for complete closure of the defect.  Using a sterile surgical marker, an appropriate advancement flap was drawn incorporating the defect and placing the expected incisions within the relaxed skin tension lines where possible.    The area thus outlined was incised deep to adipose tissue with a #15 scalpel blade.  The skin margins were undermined to an appropriate distance in all directions utilizing iris scissors.
Complex Repair And A-T Advancement Flap Text: The defect edges were debeveled with a #15 scalpel blade.  The primary defect was closed partially with a complex linear closure.  Given the location of the remaining defect, shape of the defect and the proximity to free margins an A-T advancement flap was deemed most appropriate for complete closure of the defect.  Using a sterile surgical marker, an appropriate advancement flap was drawn incorporating the defect and placing the expected incisions within the relaxed skin tension lines where possible.    The area thus outlined was incised deep to adipose tissue with a #15 scalpel blade.  The skin margins were undermined to an appropriate distance in all directions utilizing iris scissors.
Complex Repair And O-T Advancement Flap Text: The defect edges were debeveled with a #15 scalpel blade.  The primary defect was closed partially with a complex linear closure.  Given the location of the remaining defect, shape of the defect and the proximity to free margins an O-T advancement flap was deemed most appropriate for complete closure of the defect.  Using a sterile surgical marker, an appropriate advancement flap was drawn incorporating the defect and placing the expected incisions within the relaxed skin tension lines where possible.    The area thus outlined was incised deep to adipose tissue with a #15 scalpel blade.  The skin margins were undermined to an appropriate distance in all directions utilizing iris scissors.
Complex Repair And O-L Flap Text: The defect edges were debeveled with a #15 scalpel blade.  The primary defect was closed partially with a complex linear closure.  Given the location of the remaining defect, shape of the defect and the proximity to free margins an O-L flap was deemed most appropriate for complete closure of the defect.  Using a sterile surgical marker, an appropriate flap was drawn incorporating the defect and placing the expected incisions within the relaxed skin tension lines where possible.    The area thus outlined was incised deep to adipose tissue with a #15 scalpel blade.  The skin margins were undermined to an appropriate distance in all directions utilizing iris scissors.
Complex Repair And Bilobe Flap Text: The defect edges were debeveled with a #15 scalpel blade.  The primary defect was closed partially with a complex linear closure.  Given the location of the remaining defect, shape of the defect and the proximity to free margins a bilobe flap was deemed most appropriate for complete closure of the defect.  Using a sterile surgical marker, an appropriate advancement flap was drawn incorporating the defect and placing the expected incisions within the relaxed skin tension lines where possible.    The area thus outlined was incised deep to adipose tissue with a #15 scalpel blade.  The skin margins were undermined to an appropriate distance in all directions utilizing iris scissors.
Complex Repair And Melolabial Flap Text: The defect edges were debeveled with a #15 scalpel blade.  The primary defect was closed partially with a complex linear closure.  Given the location of the remaining defect, shape of the defect and the proximity to free margins a melolabial flap was deemed most appropriate for complete closure of the defect.  Using a sterile surgical marker, an appropriate advancement flap was drawn incorporating the defect and placing the expected incisions within the relaxed skin tension lines where possible.    The area thus outlined was incised deep to adipose tissue with a #15 scalpel blade.  The skin margins were undermined to an appropriate distance in all directions utilizing iris scissors.
Complex Repair And Rotation Flap Text: The defect edges were debeveled with a #15 scalpel blade.  The primary defect was closed partially with a complex linear closure.  Given the location of the remaining defect, shape of the defect and the proximity to free margins a rotation flap was deemed most appropriate for complete closure of the defect.  Using a sterile surgical marker, an appropriate advancement flap was drawn incorporating the defect and placing the expected incisions within the relaxed skin tension lines where possible.    The area thus outlined was incised deep to adipose tissue with a #15 scalpel blade.  The skin margins were undermined to an appropriate distance in all directions utilizing iris scissors.
Complex Repair And Rhombic Flap Text: The defect edges were debeveled with a #15 scalpel blade.  The primary defect was closed partially with a complex linear closure.  Given the location of the remaining defect, shape of the defect and the proximity to free margins a rhombic flap was deemed most appropriate for complete closure of the defect.  Using a sterile surgical marker, an appropriate advancement flap was drawn incorporating the defect and placing the expected incisions within the relaxed skin tension lines where possible.    The area thus outlined was incised deep to adipose tissue with a #15 scalpel blade.  The skin margins were undermined to an appropriate distance in all directions utilizing iris scissors.
Complex Repair And Transposition Flap Text: The defect edges were debeveled with a #15 scalpel blade.  The primary defect was closed partially with a complex linear closure.  Given the location of the remaining defect, shape of the defect and the proximity to free margins a transposition flap was deemed most appropriate for complete closure of the defect.  Using a sterile surgical marker, an appropriate advancement flap was drawn incorporating the defect and placing the expected incisions within the relaxed skin tension lines where possible.    The area thus outlined was incised deep to adipose tissue with a #15 scalpel blade.  The skin margins were undermined to an appropriate distance in all directions utilizing iris scissors.
Complex Repair And V-Y Plasty Text: The defect edges were debeveled with a #15 scalpel blade.  The primary defect was closed partially with a complex linear closure.  Given the location of the remaining defect, shape of the defect and the proximity to free margins a V-Y plasty was deemed most appropriate for complete closure of the defect.  Using a sterile surgical marker, an appropriate advancement flap was drawn incorporating the defect and placing the expected incisions within the relaxed skin tension lines where possible.    The area thus outlined was incised deep to adipose tissue with a #15 scalpel blade.  The skin margins were undermined to an appropriate distance in all directions utilizing iris scissors.
Complex Repair And M Plasty Text: The defect edges were debeveled with a #15 scalpel blade.  The primary defect was closed partially with a complex linear closure.  Given the location of the remaining defect, shape of the defect and the proximity to free margins an M plasty was deemed most appropriate for complete closure of the defect.  Using a sterile surgical marker, an appropriate advancement flap was drawn incorporating the defect and placing the expected incisions within the relaxed skin tension lines where possible.    The area thus outlined was incised deep to adipose tissue with a #15 scalpel blade.  The skin margins were undermined to an appropriate distance in all directions utilizing iris scissors.
Complex Repair And Double M Plasty Text: The defect edges were debeveled with a #15 scalpel blade.  The primary defect was closed partially with a complex linear closure.  Given the location of the remaining defect, shape of the defect and the proximity to free margins a double M plasty was deemed most appropriate for complete closure of the defect.  Using a sterile surgical marker, an appropriate advancement flap was drawn incorporating the defect and placing the expected incisions within the relaxed skin tension lines where possible.    The area thus outlined was incised deep to adipose tissue with a #15 scalpel blade.  The skin margins were undermined to an appropriate distance in all directions utilizing iris scissors.
Complex Repair And W Plasty Text: The defect edges were debeveled with a #15 scalpel blade.  The primary defect was closed partially with a complex linear closure.  Given the location of the remaining defect, shape of the defect and the proximity to free margins a W plasty was deemed most appropriate for complete closure of the defect.  Using a sterile surgical marker, an appropriate advancement flap was drawn incorporating the defect and placing the expected incisions within the relaxed skin tension lines where possible.    The area thus outlined was incised deep to adipose tissue with a #15 scalpel blade.  The skin margins were undermined to an appropriate distance in all directions utilizing iris scissors.
Complex Repair And Z Plasty Text: The defect edges were debeveled with a #15 scalpel blade.  The primary defect was closed partially with a complex linear closure.  Given the location of the remaining defect, shape of the defect and the proximity to free margins a Z plasty was deemed most appropriate for complete closure of the defect.  Using a sterile surgical marker, an appropriate advancement flap was drawn incorporating the defect and placing the expected incisions within the relaxed skin tension lines where possible.    The area thus outlined was incised deep to adipose tissue with a #15 scalpel blade.  The skin margins were undermined to an appropriate distance in all directions utilizing iris scissors.
Complex Repair And Dorsal Nasal Flap Text: The defect edges were debeveled with a #15 scalpel blade.  The primary defect was closed partially with a complex linear closure.  Given the location of the remaining defect, shape of the defect and the proximity to free margins a dorsal nasal flap was deemed most appropriate for complete closure of the defect.  Using a sterile surgical marker, an appropriate flap was drawn incorporating the defect and placing the expected incisions within the relaxed skin tension lines where possible.    The area thus outlined was incised deep to adipose tissue with a #15 scalpel blade.  The skin margins were undermined to an appropriate distance in all directions utilizing iris scissors.
Complex Repair And Ftsg Text: The defect edges were debeveled with a #15 scalpel blade.  The primary defect was closed partially with a complex linear closure.  Given the location of the defect, shape of the defect and the proximity to free margins a full thickness skin graft was deemed most appropriate to repair the remaining defect.  The graft was trimmed to fit the size of the remaining defect.  The graft was then placed in the primary defect, oriented appropriately, and sutured into place.
Complex Repair And Burow's Graft Text: The defect edges were debeveled with a #15 scalpel blade.  The primary defect was closed partially with a complex linear closure.  Given the location of the defect, shape of the defect, the proximity to free margins and the presence of a standing cone deformity a Burow's graft was deemed most appropriate to repair the remaining defect.  The graft was trimmed to fit the size of the remaining defect.  The graft was then placed in the primary defect, oriented appropriately, and sutured into place.
Complex Repair And Split-Thickness Skin Graft Text: The defect edges were debeveled with a #15 scalpel blade.  The primary defect was closed partially with a complex linear closure.  Given the location of the defect, shape of the defect and the proximity to free margins a split thickness skin graft was deemed most appropriate to repair the remaining defect.  The graft was trimmed to fit the size of the remaining defect.  The graft was then placed in the primary defect, oriented appropriately, and sutured into place.
Complex Repair And Epidermal Autograft Text: The defect edges were debeveled with a #15 scalpel blade.  The primary defect was closed partially with a complex linear closure.  Given the location of the defect, shape of the defect and the proximity to free margins an epidermal autograft was deemed most appropriate to repair the remaining defect.  The graft was trimmed to fit the size of the remaining defect.  The graft was then placed in the primary defect, oriented appropriately, and sutured into place.
Complex Repair And Dermal Autograft Text: The defect edges were debeveled with a #15 scalpel blade.  The primary defect was closed partially with a complex linear closure.  Given the location of the defect, shape of the defect and the proximity to free margins an dermal autograft was deemed most appropriate to repair the remaining defect.  The graft was trimmed to fit the size of the remaining defect.  The graft was then placed in the primary defect, oriented appropriately, and sutured into place.
Complex Repair And Tissue Cultured Epidermal Autograft Text: The defect edges were debeveled with a #15 scalpel blade.  The primary defect was closed partially with a complex linear closure.  Given the location of the defect, shape of the defect and the proximity to free margins an tissue cultured epidermal autograft was deemed most appropriate to repair the remaining defect.  The graft was trimmed to fit the size of the remaining defect.  The graft was then placed in the primary defect, oriented appropriately, and sutured into place.
Complex Repair And Xenograft Text: The defect edges were debeveled with a #15 scalpel blade.  The primary defect was closed partially with a complex linear closure.  Given the location of the defect, shape of the defect and the proximity to free margins a xenograft was deemed most appropriate to repair the remaining defect.  The graft was trimmed to fit the size of the remaining defect.  The graft was then placed in the primary defect, oriented appropriately, and sutured into place.
Complex Repair And Skin Substitute Graft Text: The defect edges were debeveled with a #15 scalpel blade.  The primary defect was closed partially with a complex linear closure.  Given the location of the remaining defect, shape of the defect and the proximity to free margins a skin substitute graft was deemed most appropriate to repair the remaining defect.  The graft was trimmed to fit the size of the remaining defect.  The graft was then placed in the primary defect, oriented appropriately, and sutured into place.
Path Notes (To The Dermatopathologist): Please check margins.
Consent was obtained from the patient. The risks and benefits to therapy were discussed in detail. Specifically, the risks of infection, scarring, bleeding, prolonged wound healing, incomplete removal, allergy to anesthesia, nerve injury and recurrence were addressed. Prior to the procedure, the treatment site was clearly identified and confirmed by the patient. All components of Universal Protocol/PAUSE Rule completed.
Post-Care Instructions: I reviewed with the patient in detail post-care instructions. Patient is not to engage in any heavy lifting, exercise, or swimming for the next 14 days. Should the patient develop any fevers, chills, bleeding, severe pain patient will contact the office immediately.
Home Suture Removal Text: Patient was provided a home suture removal kit and will remove their sutures at home.  If they have any questions or difficulties they will call the office.
Where Do You Want The Question To Include Opioid Counseling Located?: Case Summary Tab
Information: Selecting Yes will display possible errors in your note based on the variables you have selected. This validation is only offered as a suggestion for you. PLEASE NOTE THAT THE VALIDATION TEXT WILL BE REMOVED WHEN YOU FINALIZE YOUR NOTE. IF YOU WANT TO FAX A PRELIMINARY NOTE YOU WILL NEED TO TOGGLE THIS TO 'NO' IF YOU DO NOT WANT IT IN YOUR FAXED NOTE.

## 2023-03-01 SDOH — HEALTH STABILITY: PHYSICAL HEALTH: ON AVERAGE, HOW MANY MINUTES DO YOU ENGAGE IN EXERCISE AT THIS LEVEL?: 40 MIN

## 2023-03-01 SDOH — ECONOMIC STABILITY: FOOD INSECURITY: WITHIN THE PAST 12 MONTHS, THE FOOD YOU BOUGHT JUST DIDN'T LAST AND YOU DIDN'T HAVE MONEY TO GET MORE.: NEVER TRUE

## 2023-03-01 SDOH — HEALTH STABILITY: PHYSICAL HEALTH: ON AVERAGE, HOW MANY DAYS PER WEEK DO YOU ENGAGE IN MODERATE TO STRENUOUS EXERCISE (LIKE A BRISK WALK)?: 5 DAYS

## 2023-03-01 SDOH — ECONOMIC STABILITY: INCOME INSECURITY: IN THE LAST 12 MONTHS, WAS THERE A TIME WHEN YOU WERE NOT ABLE TO PAY THE MORTGAGE OR RENT ON TIME?: NO

## 2023-03-01 SDOH — ECONOMIC STABILITY: FOOD INSECURITY: WITHIN THE PAST 12 MONTHS, YOU WORRIED THAT YOUR FOOD WOULD RUN OUT BEFORE YOU GOT MONEY TO BUY MORE.: NEVER TRUE

## 2023-03-01 SDOH — ECONOMIC STABILITY: INCOME INSECURITY: HOW HARD IS IT FOR YOU TO PAY FOR THE VERY BASICS LIKE FOOD, HOUSING, MEDICAL CARE, AND HEATING?: NOT HARD AT ALL

## 2023-03-01 SDOH — ECONOMIC STABILITY: HOUSING INSECURITY: IN THE LAST 12 MONTHS, HOW MANY PLACES HAVE YOU LIVED?: 1

## 2023-03-01 ASSESSMENT — LIFESTYLE VARIABLES
AUDIT-C TOTAL SCORE: 4
SKIP TO QUESTIONS 9-10: 1
HOW MANY STANDARD DRINKS CONTAINING ALCOHOL DO YOU HAVE ON A TYPICAL DAY: 1 OR 2
HOW OFTEN DO YOU HAVE SIX OR MORE DRINKS ON ONE OCCASION: NEVER
HOW OFTEN DO YOU HAVE A DRINK CONTAINING ALCOHOL: 4 OR MORE TIMES A WEEK

## 2023-03-01 ASSESSMENT — SOCIAL DETERMINANTS OF HEALTH (SDOH)
HOW MANY DRINKS CONTAINING ALCOHOL DO YOU HAVE ON A TYPICAL DAY WHEN YOU ARE DRINKING: 1 OR 2
HOW OFTEN DO YOU ATTENT MEETINGS OF THE CLUB OR ORGANIZATION YOU BELONG TO?: MORE THAN 4 TIMES PER YEAR
HOW OFTEN DO YOU ATTENT MEETINGS OF THE CLUB OR ORGANIZATION YOU BELONG TO?: MORE THAN 4 TIMES PER YEAR
HOW OFTEN DO YOU GET TOGETHER WITH FRIENDS OR RELATIVES?: MORE THAN THREE TIMES A WEEK
HOW HARD IS IT FOR YOU TO PAY FOR THE VERY BASICS LIKE FOOD, HOUSING, MEDICAL CARE, AND HEATING?: NOT HARD AT ALL
DO YOU BELONG TO ANY CLUBS OR ORGANIZATIONS SUCH AS CHURCH GROUPS UNIONS, FRATERNAL OR ATHLETIC GROUPS, OR SCHOOL GROUPS?: YES
IN A TYPICAL WEEK, HOW MANY TIMES DO YOU TALK ON THE PHONE WITH FAMILY, FRIENDS, OR NEIGHBORS?: MORE THAN THREE TIMES A WEEK
IN A TYPICAL WEEK, HOW MANY TIMES DO YOU TALK ON THE PHONE WITH FAMILY, FRIENDS, OR NEIGHBORS?: MORE THAN THREE TIMES A WEEK
HOW OFTEN DO YOU ATTEND CHURCH OR RELIGIOUS SERVICES?: NEVER
HOW OFTEN DO YOU HAVE A DRINK CONTAINING ALCOHOL: 4 OR MORE TIMES A WEEK
WITHIN THE PAST 12 MONTHS, YOU WORRIED THAT YOUR FOOD WOULD RUN OUT BEFORE YOU GOT THE MONEY TO BUY MORE: NEVER TRUE
HOW OFTEN DO YOU GET TOGETHER WITH FRIENDS OR RELATIVES?: MORE THAN THREE TIMES A WEEK
HOW OFTEN DO YOU ATTEND CHURCH OR RELIGIOUS SERVICES?: NEVER
DO YOU BELONG TO ANY CLUBS OR ORGANIZATIONS SUCH AS CHURCH GROUPS UNIONS, FRATERNAL OR ATHLETIC GROUPS, OR SCHOOL GROUPS?: YES
HOW OFTEN DO YOU HAVE SIX OR MORE DRINKS ON ONE OCCASION: NEVER

## 2023-03-06 ENCOUNTER — OFFICE VISIT (OUTPATIENT)
Dept: MEDICAL GROUP | Facility: MEDICAL CENTER | Age: 78
End: 2023-03-06
Payer: MEDICARE

## 2023-03-06 ENCOUNTER — TELEPHONE (OUTPATIENT)
Dept: MEDICAL GROUP | Facility: MEDICAL CENTER | Age: 78
End: 2023-03-06

## 2023-03-06 VITALS
HEART RATE: 70 BPM | TEMPERATURE: 97.5 F | BODY MASS INDEX: 23.56 KG/M2 | HEIGHT: 64 IN | WEIGHT: 138 LBS | SYSTOLIC BLOOD PRESSURE: 128 MMHG | OXYGEN SATURATION: 94 % | DIASTOLIC BLOOD PRESSURE: 64 MMHG

## 2023-03-06 DIAGNOSIS — E78.5 DYSLIPIDEMIA: Chronic | ICD-10-CM

## 2023-03-06 DIAGNOSIS — R73.09 IMPAIRED GLUCOSE METABOLISM: ICD-10-CM

## 2023-03-06 DIAGNOSIS — Z12.31 ENCOUNTER FOR SCREENING MAMMOGRAM FOR BREAST CANCER: ICD-10-CM

## 2023-03-06 DIAGNOSIS — G25.81 RESTLESS LEG SYNDROME: ICD-10-CM

## 2023-03-06 DIAGNOSIS — Z00.00 MEDICARE ANNUAL WELLNESS VISIT, SUBSEQUENT: ICD-10-CM

## 2023-03-06 DIAGNOSIS — D70.9 NEUTROPENIA, UNSPECIFIED TYPE (HCC): ICD-10-CM

## 2023-03-06 DIAGNOSIS — Z00.00 PREVENTATIVE HEALTH CARE: Chronic | ICD-10-CM

## 2023-03-06 DIAGNOSIS — G47.30 SLEEP APNEA, UNSPECIFIED TYPE: ICD-10-CM

## 2023-03-06 DIAGNOSIS — E83.19 IRON OVERLOAD: ICD-10-CM

## 2023-03-06 PROCEDURE — G0439 PPPS, SUBSEQ VISIT: HCPCS | Performed by: INTERNAL MEDICINE

## 2023-03-06 ASSESSMENT — FIBROSIS 4 INDEX: FIB4 SCORE: 1.63

## 2023-03-06 ASSESSMENT — PATIENT HEALTH QUESTIONNAIRE - PHQ9: CLINICAL INTERPRETATION OF PHQ2 SCORE: 0

## 2023-03-06 ASSESSMENT — ENCOUNTER SYMPTOMS: GENERAL WELL-BEING: GOOD

## 2023-03-06 ASSESSMENT — ACTIVITIES OF DAILY LIVING (ADL): BATHING_REQUIRES_ASSISTANCE: 0

## 2023-03-06 NOTE — LETTER
[facility]  Eron Flowers M.D.  [address]  [place] [address]  Fax: [phone]   Authorization for Release/Disclosure of   Protected Health Information   Name: SAMARA CISNEROS : 1945 SSN: [id number]   Address: [address] [place] [id number] Phone:    [phone] (home)    I authorize the entity listed below to release/disclose the PHI below to:   [facility]/Eron Flowers M.D. and Eron Flowers M.D.   Provider or Entity Name:                                                          Dr Jimenez (Aud)   Address   City, [facility], [facility]   Phone:      Fax:             [phone]   Reason for request: continuity of care   Information to be released:    OLD RECORDS   [  ] LAST COLONOSCOPY,  including any PATH REPORT and follow-up  [  ] LAST FIT/COLOGUARD RESULT [  ] LAST DEXA  [  ] LAST MAMMOGRAM  [  ] LAST PAP  [  ] LAST LABS [  ] RETINA EXAM REPORT  [  ] IMMUNIZATION RECORDS  [ x ] Release all info      [  ] Check here and initial the line next to each item to release ALL health information INCLUDING  _____ Care and treatment for drug and / or alcohol abuse  _____ HIV testing, infection status, or AIDS  _____ Genetic Testing    DATES OF SERVICE OR TIME PERIOD TO BE DISCLOSED: _____________  I understand and acknowledge that:  * This Authorization may be revoked at any time by you in writing, except if your health information has already been used or disclosed.  * Your health information that will be used or disclosed as a result of you signing this authorization could be re-disclosed by the recipient. If this occurs, your re-disclosed health information may no longer be protected by State or Federal laws.  * You may refuse to sign this Authorization. Your refusal will not affect your ability to obtain treatment.  * This Authorization becomes effective upon signing and will  on (date) __________.      If no date is indicated, this Authorization will   one (1) year from the signature date.    Name: Bernadette Dawsonub  Signature:                         Continuity of Care   Date:   3/9/2023     PLEASE FAX REQUESTED RECORDS BACK TO: [phone]

## 2023-03-06 NOTE — PROGRESS NOTES
Lisa Paul is a 77 y.o. female who presents with medicare wellness            HPI          Medicare wellness  Current supplements: Reviewed  Chronic narcotic pain medicines: No  Allergies:reviewed    Eye exam annually glasses for reading   Teeth cleaning twice per year  Audiology has hearing aids   Medications, allergies, medical history, surgical history, social history, family history  reviewed and updated  Has pickle ball indoor court 3 days per week 2 hours at a time and does stationary bike 2-3 times per week 30 minutes and does stretchingg 15 minutes daily  Sees  endocrinology  Sees  dermatology  No falls, no joint pain with activity  Screening: Reviewed  Depressive Symptoms: Denies feeling down, depressed or hopeless. Denies loss of interest or pleasure in doing things   ADLs: Denies needing help with using telephone, transportation, shopping, preparing meals, housework, laundry, or managing medication or money.    Independent with bathing, hygiene, feeding, toileting, dressing    Memory concerns: Denies difficulty remembering details of conversations, events and upcoming appointments.  Hearing problems: Denies.   Has symptoms consistent with restless leg, gabapentin 100 mg helped initially but then started to wear off and she is now using compression stockings at night which has been helpful along with tonic water at night  Current social contact/activities: Reviewed    ROS:    Ostomy or other tubes or amputations no  Chronic oxygen use no  Gait: normal. Uses assistive device :  no  Occasional constipation, takes MiraLAX as needed    Health Care Screening recommendations reviewed with patient today and updated or ordered.  DPA/Advanced directive: Completed/Information provided.   Referrals for PT/OT/Nutrition counseling/Behavioral Health/Smoking cessation as above if indicated  Discussion today about general wellness and lifestyle habits:    Prevent falls and reduce trip  hazards;   Have a working fire alarm and carbon monoxide detector;   Engage in regular physical activity and social activities;   Use sun protection when outdoors.       Current Outpatient Medications   Medication Sig Dispense Refill    VITAMIN D PO Take  by mouth.      gabapentin (NEURONTIN) 100 MG Cap TAKE 1 CAPSULE BY MOUTH EVERY EVENING. 90 Capsule 1    ibuprofen (MOTRIN) 200 MG Tab Take 400 mg by mouth.      polyethylene glycol 3350 (MIRALAX) 17 GM/SCOOP Powder Take 17 g by mouth.      simvastatin (ZOCOR) 20 MG Tab Take 1 Tablet by mouth every evening. 90 Tablet 3    Coenzyme Q10 (COQ10) 100 MG Cap       Zinc 10 MG Lozenge       CALCIUM PO       polyethylene glycol/lytes (MIRALAX) 17 g Pack Take 17 g by mouth every day.      levothyroxine (SYNTHROID) 50 MCG Tab Take 50 mcg by mouth every morning on an empty stomach. Pt takes 100 MCG only on Sunday  Al other days 50 MCG       Cyanocobalamin (VITAMIN B 12 PO) Take 1 tablet by mouth every day.      Ascorbic Acid (VITAMIN C PO) Take 1 tablet by mouth every day.      Probiotic Product (PROBIOTIC PO) Take 1 capsule by mouth every evening.       No current facility-administered medications for this visit.         Adenoma  2/11 colon per [facility] adenoma   3/16/16 colon per [facility] tortuous colon, melanosis in colon, repeat colonoscopy 5 years for surveillance  3/24/21 colon per [facility] negative, no further surveillance colonoscopy necessary      Allergic rhinitis     Atypical chest pain  10/29/19 chest burning sensation, EKG negative, stress test ordered   10/30/19 echo normal LV function EF 60%, normal diastolic function, mild aortic insufficiency, normal right LV size and function, RSVP 25  11/12/19 myocardial perfusion study no evidence of ischemia, EF 79%     Dyslipidemia  11/08 chol 210,trig 45,hdl 95,ldl 97  6/09 chol 271,trig 113,hdl 74,ldl 163 restart on zocor 40 (1/2)  5/05 aorta abd u/s negative  11/08 p.thallium no ischemia EF 87%  2/09 ERIC negative  2/10 chol 205,trig  55,hdl 100,ldl 94  1/11 chol 197,trig 66,hdl 100,ldl 84 on zocor 20 mg  2/12 chol 205,trig 43,hdl 106,ldl 90 on zocor 20 mg  2/12/13 chol 216,trig 58,hdl 114,ldl 90 on zocor 20 mg  2/13/14 chol 195,trig 30,hdl 106,ldl 83 on zocor 20 mg  3/12/15 chol 205,trig 41,hdl 99,ldl 98 on zocor 20 mg  3/15/16 chol 260,trig 77,hdl 102,ldl 143 on zocor 20 mg repeat lipid panel 2 months  5/25/16 chol 243,trig 51,hdl 104,ldl 129 on zocor 20 mg  2/7/17 chol 203,trig 66,hdl 105,ldl 85 on zocor 20 mg  9/20/17 chol 211,trig 71,hdl 104,ldl 93 on zocor 20 mg  9/10/18 chol 218,trig 48,hdl 106,ldl 102 on zocor 20 mg  11/12/19 myocardial perfusion study no evidence of ischemia, EF 79%  1/9/20 off zocor   6/20/20 chol 172,trig 41,hdl 99,ldl 65 on zocor 20 mg  2/3/22 chol 201,trig 56,hdl 91,ldl 99 on zocor 20 mg     GERD  11/4/19 [facility] note burning sensation controlled, start pantoprazole 20 mg, labs ordered     History ankle fracture  5/15/20 [facility] orthopedic note x-ray right ankle nondisplaced oblique distal fibula fracture lowry B, placed in walking boot, follow-up 1 week  5/26/20  orthopedic note follow-up right ankle fracture, x-rays demonstrate nondisplaced right lateral malleolus fracture, ASO ankle lacer for stability follow-up 4 weeks  6/16/20  orthopedic note closed treatment ankle fracture 5/15/20 x-ray right ankle healed malleolus fracture, proceed with aggressive physical therapy follow-up 6 weeks     History of bilateral pleural effusion  12/4/19 hospital admission, 12/9/19 hospital discharge, admission for fevers and chills while on home infusion IV daptomycin for septic arthritis MSSA    12/4/19 chest x-ray negative  12/5/19  infectious disease hospital consultation recommend NICOLE, repeat blood cultures negative thus far, hold off on discontinuation of PICC line until NICOLE obtained, changed to cefazolin from daptomycin  12/6/19 transesophageal echo structurally normal heart valves  12/6/19  infectious disease hospital note repeat blood cultures negative  12/6/19 PICC line removed, catheter tip culture negative, blood cultures no growth to date  12/6/19 CT abdomen pelvis with contrast, minimal right lower lobe atelectasis or pneumonia, small bilateral pleural effusions, no loculated fluid collection  12/7/19 CT chest with; trace amounts dependent bilateral pleural effusion, interstitial groundglass opacities  12/8/19 infectious disease hospital note cough ongoing prior to initiation of daptomycin, less likely daptomycin induced eosinophilic pneumonia, will not restart daptomycin, continue cefazolin with end-date 12/15/19, follow-up pulmonary  12/8/19  pulmonary hospital consultation bilateral pleural effusion confirmed on CT, too small for bedside thoracentesis, given acute sickness likely related to hypoalbuminemia versus volume overload status post resuscitation, cannot rule out parapneumonic effusion but clinical picture with negative procalcitonin is not suggestive of this, recommend gentle diuresis, outpatient follow-up CT thorax 4 to 6 weeks, regarding cough differential includes reflux, asthma, volume overload, interstitial lung disease, postnasal drip, eosinophilic granuloma, nonasthmatic eosinophilic bronchitis, given eosinophilia to differentiate would need full pulmonary function test with methacholine challenge, which can be done as an outpatient if cough persists, repeat CT scan as above (if groundglass opacities persist will need further diagnostic intervention bronchoscopy), check REGGIE, rheumatoid factor, IgE level  12/8/19 REGGIE negative,CCP 6,RF negative  12/9/19 hospital discharge per infectious disease recommendation continue cefazolin through 12/15/15 through peripheral IV, follow-up infectious disease outpatient pulmonary outpatient  12/10/19 ESR 70,CRP 3.9,wbc 5.6,hgb 12,hct 36  12/11/19 referral pulmonary placed, repeat CT high-resolution thorax ordered 4 to 6 weeks  12/17/19  infectious disease note transition IV antibiotics to doxycycline  12/18/19 pulmonary note order CT high resolution thorax  1/21/20 CT thorax high resolution; interval resolution of bilateral subpleural interstitial opacities with hazy groundglass opacities, minimal streaky linear opacity in the lingula may represent subsegmental atelectasis or scar  2/10/20  pulmon note resolution of pleural effusion on follow-up CT scan, suspect pleural effusion and subpleural reticular thickening was related to sepsis induced lung injury from volume overload, follow-up as needed    history epistaxis  9/7/22  ENT note epistaxis, prominent vessel right cauterized today, provided neosporin twice a day x10 days, follow-up as needed     History MSSA bacteremia  11/17/19  infectious disease hospital consultation admitted 11/15/19 right knee pain after treadmill stress test, seen by orthopedics and had arthrocentesis performed, sent to emergency room with increased cell count on arthrocentesis, presentation ER WBC 17.8, ESR 44, went to the operating room for incision and drainage right septic knee positive staphylococcus aureus MSSA, blood cultures positive for staphylococcus aureus MSSA  11/18/19 echo EF 60% no evidence of endocarditis  11/20/19 ultrasound-guided catheter insertion for intravenous antibiotics  11/22/19 discharged home IV daptomycin and date 12/15/19, follow-up once a week infusion center, follow-up infectious disease 2 weeks  12/4/19 hospital admission, 12/9/19 hospital discharge, admission for fevers and chills while on home infusion IV daptomycin for septic arthritis MSSA    12/4/19 chest x-ray negative  12/5/19  infectious disease hospital consultation recommend NICOLE, repeat blood cultures negative thus far, hold off on discontinuation of PICC line until NICOLE obtained, changed to cefazolin from daptomycin  12/6/19 transesophageal echo structurally normal heart valves  12/6/19  infectious disease note transition IV antibiotics to doxycycline  12/18/19 pulmonary note order CT high resolution thorax  1/21/20 CT thorax high resolution; interval resolution of bilateral subpleural interstitial opacities with hazy groundglass opacities, minimal streaky linear opacity in the lingula may represent subsegmental atelectasis or scar  1/22/20 wbc 3.2 (62%N,27%L), ESR 7, CRP 0.13 off antibiotics since 1/16/20  2/10/20  pulmon note resolution of pleural effusion on follow-up CT scan, suspect pleural effusion and subpleural reticular thickening was related to sepsis induced lung injury from volume overload, follow-up as needed      History septic joint right knee  11/17/19  infectious disease hospital consultation admitted 11/15/19 right knee pain after treadmill stress test, seen by orthopedics and had arthrocentesis performed, sent to emergency room with increased cell count on arthrocentesis, presentation ER WBC 17.8, ESR 44, went to the operating room for incision and drainage right septic knee positive staphylococcus aureus MSSA, blood cultures positive for staphylococcus aureus MSSA  11/18/19 echo EF 60% no evidence of endocarditis  11/20/19 ultrasound-guided catheter insertion for intravenous antibiotics  11/22/19 discharged home IV daptomycin and date 12/15/19, follow-up once a week infusion center, follow-up infectious disease 2 weeks  12/4/19 hospital admission, 12/9/19 hospital discharge, admission for fevers and chills while on home infusion IV daptomycin for septic arthritis MSSA    12/4/19 chest x-ray negative  12/5/19  infectious disease hospital consultation recommend NICOLE, repeat blood cultures negative thus far, hold off on discontinuation of PICC line until NICOLE obtained, changed to cefazolin from daptomycin  12/6/19 transesophageal echo structurally normal heart valves  12/6/19 infectious disease hospital note repeat blood cultures negative  12/6/19 PICC line  removed, catheter tip culture negative, blood cultures no growth to date  12/6/19 CT abdomen pelvis with contrast, minimal right lower lobe atelectasis or pneumonia, small bilateral pleural effusions, no loculated fluid collection  12/7/19 CT chest with; trace amounts dependent bilateral pleural effusion, interstitial groundglass opacities  12/8/19 infectious disease hospital note cough ongoing prior to initiation of daptomycin, less likely daptomycin induced eosinophilic pneumonia, will not restart daptomycin, continue cefazolin with end-date 12/15/19, follow-up pulmonary  12/8/19  pulmonary hospital consultation bilateral pleural effusion confirmed on CT, too small for bedside thoracentesis, given acute sickness likely related to hypoalbuminemia versus volume overload status post resuscitation, cannot rule out parapneumonic effusion but clinical picture with negative procalcitonin is not suggestive of this, recommend gentle diuresis, outpatient follow-up CT thorax 4 to 6 weeks, regarding cough differential includes reflux, asthma, volume overload, interstitial lung disease, postnasal drip, eosinophilic granuloma, nonasthmatic eosinophilic bronchitis, given eosinophilia to differentiate would need full pulmonary function test with methacholine challenge, which can be done as an outpatient if cough persists, repeat CT scan as above (if groundglass opacities persist will need further diagnostic intervention bronchoscopy), check REGGIE, rheumatoid factor, IgE level  12/8/19 REGGIE negative,CCP 6,RF negative  12/9/19 hospital discharge per infectious disease recommendation continue cefazolin through 12/15/15 through peripheral IV, follow-up infectious disease outpatient pulmonary outpatient  12/10/19 ESR 70,CRP 3.9,wbc 5.6,hgb 12,hct 36  12/11/19 referral pulmonary placed, repeat CT high-resolution thorax ordered 4 to 6 weeks  12/17/19 infectious disease note transition IV antibiotics to doxycycline  12/18/19  pulmonary note order CT high resolution thorax  1/22/20 wbc 3.2 (62%N,27%L), ESR 7, CRP 0.13 off antibiotics from 1/16/20    history squamous cell skin cancer  6/23/21  skin cancer dermatology note  11/10/21  skin cancer dermatology note  5/4/22  skin cancer dermatology note  5/24/22  skin cancer dermatology note right proximal pretibial region biopsy squamous cell carcinoma     History of vasovagal  5/12 one episode, ekg, echo, holter per [facility] negative, no further workup necessary  10/30/19 echo normal LV function EF 60%, normal diastolic function, mild aortic insufficiency, normal right LV size and function, RSVP 25  11/12/19 myocardial perfusion study no evidence of ischemia, EF 79%     History wrist fracture  12/30/12 left wrist fracture had splint for 6 weeks, no surgery     hyponatremia  2/12/13 Na 136  2/13/14 Na 131  3/15/16 Na 137  5/25/16 Na 134  9/20/17 Na 131  10/2/19 seen ER URI, Na 129 labs ordered to be done in 2 weeks  10/9/19 Na 134, normal cbc,cmp,tsh (consider cortisol, ACTH, HIV, hepatitis serology, REGGIE, B12)   10/10/19 labs ordered dry mouth, fatigue, hyponatremia, echo ordered murmur, ativan as needed for breakthrough panic attack  10/15/19 serum osm 277 (278-298), urine osm 128 (300-900), urine Na 14, cortisol 9.8, ACTH 15  12/6/19 Na 134  12/10/19 Na 132  1/22/20 Na 133  1/29/20  endocrine note hyponatremia rule out adrenal insufficiency, ACTH stim test  2/4/20 ACTH 14,cortisol 5.4, serum osmolality 282, urine osmolality 344, Na 134  2/4/20 tsh 2.7,free t4 0.83,free t3 2.84  2/12/20 ACTH stim test per endocrinology negative, urine and serum osmolality indicates excess water intake  3/17/20 tsh 3.8,free t4 0.85,free t3 2.6,t3 86,TPO 0.3  3/23/20  endocrine note hyponatremia could be excessive water intake hypothyroid, start synthroid 75 mcg daily follow-up 2 months  5/20/20 tsh 0.547,free t4 1.62,free t3 2.46, Na 133 on synthroid 75  mcg  5/26/20  endocrine note recent Na 133, monitor sodium, try liquid IV mixed once taking 16 ounce water, follow-up 3 months  8/19/20 Na 133   8/28/20  endocrinology note hyponatremia monitor sodium no identified endocrine cause, follow-up 3 months  4/14/21 Na 138, serum osm 284, urine sodium 102 mmol/L, urine osmol 507 mOsm/kg  6/29/21 Na 124  7/14/21 Na 126  7/19/21 Na 129  7/24/21 tsh 1.9, free t4 1.78,free t3 2.43 on levothyroxine 50 mcg daily per  endocrinology   9/20/21  nephrology note hyponatremia, appears euvolemic on exam, likely SIADH, high urine osm 540, urine sodium 102, serum Na improved with water restriction, continue home blood pressure readings, minimize use of NSAIDs, continue work-up for hyponatremia 135  10/15/21 Na 141  2/3/22 Na 135, serum osmolality 284, urine osmolality 434, urine sodium 54  3/1/22 Na 136  3/9/22  endocrinology note hyponatremia, sodium above 130 is acceptable, patient did not tolerate 1 g salt tablet daily due to weight gain, now doing 2 sticks of liquid IV per day (1 stick per 16 hours 1), most recent sodium 135-140  11/16/22 Na 133  1/10/23  endocrinology note on 2 sticks liquid IV daily     Hypothyroid  10/15/19 b2 1103,iron and zinc normal,HIV negative, REGGIE, SSA and SSB labs negative  10/29/19 chest burning sensation, EKG negative, stress test ordered   10/30/19 echo normal LV function EF 60%, normal diastolic function, mild aortic insufficiency, normal right LV size and function, RSVP 25  11/12/19 myocardial perfusion study no evidence of ischemia, EF 79%  3/23/20  endocrinology note fatigue tsh 2.7,free t4 0.83,free t3 2.8 start levothyroxine 75 mcg   5/20/20 tsh 0.54 on levothyroxine 75 mcg   6/17/20 tsh 0.156 on levothyroxine 75 mcg   8/19/20 tsh 0.056   8/28/20  endocrinology note because of palpitations at night decrease levothyroxine to 75 mcg 6 days a week, half a tablet on day  7, hyponatremia monitor sodium no identified endocrine cause, follow-up 3 months  7/24/21 tsh 1.9, free t4 1.78,free t3 2.43 on levothyroxine 50 mcg daily per  endocrinology   2/3/22 tsh 1.8 on levothyroxine 50 mgc daily 2 on sunday per endocrinology   3/1/22 tsh 1.3,free t4 1.4,free t3 2.5 on levothyroxine 50 mcg  3/9/22  endocrinology note hypothyroid on levothyroxine 50 mcg    11/16/22 tsh 1.6,,free t4 1.38,free t3 2.56 on levothyroxine 50 mcg  1/10/23  endocrine note on levothyroxine 50 mcg     Neutropenia  9/20/17 wbc 4.3  9/10/18 wbc 4.9  12/10/19 wbc 5.6  1/14/20 wbc 3.2  6/17/20 wbc 4.0  6/29/21 wbc 6.6  10/15/21 wbc 11.5  2/3/22 wbc 3.4 (65%N,24%L)  3/7/22 dr.kelly taylor oncology check immunoglobin's, leukemia panel, HIV, for because of lymphopenia  3/9/22 wbc 4.3 (67%N,22%L), flow cytometry very small lambda restricted partial 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 accounting for 0.3% of leukocytes, no abnormal myeloid, T-cell, or NK cell population identified, while findings may be suggestive of monoclonal B-cell lymphocytosis, clinical correlation required, IgG 1018,IgE 39,IgM<10,IgA 91,HIV negative  8/9/22 wbc 4.5  11/16/22 b12 1294     Preventative health  1/19/16 pneumovax   2/10/17 [facility]  9/7/17 tdap  1/23/20 mammogram  1/7/21 shingrix second  3/24/21 colon per [facility] negative, no further surveillance colonoscopy necessary  9/27/21 covid pfizer booster  3/8/22 dexa LS-0.3,hip-0.9  3/1/22 vit d 60 on 2000 units  10/6/22 flu  11/16/22 vit d 72    restless leg  9/27/18 restless leg tried mirapex 0.125 mg no benefit  9/11/18 trial neurontin  6/6/22 resume neurontin 100 mg qpm     Sensorineural hearing loss  1/6/21  cardiology note, sensorineural hearing loss candidate for amplification, patient will check with her insurance regarding coverage     Sleep apnea  8/26/20 pulmonary note moderate risk sleep apnea,  stopbang score 4, check on sleep study, follow-up 3 months  9/1/20 sleep study recommended airway pressurization by titration polysomnogram or auto titrating cpap     Status post laparoscopic cholecystectomy     s/p lumbar surgery  6/20/11 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  2/14/17 [employer] note   6/18/21 [facility] note back pain, trigger point injection with steroids performed  7/17/21 MRI lumbar spine, L1-L2 moderate facet arthropathy, moderate right and moderate to severe left foraminal stenosis, L2-L3 moderate to severe facet arthropathy, moderate left neuroforaminal stenosis, L4-5 moderate disc bulge, severe facet arthropathy, severe central canal stenosis with mild to moderate right foraminal stenosis, no acute abnormality  9/13/21  neurosurgery note, discussed surgical intervention, likely related to L4-L5 level, having tried physical therapy and epidural which was not effective  9/17/21  neurosurgery note, flexion-extension films shows no instability, discussed lumbar decompression with complete laminectomy L5, possible laminectomy L4, she would like to proceed  10/13/21  neurosurgery operative note L4-5 complete laminectomy, L3-S1 bilateral medial facetectomy and bilateral foraminoty  10/26/21  neurosurgery postoperative note follow-up L4-L5 laminectomy, L3-S1 medial facetectomy and foraminotomy, taper gabapentin to 300 mg bid x one week then 300 mg qday x one week  11/29/21 [facility] neurosurgery note postoperative visit, increase activity as tolerated, ultrasound rule out DVT right ankle swelling, refill tizanidine  12/16/21 [facility] neurosurgery note, 6-month postop visit, symptoms of neurogenic claudication have improved  3/21/22 [facility] neurosurgery note status post laminectomy, facetectomy, foraminotomy, improvement in symptoms of claudication only complaint is persistent numbness shocklike distribution bilateral, recommend if no improvement by 1 year  check EMG lower extremities     Status post parathyroidectomy  2005 no old records  11/08 calcium 10.3  2/10 calcium 9.9  1/11 calcium 10.1  2/12 calcium 10, vit d 64  2/13 calcium 10.0  2/13/14 calcium 9.9  3/12/15 calcium 10.3  3/15/16 calcium 10.7  5/25/16 calcium 10.5,ionized 1.3,PTH 26  2/7/17 calcium 10.5  9/10/18 calcium 9.4,vit d 58  10/10/19 calcium 10.3  1/22/20 calcium 10.3  6/17/20 calcium 9.8  8/19/20 calcium 10.2,ionized calcium 1.3,PTH 28,phos 3.3  8/28/20  endocrinology note   2/3/22 calcium 9.9,PTH 26                       Patient Active Problem List   Diagnosis    Dyslipidemia    S/p parathyroidectomy    Allergic rhinitis    Preventative health care    S/P laparoscopic cholecystectomy    Adenomatous polyp of colon    History of vasovagal syncope    History of wrist fracture    S/p lumbar surgery    Restless leg syndrome    Hyponatremia    History of MSSA bacteremia    History of bilateral pleural effusion    Cataract    GERD (gastroesophageal reflux disease)    Atypical chest pain    Hypothyroid    Neutropenia (HCC)    History of ankle fracture    Sleep apnea    Sensorineural hearing loss    History of squamous cell carcinoma of skin    History of epistaxis     Depression Screening  Little interest or pleasure in doing things?  0 - not at all  Feeling down, depressed , or hopeless? 0 - not at all  Patient Health Questionnaire Score: 0     If depressive symptoms identified deferred to follow up visit unless specifically addressed in assessment and plan.    Interpretation of PHQ-9 Total Score   Score Severity   1-4 No Depression   5-9 Mild Depression   10-14 Moderate Depression   15-19 Moderately Severe Depression   20-27 Severe Depression    Screening for Cognitive Impairment  Three Minute Recall (daughter, heaven, mountain) 3/3    Madi clock face with all 12 numbers and set the hands to show 10 past 11.  Yes    Cognitive concerns identified deferred for follow up unless specifically  "addressed in assessment and plan.    Fall Risk Assessment  Has the patient had two or more falls in the last year or any fall with injury in the last year?  No    Safety Assessment  Throw rugs on floor.  Yes  Handrails on all stairs.  Yes  Good lighting in all hallways.  Yes  Difficulty hearing.  Yes  Patient counseled about all safety risks that were identified.    Functional Assessment ADLs  Are there any barriers preventing you from cooking for yourself or meeting nutritional needs?  No.    Are there any barriers preventing you from driving safely or obtaining transportation?  No.    Are there any barriers preventing you from using a telephone or calling for help?  No.    Are there any barriers preventing you from shopping?  No.    Are there any barriers preventing you from taking care of your own finances?  No.    Are there any barriers preventing you from managing your medications?  No.    Are there any barriers preventing you from showering, bathing or dressing yourself?  No.    Are you currently engaging in any exercise or physical activity?  Yes.     What is your perception of your health?  Good    Advance Care Planning  Do you have an Advance Directive, Living Will, Durable Power of , or POLST? Yes          is not on file - instructed patient to bring in a copy to scan into their chart            Patient Care Team:  Eron Flowers M.D. as PCP - General  Ramin Meadows M.D. (Inactive) as Consulting Physician (Ophthalmology)      ROS           Objective     /64 (BP Location: Right arm, Patient Position: Sitting, BP Cuff Size: Adult)   Pulse 70   Temp 36.4 °C (97.5 °F)   Ht 1.613 m (5' 3.5\")   Wt 62.6 kg (138 lb)   SpO2 94%   BMI 24.06 kg/m²      Physical Exam  Vitals and nursing note reviewed.   Constitutional:       Appearance: Normal appearance.   HENT:      Head: Normocephalic and atraumatic.      Right Ear: External ear normal.      Left Ear: External ear normal.   Eyes:      " Conjunctiva/sclera: Conjunctivae normal.   Cardiovascular:      Rate and Rhythm: Normal rate and regular rhythm.      Heart sounds: Normal heart sounds.   Pulmonary:      Effort: Pulmonary effort is normal.      Breath sounds: Normal breath sounds.   Abdominal:      General: There is no distension.   Skin:     General: Skin is warm.   Neurological:      General: No focal deficit present.      Mental Status: She is alert.   Psychiatric:         Mood and Affect: Mood normal.         Behavior: Behavior normal.     Extremities no edema  Vitiligo on extremities               Assessment & Plan        Assessment  #1  Medicare wellness visit    #2 hypothyroid on replacement followed by endocrinology, on levothyroxine 50 mcg    #3 dyslipidemia 2/3/22 chol 201,trig 56,hdl 91,ldl 99 on zocor 20 mg has been stable and controlled following a good nutrition and exercise program    #4 restless leg tried neurontin and off the medication and now using compression stocking     #5 history of hyponatremia followed by endocrinology, has seen nephrology, most recent sodium 133 November    #6 history of squamous cell skin cancer followed by dermatology uses sunscreen    #7 neutropenia WBC 4.5 in August no recurrent infections    #8 constipation on miralax     #9 Labs to evaluate impaired glucose metabolism and iron overload which may cause restless leg symptoms     #10 sensorineural hearing loss has hearing aids    Plan  #1 health maintenance reviewed and updated    #2 mammogram    #3 repeat labs ordered    #4 advised patient to bring a copy of her advance directive so we can make a copy of those records    #5 follow-up dermatology vitiligo and cancer screening    #6 recommend she consider the updated COVID-vaccine    #7 continue her good nutrition and exercise program    #8 should restless leg symptoms intensify we could consider resuming gabapentin and titrating upwards    #9 she has had her flu vaccine in October    #10 repeat bone  density next year    #11 follow-up endocrinology    #12 follow-up 1 year with myself

## 2023-03-06 NOTE — LETTER
[facility]  Eron Flowers M.D.  [address]  [place] [address]  Fax: [phone]   Authorization for Release/Disclosure of   Protected Health Information   Name: SAMARA CISNEROS : 1945 SSN: [id number]   Address: [address] [place] [address] Phone:    [phone] (home)    I authorize the entity listed below to release/disclose the PHI below to:   [facility]/Eron Flowers M.D. and Eron Flowers M.D.   Provider or Entity Name:                                                    [facility]   Address   City, State, [facility]   Phone:      Fax:                [phone]   Reason for request: continuity of care   Information to be released:   OLD RECORDS   [  ] LAST COLONOSCOPY,  including any PATH REPORT and follow-up  [  ] LAST FIT/COLOGUARD RESULT [  ] LAST DEXA  [  ] LAST MAMMOGRAM  [  ] LAST PAP  [  ] LAST LABS [  ] RETINA EXAM REPORT  [  ] IMMUNIZATION RECORDS  [ x ] Release all info      [  ] Check here and initial the line next to each item to release ALL health information INCLUDING  _____ Care and treatment for drug and / or alcohol abuse  _____ HIV testing, infection status, or AIDS  _____ Genetic Testing    DATES OF SERVICE OR TIME PERIOD TO BE DISCLOSED: _____________  I understand and acknowledge that:  * This Authorization may be revoked at any time by you in writing, except if your health information has already been used or disclosed.  * Your health information that will be used or disclosed as a result of you signing this authorization could be re-disclosed by the recipient. If this occurs, your re-disclosed health information may no longer be protected by State or Federal laws.  * You may refuse to sign this Authorization. Your refusal will not affect your ability to obtain treatment.  * This Authorization becomes effective upon signing and will  on (date) __________.      If no date is indicated, this Authorization will  one  (1) year from the signature date.    Name: Bernadette Dawsonub  Signature:                               Continuity of Care   Date:   3/9/2023     PLEASE FAX REQUESTED RECORDS BACK TO: [phone]

## 2023-03-07 ENCOUNTER — TELEPHONE (OUTPATIENT)
Dept: MEDICAL GROUP | Facility: MEDICAL CENTER | Age: 78
End: 2023-03-07
Payer: MEDICARE

## 2023-03-07 ENCOUNTER — HOSPITAL ENCOUNTER (OUTPATIENT)
Dept: LAB | Facility: MEDICAL CENTER | Age: 78
End: 2023-03-07
Attending: INTERNAL MEDICINE
Payer: MEDICARE

## 2023-03-07 DIAGNOSIS — E83.19 IRON OVERLOAD: ICD-10-CM

## 2023-03-07 DIAGNOSIS — E78.5 DYSLIPIDEMIA: Chronic | ICD-10-CM

## 2023-03-07 DIAGNOSIS — R73.09 IMPAIRED GLUCOSE METABOLISM: ICD-10-CM

## 2023-03-07 LAB
ALBUMIN SERPL BCP-MCNC: 4.6 G/DL (ref 3.2–4.9)
ALBUMIN/GLOB SERPL: 1.8 G/DL
ALP SERPL-CCNC: 68 U/L (ref 30–99)
ALT SERPL-CCNC: 25 U/L (ref 2–50)
ANION GAP SERPL CALC-SCNC: 9 MMOL/L (ref 7–16)
AST SERPL-CCNC: 24 U/L (ref 12–45)
BASOPHILS # BLD AUTO: 0.7 % (ref 0–1.8)
BASOPHILS # BLD: 0.03 K/UL (ref 0–0.12)
BILIRUB SERPL-MCNC: 0.4 MG/DL (ref 0.1–1.5)
BUN SERPL-MCNC: 17 MG/DL (ref 8–22)
CALCIUM ALBUM COR SERPL-MCNC: 9.8 MG/DL (ref 8.5–10.5)
CALCIUM SERPL-MCNC: 10.3 MG/DL (ref 8.5–10.5)
CHLORIDE SERPL-SCNC: 98 MMOL/L (ref 96–112)
CHOLEST SERPL-MCNC: 220 MG/DL (ref 100–199)
CO2 SERPL-SCNC: 28 MMOL/L (ref 20–33)
CREAT SERPL-MCNC: 0.72 MG/DL (ref 0.5–1.4)
EOSINOPHIL # BLD AUTO: 0.09 K/UL (ref 0–0.51)
EOSINOPHIL NFR BLD: 2.2 % (ref 0–6.9)
ERYTHROCYTE [DISTWIDTH] IN BLOOD BY AUTOMATED COUNT: 45.8 FL (ref 35.9–50)
EST. AVERAGE GLUCOSE BLD GHB EST-MCNC: 111 MG/DL
FASTING STATUS PATIENT QL REPORTED: NORMAL
FERRITIN SERPL-MCNC: 119 NG/ML (ref 10–291)
GFR SERPLBLD CREATININE-BSD FMLA CKD-EPI: 86 ML/MIN/1.73 M 2
GLOBULIN SER CALC-MCNC: 2.5 G/DL (ref 1.9–3.5)
GLUCOSE SERPL-MCNC: 99 MG/DL (ref 65–99)
HBA1C MFR BLD: 5.5 % (ref 4–5.6)
HCT VFR BLD AUTO: 48.7 % (ref 37–47)
HDLC SERPL-MCNC: 100 MG/DL
HGB BLD-MCNC: 16.5 G/DL (ref 12–16)
IMM GRANULOCYTES # BLD AUTO: 0.01 K/UL (ref 0–0.11)
IMM GRANULOCYTES NFR BLD AUTO: 0.2 % (ref 0–0.9)
IRON SATN MFR SERPL: 21 % (ref 15–55)
IRON SERPL-MCNC: 62 UG/DL (ref 40–170)
LDLC SERPL CALC-MCNC: 110 MG/DL
LYMPHOCYTES # BLD AUTO: 1.19 K/UL (ref 1–4.8)
LYMPHOCYTES NFR BLD: 29 % (ref 22–41)
MCH RBC QN AUTO: 30.8 PG (ref 27–33)
MCHC RBC AUTO-ENTMCNC: 33.9 G/DL (ref 33.6–35)
MCV RBC AUTO: 91 FL (ref 81.4–97.8)
MONOCYTES # BLD AUTO: 0.35 K/UL (ref 0–0.85)
MONOCYTES NFR BLD AUTO: 8.5 % (ref 0–13.4)
NEUTROPHILS # BLD AUTO: 2.44 K/UL (ref 2–7.15)
NEUTROPHILS NFR BLD: 59.4 % (ref 44–72)
NRBC # BLD AUTO: 0 K/UL
NRBC BLD-RTO: 0 /100 WBC
PLATELET # BLD AUTO: 229 K/UL (ref 164–446)
PMV BLD AUTO: 10.7 FL (ref 9–12.9)
POTASSIUM SERPL-SCNC: 4.4 MMOL/L (ref 3.6–5.5)
PROT SERPL-MCNC: 7.1 G/DL (ref 6–8.2)
RBC # BLD AUTO: 5.35 M/UL (ref 4.2–5.4)
SODIUM SERPL-SCNC: 135 MMOL/L (ref 135–145)
TIBC SERPL-MCNC: 300 UG/DL (ref 250–450)
TRIGL SERPL-MCNC: 52 MG/DL (ref 0–149)
UIBC SERPL-MCNC: 238 UG/DL (ref 110–370)
WBC # BLD AUTO: 4.1 K/UL (ref 4.8–10.8)

## 2023-03-07 PROCEDURE — 82728 ASSAY OF FERRITIN: CPT

## 2023-03-07 PROCEDURE — 36415 COLL VENOUS BLD VENIPUNCTURE: CPT

## 2023-03-07 PROCEDURE — 85025 COMPLETE CBC W/AUTO DIFF WBC: CPT

## 2023-03-07 PROCEDURE — 80061 LIPID PANEL: CPT

## 2023-03-07 PROCEDURE — 83036 HEMOGLOBIN GLYCOSYLATED A1C: CPT | Mod: GA

## 2023-03-07 PROCEDURE — 83540 ASSAY OF IRON: CPT

## 2023-03-07 PROCEDURE — 83550 IRON BINDING TEST: CPT

## 2023-03-07 PROCEDURE — 80053 COMPREHEN METABOLIC PANEL: CPT

## 2023-04-04 ENCOUNTER — HOSPITAL ENCOUNTER (OUTPATIENT)
Dept: RADIOLOGY | Facility: MEDICAL CENTER | Age: 78
End: 2023-04-04
Attending: INTERNAL MEDICINE
Payer: MEDICARE

## 2023-04-04 DIAGNOSIS — Z12.31 ENCOUNTER FOR SCREENING MAMMOGRAM FOR BREAST CANCER: ICD-10-CM

## 2023-04-04 PROCEDURE — 77063 BREAST TOMOSYNTHESIS BI: CPT

## 2023-05-19 ENCOUNTER — TELEPHONE (OUTPATIENT)
Dept: MEDICAL GROUP | Facility: MEDICAL CENTER | Age: 78
End: 2023-05-19
Payer: MEDICARE

## 2023-05-19 NOTE — LETTER
[facility]  Eron Flowers M.D.  [address]  [place] [address]  Fax: [phone]   Authorization for Release/Disclosure of   Protected Health Information   Name: SAMARA CISNEROS : 1945 SSN: [id number]   Address: [address] [place] [id number] Phone:    [phone] (home)    I authorize the entity listed below to release/disclose the PHI below to:   [facility]/Eron Flowers M.D. and Eron Flowers M.D.   Provider or Entity Name:                                                     [facility]    Address   City, State, [facility]   Phone:      Fax:                [phone]   Reason for request: continuity of care   Information to be released: OLD RECORDS   [  ] LAST COLONOSCOPY,  including any PATH REPORT and follow-up  [  ] LAST FIT/COLOGUARD RESULT [  ] LAST DEXA  [  ] LAST MAMMOGRAM  [  ] LAST PAP  [  ] LAST LABS [  ] RETINA EXAM REPORT  [  ] IMMUNIZATION RECORDS  [ x ] Release all info      [  ] Check here and initial the line next to each item to release ALL health information INCLUDING  _____ Care and treatment for drug and / or alcohol abuse  _____ HIV testing, infection status, or AIDS  _____ Genetic Testing    DATES OF SERVICE OR TIME PERIOD TO BE DISCLOSED: _____________  I understand and acknowledge that:  * This Authorization may be revoked at any time by you in writing, except if your health information has already been used or disclosed.  * Your health information that will be used or disclosed as a result of you signing this authorization could be re-disclosed by the recipient. If this occurs, your re-disclosed health information may no longer be protected by State or Federal laws.  * You may refuse to sign this Authorization. Your refusal will not affect your ability to obtain treatment.  * This Authorization becomes effective upon signing and will  on (date) __________.      If no date is indicated, this Authorization  will  one (1) year from the signature date.    Name: Bernadette Paul  Signature:                    Continuity of Care   Date:   2023     PLEASE FAX REQUESTED RECORDS BACK TO: [phone]

## 2023-06-05 ENCOUNTER — APPOINTMENT (RX ONLY)
Dept: URBAN - METROPOLITAN AREA CLINIC 6 | Facility: CLINIC | Age: 78
Setting detail: DERMATOLOGY
End: 2023-06-05

## 2023-06-05 DIAGNOSIS — L663 OTHER SPECIFIED DISEASES OF HAIR AND HAIR FOLLICLES: ICD-10-CM

## 2023-06-05 DIAGNOSIS — D18.0 HEMANGIOMA: ICD-10-CM

## 2023-06-05 DIAGNOSIS — S31000A OPEN WOUND(S) (MULTIPLE) OF UNSPECIFIED SITE(S), WITHOUT MENTION OF COMPLICATION: ICD-10-CM

## 2023-06-05 DIAGNOSIS — L80 VITILIGO: ICD-10-CM

## 2023-06-05 DIAGNOSIS — D22 MELANOCYTIC NEVI: ICD-10-CM

## 2023-06-05 DIAGNOSIS — L81.4 OTHER MELANIN HYPERPIGMENTATION: ICD-10-CM

## 2023-06-05 DIAGNOSIS — Z85.828 PERSONAL HISTORY OF OTHER MALIGNANT NEOPLASM OF SKIN: ICD-10-CM

## 2023-06-05 DIAGNOSIS — L57.0 ACTINIC KERATOSIS: ICD-10-CM

## 2023-06-05 DIAGNOSIS — L738 OTHER SPECIFIED DISEASES OF HAIR AND HAIR FOLLICLES: ICD-10-CM

## 2023-06-05 DIAGNOSIS — L92.3 FOREIGN BODY GRANULOMA OF THE SKIN AND SUBCUTANEOUS TISSUE: ICD-10-CM

## 2023-06-05 DIAGNOSIS — Z71.89 OTHER SPECIFIED COUNSELING: ICD-10-CM

## 2023-06-05 DIAGNOSIS — L73.9 FOLLICULAR DISORDER, UNSPECIFIED: ICD-10-CM

## 2023-06-05 DIAGNOSIS — L82.1 OTHER SEBORRHEIC KERATOSIS: ICD-10-CM

## 2023-06-05 PROBLEM — S51.812A LACERATION WITHOUT FOREIGN BODY OF LEFT FOREARM, INITIAL ENCOUNTER: Status: ACTIVE | Noted: 2023-06-05

## 2023-06-05 PROBLEM — D18.01 HEMANGIOMA OF SKIN AND SUBCUTANEOUS TISSUE: Status: ACTIVE | Noted: 2023-06-05

## 2023-06-05 PROBLEM — L02.92 FURUNCLE, UNSPECIFIED: Status: ACTIVE | Noted: 2023-06-05

## 2023-06-05 PROBLEM — D22.5 MELANOCYTIC NEVI OF TRUNK: Status: ACTIVE | Noted: 2023-06-05

## 2023-06-05 PROCEDURE — 17003 DESTRUCT PREMALG LES 2-14: CPT

## 2023-06-05 PROCEDURE — 99213 OFFICE O/P EST LOW 20 MIN: CPT | Mod: 25

## 2023-06-05 PROCEDURE — ? COUNSELING

## 2023-06-05 PROCEDURE — ? SUNSCREEN TREATMENT REGIMEN

## 2023-06-05 PROCEDURE — ? PRESCRIPTION

## 2023-06-05 PROCEDURE — ? LIQUID NITROGEN

## 2023-06-05 PROCEDURE — ? PRESCRIPTION MEDICATION MANAGEMENT

## 2023-06-05 PROCEDURE — 17000 DESTRUCT PREMALG LESION: CPT

## 2023-06-05 RX ORDER — TACROLIMUS 1 MG/G
OINTMENT TOPICAL BID
Qty: 30 | Refills: 2 | Status: ERX | COMMUNITY
Start: 2023-06-05

## 2023-06-05 RX ORDER — RUXOLITINIB 15 MG/G
CREAM TOPICAL QD
Qty: 60 | Refills: 6 | Status: ERX | COMMUNITY
Start: 2023-06-05

## 2023-06-05 RX ADMIN — TACROLIMUS: 1 OINTMENT TOPICAL at 00:00

## 2023-06-05 RX ADMIN — RUXOLITINIB: 15 CREAM TOPICAL at 00:00

## 2023-06-05 ASSESSMENT — LOCATION SIMPLE DESCRIPTION DERM
LOCATION SIMPLE: RIGHT POPLITEAL SKIN
LOCATION SIMPLE: UPPER BACK
LOCATION SIMPLE: RIGHT UPPER BACK
LOCATION SIMPLE: RIGHT CHEEK
LOCATION SIMPLE: RIGHT LOWER LEG
LOCATION SIMPLE: LEFT HAND
LOCATION SIMPLE: ABDOMEN
LOCATION SIMPLE: SUPERIOR FOREHEAD
LOCATION SIMPLE: RIGHT ZYGOMA
LOCATION SIMPLE: LEFT FOREHEAD
LOCATION SIMPLE: RIGHT PRETIBIAL REGION
LOCATION SIMPLE: LEFT FOREARM
LOCATION SIMPLE: RIGHT HAND
LOCATION SIMPLE: CHEST

## 2023-06-05 ASSESSMENT — LOCATION DETAILED DESCRIPTION DERM
LOCATION DETAILED: RIGHT CENTRAL MALAR CHEEK
LOCATION DETAILED: SUPERIOR MID FOREHEAD
LOCATION DETAILED: PERIUMBILICAL SKIN
LOCATION DETAILED: RIGHT DISTAL LATERAL PRETIBIAL REGION
LOCATION DETAILED: RIGHT PROXIMAL PRETIBIAL REGION
LOCATION DETAILED: RIGHT POPLITEAL SKIN
LOCATION DETAILED: LEFT RADIAL DORSAL HAND
LOCATION DETAILED: MIDDLE STERNUM
LOCATION DETAILED: LEFT PROXIMAL DORSAL FOREARM
LOCATION DETAILED: LEFT INFERIOR LATERAL FOREHEAD
LOCATION DETAILED: INFERIOR THORACIC SPINE
LOCATION DETAILED: RIGHT RADIAL DORSAL HAND
LOCATION DETAILED: RIGHT CENTRAL ZYGOMA
LOCATION DETAILED: EPIGASTRIC SKIN
LOCATION DETAILED: LEFT INFERIOR FOREHEAD
LOCATION DETAILED: RIGHT MEDIAL UPPER BACK

## 2023-06-05 ASSESSMENT — LOCATION ZONE DERM
LOCATION ZONE: ARM
LOCATION ZONE: FACE
LOCATION ZONE: LEG
LOCATION ZONE: HAND
LOCATION ZONE: TRUNK

## 2023-06-05 NOTE — PROCEDURE: LIQUID NITROGEN
Render Note In Bullet Format When Appropriate: No
Detail Level: Detailed
Number Of Freeze-Thaw Cycles: 2 freeze-thaw cycles
Duration Of Freeze Thaw-Cycle (Seconds): 10
Show Applicator Variable?: Yes
Consent: The patient's consent was obtained including but not limited to risks of crusting, scabbing, blistering, scarring, darker or lighter pigmentary change, recurrence, incomplete removal and infection.
Post-Care Instructions: I reviewed with the patient in detail post-care instructions. Patient is to wear sunprotection, and avoid picking at any of the treated lesions. Pt may apply Vaseline to crusted or scabbing areas.

## 2023-06-05 NOTE — PROCEDURE: COUNSELING
08/26/19 1700   Visit Type   Visit Type Initial   General Information   Start of Care Date 08/26/19   Referring Physician Dr. Gómez   Orders Evaluate and Treat as Indicated   Additional Orders SLP   Order Date 08/14/19   Medical Diagnosis Developmental delay, History of neglect in childhood, VLBW baby   Precautions/Limitations no known precautions/limitations   Pertinent history of current problem (include personal factors and/or comorbidities that impact the POC) Priscilla has PMH significant for prematurity believed to be born ~2.5-3 months early, low birth weight, elevated liver tests. She is referred to PT from adoption clinic due to concerns for gross motor delay and poor trunk strength.   Birth/Adoptive history Per chart review, Priscilla was born in [place] in the hospital for 5 days, then in orphanage care and in the same orphange until she was adopted. She appeared well cared for in the orphange and had a caregiver she appeared attached to. She was adopted 2/1/2019   Surgical/Medical history reviewed Yes   Current Community Support Family/friend caregiver   Number of Stairs To Enter Home 3  (Back door)   Number of Stairs Within Home 0   Patient/family goals Progress gross motor skills;Improve mobility/gait  (Increase core strength, decrease stiffness in legs walking)   General Information Comments Priscilla is here with her adoptive parents. She is curious in this new environment and likes music. Priscilla began walking in July 2019, which is delayed for her age   Falls Screen   Are you concerned about your child s balance? Yes   Does your child trip or fall more often than you would expect? Yes   Is your child fearful of falling or hesitant during daily activities? No   Is your child receiving physical therapy services? No   Pain   Patient currently in pain No   Self- Care   Usual Activity Tolerance good   Current Activity Tolerance good   Functional Level Prior   Age appropriate No   Cognition 0 - no cognition issues reported 
"  Cognitive Status Examination   Follows Commands and Answers Questions able to follow single-step instructions   Posture    Posture posture was appropriate   Range of Motion (ROM)   Range of Motion  Range of Motion is functional   Strength   Trunk Strength  Core and trunk weakness noted   Upper Extremity Strength  WFL   Lower Extremity Strength  B glute weakness observed   Muscle Tone Assessment   Muscle Tone Comments Hypotonia of trunk and extremities   Neurological Function   Protective Responses Age appropriate   Righting Reactions Age appropriate   Transfer Skills and Mobility   Bed Mobility Comments Independent with use of hands   Functional Motor Performance Gross Motor Skills   Gross Motor Skills Eval Four Point/Crawling;Half-Kneeling/Kneeling;Squatting;Floor to Stand;Gait   4 Point/Crawling Reciprocal crawl   Half Kneeling/Kneeling Half Kneeling/Kneeling holding onto furniture    Half Kneeling/Kneeling Deficits Unable to Half Kneel/Kneel independently   Squatting Motor Skills Squats independently   Squatting Motor Skills Deficits Lower extremity weakness  (Wide ROGERIO)   Floor to Stand Motor Skills Pulls to stand at furniture;Rises from the floor independently  (Plantigrade)   Gait Motor Skills Walks Without Support;Walks With Push Toy   Gait Motor Skills Deficit/s Foot Pronation;Decreased Balance;Falls Frequenetly;Other (Must comment)  (Stiff legged, high guard, flat footed lacks heel-toe)   Coordination Comments Some difficulty coordinating extremities with ambulation skills   Gross Motor Skill Comments Negotiates 1\" surface change without LOB ~90% of time. Requires 1 HHA or hand on wall to step up surface 2\" or greater in height, does not attempt to step down even with support   Functional Motor Performance-Higher Level Motor Skills   Running Deficit/s unable to run;frequent falls;Wide based;poor arm swing   Jumping Deficit/s unable to jumps up;unable to jump forward;unable to jump down   Stairs "
Upstairs;Downstairs   Upstairs Evaluation Crawls   Downstairs Evaluation Scoots on bottom downstairs   Stairs Deficit/s unable to walk upstairs;unable to walk downstairs   Single :Leg Stance Deficit/s Unable   Ball Skills Kick a ball  (Flings or drops ball)   Ball Skills Deficit/s Other (Must comment)  (Only kicks with R foot)   Trike: Bike Riding, Scooter Deficit/s unable to propels ride on toy   Balance   Balance deficits identified   Balance Deficits Standing balance: static;Standing balance: dynamic   General Therapy Interventions   Planned Therapy Interventions Therapeutic Activities;Therapeutic Procedures;Neuromuscular Re-education;Gait Training;Standardized Testing   Clinical Impression   Criteria for Skilled Therapeutic Interventions Met yes;treatment indicated   PT Diagnosis Gross motor delay, Proximal muscle weakness   Influenced by the following impairments Core weakness, B hip weakness, decreased balance, decreased coordination, impaired gait skills   Functional limitations due to impairments Gross motor delay, frequent falls   Clinical Presentation Stable/Uncomplicated   Clinical Presentation Rationale Pt is in typical state of health   Clinical Decision Making (Complexity) Low complexity   Therapy Frequency 1 time/week   Predicted Duration of Therapy Intervention (days/wks) 2-3 months   Risk & Benefits of therapy have been explained Yes   Patient, Family & other staff in agreement with plan of care Yes   Clinical Impression Comments Priscilla is an adorable 23 month (adjusted 20 month) old female referred to OP PT through [facility] due to concerns for gross motor delay and trunk weakness. Priscilla presents with impairments in proximal muscle strength including trunk and hips, standing balance, coordination and delayed gait skills for age. She currently functions in the 5-7th percentile for gross motor development according to PDMS2 testing. She will benefit from skilled OP PT intervention in order to address these 
"deficits and progress functional gross motor and mobility skills in an optimal and timely manner in all environments.   Education Assessment   Preferred Learning Style Listening;Demonstration;Pictures/video   Barriers to Learning No barriers   Pediatric Goals   PT Pediatric Goals 1;2;3;4;5   Goal 1   Goal Identifier Ambulation with balance challenges   Goal Description Priscilla will successfully ambulate 100' IND including negotiation of turns, obstacles and surface changes up to 3\" height without UE support/high guard required or LOB, 2x/session demonstrating improved functional dynamic balance and gait skills to decrease fall risk   Target Date 11/23/19   Goal 2   Goal Identifier Running   Goal Description Priscilla will be able to run >10' with arm swing and reciprocal strides on static indoor terrain without LOB, 2x/session demonstrating improved motor coordination and strength in prep for peer physical activities   Target Date 11/23/19   Goal 3   Goal Identifier Vertical Jump   Goal Description Priscilla will demontrate 1 vertical jump with B foot clearance given 2 HHA/Min A, demonstrating improved LE strength for progression of age appropriate gross motor skills   Target Date 11/23/19   Goal 4   Goal Identifier Stairs   Goal Description Priscilla will negotiate 4 stairs in upright posture using 1 rail or 1 HHA, non-reciprocal pattern, demonstrating improved core and LE strength for progression of stair negotiation skills for age   Target Date 11/23/19   Goal 5   Goal Identifier HEP   Goal Description Priscilla's parents will demonstrate full and complete understanding of HEP recommendations to progress skills and carry-over to home and community environments   Target Date   (ongoing goal)   Total Evaluation Time   PT Gibran, Low Complexity Minutes (44062) 10     Thank you for referring Priscilla An to outpatient pediatric physical therapy services at the [facility]. Please do not hesitate to contact me with any "
questions at [phone] or through email at [email or web address].    Gisselle Clark, PT, DPT  Pediatric Physical Therapist  [facility]  
Detail Level: Detailed
Detail Level: Generalized
Detail Level: Zone

## 2023-09-27 PROBLEM — I87.2 VENOUS INSUFFICIENCY: Status: ACTIVE | Noted: 2023-09-27

## 2023-10-12 ENCOUNTER — HOSPITAL ENCOUNTER (OUTPATIENT)
Dept: LAB | Facility: MEDICAL CENTER | Age: 78
End: 2023-10-12
Attending: INTERNAL MEDICINE
Payer: MEDICARE

## 2023-10-12 LAB
25(OH)D3 SERPL-MCNC: 63 NG/ML (ref 30–100)
ANION GAP SERPL CALC-SCNC: 9 MMOL/L (ref 7–16)
BUN SERPL-MCNC: 15 MG/DL (ref 8–22)
CALCIUM SERPL-MCNC: 9.7 MG/DL (ref 8.4–10.2)
CHLORIDE SERPL-SCNC: 98 MMOL/L (ref 96–112)
CO2 SERPL-SCNC: 27 MMOL/L (ref 20–33)
CREAT SERPL-MCNC: 0.78 MG/DL (ref 0.5–1.4)
FASTING STATUS PATIENT QL REPORTED: NORMAL
GFR SERPLBLD CREATININE-BSD FMLA CKD-EPI: 78 ML/MIN/1.73 M 2
GLUCOSE SERPL-MCNC: 98 MG/DL (ref 65–99)
POTASSIUM SERPL-SCNC: 4.3 MMOL/L (ref 3.6–5.5)
SODIUM SERPL-SCNC: 134 MMOL/L (ref 135–145)
T3FREE SERPL-MCNC: 2.54 PG/ML (ref 2–4.4)
T4 FREE SERPL-MCNC: 1.36 NG/DL (ref 0.93–1.7)
TSH SERPL DL<=0.005 MIU/L-ACNC: 1.43 UIU/ML (ref 0.38–5.33)
VIT B12 SERPL-MCNC: 1674 PG/ML (ref 211–911)

## 2023-10-12 PROCEDURE — 82306 VITAMIN D 25 HYDROXY: CPT

## 2023-10-12 PROCEDURE — 84439 ASSAY OF FREE THYROXINE: CPT

## 2023-10-12 PROCEDURE — 80048 BASIC METABOLIC PNL TOTAL CA: CPT

## 2023-10-12 PROCEDURE — 84481 FREE ASSAY (FT-3): CPT

## 2023-10-12 PROCEDURE — 84443 ASSAY THYROID STIM HORMONE: CPT

## 2023-10-12 PROCEDURE — 36415 COLL VENOUS BLD VENIPUNCTURE: CPT

## 2023-10-12 PROCEDURE — 82607 VITAMIN B-12: CPT

## 2023-11-29 ENCOUNTER — PATIENT MESSAGE (OUTPATIENT)
Dept: HEALTH INFORMATION MANAGEMENT | Facility: OTHER | Age: 78
End: 2023-11-29

## 2023-12-06 ENCOUNTER — APPOINTMENT (RX ONLY)
Dept: URBAN - METROPOLITAN AREA CLINIC 6 | Facility: CLINIC | Age: 78
Setting detail: DERMATOLOGY
End: 2023-12-06

## 2023-12-06 DIAGNOSIS — Z85.828 PERSONAL HISTORY OF OTHER MALIGNANT NEOPLASM OF SKIN: ICD-10-CM | Status: STABLE

## 2023-12-06 DIAGNOSIS — Z71.89 OTHER SPECIFIED COUNSELING: ICD-10-CM

## 2023-12-06 DIAGNOSIS — D18.0 HEMANGIOMA: ICD-10-CM

## 2023-12-06 DIAGNOSIS — L82.1 OTHER SEBORRHEIC KERATOSIS: ICD-10-CM

## 2023-12-06 DIAGNOSIS — L80 VITILIGO: ICD-10-CM | Status: INADEQUATELY CONTROLLED

## 2023-12-06 DIAGNOSIS — D22 MELANOCYTIC NEVI: ICD-10-CM

## 2023-12-06 DIAGNOSIS — L81.4 OTHER MELANIN HYPERPIGMENTATION: ICD-10-CM

## 2023-12-06 PROBLEM — D18.01 HEMANGIOMA OF SKIN AND SUBCUTANEOUS TISSUE: Status: ACTIVE | Noted: 2023-12-06

## 2023-12-06 PROBLEM — D22.5 MELANOCYTIC NEVI OF TRUNK: Status: ACTIVE | Noted: 2023-12-06

## 2023-12-06 PROCEDURE — ? SUNSCREEN TREATMENT REGIMEN

## 2023-12-06 PROCEDURE — ? COUNSELING

## 2023-12-06 PROCEDURE — ? DIAGNOSIS COMMENT

## 2023-12-06 PROCEDURE — ? PRESCRIPTION

## 2023-12-06 PROCEDURE — ? PRESCRIPTION MEDICATION MANAGEMENT

## 2023-12-06 PROCEDURE — 99214 OFFICE O/P EST MOD 30 MIN: CPT

## 2023-12-06 RX ORDER — RUXOLITINIB 15 MG/G
1 CREAM TOPICAL QD
Qty: 60 | Refills: 6 | Status: ERX

## 2023-12-06 ASSESSMENT — LOCATION ZONE DERM
LOCATION ZONE: HAND
LOCATION ZONE: LEG
LOCATION ZONE: FACE
LOCATION ZONE: TRUNK

## 2023-12-06 ASSESSMENT — LOCATION SIMPLE DESCRIPTION DERM
LOCATION SIMPLE: LEFT FOREHEAD
LOCATION SIMPLE: LEFT HAND
LOCATION SIMPLE: SUPERIOR FOREHEAD
LOCATION SIMPLE: RIGHT POPLITEAL SKIN
LOCATION SIMPLE: UPPER BACK
LOCATION SIMPLE: RIGHT UPPER BACK
LOCATION SIMPLE: ABDOMEN
LOCATION SIMPLE: CHEST
LOCATION SIMPLE: RIGHT HAND
LOCATION SIMPLE: RIGHT PRETIBIAL REGION

## 2023-12-06 ASSESSMENT — LOCATION DETAILED DESCRIPTION DERM
LOCATION DETAILED: RIGHT MEDIAL UPPER BACK
LOCATION DETAILED: RIGHT RADIAL DORSAL HAND
LOCATION DETAILED: MIDDLE STERNUM
LOCATION DETAILED: EPIGASTRIC SKIN
LOCATION DETAILED: SUPERIOR MID FOREHEAD
LOCATION DETAILED: LEFT RADIAL DORSAL HAND
LOCATION DETAILED: PERIUMBILICAL SKIN
LOCATION DETAILED: RIGHT PROXIMAL PRETIBIAL REGION
LOCATION DETAILED: RIGHT POPLITEAL SKIN
LOCATION DETAILED: LEFT INFERIOR FOREHEAD
LOCATION DETAILED: INFERIOR THORACIC SPINE

## 2023-12-06 NOTE — PROCEDURE: PRESCRIPTION MEDICATION MANAGEMENT
Render In Strict Bullet Format?: No
Detail Level: Zone
Plan: Discussed r/b of opzelura.\\nConsider tacrolimus 0.1 % topical ointment BID.
Initiate Treatment: Opzelura

## 2023-12-06 NOTE — PROCEDURE: DIAGNOSIS COMMENT
Comment: affects sensitive areas/affects life - wants to treat face, and need to avoid topical steroids given areas involved
Detail Level: Simple
Render Risk Assessment In Note?: no

## 2024-03-23 DIAGNOSIS — Z00.00 PREVENTATIVE HEALTH CARE: Chronic | ICD-10-CM

## 2024-03-23 PROBLEM — Z87.19 HISTORY OF GASTROESOPHAGEAL REFLUX (GERD): Status: ACTIVE | Noted: 2020-06-18

## 2024-03-23 SDOH — ECONOMIC STABILITY: HOUSING INSECURITY: IN THE LAST 12 MONTHS, HOW MANY PLACES HAVE YOU LIVED?: 1

## 2024-03-23 SDOH — ECONOMIC STABILITY: INCOME INSECURITY: IN THE LAST 12 MONTHS, WAS THERE A TIME WHEN YOU WERE NOT ABLE TO PAY THE MORTGAGE OR RENT ON TIME?: NO

## 2024-03-23 SDOH — ECONOMIC STABILITY: FOOD INSECURITY: WITHIN THE PAST 12 MONTHS, YOU WORRIED THAT YOUR FOOD WOULD RUN OUT BEFORE YOU GOT MONEY TO BUY MORE.: NEVER TRUE

## 2024-03-23 SDOH — ECONOMIC STABILITY: FOOD INSECURITY: WITHIN THE PAST 12 MONTHS, THE FOOD YOU BOUGHT JUST DIDN'T LAST AND YOU DIDN'T HAVE MONEY TO GET MORE.: NEVER TRUE

## 2024-03-23 SDOH — HEALTH STABILITY: PHYSICAL HEALTH: ON AVERAGE, HOW MANY MINUTES DO YOU ENGAGE IN EXERCISE AT THIS LEVEL?: 60 MIN

## 2024-03-23 SDOH — ECONOMIC STABILITY: INCOME INSECURITY: HOW HARD IS IT FOR YOU TO PAY FOR THE VERY BASICS LIKE FOOD, HOUSING, MEDICAL CARE, AND HEATING?: NOT HARD AT ALL

## 2024-03-23 SDOH — HEALTH STABILITY: PHYSICAL HEALTH: ON AVERAGE, HOW MANY DAYS PER WEEK DO YOU ENGAGE IN MODERATE TO STRENUOUS EXERCISE (LIKE A BRISK WALK)?: 5 DAYS

## 2024-03-23 ASSESSMENT — SOCIAL DETERMINANTS OF HEALTH (SDOH)
HOW OFTEN DO YOU ATTEND CHURCH OR RELIGIOUS SERVICES?: NEVER
HOW HARD IS IT FOR YOU TO PAY FOR THE VERY BASICS LIKE FOOD, HOUSING, MEDICAL CARE, AND HEATING?: NOT HARD AT ALL
HOW OFTEN DO YOU ATTEND CHURCH OR RELIGIOUS SERVICES?: NEVER
WITHIN THE PAST 12 MONTHS, YOU WORRIED THAT YOUR FOOD WOULD RUN OUT BEFORE YOU GOT THE MONEY TO BUY MORE: NEVER TRUE
DO YOU BELONG TO ANY CLUBS OR ORGANIZATIONS SUCH AS CHURCH GROUPS UNIONS, FRATERNAL OR ATHLETIC GROUPS, OR SCHOOL GROUPS?: YES
HOW MANY DRINKS CONTAINING ALCOHOL DO YOU HAVE ON A TYPICAL DAY WHEN YOU ARE DRINKING: 1 OR 2
IN A TYPICAL WEEK, HOW MANY TIMES DO YOU TALK ON THE PHONE WITH FAMILY, FRIENDS, OR NEIGHBORS?: MORE THAN THREE TIMES A WEEK
IN A TYPICAL WEEK, HOW MANY TIMES DO YOU TALK ON THE PHONE WITH FAMILY, FRIENDS, OR NEIGHBORS?: MORE THAN THREE TIMES A WEEK
HOW OFTEN DO YOU ATTENT MEETINGS OF THE CLUB OR ORGANIZATION YOU BELONG TO?: 1 TO 4 TIMES PER YEAR
HOW OFTEN DO YOU HAVE SIX OR MORE DRINKS ON ONE OCCASION: NEVER
HOW OFTEN DO YOU GET TOGETHER WITH FRIENDS OR RELATIVES?: MORE THAN THREE TIMES A WEEK
HOW OFTEN DO YOU GET TOGETHER WITH FRIENDS OR RELATIVES?: MORE THAN THREE TIMES A WEEK
HOW OFTEN DO YOU ATTENT MEETINGS OF THE CLUB OR ORGANIZATION YOU BELONG TO?: 1 TO 4 TIMES PER YEAR
HOW OFTEN DO YOU HAVE A DRINK CONTAINING ALCOHOL: 4 OR MORE TIMES A WEEK
DO YOU BELONG TO ANY CLUBS OR ORGANIZATIONS SUCH AS CHURCH GROUPS UNIONS, FRATERNAL OR ATHLETIC GROUPS, OR SCHOOL GROUPS?: YES

## 2024-03-23 ASSESSMENT — LIFESTYLE VARIABLES
SKIP TO QUESTIONS 9-10: 1
HOW MANY STANDARD DRINKS CONTAINING ALCOHOL DO YOU HAVE ON A TYPICAL DAY: 1 OR 2
HOW OFTEN DO YOU HAVE A DRINK CONTAINING ALCOHOL: 4 OR MORE TIMES A WEEK
HOW OFTEN DO YOU HAVE SIX OR MORE DRINKS ON ONE OCCASION: NEVER
AUDIT-C TOTAL SCORE: 4

## 2024-03-25 ENCOUNTER — OFFICE VISIT (OUTPATIENT)
Dept: MEDICAL GROUP | Facility: MEDICAL CENTER | Age: 79
End: 2024-03-25
Payer: MEDICARE

## 2024-03-25 ENCOUNTER — HOSPITAL ENCOUNTER (OUTPATIENT)
Dept: LAB | Facility: MEDICAL CENTER | Age: 79
End: 2024-03-25
Attending: INTERNAL MEDICINE
Payer: MEDICARE

## 2024-03-25 ENCOUNTER — TELEPHONE (OUTPATIENT)
Dept: MEDICAL GROUP | Facility: MEDICAL CENTER | Age: 79
End: 2024-03-25

## 2024-03-25 VITALS
TEMPERATURE: 97.3 F | BODY MASS INDEX: 24.1 KG/M2 | HEIGHT: 63 IN | OXYGEN SATURATION: 97 % | RESPIRATION RATE: 16 BRPM | HEART RATE: 70 BPM | SYSTOLIC BLOOD PRESSURE: 106 MMHG | DIASTOLIC BLOOD PRESSURE: 58 MMHG | WEIGHT: 136 LBS

## 2024-03-25 DIAGNOSIS — R73.09 IMPAIRED GLUCOSE METABOLISM: ICD-10-CM

## 2024-03-25 DIAGNOSIS — G25.81 RESTLESS LEG SYNDROME: ICD-10-CM

## 2024-03-25 DIAGNOSIS — E55.9 VITAMIN D DEFICIENCY: ICD-10-CM

## 2024-03-25 DIAGNOSIS — M81.0 POSTMENOPAUSAL BONE LOSS: ICD-10-CM

## 2024-03-25 DIAGNOSIS — D70.9 NEUTROPENIA, UNSPECIFIED TYPE (HCC): ICD-10-CM

## 2024-03-25 DIAGNOSIS — Z00.00 MEDICARE ANNUAL WELLNESS VISIT, SUBSEQUENT: ICD-10-CM

## 2024-03-25 DIAGNOSIS — E78.5 DYSLIPIDEMIA: Chronic | ICD-10-CM

## 2024-03-25 DIAGNOSIS — Z12.31 ENCOUNTER FOR SCREENING MAMMOGRAM FOR BREAST CANCER: ICD-10-CM

## 2024-03-25 LAB
ALBUMIN SERPL BCP-MCNC: 4.7 G/DL (ref 3.2–4.9)
ALBUMIN/GLOB SERPL: 1.6 G/DL
ALP SERPL-CCNC: 68 U/L (ref 30–99)
ALT SERPL-CCNC: 27 U/L (ref 2–50)
ANION GAP SERPL CALC-SCNC: 10 MMOL/L (ref 7–16)
AST SERPL-CCNC: 27 U/L (ref 12–45)
BASOPHILS # BLD AUTO: 0.7 % (ref 0–1.8)
BASOPHILS # BLD: 0.03 K/UL (ref 0–0.12)
BILIRUB SERPL-MCNC: 0.6 MG/DL (ref 0.1–1.5)
BUN SERPL-MCNC: 15 MG/DL (ref 8–22)
CALCIUM ALBUM COR SERPL-MCNC: 9.8 MG/DL (ref 8.5–10.5)
CALCIUM SERPL-MCNC: 10.4 MG/DL (ref 8.4–10.2)
CHLORIDE SERPL-SCNC: 100 MMOL/L (ref 96–112)
CHOLEST SERPL-MCNC: 225 MG/DL (ref 100–199)
CO2 SERPL-SCNC: 27 MMOL/L (ref 20–33)
CREAT SERPL-MCNC: 0.69 MG/DL (ref 0.5–1.4)
EOSINOPHIL # BLD AUTO: 0.05 K/UL (ref 0–0.51)
EOSINOPHIL NFR BLD: 1.1 % (ref 0–6.9)
ERYTHROCYTE [DISTWIDTH] IN BLOOD BY AUTOMATED COUNT: 44.5 FL (ref 35.9–50)
EST. AVERAGE GLUCOSE BLD GHB EST-MCNC: 114 MG/DL
FASTING STATUS PATIENT QL REPORTED: NORMAL
GFR SERPLBLD CREATININE-BSD FMLA CKD-EPI: 88 ML/MIN/1.73 M 2
GLOBULIN SER CALC-MCNC: 2.9 G/DL (ref 1.9–3.5)
GLUCOSE SERPL-MCNC: 104 MG/DL (ref 65–99)
HBA1C MFR BLD: 5.6 % (ref 4–5.6)
HCT VFR BLD AUTO: 48.7 % (ref 37–47)
HDLC SERPL-MCNC: 106 MG/DL
HGB BLD-MCNC: 16.5 G/DL (ref 12–16)
IMM GRANULOCYTES # BLD AUTO: 0.01 K/UL (ref 0–0.11)
IMM GRANULOCYTES NFR BLD AUTO: 0.2 % (ref 0–0.9)
LDLC SERPL CALC-MCNC: 106 MG/DL
LYMPHOCYTES # BLD AUTO: 0.78 K/UL (ref 1–4.8)
LYMPHOCYTES NFR BLD: 17.4 % (ref 22–41)
MCH RBC QN AUTO: 31 PG (ref 27–33)
MCHC RBC AUTO-ENTMCNC: 33.9 G/DL (ref 32.2–35.5)
MCV RBC AUTO: 91.4 FL (ref 81.4–97.8)
MONOCYTES # BLD AUTO: 0.3 K/UL (ref 0–0.85)
MONOCYTES NFR BLD AUTO: 6.7 % (ref 0–13.4)
NEUTROPHILS # BLD AUTO: 3.31 K/UL (ref 1.82–7.42)
NEUTROPHILS NFR BLD: 73.9 % (ref 44–72)
NRBC # BLD AUTO: 0 K/UL
NRBC BLD-RTO: 0 /100 WBC (ref 0–0.2)
PLATELET # BLD AUTO: 221 K/UL (ref 164–446)
PMV BLD AUTO: 10 FL (ref 9–12.9)
POTASSIUM SERPL-SCNC: 4.6 MMOL/L (ref 3.6–5.5)
PROT SERPL-MCNC: 7.6 G/DL (ref 6–8.2)
RBC # BLD AUTO: 5.33 M/UL (ref 4.2–5.4)
SODIUM SERPL-SCNC: 137 MMOL/L (ref 135–145)
TRIGL SERPL-MCNC: 64 MG/DL (ref 0–149)
WBC # BLD AUTO: 4.5 K/UL (ref 4.8–10.8)

## 2024-03-25 PROCEDURE — 83036 HEMOGLOBIN GLYCOSYLATED A1C: CPT | Mod: GA

## 2024-03-25 PROCEDURE — 80053 COMPREHEN METABOLIC PANEL: CPT

## 2024-03-25 PROCEDURE — G0439 PPPS, SUBSEQ VISIT: HCPCS | Performed by: INTERNAL MEDICINE

## 2024-03-25 PROCEDURE — 3074F SYST BP LT 130 MM HG: CPT | Performed by: INTERNAL MEDICINE

## 2024-03-25 PROCEDURE — 3078F DIAST BP <80 MM HG: CPT | Performed by: INTERNAL MEDICINE

## 2024-03-25 PROCEDURE — 36415 COLL VENOUS BLD VENIPUNCTURE: CPT | Mod: GA

## 2024-03-25 PROCEDURE — 80061 LIPID PANEL: CPT

## 2024-03-25 PROCEDURE — 85025 COMPLETE CBC W/AUTO DIFF WBC: CPT

## 2024-03-25 ASSESSMENT — FIBROSIS 4 INDEX: FIB4 SCORE: 1.63

## 2024-03-25 ASSESSMENT — ENCOUNTER SYMPTOMS: GENERAL WELL-BEING: GOOD

## 2024-03-25 ASSESSMENT — PATIENT HEALTH QUESTIONNAIRE - PHQ9: CLINICAL INTERPRETATION OF PHQ2 SCORE: 0

## 2024-03-25 ASSESSMENT — ACTIVITIES OF DAILY LIVING (ADL): BATHING_REQUIRES_ASSISTANCE: 0

## 2024-03-25 NOTE — LETTER
[facility]  Eron Flowers M.D.  [address]  [place] [address]  Fax: [phone]   Authorization for Release/Disclosure of   Protected Health Information   Name: SAMARA CISNEROS : 1945 SSN: [id number]   Address: [address] [place] [address] Phone:    [phone] (home)    I authorize the entity listed below to release/disclose the PHI below to:   [facility]/Eron Flowers M.D. and Eron Flowers M.D.   Provider or Entity Name:                                               [facility]   Address   City, State, [facility]   Phone:      Fax:                [phone]   Reason for request: continuity of care   Information to be released: Past 12 mo of records please   [  ] LAST COLONOSCOPY,  including any PATH REPORT and follow-up  [  ] LAST FIT/COLOGUARD RESULT [  ] LAST DEXA  [  ] LAST MAMMOGRAM  [  ] LAST PAP  [  ] LAST LABS [  ] RETINA EXAM REPORT  [  ] IMMUNIZATION RECORDS  [ x ] Release all info      [  ] Check here and initial the line next to each item to release ALL health information INCLUDING  _____ Care and treatment for drug and / or alcohol abuse  _____ HIV testing, infection status, or AIDS  _____ Genetic Testing    DATES OF SERVICE OR TIME PERIOD TO BE DISCLOSED: _____________  I understand and acknowledge that:  * This Authorization may be revoked at any time by you in writing, except if your health information has already been used or disclosed.  * Your health information that will be used or disclosed as a result of you signing this authorization could be re-disclosed by the recipient. If this occurs, your re-disclosed health information may no longer be protected by State or Federal laws.  * You may refuse to sign this Authorization. Your refusal will not affect your ability to obtain treatment.  * This Authorization becomes effective upon signing and will  on (date) __________.      If no date is indicated, this Authorization will   one (1) year from the signature date.    Name: Bernadettemanny Storey Humberto  Signature:                      Continuity of Care Date:   3/25/2024     PLEASE FAX REQUESTED RECORDS BACK TO: [phone]

## 2024-03-25 NOTE — PROGRESS NOTES
Lisa Paul is a 78 y.o. female who presents with medicare wellness assessment          HPI          Medicare wellness assessment  Current supplements: Reviewed  Chronic narcotic pain medicines: No  Allergies:  reviewed  Medications, allergies, medical history, surgical history, social history, family history  reviewed and updated   Sees  annually driving glasses   Sees  at [facility]  Sees [facility]  Sees [facility]   Dental exam twice per year  Does pickle ball three days per week 2 hours at a time during the winter, and does golf three times per week during the summer and off days will walk for one hour, no falls, also does weights as well  Liquid iv daily, 2 cups of coffee daily, no soda, etoh one nightly  Eats healthy and mostly healthy chicken, fish and salmon, no sweets  Gets good sleep at night typically going to bed 9 to 9:30 at night getting up at 6 or 630.  No regular nocturia, even if she does get up at night to urinate, she can get back to sleep without difficulty.  No naps.  No change in memory or mood.  Patient does have advanced directives, we just do not have a copy in her chart      Current Outpatient Medications   Medication Sig Dispense Refill    simvastatin (ZOCOR) 20 MG Tab TAKE 1 TABLET BY MOUTH EVERY EVENING. 90 Tablet 3    VITAMIN D PO Take  by mouth.      Coenzyme Q10 (COQ10) 100 MG Cap       Zinc 10 MG Lozenge       CALCIUM PO       polyethylene glycol/lytes (MIRALAX) 17 g Pack Take 17 g by mouth every day.      levothyroxine (SYNTHROID) 50 MCG Tab Take 50 mcg by mouth every morning on an empty stomach. Pt takes 100 MCG only on Sunday  Al other days 50 MCG       Cyanocobalamin (VITAMIN B 12 PO) Take 1 tablet by mouth every day.      Ascorbic Acid (VITAMIN C PO) Take 1 tablet by mouth every day.      Probiotic Product (PROBIOTIC PO) Take 1 capsule by mouth every evening.       No current facility-administered  medications for this visit.         Adenoma  2/11 colon per [facility] adenoma   3/16/16 colon per [facility] tortuous colon, melanosis in colon, repeat colonoscopy 5 years for surveillance  3/24/21 colon per [facility] negative, no further surveillance colonoscopy necessary      Atypical chest pain  10/29/19 chest burning sensation, EKG negative, stress test ordered   10/30/19 echo normal LV function EF 60%, normal diastolic function, mild aortic insufficiency, normal right LV size and function, RSVP 25  11/12/19 myocardial perfusion study no evidence of ischemia, EF 79%     Dyslipidemia  11/08 chol 210,trig 45,hdl 95,ldl 97  6/09 chol 271,trig 113,hdl 74,ldl 163 restart on zocor 40 (1/2)  5/05 aorta abd u/s negative  11/08 p.thallium no ischemia EF 87%  2/09 ERIC negative  2/10 chol 205,trig 55,hdl 100,ldl 94  1/11 chol 197,trig 66,hdl 100,ldl 84 on zocor 20 mg  2/12 chol 205,trig 43,hdl 106,ldl 90 on zocor 20 mg  2/12/13 chol 216,trig 58,hdl 114,ldl 90 on zocor 20 mg  2/13/14 chol 195,trig 30,hdl 106,ldl 83 on zocor 20 mg  3/12/15 chol 205,trig 41,hdl 99,ldl 98 on zocor 20 mg  3/15/16 chol 260,trig 77,hdl 102,ldl 143 on zocor 20 mg repeat lipid panel 2 months  5/25/16 chol 243,trig 51,hdl 104,ldl 129 on zocor 20 mg  2/7/17 chol 203,trig 66,hdl 105,ldl 85 on zocor 20 mg  9/20/17 chol 211,trig 71,hdl 104,ldl 93 on zocor 20 mg  9/10/18 chol 218,trig 48,hdl 106,ldl 102 on zocor 20 mg  11/12/19 myocardial perfusion study no evidence of ischemia, EF 79%  1/9/20 off zocor   6/20/20 chol 172,trig 41,hdl 99,ldl 65 on zocor 20 mg  2/3/22 chol 201,trig 56,hdl 91,ldl 99 on zocor 20 mg  3/7/23 chol 220,trig 52,hdl 100,ldl 110 on zocor 20 mg     History ankle fracture  5/15/20 [facility] orthopedic note x-ray right ankle nondisplaced oblique distal fibula fracture lowry B, placed in walking boot, follow-up 1 week  5/26/20  orthopedic note follow-up right ankle fracture, x-rays demonstrate nondisplaced right lateral malleolus fracture, ASO  ankle lacer for stability follow-up 4 weeks  6/16/20  orthopedic note closed treatment ankle fracture 5/15/20 x-ray right ankle healed malleolus fracture, proceed with aggressive physical therapy follow-up 6 weeks     History of bilateral pleural effusion  12/4/19 hospital admission, 12/9/19 hospital discharge, admission for fevers and chills while on home infusion IV daptomycin for septic arthritis MSSA    12/4/19 chest x-ray negative  12/5/19  infectious disease hospital consultation recommend NICOLE, repeat blood cultures negative thus far, hold off on discontinuation of PICC line until NICOLE obtained, changed to cefazolin from daptomycin  12/6/19 transesophageal echo structurally normal heart valves  12/6/19 infectious disease hospital note repeat blood cultures negative  12/6/19 PICC line removed, catheter tip culture negative, blood cultures no growth to date  12/6/19 CT abdomen pelvis with contrast, minimal right lower lobe atelectasis or pneumonia, small bilateral pleural effusions, no loculated fluid collection  12/7/19 CT chest with; trace amounts dependent bilateral pleural effusion, interstitial groundglass opacities  12/8/19 infectious disease hospital note cough ongoing prior to initiation of daptomycin, less likely daptomycin induced eosinophilic pneumonia, will not restart daptomycin, continue cefazolin with end-date 12/15/19, follow-up pulmonary  12/8/19  pulmonary hospital consultation bilateral pleural effusion confirmed on CT, too small for bedside thoracentesis, given acute sickness likely related to hypoalbuminemia versus volume overload status post resuscitation, cannot rule out parapneumonic effusion but clinical picture with negative procalcitonin is not suggestive of this, recommend gentle diuresis, outpatient follow-up CT thorax 4 to 6 weeks, regarding cough differential includes reflux, asthma, volume overload, interstitial lung disease, postnasal drip,  eosinophilic granuloma, nonasthmatic eosinophilic bronchitis, given eosinophilia to differentiate would need full pulmonary function test with methacholine challenge, which can be done as an outpatient if cough persists, repeat CT scan as above (if groundglass opacities persist will need further diagnostic intervention bronchoscopy), check REGGIE, rheumatoid factor, IgE level  12/8/19 REGGIE negative,CCP 6,RF negative  12/9/19 hospital discharge per infectious disease recommendation continue cefazolin through 12/15/15 through peripheral IV, follow-up infectious disease outpatient pulmonary outpatient  12/10/19 ESR 70,CRP 3.9,wbc 5.6,hgb 12,hct 36  12/11/19 referral pulmonary placed, repeat CT high-resolution thorax ordered 4 to 6 weeks  12/17/19 infectious disease note transition IV antibiotics to doxycycline  12/18/19 pulmonary note order CT high resolution thorax  1/21/20 CT thorax high resolution; interval resolution of bilateral subpleural interstitial opacities with hazy groundglass opacities, minimal streaky linear opacity in the lingula may represent subsegmental atelectasis or scar  2/10/20  pulmon note resolution of pleural effusion on follow-up CT scan, suspect pleural effusion and subpleural reticular thickening was related to sepsis induced lung injury from volume overload, follow-up as needed     history epistaxis  9/7/22  ENT note epistaxis, prominent vessel right cauterized today, provided neosporin twice a day x10 days, follow-up as needed    history of GERD  11/4/19 [facility] note burning sensation controlled, start pantoprazole 20 mg, labs ordered     History MSSA bacteremia  11/17/19  infectious disease hospital consultation admitted 11/15/19 right knee pain after treadmill stress test, seen by orthopedics and had arthrocentesis performed, sent to emergency room with increased cell count on arthrocentesis, presentation ER WBC 17.8, ESR 44, went to the operating room for incision and  drainage right septic knee positive staphylococcus aureus MSSA, blood cultures positive for staphylococcus aureus MSSA  11/18/19 echo EF 60% no evidence of endocarditis  11/20/19 ultrasound-guided catheter insertion for intravenous antibiotics  11/22/19 discharged home IV daptomycin and date 12/15/19, follow-up once a week infusion center, follow-up infectious disease 2 weeks  12/4/19 hospital admission, 12/9/19 hospital discharge, admission for fevers and chills while on home infusion IV daptomycin for septic arthritis MSSA    12/4/19 chest x-ray negative  12/5/19  infectious disease hospital consultation recommend NICOLE, repeat blood cultures negative thus far, hold off on discontinuation of PICC line until NICOLE obtained, changed to cefazolin from daptomycin  12/6/19 transesophageal echo structurally normal heart valves  12/6/19 infectious disease hospital note repeat blood cultures negative  12/6/19 PICC line removed, catheter tip culture negative, blood cultures no growth to date  12/6/19 CT abdomen pelvis with contrast, minimal right lower lobe atelectasis or pneumonia, small bilateral pleural effusions, no loculated fluid collection  12/7/19 CT chest with; trace amounts dependent bilateral pleural effusion, interstitial groundglass opacities  12/8/19 infectious disease hospital note cough ongoing prior to initiation of daptomycin, less likely daptomycin induced eosinophilic pneumonia, will not restart daptomycin, continue cefazolin with end-date 12/15/19, follow-up pulmonary  12/8/19  pulmonary hospital consultation bilateral pleural effusion confirmed on CT, too small for bedside thoracentesis, given acute sickness likely related to hypoalbuminemia versus volume overload status post resuscitation, cannot rule out parapneumonic effusion but clinical picture with negative procalcitonin is not suggestive of this, recommend gentle diuresis, outpatient follow-up CT thorax 4 to 6 weeks, regarding  cough differential includes reflux, asthma, volume overload, interstitial lung disease, postnasal drip, eosinophilic granuloma, nonasthmatic eosinophilic bronchitis, given eosinophilia to differentiate would need full pulmonary function test with methacholine challenge, which can be done as an outpatient if cough persists, repeat CT scan as above (if groundglass opacities persist will need further diagnostic intervention bronchoscopy), check REGGIE, rheumatoid factor, IgE level  12/8/19 REGGIE negative,CCP 6,RF negative  12/9/19 hospital discharge per infectious disease recommendation continue cefazolin through 12/15/15 through peripheral IV, follow-up infectious disease outpatient pulmonary outpatient  12/10/19 ESR 70,CRP 3.9,wbc 5.6,hgb 12,hct 36  12/11/19 referral pulmonary placed, repeat CT high-resolution thorax ordered 4 to 6 weeks  12/17/19 infectious disease note transition IV antibiotics to doxycycline  12/18/19 pulmonary note order CT high resolution thorax  1/21/20 CT thorax high resolution; interval resolution of bilateral subpleural interstitial opacities with hazy groundglass opacities, minimal streaky linear opacity in the lingula may represent subsegmental atelectasis or scar  1/22/20 wbc 3.2 (62%N,27%L), ESR 7, CRP 0.13 off antibiotics since 1/16/20  2/10/20  pulmon note resolution of pleural effusion on follow-up CT scan, suspect pleural effusion and subpleural reticular thickening was related to sepsis induced lung injury from volume overload, follow-up as needed      History septic joint right knee  11/17/19  infectious disease hospital consultation admitted 11/15/19 right knee pain after treadmill stress test, seen by orthopedics and had arthrocentesis performed, sent to emergency room with increased cell count on arthrocentesis, presentation ER WBC 17.8, ESR 44, went to the operating room for incision and drainage right septic knee positive staphylococcus aureus MSSA, blood cultures  positive for staphylococcus aureus MSSA  11/18/19 echo EF 60% no evidence of endocarditis  11/20/19 ultrasound-guided catheter insertion for intravenous antibiotics  11/22/19 discharged home IV daptomycin and date 12/15/19, follow-up once a week infusion center, follow-up infectious disease 2 weeks  12/4/19 hospital admission, 12/9/19 hospital discharge, admission for fevers and chills while on home infusion IV daptomycin for septic arthritis MSSA    12/4/19 chest x-ray negative  12/5/19  infectious disease [facility] [place] consultation recommend NICOLE, repeat blood cultures negative thus far, hold off on discontinuation of PICC line until NICOLE obtained, changed to cefazolin from daptomycin  12/6/19 transesophageal echo structurally normal heart valves  12/6/19 infectious [facility] hospital note repeat blood cultures negative  12/6/19 PICC line removed, catheter tip culture negative, blood cultures no growth to date  12/6/19 CT abdomen pelvis with contrast, minimal right lower lobe atelectasis or pneumonia, small bilateral pleural effusions, no loculated fluid collection  12/7/19 CT chest with; trace amounts dependent bilateral pleural effusion, interstitial groundglass opacities  12/8/19 infectious disease hospital note cough ongoing prior to initiation of daptomycin, less likely daptomycin induced eosinophilic pneumonia, will not restart daptomycin, continue cefazolin with end-date 12/15/19, follow-up pulmonary  12/8/19  pulmonary hospital consultation bilateral pleural effusion confirmed on CT, too small for bedside thoracentesis, given acute sickness likely related to hypoalbuminemia versus volume overload status post resuscitation, cannot rule out parapneumonic effusion but clinical picture with negative procalcitonin is not suggestive of this, recommend gentle diuresis, outpatient follow-up CT thorax 4 to 6 weeks, regarding cough differential includes reflux, asthma, volume overload, interstitial lung  disease, postnasal drip, eosinophilic granuloma, nonasthmatic eosinophilic bronchitis, given eosinophilia to differentiate would need full pulmonary function test with methacholine challenge, which can be done as an outpatient if cough persists, repeat CT scan as above (if groundglass opacities persist will need further diagnostic intervention bronchoscopy), check REGGIE, rheumatoid factor, IgE level  12/8/19 REGGIE negative,CCP 6,RF negative  12/9/19 hospital discharge per infectious disease recommendation continue cefazolin through 12/15/15 through peripheral IV, follow-up infectious disease outpatient pulmonary outpatient  12/10/19 ESR 70,CRP 3.9,wbc 5.6,hgb 12,hct 36  12/11/19 referral pulmonary placed, repeat CT high-resolution thorax ordered 4 to 6 weeks  12/17/19 infectious disease note transition IV antibiotics to doxycycline  12/18/19 pulmonary note order CT high resolution thorax  1/22/20 wbc 3.2 (62%N,27%L), ESR 7, CRP 0.13 off antibiotics from 1/16/20     history squamous cell skin cancer  6/23/21  skin cancer dermatology note  11/10/21  skin cancer dermatology note  5/4/22  skin cancer dermatology note  5/24/22  skin cancer dermatology note right proximal pretibial region biopsy squamous cell carcinoma     History of vasovagal  5/12 one episode, ekg, echo, holter per [facility] negative, no further workup necessary  10/30/19 echo normal LV function EF 60%, normal diastolic function, mild aortic insufficiency, normal right LV size and function, RSVP 25  11/12/19 myocardial perfusion study no evidence of ischemia, EF 79%     History wrist fracture  12/30/12 left wrist fracture had splint for 6 weeks, no surgery     hyponatremia  2/12/13 Na 136  2/13/14 Na 131  3/15/16 Na 137  5/25/16 Na 134  9/20/17 Na 131  10/2/19 seen ER URI, Na 129 labs ordered to be done in 2 weeks  10/9/19 Na 134, normal cbc,cmp,tsh (consider cortisol, ACTH, HIV, hepatitis serology, REGGIE, B12)   10/10/19 labs  ordered dry mouth, fatigue, hyponatremia, echo ordered murmur, ativan as needed for breakthrough panic attack  10/15/19 serum osm 277 (278-298), urine osm 128 (300-900), urine Na 14, cortisol 9.8, ACTH 15  12/6/19 Na 134  12/10/19 Na 132  1/22/20 Na 133  1/29/20  endocrine note hyponatremia rule out adrenal insufficiency, ACTH stim test  2/4/20 ACTH 14,cortisol 5.4, serum osmolality 282, urine osmolality 344, Na 134  2/4/20 tsh 2.7,free t4 0.83,free t3 2.84  2/12/20 ACTH stim test per endocrinology negative, urine and serum osmolality indicates excess water intake  3/17/20 tsh 3.8,free t4 0.85,free t3 2.6,t3 86,TPO 0.3  3/23/20  endocrine note hyponatremia could be excessive water intake hypothyroid, start synthroid 75 mcg daily follow-up 2 months  5/20/20 tsh 0.547,free t4 1.62,free t3 2.46, Na 133 on synthroid 75 mcg  5/26/20  endocrine note recent Na 133, monitor sodium, try liquid IV mixed once taking 16 ounce water, follow-up 3 months  8/19/20 Na 133   8/28/20  endocrinology note hyponatremia monitor sodium no identified endocrine cause, follow-up 3 months  4/14/21 Na 138, serum osm 284, urine sodium 102 mmol/L, urine osmol 507 mOsm/kg  6/29/21 Na 124  7/14/21 Na 126  7/19/21 Na 129  7/24/21 tsh 1.9, free t4 1.78,free t3 2.43 on levothyroxine 50 mcg daily per  endocrinology   9/20/21  nephrology note hyponatremia, appears euvolemic on exam, likely SIADH, high urine osm 540, urine sodium 102, serum Na improved with water restriction, continue home blood pressure readings, minimize use of NSAIDs, continue work-up for hyponatremia 135  10/15/21 Na 141  2/3/22 Na 135, serum osmolality 284, urine osmolality 434, urine sodium 54  3/1/22 Na 136  3/9/22  endocrinology note hyponatremia, sodium above 130 is acceptable, patient did not tolerate 1 g salt tablet daily due to weight gain, now doing 2 sticks of liquid IV per day (1 stick per 16 hours  1), most recent sodium 135-140  11/16/22 Na 133  1/10/23  endocrinology note on 2 sticks liquid IV daily   3/7/23 Na 135  10/12/23 Na 134  11/22/23  endocrine note continues to use 2 sticks liquid IV daily (1 stick per 16 ounces water)     Hypothyroid  10/15/19 b2 1103,iron and zinc normal,HIV negative, REGGIE, SSA and SSB labs negative  10/29/19 chest burning sensation, EKG negative, stress test ordered   10/30/19 echo normal LV function EF 60%, normal diastolic function, mild aortic insufficiency, normal right LV size and function, RSVP 25  11/12/19 myocardial perfusion study no evidence of ischemia, EF 79%  3/23/20  endocrinology note fatigue tsh 2.7,free t4 0.83,free t3 2.8 start levothyroxine 75 mcg   5/20/20 tsh 0.54 on levothyroxine 75 mcg   6/17/20 tsh 0.156 on levothyroxine 75 mcg   8/19/20 tsh 0.056   8/28/20  endocrinology note because of palpitations at night decrease levothyroxine to 75 mcg 6 days a week, half a tablet on day 7, hyponatremia monitor sodium no identified endocrine cause, follow-up 3 months  7/24/21 tsh 1.9, free t4 1.78,free t3 2.43 on levothyroxine 50 mcg daily per  endocrinology   2/3/22 tsh 1.8 on levothyroxine 50 mgc daily 2 on sunday per endocrinology   3/1/22 tsh 1.3,free t4 1.4,free t3 2.5 on levothyroxine 50 mcg  3/9/22  endocrinology note hypothyroid on levothyroxine 50 mcg    11/16/22 tsh 1.6,,free t4 1.38,free t3 2.56 on levothyroxine 50 mcg  1/10/23  endocrine note on levothyroxine 50 mcg  10/12/23 tsh 1.4,free t4,free t3 2.5 on levothyroxine 50 mcg   11/22/23  endocrine note on levothyroxine 50 mcg      Neutropenia  9/20/17 wbc 4.3  9/10/18 wbc 4.9  12/10/19 wbc 5.6  1/14/20 wbc 3.2  6/17/20 wbc 4.0  6/29/21 wbc 6.6  10/15/21 wbc 11.5  2/3/22 wbc 3.4 (65%N,24%L)  3/7/22 dr.kelly taylor oncology check immunoglobin's, leukemia panel, HIV, for because of lymphopenia  3/9/22 wbc 4.3 (67%N,22%L),  flow cytometry very small lambda restricted partial 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 accounting for 0.3% of leukocytes, no abnormal myeloid, T-cell, or NK cell population identified, while findings may be suggestive of monoclonal B-cell lymphocytosis, clinical correlation required, IgG 1018,IgE 39,IgM<10,IgA 91,HIV negative  8/9/22 wbc 4.5  11/16/22 b12 1294  3/7/23 wbc 4.1     Preventative health  1/19/16 pneumovax   2/10/17 [facility]  9/7/17 tdap  1/7/21 shingrix second  3/24/21 colon per [facility] negative, no further surveillance colonoscopy necessary  3/8/22 dexa LS-0.3,hip-0.9  4/4/23 mammogram   9/13/23 flu  10/12/23 vit d 63 on 2000 units per  endocrine     restless leg  9/27/18 restless leg tried mirapex 0.125 mg no benefit  9/11/18 trial neurontin  6/6/22 resume neurontin 100 mg qpm  3/6/23 off neurontin, but using compression stockings nightly, should symptoms recur consider gabapentin again  3/7/23 A1c 5.5%  3/7/23 iron 62,%sat 21,ferritin 119     Sensorineural hearing loss  1/6/21  audiology note, sensorineural hearing loss candidate for amplification, patient will check with her insurance regarding coverage  1/25/21 [facility] note patient would like to purchase hearing aids private pay not with insurance, her [place] area of [place] regarding [place], ordered resound hearing aids, return for fitting  2/18/21 [facility] hearing is okay with the aids, trouble keeping hearing the right one in place, will change to shortened size wires  2/23/23 [facility] hearing aid check, instructed on how to clean hearing aids    Sleep apnea  8/26/20 pulmonary note moderate risk sleep apnea, stopbang score 4, check on sleep study, follow-up 3 months  9/1/20 sleep study recommended airway pressurization by titration polysomnogram or auto titrating cpap  3/6/23 declines follow up asymptomatic     Status post laparoscopic  cholecystectomy     s/p lumbar surgery  6/20/11 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  2/14/17 [employer] note   6/18/21 [facility] note back pain, trigger point injection with steroids performed  7/17/21 MRI lumbar spine, L1-L2 moderate facet arthropathy, moderate right and moderate to severe left foraminal stenosis, L2-L3 moderate to severe facet arthropathy, moderate left neuroforaminal stenosis, L4-5 moderate disc bulge, severe facet arthropathy, severe central canal stenosis with mild to moderate right foraminal stenosis, no acute abnormality  9/13/21  neurosurgery note, discussed surgical intervention, likely related to L4-L5 level, having tried physical therapy and epidural which was not effective  9/17/21  neurosurgery note, flexion-extension films shows no instability, discussed lumbar decompression with complete laminectomy L5, possible laminectomy L4, she would like to proceed  10/13/21  neurosurgery operative note L4-5 complete laminectomy, L3-S1 bilateral medial facetectomy and bilateral foraminoty  10/26/21  neurosurgery postoperative note follow-up L4-L5 laminectomy, L3-S1 medial facetectomy and foraminotomy, taper gabapentin to 300 mg bid x one week then 300 mg qday x one week  11/29/21 [facility] neurosurgery note postoperative visit, increase activity as tolerated, ultrasound rule out DVT right ankle swelling, refill tizanidine  12/16/21 [facility] neurosurgery note, 6-month postop visit, symptoms of neurogenic claudication have improved  3/21/22 [facility] neurosurgery note status post laminectomy, facetectomy, foraminotomy, improvement in symptoms of claudication only complaint is persistent numbness shocklike distribution bilateral, recommend if no improvement by 1 year check EMG lower extremities  3/22/23 [facility] neurosurgery note symptoms of neurogenic claudication improved, still with some persistent numbness sciatica-like distribution bilateral we will  order EMG nerve conduction study bilateral lower extremities  5/12/23 [facility] nerve conduction study chronic right L5 radiculopathy with incomplete reinnervation, no evidence of mononeuropathy or peripheral neuropathy, possible that chronic L5 radiculopathy contribute to symptoms but will refer to vascular surgery for further evaluation     Status post parathyroidectomy  2005 no old records  11/08 calcium 10.3  2/10 calcium 9.9  1/11 calcium 10.1  2/12 calcium 10, vit d 64  2/13 calcium 10.0  2/13/14 calcium 9.9  3/12/15 calcium 10.3  3/15/16 calcium 10.7  5/25/16 calcium 10.5,ionized 1.3,PTH 26  2/7/17 calcium 10.5  9/10/18 calcium 9.4,vit d 58  10/10/19 calcium 10.3  1/22/20 calcium 10.3  6/17/20 calcium 9.8  8/19/20 calcium 10.2,ionized calcium 1.3,PTH 28,phos 3.3  8/28/20  endocrinology note   2/3/22 calcium 9.9,PTH 26  3/7/23 calcium 10.3    venous insufficiency   9/27/23  [facility] symptomatic varicose veins lower extremities, diagnostic ultrasound demonstrates retrograde flow bilateral great saphenous veins, right calf , having failed 12 weeks of conservative therapy we will arrange for radiofrequency endovenous ablation  11/27/23 [facility] vein note varicose insufficiency status post venous seal right greater saphenous vein, ultrasound demonstrated veins to be occluded with no evidence of deep venous thrombosis, follow-up 6 weeks  1/15/24 [facility] vein [place] note, status post right greater saphenous vein venaseal closure ultrasound venous reveals treated veins to be occluded with no evidence of deep venous thrombosis, follow-up 3 months if cramping begins to worsen left lower extremity will consider venaseal left greater saphenous vein       Patient Active Problem List   Diagnosis    Dyslipidemia    S/p parathyroidectomy    Preventative health care    S/P laparoscopic cholecystectomy    Adenomatous polyp of colon    History of vasovagal syncope    History of wrist  fracture    S/p lumbar surgery    Restless leg syndrome    Hyponatremia    History of MSSA bacteremia    History of bilateral pleural effusion    Cataract    History of gastroesophageal reflux (GERD)    Atypical chest pain    Hypothyroid    Neutropenia (HCC)    History of ankle fracture    Sleep apnea    Sensorineural hearing loss    History of squamous cell carcinoma of skin    History of epistaxis    Venous insufficiency         Screening: Reviewed  Depressive Symptoms: Denies feeling down, depressed or hopeless. Denies loss of interest or pleasure in doing things   ADLs: Denies needing help with using telephone, transportation, shopping, preparing meals, housework, laundry, or managing medication or money.    Independent with bathing, hygiene, feeding, toileting, dressing    Memory concerns: Denies difficulty remembering details of conversations, events and upcoming appointments.  Hearing problems: Denies.   Recent falls no    Current social contact/activities: Reviewed      ROS:    Ostomy or other tubes or amputations no  Chronic oxygen use no     Gait: normal. Uses assistive device :  no       Depression Screening  Little interest or pleasure in doing things?  0 - not at all  Feeling down, depressed , or hopeless? 0 - not at all  Patient Health Questionnaire Score: 0     If depressive symptoms identified deferred to follow up visit unless specifically addressed in assessment and plan.    Interpretation of PHQ-9 Total Score   Score Severity   1-4 No Depression   5-9 Mild Depression   10-14 Moderate Depression   15-19 Moderately Severe Depression   20-27 Severe Depression    Screening for Cognitive Impairment  Do you or any of your friends or family members have any concern about your memory? No  Three Minute Recall (Leader, Season, Table) 3/3    Madi clock face with all 12 numbers and set the hands to show 10 minutes after 11.  Yes    Cognitive concerns identified deferred for follow up unless specifically  addressed in assessment and plan.    Fall Risk Assessment  Has the patient had two or more falls in the last year or any fall with injury in the last year?  No    Safety Assessment  Do you always wear your seatbelt?  Yes  Any changes to home needed to function safely? No  Difficulty hearing.  Yes  Patient counseled about all safety risks that were identified.    Functional Assessment ADLs  Are there any barriers preventing you from cooking for yourself or meeting nutritional needs?  No.    Are there any barriers preventing you from driving safely or obtaining transportation?  No.    Are there any barriers preventing you from using a telephone or calling for help?  No    Are there any barriers preventing you from shopping?  No.    Are there any barriers preventing you from taking care of your own finances?  No    Are there any barriers preventing you from managing your medications?  No    Are there any barriers preventing you from showering, bathing or dressing yourself? No    Are there any barriers preventing you from doing housework or laundry? No  Are there any barriers preventing you from using the toilet?No  Are you currently engaging in any exercise or physical activity?  Yes.      Self-Assessment of Health  What is your perception of your health? Good  Do you sleep more than six hours a night? Yes  In the past 7 days, how much did pain keep you from doing your normal work? None  Do you spend quality time with family or friends (virtually or in person)? Yes  Do you usually eat a heart healthy diet that constists of a variety of fruits, vegetables, whole grains and fiber? Yes  Do you eat foods high in fat and/or Fast Food more than three times per week? No    Advance Care Planning  Do you have an Advance Directive, Living Will, Durable Power of , or POLST?  No                        Patient Care Team:  Eron Flowers M.D. as PCP - General  Ramin Meadows M.D. (Inactive) as Consulting Physician  (Ophthalmology)      Health Care Screening recommendations reviewed with patient today and updated or ordered.  DPA/Advanced directive: Completed/Information provided.   Referrals for PT/OT/Nutrition counseling/Behavioral Health/Smoking cessation as above if indicated  Discussion today about general wellness and lifestyle habits:    Prevent falls and reduce trip hazards;   Have a working fire alarm and carbon monoxide detector;   Engage in regular physical activity and social activities;   Use sun protection when outdoors.       ROS  No chest pain, or shortness of breath with activity  No back pain, hip pain, knee pain with activity, no NSAIDs on a regular basis  No reflux  No change in bowel habits  Nocturia x 1 at most depending on water intake, no incontinence  Restless leg symptoms right side improved after venous ablation therapy by [facility]  No falls  No anxiety or depression  No memory loss         Objective          Physical Exam  Vitals and nursing note reviewed.   Constitutional:       Appearance: Normal appearance.   HENT:      Head: Normocephalic and atraumatic.      Right Ear: External ear normal.      Left Ear: External ear normal.   Eyes:      Conjunctiva/sclera: Conjunctivae normal.   Cardiovascular:      Rate and Rhythm: Normal rate and regular rhythm.      Heart sounds: Normal heart sounds.   Pulmonary:      Effort: Pulmonary effort is normal.      Breath sounds: Normal breath sounds.   Abdominal:      General: There is no distension.   Musculoskeletal:         General: No swelling.      Cervical back: Neck supple.   Skin:     Coloration: Skin is not jaundiced.      Findings: No bruising.   Neurological:      General: No focal deficit present.      Mental Status: She is alert.   Psychiatric:         Mood and Affect: Mood normal.         Behavior: Behavior normal.                             Assessment & Plan        Assessment     #1 Medicare wellness visit    #2 dyslipidemia 3/7/23 chol 220,trig  52,hdl 100,ldl 110 on zocor 20 mg    #3  Hypothyroid on levothyroxine per endocrinology most recent labs, 10/12/23 tsh 1.4,free t4,free t3 2.5 on levothyroxine 50 mcg, most recent office visit with [facility] 11/22/23    #4 History squamous cell skin cancer followed by dermatology uses sunscreen    #5 status post parathyroidectomy, calcium has remained stable last year 10.3    #6 sensorineural hearing loss has hearing aids sees [facility]     #7 neutropenia WBC 4.1 last March stable no recurrent infections    #8 postmenopausal bone loss and vitamin D deficiency    #9 history of restless leg symptoms right lower extremity, improved after right greater saphenous veins here by [facility] in November    #10 preventative health due for mammogram after April 5, also due for bone density, no further colonoscopy necessary            Plan    #1 health maintenance reviewed and updated    #2 Labs    #3 old records [facility], continue sunscreen with SPF with sun exposure    #4 continue levothyroxine per endocrinology    #5  Continue her good nutrition and exercise program    #6 continue Zocor, will update cholesterol labs    #7  Asked patient to bring her advance directive paperwork so we can make a copy    #8 follow-up audiology    #9 recommend she get the COVID-vaccine at the pharmacy but she declines    #10 old records  ophthalmology, old records [facility]    #11 follow-up 1 year

## 2024-03-25 NOTE — LETTER
[facility]  Eron Flowers M.D.  [address]  [place] [address]  Fax: [phone]   Authorization for Release/Disclosure of   Protected Health Information   Name: SAMARA CISNEROS : 1945 SSN: [id number]   Address: [address] [place] [id number] Phone:    [phone] (home)    I authorize the entity listed below to release/disclose the PHI below to:   [facility]/Eron Flowers M.D. and Eron Flowers M.D.   Provider or Entity Name:                                                   Dr Hou (OPHTH)   Address   City, [facility], [facility]   Phone:      Fax:            [phone]   Reason for request: continuity of care   Information to be released:              OLD RECORDS   [  ] LAST COLONOSCOPY,  including any PATH REPORT and follow-up  [  ] LAST FIT/COLOGUARD RESULT [  ] LAST DEXA  [  ] LAST MAMMOGRAM  [  ] LAST PAP  [  ] LAST LABS [  ] RETINA EXAM REPORT  [  ] IMMUNIZATION RECORDS  [ x ] Release all info      [  ] Check here and initial the line next to each item to release ALL health information INCLUDING  _____ Care and treatment for drug and / or alcohol abuse  _____ HIV testing, infection status, or AIDS  _____ Genetic Testing    DATES OF SERVICE OR TIME PERIOD TO BE DISCLOSED: _____________  I understand and acknowledge that:  * This Authorization may be revoked at any time by you in writing, except if your health information has already been used or disclosed.  * Your health information that will be used or disclosed as a result of you signing this authorization could be re-disclosed by the recipient. If this occurs, your re-disclosed health information may no longer be protected by State or Federal laws.  * You may refuse to sign this Authorization. Your refusal will not affect your ability to obtain treatment.  * This Authorization becomes effective upon signing and will  on (date) __________.      If no date is indicated, this Authorization will   one (1) year from the signature date.    Name: Bernadettemanny Storey Humberto  Signature:                    Continuity of Care Date:   3/25/2024     PLEASE FAX REQUESTED RECORDS BACK TO: [phone]

## 2024-03-30 RX ORDER — SIMVASTATIN 20 MG
20 TABLET ORAL EVERY EVENING
Qty: 90 TABLET | Refills: 3 | Status: SHIPPED | OUTPATIENT
Start: 2024-03-30

## 2024-05-08 ENCOUNTER — HOSPITAL ENCOUNTER (OUTPATIENT)
Dept: LAB | Facility: MEDICAL CENTER | Age: 79
End: 2024-05-08
Attending: INTERNAL MEDICINE
Payer: MEDICARE

## 2024-05-08 LAB
25(OH)D3 SERPL-MCNC: 69 NG/ML (ref 30–100)
ANION GAP SERPL CALC-SCNC: 10 MMOL/L (ref 7–16)
BUN SERPL-MCNC: 10 MG/DL (ref 8–22)
CALCIUM SERPL-MCNC: 9.8 MG/DL (ref 8.4–10.2)
CHLORIDE SERPL-SCNC: 96 MMOL/L (ref 96–112)
CO2 SERPL-SCNC: 26 MMOL/L (ref 20–33)
CREAT SERPL-MCNC: 0.78 MG/DL (ref 0.5–1.4)
FASTING STATUS PATIENT QL REPORTED: NORMAL
GFR SERPLBLD CREATININE-BSD FMLA CKD-EPI: 77 ML/MIN/1.73 M 2
GLUCOSE SERPL-MCNC: 98 MG/DL (ref 65–99)
POTASSIUM SERPL-SCNC: 4.7 MMOL/L (ref 3.6–5.5)
SODIUM SERPL-SCNC: 132 MMOL/L (ref 135–145)
T3FREE SERPL-MCNC: 2.2 PG/ML (ref 2–4.4)
T4 FREE SERPL-MCNC: 1.51 NG/DL (ref 0.93–1.7)
TSH SERPL DL<=0.005 MIU/L-ACNC: 1.59 UIU/ML (ref 0.38–5.33)
VIT B12 SERPL-MCNC: 1340 PG/ML (ref 211–911)

## 2024-05-13 ENCOUNTER — TELEPHONE (OUTPATIENT)
Dept: MEDICAL GROUP | Facility: MEDICAL CENTER | Age: 79
End: 2024-05-13

## 2024-05-13 ENCOUNTER — HOSPITAL ENCOUNTER (OUTPATIENT)
Dept: RADIOLOGY | Facility: MEDICAL CENTER | Age: 79
End: 2024-05-13
Attending: INTERNAL MEDICINE
Payer: MEDICARE

## 2024-05-13 DIAGNOSIS — Z12.31 ENCOUNTER FOR SCREENING MAMMOGRAM FOR BREAST CANCER: ICD-10-CM

## 2024-05-13 DIAGNOSIS — M81.0 POSTMENOPAUSAL BONE LOSS: ICD-10-CM

## 2024-05-14 ENCOUNTER — TELEPHONE (OUTPATIENT)
Dept: MEDICAL GROUP | Facility: MEDICAL CENTER | Age: 79
End: 2024-05-14
Payer: MEDICARE

## 2024-05-14 NOTE — TELEPHONE ENCOUNTER
Please notify the patient that her bone density test shows normal bone strength of her back and her hip.    Have her continue her vitamin D supplementation, calcium 1000 mg over-the-counter daily, regular weightbearing exercise, we can repeat the bone density test in 2 years.

## 2024-05-14 NOTE — TELEPHONE ENCOUNTER
----- Message from Eron Flowers M.D. sent at 5/13/2024  9:34 PM PDT -----  Please notify the patient that her bone density test shows normal bone strength of her back and her hip.    Have her continue her vitamin D supplementation, calcium 1000 mg over-the-counter daily, regular weightbearing exercise, we can repeat the bone density test in 2 years.

## 2024-07-24 NOTE — PROGRESS NOTES
CM emailed Macario Olson, Financial Counselor at [facility], to request assistance with completing a financial assistance application with pt (due pt being ineligible for Medicaid). Ms. Olson confirmed that a financial assistance application was completed with pt on Friday, 7/19, and scanned to his account.     Case management continuing to follow for discharge planning.    Kalpana Andre LMSW,   [phone]   Report given to Day RN.

## 2024-12-06 ENCOUNTER — HOSPITAL ENCOUNTER (OUTPATIENT)
Dept: LAB | Facility: MEDICAL CENTER | Age: 79
End: 2024-12-06
Attending: INTERNAL MEDICINE
Payer: MEDICARE

## 2024-12-06 LAB
25(OH)D3 SERPL-MCNC: 56 NG/ML (ref 30–100)
ANION GAP SERPL CALC-SCNC: 7 MMOL/L (ref 7–16)
BUN SERPL-MCNC: 15 MG/DL (ref 8–22)
CALCIUM SERPL-MCNC: 9.8 MG/DL (ref 8.4–10.2)
CHLORIDE SERPL-SCNC: 98 MMOL/L (ref 96–112)
CO2 SERPL-SCNC: 28 MMOL/L (ref 20–33)
CREAT SERPL-MCNC: 0.74 MG/DL (ref 0.5–1.4)
FASTING STATUS PATIENT QL REPORTED: NORMAL
GFR SERPLBLD CREATININE-BSD FMLA CKD-EPI: 82 ML/MIN/1.73 M 2
GLUCOSE SERPL-MCNC: 102 MG/DL (ref 65–99)
POTASSIUM SERPL-SCNC: 4.4 MMOL/L (ref 3.6–5.5)
SODIUM SERPL-SCNC: 133 MMOL/L (ref 135–145)
T3FREE SERPL-MCNC: 2.44 PG/ML (ref 2–4.4)
T4 FREE SERPL-MCNC: 1.39 NG/DL (ref 0.93–1.7)
TSH SERPL DL<=0.005 MIU/L-ACNC: 2.72 UIU/ML (ref 0.38–5.33)
VIT B12 SERPL-MCNC: 1226 PG/ML (ref 211–911)

## 2024-12-06 PROCEDURE — 82607 VITAMIN B-12: CPT

## 2024-12-06 PROCEDURE — 84443 ASSAY THYROID STIM HORMONE: CPT

## 2024-12-06 PROCEDURE — 84481 FREE ASSAY (FT-3): CPT

## 2024-12-06 PROCEDURE — 84439 ASSAY OF FREE THYROXINE: CPT

## 2024-12-06 PROCEDURE — 36415 COLL VENOUS BLD VENIPUNCTURE: CPT

## 2024-12-06 PROCEDURE — 80048 BASIC METABOLIC PNL TOTAL CA: CPT

## 2024-12-06 PROCEDURE — 82306 VITAMIN D 25 HYDROXY: CPT

## 2025-01-23 ENCOUNTER — APPOINTMENT (OUTPATIENT)
Dept: URBAN - METROPOLITAN AREA CLINIC 6 | Facility: CLINIC | Age: 80
Setting detail: DERMATOLOGY
End: 2025-01-23

## 2025-01-23 DIAGNOSIS — L82.1 OTHER SEBORRHEIC KERATOSIS: ICD-10-CM

## 2025-01-23 DIAGNOSIS — D22 MELANOCYTIC NEVI: ICD-10-CM

## 2025-01-23 DIAGNOSIS — D18.0 HEMANGIOMA: ICD-10-CM

## 2025-01-23 DIAGNOSIS — Z85.828 PERSONAL HISTORY OF OTHER MALIGNANT NEOPLASM OF SKIN: ICD-10-CM

## 2025-01-23 DIAGNOSIS — L81.4 OTHER MELANIN HYPERPIGMENTATION: ICD-10-CM

## 2025-01-23 DIAGNOSIS — Z71.89 OTHER SPECIFIED COUNSELING: ICD-10-CM

## 2025-01-23 DIAGNOSIS — L80 VITILIGO: ICD-10-CM

## 2025-01-23 DIAGNOSIS — L57.0 ACTINIC KERATOSIS: ICD-10-CM

## 2025-01-23 PROBLEM — D18.01 HEMANGIOMA OF SKIN AND SUBCUTANEOUS TISSUE: Status: ACTIVE | Noted: 2025-01-23

## 2025-01-23 PROBLEM — D22.5 MELANOCYTIC NEVI OF TRUNK: Status: ACTIVE | Noted: 2025-01-23

## 2025-01-23 PROCEDURE — ? DIAGNOSIS COMMENT

## 2025-01-23 PROCEDURE — 17000 DESTRUCT PREMALG LESION: CPT

## 2025-01-23 PROCEDURE — ? LIQUID NITROGEN

## 2025-01-23 PROCEDURE — ? SUNSCREEN TREATMENT REGIMEN

## 2025-01-23 PROCEDURE — 17003 DESTRUCT PREMALG LES 2-14: CPT

## 2025-01-23 PROCEDURE — 99213 OFFICE O/P EST LOW 20 MIN: CPT | Mod: 25

## 2025-01-23 PROCEDURE — ? COUNSELING

## 2025-01-23 RX ORDER — RUXOLITINIB 15 MG/G
1 CREAM TOPICAL QD
Qty: 60 | Refills: 6 | Status: CANCELLED

## 2025-01-23 ASSESSMENT — LOCATION ZONE DERM
LOCATION ZONE: FACE
LOCATION ZONE: HAND
LOCATION ZONE: TRUNK
LOCATION ZONE: LEG
LOCATION ZONE: NOSE

## 2025-01-23 ASSESSMENT — LOCATION SIMPLE DESCRIPTION DERM
LOCATION SIMPLE: RIGHT PRETIBIAL REGION
LOCATION SIMPLE: RIGHT POPLITEAL SKIN
LOCATION SIMPLE: RIGHT HAND
LOCATION SIMPLE: RIGHT UPPER BACK
LOCATION SIMPLE: RIGHT ZYGOMA
LOCATION SIMPLE: NOSE
LOCATION SIMPLE: UPPER BACK
LOCATION SIMPLE: LEFT FOREHEAD
LOCATION SIMPLE: CHEST
LOCATION SIMPLE: LEFT HAND
LOCATION SIMPLE: ABDOMEN

## 2025-01-23 ASSESSMENT — LOCATION DETAILED DESCRIPTION DERM
LOCATION DETAILED: RIGHT PROXIMAL PRETIBIAL REGION
LOCATION DETAILED: RIGHT MEDIAL UPPER BACK
LOCATION DETAILED: MIDDLE STERNUM
LOCATION DETAILED: RIGHT RADIAL DORSAL HAND
LOCATION DETAILED: 3RD WEB SPACE RIGHT HAND
LOCATION DETAILED: LEFT RADIAL DORSAL HAND
LOCATION DETAILED: PERIUMBILICAL SKIN
LOCATION DETAILED: RIGHT ULNAR DORSAL HAND
LOCATION DETAILED: NASAL SUPRATIP
LOCATION DETAILED: RIGHT CENTRAL ZYGOMA
LOCATION DETAILED: INFERIOR THORACIC SPINE
LOCATION DETAILED: RIGHT POPLITEAL SKIN
LOCATION DETAILED: EPIGASTRIC SKIN
LOCATION DETAILED: LEFT INFERIOR FOREHEAD

## 2025-01-23 NOTE — PROCEDURE: LIQUID NITROGEN
Consent: The patient's consent was obtained including but not limited to risks of crusting, scabbing, blistering, scarring, darker or lighter pigmentary change, recurrence, incomplete removal and infection.
Duration Of Freeze Thaw-Cycle (Seconds): 10
Detail Level: Detailed
Post-Care Instructions: I reviewed with the patient in detail post-care instructions. Patient is to wear sunprotection, and avoid picking at any of the treated lesions. Pt may apply Vaseline to crusted or scabbing areas.
Number Of Freeze-Thaw Cycles: 2 freeze-thaw cycles
Render Post-Care Instructions In Note?: no
Show Aperture Variable?: Yes

## 2025-01-23 NOTE — PROCEDURE: DIAGNOSIS COMMENT
Comment: Tried opzelura on face, no improvement, declines any further treatment at this time
Detail Level: Simple
Render Risk Assessment In Note?: no

## 2025-03-31 SDOH — ECONOMIC STABILITY: INCOME INSECURITY: IN THE LAST 12 MONTHS, WAS THERE A TIME WHEN YOU WERE NOT ABLE TO PAY THE MORTGAGE OR RENT ON TIME?: NO

## 2025-03-31 SDOH — HEALTH STABILITY: PHYSICAL HEALTH: ON AVERAGE, HOW MANY MINUTES DO YOU ENGAGE IN EXERCISE AT THIS LEVEL?: 60 MIN

## 2025-03-31 SDOH — HEALTH STABILITY: PHYSICAL HEALTH: ON AVERAGE, HOW MANY DAYS PER WEEK DO YOU ENGAGE IN MODERATE TO STRENUOUS EXERCISE (LIKE A BRISK WALK)?: 6 DAYS

## 2025-03-31 SDOH — ECONOMIC STABILITY: FOOD INSECURITY: WITHIN THE PAST 12 MONTHS, YOU WORRIED THAT YOUR FOOD WOULD RUN OUT BEFORE YOU GOT MONEY TO BUY MORE.: NEVER TRUE

## 2025-03-31 SDOH — ECONOMIC STABILITY: FOOD INSECURITY: WITHIN THE PAST 12 MONTHS, THE FOOD YOU BOUGHT JUST DIDN'T LAST AND YOU DIDN'T HAVE MONEY TO GET MORE.: NEVER TRUE

## 2025-03-31 SDOH — ECONOMIC STABILITY: INCOME INSECURITY: HOW HARD IS IT FOR YOU TO PAY FOR THE VERY BASICS LIKE FOOD, HOUSING, MEDICAL CARE, AND HEATING?: NOT HARD AT ALL

## 2025-03-31 ASSESSMENT — SOCIAL DETERMINANTS OF HEALTH (SDOH)
HOW OFTEN DO YOU HAVE A DRINK CONTAINING ALCOHOL: 4 OR MORE TIMES A WEEK
HOW OFTEN DO YOU HAVE SIX OR MORE DRINKS ON ONE OCCASION: NEVER
HOW OFTEN DO YOU ATTEND CHURCH OR RELIGIOUS SERVICES?: NEVER
HOW MANY DRINKS CONTAINING ALCOHOL DO YOU HAVE ON A TYPICAL DAY WHEN YOU ARE DRINKING: 1 OR 2
WITHIN THE PAST 12 MONTHS, YOU WORRIED THAT YOUR FOOD WOULD RUN OUT BEFORE YOU GOT THE MONEY TO BUY MORE: NEVER TRUE
DO YOU BELONG TO ANY CLUBS OR ORGANIZATIONS SUCH AS CHURCH GROUPS UNIONS, FRATERNAL OR ATHLETIC GROUPS, OR SCHOOL GROUPS?: YES
HOW OFTEN DO YOU ATTENT MEETINGS OF THE CLUB OR ORGANIZATION YOU BELONG TO?: MORE THAN 4 TIMES PER YEAR
IN THE PAST 12 MONTHS, HAS THE ELECTRIC, GAS, OIL, OR WATER COMPANY THREATENED TO SHUT OFF SERVICE IN YOUR HOME?: NO
IN A TYPICAL WEEK, HOW MANY TIMES DO YOU TALK ON THE PHONE WITH FAMILY, FRIENDS, OR NEIGHBORS?: MORE THAN THREE TIMES A WEEK
HOW OFTEN DO YOU ATTENT MEETINGS OF THE CLUB OR ORGANIZATION YOU BELONG TO?: MORE THAN 4 TIMES PER YEAR
HOW OFTEN DO YOU GET TOGETHER WITH FRIENDS OR RELATIVES?: MORE THAN THREE TIMES A WEEK
IN A TYPICAL WEEK, HOW MANY TIMES DO YOU TALK ON THE PHONE WITH FAMILY, FRIENDS, OR NEIGHBORS?: MORE THAN THREE TIMES A WEEK
DO YOU BELONG TO ANY CLUBS OR ORGANIZATIONS SUCH AS CHURCH GROUPS UNIONS, FRATERNAL OR ATHLETIC GROUPS, OR SCHOOL GROUPS?: YES
HOW HARD IS IT FOR YOU TO PAY FOR THE VERY BASICS LIKE FOOD, HOUSING, MEDICAL CARE, AND HEATING?: NOT HARD AT ALL
HOW OFTEN DO YOU GET TOGETHER WITH FRIENDS OR RELATIVES?: MORE THAN THREE TIMES A WEEK
HOW OFTEN DO YOU ATTEND CHURCH OR RELIGIOUS SERVICES?: NEVER

## 2025-03-31 ASSESSMENT — LIFESTYLE VARIABLES
HOW MANY STANDARD DRINKS CONTAINING ALCOHOL DO YOU HAVE ON A TYPICAL DAY: 1 OR 2
AUDIT-C TOTAL SCORE: 4
HOW OFTEN DO YOU HAVE SIX OR MORE DRINKS ON ONE OCCASION: NEVER
SKIP TO QUESTIONS 9-10: 1
HOW OFTEN DO YOU HAVE A DRINK CONTAINING ALCOHOL: 4 OR MORE TIMES A WEEK

## 2025-04-02 ENCOUNTER — TELEPHONE (OUTPATIENT)
Dept: MEDICAL GROUP | Facility: MEDICAL CENTER | Age: 80
End: 2025-04-02
Payer: MEDICARE

## 2025-04-02 DIAGNOSIS — Z00.00 PREVENTATIVE HEALTH CARE: Chronic | ICD-10-CM

## 2025-04-02 PROBLEM — E53.8 B12 DEFICIENCY: Status: ACTIVE | Noted: 2025-04-02

## 2025-04-02 PROBLEM — E53.8 B12 DEFICIENCY: Status: RESOLVED | Noted: 2025-04-02 | Resolved: 2025-04-02

## 2025-04-02 RX ORDER — LEVOTHYROXINE SODIUM 75 UG/1
TABLET ORAL
COMMUNITY
Start: 2024-12-19

## 2025-04-02 NOTE — LETTER
[facility]  Eron Flowers M.D.  [address]  [place]  [facility] [address]  Fax: [phone]   Authorization for Release/Disclosure of   Protected Health Information   Name: SAMARA CISNEROS : 1945 SSN: [id number]   Address: [address] [place] [address] Phone:    [phone] (home)    I authorize the entity listed below to release/disclose the PHI below to:   [facility] [employer] [facility]/Eron Flowers M.D. and Eron Flowers M.D.   Provider or Entity Name:  [facility]  [address] [employer] [place], [place] [address] Phone:  [phone]    Fax:  [phone]   Reason for request: continuity of care   Information to be released:    [  ] LAST COLONOSCOPY,  including any PATH REPORT and follow-up  [  ] LAST FIT/COLOGUARD RESULT [  ] LAST DEXA  [  ] LAST MAMMOGRAM  [  ] LAST PAP  [  ] LAST LABS [  ] RETINA EXAM REPORT  [  ] IMMUNIZATION RECORDS  [X] Release Last 6 months      [  ] Check here and initial the line next to each item to release ALL health information INCLUDING  _____ Care and treatment for drug and / or alcohol abuse  _____ HIV testing, infection status, or AIDS  _____ Genetic Testing    DATES OF SERVICE OR TIME PERIOD TO BE DISCLOSED: _____________  I understand and acknowledge that:  * This Authorization may be revoked at any time by you in writing, except if your health information has already been used or disclosed.  * Your health information that will be used or disclosed as a result of you signing this authorization could be re-disclosed by the recipient. If this occurs, your re-disclosed health information may no longer be protected by State or Federal laws.  * You may refuse to sign this Authorization. Your refusal will not affect your ability to obtain treatment.  * This Authorization becomes effective upon signing and will  on (date) __________.      If no date is indicated, this Authorization  will  one (1) year from the signature date.    Name: Bernadette Dawsonub  Signature: Date:   4/3/2025     PLEASE FAX REQUESTED RECORDS BACK TO: [phone]

## 2025-04-02 NOTE — LETTER
[facility]  Eron Flowers M.D.  [address]  [place] [address]  Fax: [phone]   Authorization for Release/Disclosure of   Protected Health Information   Name: SAMARA CISNEROS : 1945 SSN: [id number]   Address: [address] [place] [address] Phone:    [phone] (home)    I authorize the entity listed below to release/disclose the PHI below to:   [facility]/Eron Flowers M.D. and Eron Flowers M.D.   Provider or Entity Name:  [facility]   Address   [facility], [facility]  [address] [place] [address] Phone:  [phone]    Fax:  [phone]   Reason for request: continuity of care   Information to be released:    [  ] LAST COLONOSCOPY,  including any PATH REPORT and follow-up  [  ] LAST FIT/COLOGUARD RESULT [  ] LAST DEXA  [  ] LAST MAMMOGRAM  [  ] LAST PAP  [  ] LAST LABS [  ] RETINA EXAM REPORT  [  ] IMMUNIZATION RECORDS  [X] Release Past 12 months      [  ] Check here and initial the line next to each item to release ALL health information INCLUDING  _____ Care and treatment for drug and / or alcohol abuse  _____ HIV testing, infection status, or AIDS  _____ Genetic Testing    DATES OF SERVICE OR TIME PERIOD TO BE DISCLOSED: _____________  I understand and acknowledge that:  * This Authorization may be revoked at any time by you in writing, except if your health information has already been used or disclosed.  * Your health information that will be used or disclosed as a result of you signing this authorization could be re-disclosed by the recipient. If this occurs, your re-disclosed health information may no longer be protected by State or Federal laws.  * You may refuse to sign this Authorization. Your refusal will not affect your ability to obtain treatment.  * This Authorization becomes effective upon signing and will  on (date) __________.      If no date is indicated, this Authorization will  one (1)  year from the signature date.    Name: Bernadette Paul  Signature: Date:   4/3/2025     PLEASE FAX REQUESTED RECORDS BACK TO: [phone]   never used

## 2025-04-03 ENCOUNTER — APPOINTMENT (OUTPATIENT)
Dept: MEDICAL GROUP | Facility: MEDICAL CENTER | Age: 80
End: 2025-04-03
Payer: MEDICARE

## 2025-04-03 ENCOUNTER — HOSPITAL ENCOUNTER (OUTPATIENT)
Facility: MEDICAL CENTER | Age: 80
End: 2025-04-03
Attending: INTERNAL MEDICINE
Payer: MEDICARE

## 2025-04-03 ENCOUNTER — TELEPHONE (OUTPATIENT)
Dept: MEDICAL GROUP | Facility: MEDICAL CENTER | Age: 80
End: 2025-04-03

## 2025-04-03 VITALS
RESPIRATION RATE: 12 BRPM | SYSTOLIC BLOOD PRESSURE: 114 MMHG | HEIGHT: 64 IN | DIASTOLIC BLOOD PRESSURE: 68 MMHG | WEIGHT: 137.7 LBS | TEMPERATURE: 98 F | HEART RATE: 57 BPM | OXYGEN SATURATION: 96 % | BODY MASS INDEX: 23.51 KG/M2

## 2025-04-03 DIAGNOSIS — R73.09 IMPAIRED GLUCOSE METABOLISM: ICD-10-CM

## 2025-04-03 DIAGNOSIS — E78.5 DYSLIPIDEMIA: Chronic | ICD-10-CM

## 2025-04-03 DIAGNOSIS — Z00.00 MEDICARE ANNUAL WELLNESS VISIT, SUBSEQUENT: ICD-10-CM

## 2025-04-03 DIAGNOSIS — Z12.31 ENCOUNTER FOR SCREENING MAMMOGRAM FOR BREAST CANCER: ICD-10-CM

## 2025-04-03 DIAGNOSIS — D70.9 NEUTROPENIA, UNSPECIFIED TYPE (HCC): ICD-10-CM

## 2025-04-03 DIAGNOSIS — R35.1 NOCTURIA: ICD-10-CM

## 2025-04-03 LAB
ALBUMIN SERPL BCP-MCNC: 4.5 G/DL (ref 3.2–4.9)
ALBUMIN/GLOB SERPL: 1.7 G/DL
ALP SERPL-CCNC: 58 U/L (ref 30–99)
ALT SERPL-CCNC: 26 U/L (ref 2–50)
ANION GAP SERPL CALC-SCNC: 7 MMOL/L (ref 7–16)
AST SERPL-CCNC: 28 U/L (ref 12–45)
BASOPHILS # BLD AUTO: 0.9 % (ref 0–1.8)
BASOPHILS # BLD: 0.04 K/UL (ref 0–0.12)
BILIRUB SERPL-MCNC: 0.5 MG/DL (ref 0.1–1.5)
BUN SERPL-MCNC: 13 MG/DL (ref 8–22)
CALCIUM ALBUM COR SERPL-MCNC: 9.8 MG/DL (ref 8.5–10.5)
CALCIUM SERPL-MCNC: 10.2 MG/DL (ref 8.5–10.5)
CHLORIDE SERPL-SCNC: 98 MMOL/L (ref 96–112)
CHOLEST SERPL-MCNC: 203 MG/DL (ref 100–199)
CO2 SERPL-SCNC: 28 MMOL/L (ref 20–33)
CREAT SERPL-MCNC: 0.87 MG/DL (ref 0.5–1.4)
EOSINOPHIL # BLD AUTO: 0.06 K/UL (ref 0–0.51)
EOSINOPHIL NFR BLD: 1.4 % (ref 0–6.9)
ERYTHROCYTE [DISTWIDTH] IN BLOOD BY AUTOMATED COUNT: 47.1 FL (ref 35.9–50)
EST. AVERAGE GLUCOSE BLD GHB EST-MCNC: 114 MG/DL
FASTING STATUS PATIENT QL REPORTED: NORMAL
GFR SERPLBLD CREATININE-BSD FMLA CKD-EPI: 67 ML/MIN/1.73 M 2
GLOBULIN SER CALC-MCNC: 2.7 G/DL (ref 1.9–3.5)
GLUCOSE SERPL-MCNC: 93 MG/DL (ref 65–99)
HBA1C MFR BLD: 5.6 % (ref 4–5.6)
HCT VFR BLD AUTO: 48.2 % (ref 37–47)
HDLC SERPL-MCNC: 103 MG/DL
HGB BLD-MCNC: 15.9 G/DL (ref 12–16)
IMM GRANULOCYTES # BLD AUTO: 0.01 K/UL (ref 0–0.11)
IMM GRANULOCYTES NFR BLD AUTO: 0.2 % (ref 0–0.9)
LDLC SERPL CALC-MCNC: 91 MG/DL
LYMPHOCYTES # BLD AUTO: 0.86 K/UL (ref 1–4.8)
LYMPHOCYTES NFR BLD: 20.1 % (ref 22–41)
MCH RBC QN AUTO: 30.4 PG (ref 27–33)
MCHC RBC AUTO-ENTMCNC: 33 G/DL (ref 32.2–35.5)
MCV RBC AUTO: 92.2 FL (ref 81.4–97.8)
MONOCYTES # BLD AUTO: 0.29 K/UL (ref 0–0.85)
MONOCYTES NFR BLD AUTO: 6.8 % (ref 0–13.4)
NEUTROPHILS # BLD AUTO: 3.01 K/UL (ref 1.82–7.42)
NEUTROPHILS NFR BLD: 70.6 % (ref 44–72)
NRBC # BLD AUTO: 0 K/UL
NRBC BLD-RTO: 0 /100 WBC (ref 0–0.2)
PLATELET # BLD AUTO: 213 K/UL (ref 164–446)
PMV BLD AUTO: 10.6 FL (ref 9–12.9)
POTASSIUM SERPL-SCNC: 4.7 MMOL/L (ref 3.6–5.5)
PROT SERPL-MCNC: 7.2 G/DL (ref 6–8.2)
RBC # BLD AUTO: 5.23 M/UL (ref 4.2–5.4)
SODIUM SERPL-SCNC: 133 MMOL/L (ref 135–145)
TRIGL SERPL-MCNC: 44 MG/DL (ref 0–149)
WBC # BLD AUTO: 4.3 K/UL (ref 4.8–10.8)

## 2025-04-03 PROCEDURE — 3078F DIAST BP <80 MM HG: CPT | Performed by: INTERNAL MEDICINE

## 2025-04-03 PROCEDURE — 80053 COMPREHEN METABOLIC PANEL: CPT

## 2025-04-03 PROCEDURE — 83036 HEMOGLOBIN GLYCOSYLATED A1C: CPT

## 2025-04-03 PROCEDURE — 82172 ASSAY OF APOLIPOPROTEIN: CPT

## 2025-04-03 PROCEDURE — 3074F SYST BP LT 130 MM HG: CPT | Performed by: INTERNAL MEDICINE

## 2025-04-03 PROCEDURE — G0439 PPPS, SUBSEQ VISIT: HCPCS | Performed by: INTERNAL MEDICINE

## 2025-04-03 PROCEDURE — 36415 COLL VENOUS BLD VENIPUNCTURE: CPT

## 2025-04-03 PROCEDURE — 85025 COMPLETE CBC W/AUTO DIFF WBC: CPT

## 2025-04-03 PROCEDURE — 83695 ASSAY OF LIPOPROTEIN(A): CPT

## 2025-04-03 PROCEDURE — 80061 LIPID PANEL: CPT

## 2025-04-03 ASSESSMENT — PATIENT HEALTH QUESTIONNAIRE - PHQ9: CLINICAL INTERPRETATION OF PHQ2 SCORE: 0

## 2025-04-03 ASSESSMENT — FIBROSIS 4 INDEX: FIB4 SCORE: 1.86

## 2025-04-03 ASSESSMENT — ENCOUNTER SYMPTOMS: GENERAL WELL-BEING: EXCELLENT

## 2025-04-03 ASSESSMENT — ACTIVITIES OF DAILY LIVING (ADL): BATHING_REQUIRES_ASSISTANCE: 0

## 2025-04-03 NOTE — PROGRESS NOTES
Lisa Paul is a 79 y.o. female who presents with medicare wellness            HPI            Medicare wellness assessment  Current supplements: Reviewed  Chronic narcotic pain medicines: No  Allergies:  reviewed  Sees  endocrine  Sees dermatology uses hat and sunscreen  Sees  eye uses glasses for reading and distance   Sees [facility]    keeps active pickle ball 3 days per week two hours at a time and golfs two days per week and does weight training   Eats at home fresh fruits and veggies, oatmeal, occasional pasta, chicken and fish   Limits sweets   Water intake is good, no soda, coffee 2 cups per day, etoh 1 to 1.5 glass per night   Sleep 9 hours and nocturia 4 times per week will get up at 230 am   Screening: Reviewed  Depressive Symptoms: Denies feeling down, depressed or hopeless. Denies loss of interest or pleasure in doing things   ADLs: Denies needing help with using telephone, transportation, shopping, preparing meals, housework, laundry, or managing medication or money.    Independent with bathing, hygiene, feeding, toileting, dressing    Memory concerns: Denies difficulty remembering details of conversations, events and upcoming appointments.  Hearing problems: hearing aids  Recent falls no    Current Outpatient Medications   Medication Sig Dispense Refill    levothyroxine (SYNTHROID) 75 MCG Tab 1 tablet in the morning on an empty stomach Orally Once a day for 90 days      simvastatin (ZOCOR) 20 MG Tab TAKE 1 TABLET BY MOUTH EVERY EVENING. 90 Tablet 3    VITAMIN D PO Take  by mouth.      Coenzyme Q10 (COQ10) 100 MG Cap       Zinc 10 MG Lozenge       CALCIUM PO       polyethylene glycol/lytes (MIRALAX) 17 g Pack Take 17 g by mouth every day.      levothyroxine (SYNTHROID) 50 MCG Tab Take 50 mcg by mouth every morning on an empty stomach. Pt takes 100 MCG only on Sunday  Al other days 50 MCG       Cyanocobalamin (VITAMIN B 12 PO) Take 1 tablet by mouth every  day.      Ascorbic Acid (VITAMIN C PO) Take 1 tablet by mouth every day.      Probiotic Product (PROBIOTIC PO) Take 1 capsule by mouth every evening.       No current facility-administered medications for this visit.         Adenoma  2/11 colon per [facility] adenoma   3/16/16 colon per [facility] tortuous colon, melanosis in colon, repeat colonoscopy 5 years for surveillance  3/24/21 colon per [facility] negative, no further surveillance colonoscopy necessary      Atypical chest pain  10/29/19 chest burning sensation, EKG negative, stress test ordered   10/30/19 echo normal LV function EF 60%, normal diastolic function, mild aortic insufficiency, normal right LV size and function, RSVP 25  11/12/19 myocardial perfusion study no evidence of ischemia, EF 79%     Dyslipidemia  11/08 chol 210,trig 45,hdl 95,ldl 97  6/09 chol 271,trig 113,hdl 74,ldl 163 restart on zocor 40 (1/2)  5/05 aorta abd u/s negative  11/08 p.thallium no ischemia EF 87%  2/09 ERIC negative  2/10 chol 205,trig 55,hdl 100,ldl 94  1/11 chol 197,trig 66,hdl 100,ldl 84 on zocor 20 mg  2/12 chol 205,trig 43,hdl 106,ldl 90 on zocor 20 mg  2/12/13 chol 216,trig 58,hdl 114,ldl 90 on zocor 20 mg  2/13/14 chol 195,trig 30,hdl 106,ldl 83 on zocor 20 mg  3/12/15 chol 205,trig 41,hdl 99,ldl 98 on zocor 20 mg  3/15/16 chol 260,trig 77,hdl 102,ldl 143 on zocor 20 mg repeat lipid panel 2 months  5/25/16 chol 243,trig 51,hdl 104,ldl 129 on zocor 20 mg  2/7/17 chol 203,trig 66,hdl 105,ldl 85 on zocor 20 mg  9/20/17 chol 211,trig 71,hdl 104,ldl 93 on zocor 20 mg  9/10/18 chol 218,trig 48,hdl 106,ldl 102 on zocor 20 mg  11/12/19 myocardial perfusion study no evidence of ischemia, EF 79%  1/9/20 off zocor   6/20/20 chol 172,trig 41,hdl 99,ldl 65 on zocor 20 mg  2/3/22 chol 201,trig 56,hdl 91,ldl 99 on zocor 20 mg  3/7/23 chol 220,trig 52,hdl 100,ldl 110 on zocor 20 mg  3/22/24 chol 225,trig 64,hdl 106,ldl 106 on zocor 20 mg     History ankle fracture  5/15/20 [facility] orthopedic note x-ray  right ankle nondisplaced oblique distal fibula fracture lowry B, placed in walking boot, follow-up 1 week  5/26/20  orthopedic note follow-up right ankle fracture, x-rays demonstrate nondisplaced right lateral malleolus fracture, ASO ankle lacer for stability follow-up 4 weeks  6/16/20  orthopedic note closed treatment ankle fracture 5/15/20 x-ray right ankle healed malleolus fracture, proceed with aggressive physical therapy follow-up 6 weeks     History of bilateral pleural effusion  12/4/19 hospital admission, 12/9/19 hospital discharge, admission for fevers and chills while on home infusion IV daptomycin for septic arthritis MSSA    12/4/19 chest x-ray negative  12/5/19  infectious disease hospital consultation recommend NICOLE, repeat blood cultures negative thus far, hold off on discontinuation of PICC line until NICOLE obtained, changed to cefazolin from daptomycin  12/6/19 transesophageal echo structurally normal heart valves  12/6/19 infectious disease [facility] note repeat blood cultures negative  12/6/19 PICC line removed, catheter tip culture negative, blood cultures no growth to date  12/6/19 CT abdomen pelvis with contrast, minimal right lower lobe atelectasis or pneumonia, small bilateral pleural effusions, no loculated fluid collection  12/7/19 CT chest with; trace amounts dependent bilateral pleural effusion, interstitial groundglass opacities  12/8/19 infectious disease [facility] note cough ongoing prior to initiation of daptomycin, less likely daptomycin induced eosinophilic pneumonia, will not restart daptomycin, continue cefazolin with end-date 12/15/19, follow-up pulmonary  12/8/19  pulmonary hospital consultation bilateral pleural effusion confirmed on CT, too small for bedside thoracentesis, given acute sickness likely related to hypoalbuminemia versus volume overload status post resuscitation, cannot rule out parapneumonic effusion but clinical picture with  negative procalcitonin is not suggestive of this, recommend gentle diuresis, outpatient follow-up CT thorax 4 to 6 weeks, regarding cough differential includes reflux, asthma, volume overload, interstitial lung disease, postnasal drip, eosinophilic granuloma, nonasthmatic eosinophilic bronchitis, given eosinophilia to differentiate would need full pulmonary function test with methacholine challenge, which can be done as an outpatient if cough persists, repeat CT scan as above (if groundglass opacities persist will need further diagnostic intervention bronchoscopy), check REGGIE, rheumatoid factor, IgE level  12/8/19 REGGIE negative,CCP 6,RF negative  12/9/19 hospital discharge per infectious disease recommendation continue cefazolin through 12/15/15 through peripheral IV, follow-up infectious disease outpatient pulmonary outpatient  12/10/19 ESR 70,CRP 3.9,wbc 5.6,hgb 12,hct 36  12/11/19 referral pulmonary placed, repeat CT high-resolution thorax ordered 4 to 6 weeks  12/17/19 infectious disease note transition IV antibiotics to doxycycline  12/18/19 pulmonary note order CT high resolution thorax  1/21/20 CT thorax high resolution; interval resolution of bilateral subpleural interstitial opacities with hazy groundglass opacities, minimal streaky linear opacity in the lingula may represent subsegmental atelectasis or scar  2/10/20  pulmon note resolution of pleural effusion on follow-up CT scan, suspect pleural effusion and subpleural reticular thickening was related to sepsis induced lung injury from volume overload, follow-up as needed     history epistaxis  9/7/22  ENT note epistaxis, prominent vessel right cauterized today, provided neosporin twice a day x10 days, follow-up as needed     history of GERD  11/4/19 [facility] note burning sensation controlled, start pantoprazole 20 mg, labs ordered  3/5/23 off protonix     History MSSA bacteremia  11/17/19  infectious disease hospital consultation  admitted 11/15/19 right knee pain after treadmill stress test, seen by orthopedics and had arthrocentesis performed, sent to emergency room with increased cell count on arthrocentesis, presentation ER WBC 17.8, ESR 44, went to the operating room for incision and drainage right septic knee positive staphylococcus aureus MSSA, blood cultures positive for staphylococcus aureus MSSA  11/18/19 echo EF 60% no evidence of endocarditis  11/20/19 ultrasound-guided catheter insertion for intravenous antibiotics  11/22/19 discharged home IV daptomycin and date 12/15/19, follow-up once a week infusion center, follow-up infectious disease 2 weeks  12/4/19 hospital admission, 12/9/19 hospital discharge, admission for fevers and chills while on home infusion IV daptomycin for septic arthritis MSSA    12/4/19 chest x-ray negative  12/5/19  infectious disease hospital consultation recommend NICOLE, repeat blood cultures negative thus far, hold off on discontinuation of PICC line until NICOLE obtained, changed to cefazolin from daptomycin  12/6/19 transesophageal echo structurally normal heart valves  12/6/19 infectious disease hospital note repeat blood cultures negative  12/6/19 PICC line removed, catheter tip culture negative, blood cultures no growth to date  12/6/19 CT abdomen pelvis with contrast, minimal right lower lobe atelectasis or pneumonia, small bilateral pleural effusions, no loculated fluid collection  12/7/19 CT chest with; trace amounts dependent bilateral pleural effusion, interstitial groundglass opacities  12/8/19 infectious disease hospital note cough ongoing prior to initiation of daptomycin, less likely daptomycin induced eosinophilic pneumonia, will not restart daptomycin, continue cefazolin with end-date 12/15/19, follow-up pulmonary  12/8/19  pulmonary hospital consultation bilateral pleural effusion confirmed on CT, too small for bedside thoracentesis, given acute sickness likely related to  hypoalbuminemia versus volume overload status post resuscitation, cannot rule out parapneumonic effusion but clinical picture with negative procalcitonin is not suggestive of this, recommend gentle diuresis, outpatient follow-up CT thorax 4 to 6 weeks, regarding cough differential includes reflux, asthma, volume overload, interstitial lung disease, postnasal drip, eosinophilic granuloma, nonasthmatic eosinophilic bronchitis, given eosinophilia to differentiate would need full pulmonary function test with methacholine challenge, which can be done as an outpatient if cough persists, repeat CT scan as above (if groundglass opacities persist will need further diagnostic intervention bronchoscopy), check REGGIE, rheumatoid factor, IgE level  12/8/19 REGGIE negative,CCP 6,RF negative  12/9/19 hospital discharge per infectious disease recommendation continue cefazolin through 12/15/15 through peripheral IV, follow-up infectious disease outpatient pulmonary outpatient  12/10/19 ESR 70,CRP 3.9,wbc 5.6,hgb 12,hct 36  12/11/19 referral pulmonary placed, repeat CT high-resolution thorax ordered 4 to 6 weeks  12/17/19 infectious disease note transition IV antibiotics to doxycycline  12/18/19 pulmonary note order CT high resolution thorax  1/21/20 CT thorax high resolution; interval resolution of bilateral subpleural interstitial opacities with hazy groundglass opacities, minimal streaky linear opacity in the lingula may represent subsegmental atelectasis or scar  1/22/20 wbc 3.2 (62%N,27%L), ESR 7, CRP 0.13 off antibiotics since 1/16/20  2/10/20  pulmon note resolution of pleural effusion on follow-up CT scan, suspect pleural effusion and subpleural reticular thickening was related to sepsis induced lung injury from volume overload, follow-up as needed      History septic joint right knee  11/17/19  infectious disease hospital consultation admitted 11/15/19 right knee pain after treadmill stress test, seen by  orthopedics and had arthrocentesis performed, sent to emergency room with increased cell count on arthrocentesis, presentation ER WBC 17.8, ESR 44, went to the operating room for incision and drainage right septic knee positive staphylococcus aureus MSSA, blood cultures positive for staphylococcus aureus MSSA  11/18/19 echo EF 60% no evidence of endocarditis  11/20/19 ultrasound-guided catheter insertion for intravenous antibiotics  11/22/19 discharged home IV daptomycin and date 12/15/19, follow-up once a week infusion center, follow-up infectious disease 2 weeks  12/4/19 hospital admission, 12/9/19 hospital discharge, admission for fevers and chills while on home infusion IV daptomycin for septic arthritis MSSA    12/4/19 chest x-ray negative  12/5/19  infectious disease hospital consultation recommend NICOLE, repeat blood cultures negative thus far, hold off on discontinuation of PICC line until NICOLE obtained, changed to cefazolin from daptomycin  12/6/19 transesophageal echo structurally normal heart valves  12/6/19 infectious disease hospital note repeat blood cultures negative  12/6/19 PICC line removed, catheter tip culture negative, blood cultures no growth to date  12/6/19 CT abdomen pelvis with contrast, minimal right lower lobe atelectasis or pneumonia, small bilateral pleural effusions, no loculated fluid collection  12/7/19 CT chest with; trace amounts dependent bilateral pleural effusion, interstitial groundglass opacities  12/8/19 infectious disease hospital note cough ongoing prior to initiation of daptomycin, less likely daptomycin induced eosinophilic pneumonia, will not restart daptomycin, continue cefazolin with end-date 12/15/19, follow-up pulmonary  12/8/19  pulmonary hospital consultation bilateral pleural effusion confirmed on CT, too small for bedside thoracentesis, given acute sickness likely related to hypoalbuminemia versus volume overload status post resuscitation, cannot  rule out parapneumonic effusion but clinical picture with negative procalcitonin is not suggestive of this, recommend gentle diuresis, outpatient follow-up CT thorax 4 to 6 weeks, regarding cough differential includes reflux, asthma, volume overload, interstitial lung disease, postnasal drip, eosinophilic granuloma, nonasthmatic eosinophilic bronchitis, given eosinophilia to differentiate would need full pulmonary function test with methacholine challenge, which can be done as an outpatient if cough persists, repeat CT scan as above (if groundglass opacities persist will need further diagnostic intervention bronchoscopy), check REGGIE, rheumatoid factor, IgE level  12/8/19 REGGIE negative,CCP 6,RF negative  12/9/19 hospital discharge per infectious disease recommendation continue cefazolin through 12/15/15 through peripheral IV, follow-up infectious disease outpatient pulmonary outpatient  12/10/19 ESR 70,CRP 3.9,wbc 5.6,hgb 12,hct 36  12/11/19 referral pulmonary placed, repeat CT high-resolution thorax ordered 4 to 6 weeks  12/17/19 infectious disease note transition IV antibiotics to doxycycline  12/18/19 pulmonary note order CT high resolution thorax  1/22/20 wbc 3.2 (62%N,27%L), ESR 7, CRP 0.13 off antibiotics from 1/16/20     history squamous cell skin cancer  6/23/21  skin cancer dermatology note  11/10/21  skin cancer dermatology note  5/4/22  skin cancer dermatology note  5/24/22  skin cancer dermatology note right proximal pretibial region biopsy squamous cell carcinoma  6/5/23  skin cancer dermatology note   12/6/23  skin cancer dermatology note      History of vasovagal  5/12 one episode, ekg, echo, holter per [facility] negative, no further workup necessary  10/30/19 echo normal LV function EF 60%, normal diastolic function, mild aortic insufficiency, normal right LV size and function, RSVP 25  11/12/19 myocardial perfusion study no evidence of ischemia, EF 79%      History wrist fracture  12/30/12 left wrist fracture had splint for 6 weeks, no surgery     hyponatremia  2/12/13 Na 136  2/13/14 Na 131  3/15/16 Na 137  5/25/16 Na 134  9/20/17 Na 131  10/2/19 seen ER URI, Na 129 labs ordered to be done in 2 weeks  10/9/19 Na 134, normal cbc,cmp,tsh (consider cortisol, ACTH, HIV, hepatitis serology, REGGIE, B12)   10/10/19 labs ordered dry mouth, fatigue, hyponatremia, echo ordered murmur, ativan as needed for breakthrough panic attack  10/15/19 serum osm 277 (278-298), urine osm 128 (300-900), urine Na 14, cortisol 9.8, ACTH 15  12/6/19 Na 134  12/10/19 Na 132  1/22/20 Na 133  1/29/20  endocrine note hyponatremia rule out adrenal insufficiency, ACTH stim test  2/4/20 ACTH 14,cortisol 5.4, serum osmolality 282, urine osmolality 344, Na 134  2/4/20 tsh 2.7,free t4 0.83,free t3 2.84  2/12/20 ACTH stim test per endocrinology negative, urine and serum osmolality indicates excess water intake  3/17/20 tsh 3.8,free t4 0.85,free t3 2.6,t3 86,TPO 0.3  3/23/20  endocrine note hyponatremia could be excessive water intake hypothyroid, start synthroid 75 mcg daily follow-up 2 months  5/20/20 tsh 0.547,free t4 1.62,free t3 2.46, Na 133 on synthroid 75 mcg  5/26/20  endocrine note recent Na 133, monitor sodium, try liquid IV mixed once taking 16 ounce water, follow-up 3 months  8/19/20 Na 133   8/28/20  endocrinology note hyponatremia monitor sodium no identified endocrine cause, follow-up 3 months  4/14/21 Na 138, serum osm 284, urine sodium 102 mmol/L, urine osmol 507 mOsm/kg  6/29/21 Na 124  7/14/21 Na 126  7/19/21 Na 129  7/24/21 tsh 1.9, free t4 1.78,free t3 2.43 on levothyroxine 50 mcg daily per  endocrinology   9/20/21  nephrology note hyponatremia, appears euvolemic on exam, likely SIADH, high urine osm 540, urine sodium 102, serum Na improved with water restriction, continue home blood pressure readings, minimize use of  NSAIDs, continue work-up for hyponatremia 135  10/15/21 Na 141  2/3/22 Na 135, serum osmolality 284, urine osmolality 434, urine sodium 54  3/1/22 Na 136  3/9/22  endocrinology note hyponatremia, sodium above 130 is acceptable, patient did not tolerate 1 g salt tablet daily due to weight gain, now doing 2 sticks of liquid IV per day (1 stick per 16 hours 1), most recent sodium 135-140  11/16/22 Na 133  1/10/23  endocrinology note on 2 sticks liquid IV daily   3/7/23 Na 135  10/12/23 Na 134  11/22/23  endocrine note continues to use 2 sticks liquid IV daily (1 stick per 16 ounces water)  3/22/24 Na 137  5/8/24 Na 132  6/26/24 [facility] note on 2 sticks liquid IV qday   12/6/24 Na 133  12/19/24 [facility] note on levothyroxine 50 mcg six days per week and 100 mcg day 7, tsh 2.7 increase levothyroxine to 75 mcg daily aim for tsh 1-2, for hyponatremia 2 sticks of liquid IV per day (1 stick per 60 mL water), last week sodium levels between 134-140, continue over-the-counter vitamin D 2000 units daily, taking b12 daily recommend cutting back to 5 days a week  4/1/25 [facility] note    Hypothyroid  10/15/19 b2 1103,iron and zinc normal,HIV negative, REGGIE, SSA and SSB labs negative  10/29/19 chest burning sensation, EKG negative, stress test ordered   10/30/19 echo normal LV function EF 60%, normal diastolic function, mild aortic insufficiency, normal right LV size and function, RSVP 25  11/12/19 myocardial perfusion study no evidence of ischemia, EF 79%  3/23/20  endocrinology note fatigue tsh 2.7,free t4 0.83,free t3 2.8 start levothyroxine 75 mcg   5/20/20 tsh 0.54 on levothyroxine 75 mcg   6/17/20 tsh 0.156 on levothyroxine 75 mcg   8/19/20 tsh 0.056   8/28/20 dr.kuhadiya endocrinology note because of palpitations at night decrease levothyroxine to 75 mcg 6 days a week, half a tablet on day 7, hyponatremia monitor sodium no identified endocrine cause, follow-up  3 months  7/24/21 tsh 1.9, free t4 1.78,free t3 2.43 on levothyroxine 50 mcg daily per  endocrinology   2/3/22 tsh 1.8 on levothyroxine 50 mgc daily 2 on sunday per endocrinology   3/1/22 tsh 1.3,free t4 1.4,free t3 2.5 on levothyroxine 50 mcg  3/9/22  endocrinology note hypothyroid on levothyroxine 50 mcg    11/16/22 tsh 1.6,,free t4 1.38,free t3 2.56 on levothyroxine 50 mcg  1/10/23  endocrine note on levothyroxine 50 mcg  10/12/23 tsh 1.4,free t4,free t3 2.5 on levothyroxine 50 mcg   11/22/23  endocrine note on levothyroxine 50 mcg   5/8/24 tsh 1.5,free t4 1.5,free t3 2.2  6/26/24 [facility] note on levothyroxine 50 mg  12/6/24 tsh 2.7,free t34 1.39,free t3 2.4 on levothyroxine 50 mcg six days per week and 100 mcg day 7  12/19/24 [facility] note on levothyroxine 50 mcg six days per week and 100 mcg day 7, tsh 2.7 increase levothyroxine to 75 mcg daily aim for tsh 1-2      Neutropenia  9/20/17 wbc 4.3  9/10/18 wbc 4.9  12/10/19 wbc 5.6  1/14/20 wbc 3.2  6/17/20 wbc 4.0  6/29/21 wbc 6.6  10/15/21 wbc 11.5  2/3/22 wbc 3.4 (65%N,24%L)  3/7/22 dr.kelly taylor oncology check immunoglobin's, leukemia panel, HIV, for because of lymphopenia  3/9/22 wbc 4.3 (67%N,22%L), flow cytometry very small lambda restricted partial 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 accounting for 0.3% of leukocytes, no abnormal myeloid, T-cell, or NK cell population identified, while findings may be suggestive of monoclonal B-cell lymphocytosis, clinical correlation required, IgG 1018,IgE 39,IgM<10,IgA 91,HIV negative  8/9/22 wbc 4.5  11/16/22 b12 1294  3/7/23 wbc 4.1  3/22/24 wbc 4.5   5/8/24 b12 1340  12/6/24 b12 1226     Preventative health  1/19/16 pneumovax   2/10/17 [facility]  9/7/17 tdap  1/7/21 shingrix second  3/24/21 colon per [facility] negative, no further surveillance colonoscopy necessary  12/18/23 rsv  3/22/24 A1c 5.6%  5/13/24 dexa  LS+0.7,hip-0.6  5/13/24 mammogram  9/27/24 flu   9/27/24 covid  12/6/24 vit d 69  12/19/24 [facility] note continue over-the-counter vitamin D 2000 units daily      restless leg  9/27/18 restless leg tried mirapex 0.125 mg no benefit  9/11/18 trial neurontin  6/6/22 resume neurontin 100 mg qpm  3/6/23 off neurontin, but using compression stockings nightly, should symptoms recur consider gabapentin again  3/7/23 A1c 5.5%  3/7/23 iron 62,%sat 21,ferritin 119  3/25/24 symptoms improved after varicose vein treatment     Sensorineural hearing loss  1/6/21  audiology note, sensorineural hearing loss candidate for amplification, patient will check with her insurance regarding coverage  1/25/21 [facility] audiology note patient would like to purchase hearing aids private pay not with insurance, [place], ordered resound hearing aids, return for fitting  2/18/21 [facility] audiology hearing is okay with the aids, trouble keeping hearing the right one in place, will change to shortened size wires  2/23/23 [facility] audiology hearing aid check, instructed on how to clean hearing aids     Sleep apnea  8/26/20 pulmonary note moderate risk sleep apnea, stopbang score 4, check on sleep study, follow-up 3 months  9/1/20 sleep study recommended airway pressurization by titration polysomnogram or auto titrating cpap  3/6/23 declines follow up asymptomatic     Status post laparoscopic cholecystectomy     s/p lumbar surgery  6/20/11 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  2/14/17 [employer] note   6/18/21 [facility] note back pain, trigger point injection with steroids performed  7/17/21 MRI lumbar spine, L1-L2 moderate facet arthropathy, moderate right and moderate to severe left foraminal stenosis, L2-L3 moderate to severe facet arthropathy, moderate left neuroforaminal stenosis, L4-5 moderate disc bulge, severe facet arthropathy, severe central canal  stenosis with mild to moderate right foraminal stenosis, no acute abnormality  9/13/21  neurosurgery note, discussed surgical intervention, likely related to L4-L5 level, having tried physical therapy and epidural which was not effective  9/17/21  neurosurgery note, flexion-extension films shows no instability, discussed lumbar decompression with complete laminectomy L5, possible laminectomy L4, she would like to proceed  10/13/21  neurosurgery operative note L4-5 complete laminectomy, L3-S1 bilateral medial facetectomy and bilateral foraminoty  10/26/21  neurosurgery postoperative note follow-up L4-L5 laminectomy, L3-S1 medial facetectomy and foraminotomy, taper gabapentin to 300 mg bid x one week then 300 mg qday x one week  11/29/21 [facility] neurosurgery note postoperative visit, increase activity as tolerated, ultrasound rule out DVT right ankle swelling, refill tizanidine  12/16/21 [facility] neurosurgery note, 6-month postop visit, symptoms of neurogenic claudication have improved  3/21/22 [facility] neurosurgery note status post laminectomy, facetectomy, foraminotomy, improvement in symptoms of claudication only complaint is persistent numbness shocklike distribution bilateral, recommend if no improvement by 1 year check EMG lower extremities  3/22/23 [facility] neurosurgery note symptoms of neurogenic claudication improved, still with some persistent numbness sciatica-like distribution bilateral we will order EMG nerve conduction study bilateral lower extremities  5/12/23 [facility] nerve conduction study chronic right L5 radiculopathy with incomplete reinnervation, no evidence of mononeuropathy or peripheral neuropathy, possible that chronic L5 radiculopathy contribute to symptoms but will refer to vascular surgery for further evaluation     Status post parathyroidectomy  2005 no old records  11/08 calcium 10.3  2/10 calcium 9.9  1/11 calcium 10.1  2/12 calcium 10, vit d 64  2/13  calcium 10.0  2/13/14 calcium 9.9  3/12/15 calcium 10.3  3/15/16 calcium 10.7  5/25/16 calcium 10.5,ionized 1.3,PTH 26  2/7/17 calcium 10.5  9/10/18 calcium 9.4,vit d 58  10/10/19 calcium 10.3  1/22/20 calcium 10.3  6/17/20 calcium 9.8  8/19/20 calcium 10.2,ionized calcium 1.3,PTH 28,phos 3.3  8/28/20  endocrinology note   2/3/22 calcium 9.9,PTH 26  3/7/23 calcium 10.3     venous insufficiency   9/27/23  [facility] symptomatic varicose veins lower extremities, diagnostic ultrasound demonstrates retrograde flow bilateral great saphenous veins, right calf , having failed 12 weeks of conservative therapy we will arrange for radiofrequency endovenous ablation  11/27/23 [facility] vein note varicose insufficiency status post venous seal right greater saphenous vein, ultrasound demonstrated veins to be occluded with no evidence of deep venous thrombosis, follow-up 6 weeks  1/15/24 [facility] vein [place] note, status post right greater saphenous vein venaseal closure ultrasound venous reveals treated veins to be occluded with no evidence of deep venous thrombosis, follow-up 3 months if cramping begins to worsen left lower extremity will consider venaseal left greater saphenous vein  5/6/24 [facility] vein [place] note status post venaseal closure right great saphenous vein, ultrasound treated veins occluded with no evidence of deep venous thrombosis, follow-up as needed         Patient Active Problem List   Diagnosis    Dyslipidemia    S/P parathyroidectomy    Preventative health care    S/P laparoscopic cholecystectomy    Adenomatous polyp of colon    History of vasovagal syncope    History of wrist fracture    S/p lumbar surgery    Restless leg syndrome    Hyponatremia    History of MSSA bacteremia    History of bilateral pleural effusion    Cataract    History of gastroesophageal reflux (GERD)    Atypical chest pain    Hypothyroid    Neutropenia (HCC)    History of ankle fracture    Sleep apnea     Sensorineural hearing loss    History of squamous and basal cell carcinoma of skin    History of epistaxis    Venous insufficiency         Current social contact/activities: Pickleball, Golfing      She  reports that she has never smoked. She has never used smokeless tobacco. She reports that she does not currently use alcohol. She reports that she does not use drugs.  Counseling given: Not Answered        ROS:    Gait: Uses no assistive device  Ostomy: No  Other tubes: No  Amputations: No  Chronic oxygen use: No  Last eye exam: 11/20224  Wears hearing aids: Yes   : Denies any urinary leakage during the last 6 months  No chest pain, palpitations with activity  Occasional constipation, has used MiraLAX in the past with benefit  No back pain, hip pain, knee pain  Chronic right foot numbness status post venous insufficiency treatment, no edema, does not affect her balance  No heartburn  No mood or memory issues    Depression Screening  Little interest or pleasure in doing things?  0 - not at all  Feeling down, depressed , or hopeless? 0 - not at all  Patient Health Questionnaire Score: 0     If depressive symptoms identified deferred to follow up visit unless specifically addressed in assessment and plan.    Interpretation of PHQ-9 Total Score   Score Severity   1-4 No Depression   5-9 Mild Depression   10-14 Moderate Depression   15-19 Moderately Severe Depression   20-27 Severe Depression    Screening for Cognitive Impairment  Do you or any of your friends or family members have any concern about your memory? No  Three Minute Recall (Village, Kitchen, Baby) 3/3    [place]adi clock face with all 12 numbers and set the hands to show 10 minutes past 11.  Yes    Cognitive concerns identified deferred for follow up unless specifically addressed in assessment and plan.    Fall Risk Assessment  Has the patient had two or more falls in the last year or any fall with injury in the last year?  No    Safety Assessment  Do you  always wear your seatbelt?  No  Any changes to home needed to function safely? No  Difficulty hearing.  Yes  Patient counseled about all safety risks that were identified.    Functional Assessment ADLs  Are there any barriers preventing you from cooking for yourself or meeting nutritional needs?  No.    Are there any barriers preventing you from driving safely or obtaining transportation?  No.    Are there any barriers preventing you from using a telephone or calling for help?  No    Are there any barriers preventing you from shopping?  No.    Are there any barriers preventing you from taking care of your own finances?  No    Are there any barriers preventing you from managing your medications?  No    Are there any barriers preventing you from showering, bathing or dressing yourself? No    Are there any barriers preventing you from doing housework or laundry? No  Are there any barriers preventing you from using the toilet?No  Are you currently engaging in any exercise or physical activity?  Yes. Pickle ball, golf    Self-Assessment of Health  What is your perception of your health? Excellent    Do you sleep more than six hours a night? Yes    In the past 7 days, how much did pain keep you from doing your normal work? None    Do you spend quality time with family or friends (virtually or in person)? Yes    Do you usually eat a heart healthy diet that constists of a variety of fruits, vegetables, whole grains and fiber? Yes    Do you eat foods high in fat and/or Fast Food more than three times per week? No    How concerned are you that your medical conditions are not being well managed? Not at all    Are you worried that in the next 2 months, you may not have stable housing that you own, rent, or stay in as part of a household? No      Advance Care Planning  Do you have an Advance Directive, Living Will, Durable Power of , or POLST? Yes  Advance Directive Living Will Durable Power of  POLST is not on  "file - instructed patient to bring in a copy to scan into their chart            Patient Care Team:  Eron Flowers M.D. as PCP - General  Ramin Meadows M.D. as Consulting Physician (Ophthalmology)         Health Care Screening recommendations reviewed with patient today and updated or ordered.  DPA/Advanced directive: Completed/Information provided.   Referrals for PT/OT/Nutrition counseling/Behavioral Health/Smoking cessation as above if indicated  Discussion today about general wellness and lifestyle habits:    Prevent falls and reduce trip hazards;   Have a working fire alarm and carbon monoxide detector;   Engage in regular physical activity and social activities;   Use sun protection when outdoors.     ROS           Objective     /68   Pulse (!) 57   Temp 36.7 °C (98 °F) (Temporal)   Resp 12   Ht 1.626 m (5' 4\")   Wt 62.5 kg (137 lb 11.2 oz)   SpO2 96%   BMI 23.64 kg/m²      Physical Exam  Vitals and nursing note reviewed.   Constitutional:       Appearance: Normal appearance.   HENT:      Head: Normocephalic and atraumatic.      Right Ear: External ear normal.      Left Ear: External ear normal.      Nose: Nose normal.   Eyes:      Conjunctiva/sclera: Conjunctivae normal.   Cardiovascular:      Rate and Rhythm: Normal rate and regular rhythm.      Heart sounds: Normal heart sounds.   Pulmonary:      Effort: No respiratory distress.      Breath sounds: Normal breath sounds.   Abdominal:      General: There is no distension.   Musculoskeletal:         General: No swelling.      Cervical back: Neck supple.   Skin:     Coloration: Skin is not jaundiced.   Neurological:      General: No focal deficit present.      Mental Status: She is alert.   Psychiatric:         Mood and Affect: Mood normal.         Behavior: Behavior normal.          No lower extremity edema                        Assessment & Plan    Assessment  #1 Medicare wellness visit    #2 dyslipidemia 3/22/24 chol 225,trig 64,hdl " 106,ldl 106 on zocor 20 mg    #3 Hyponatremia followed by endocrinology on levothyroxine 75 mcg daily, TSH per endocrinology, dose of the Synthroid adjusted in December to 75 mcg daily, pending repeat in 2 weeks    #4 History squamous cell skin cancer followed by dermatology uses sunscreen and hat with sun exposure    #5 neutropenia WBC 4.5 CBC a year ago, no recurrent infections    #6 status post parathyroidectomy calcium levels have remained normal    #7 constipation tries to incorporate fresh fruits, vegetables, beans in her diet, MiraLAX has been effective in the past    #8 sensorineural hearing loss sees audiology and has hearing aids    #9 impaired glucose metabolism     #10 nocturia x 1 4 nights per week    Plan  #1 health maintenance reviewed and updated    #2 old records from skin cancer dermatology [place],  ophthalmology, [facility]    #3 asked patient to bring by her advance directive and power of  paperwork, she brought that a few years ago but for some reason that was not scanned into our system    #4 follow-up endocrinology    #5 continue with her good nutrition and exercise program    #6 continue good hydration with water, liquid IV per endocrinology, Synthroid per endocrinology    #7 she can get another COVID-vaccine, it has been 6 months since her last vaccine    #8 she will get the PCV 21 at the pharmacy    #9 mammogram ordered for after May 14, bone density next year    #10 no further colonoscopy    #11 Labs    #12 she can use MiraLAX daily for constipation    #13 follow up one year

## 2025-04-03 NOTE — ASSESSMENT & PLAN NOTE
Orders:    Lipid Profile; Future    Lipoprotein (a); Future    APOLIPOPROTEIN B; Future    Comp Metabolic Panel; Future    CBC WITH DIFFERENTIAL; Future

## 2025-04-03 NOTE — TELEPHONE ENCOUNTER
Need old records  (1) skin cancer dermatology [place] for the past 12 months  (2) [facility] for the past 6 months  
Requested Records electronically.  
I, Aurelia Bethea, performed the initial face to face bedside interview with this patient regarding history of present illness, review of symptoms and relevant past medical, social and family history.  I completed an independent physical examination.  I was the initial provider who evaluated this patient. I have signed out the follow up of any pending tests (i.e. labs, radiological studies) to the ACP.  I have communicated the patient’s plan of care and disposition with the ACP.  The history, relevant review of systems, past medical and surgical history, medical decision making, and physical examination was documented by the scribe in my presence and I attest to the accuracy of the documentation.

## 2025-04-03 NOTE — TELEPHONE ENCOUNTER
Need old records please  (1)  ophthalmology for the past 12 months  (2) skin cancer dermatology [place] for the past 2 years  (3) [facility] records for the last 2 years

## 2025-04-03 NOTE — PROGRESS NOTES
Chief Complaint   Patient presents with    Annual Exam    Constipation     Ongoing       HPI:  Bernadette Paul is a 79 y.o. here for Medicare Annual Wellness Visit     Patient Active Problem List    Diagnosis Date Noted    Venous insufficiency 09/27/2023    History of epistaxis 09/12/2022    History of squamous and basal cell carcinoma of skin 06/07/2022    Sensorineural hearing loss 01/12/2021    Sleep apnea 10/23/2020    History of ankle fracture 07/01/2020    Neutropenia (HCC) 06/19/2020    History of gastroesophageal reflux (GERD) 06/18/2020    Atypical chest pain 06/18/2020    Hypothyroid 06/18/2020    Cataract 04/23/2020    History of bilateral pleural effusion 12/08/2019    History of MSSA bacteremia 11/18/2019    Hyponatremia 10/11/2019    Restless leg syndrome 09/10/2018    S/p lumbar surgery 02/20/2017    History of wrist fracture 02/12/2013    History of vasovagal syncope 05/02/2012    Adenomatous polyp of colon 02/11/2011    S/P laparoscopic cholecystectomy 01/20/2011    Dyslipidemia 06/17/2009    S/P parathyroidectomy 06/17/2009    [facility] health care 06/17/2009       Current Outpatient Medications   Medication Sig Dispense Refill    levothyroxine (SYNTHROID) 75 MCG Tab 1 tablet in the morning on an empty stomach Orally Once a day for 90 days      simvastatin (ZOCOR) 20 MG Tab TAKE 1 TABLET BY MOUTH EVERY EVENING. 90 Tablet 3    VITAMIN D PO Take  by mouth.      Coenzyme Q10 (COQ10) 100 MG Cap       Zinc 10 MG Lozenge       CALCIUM PO       polyethylene glycol/lytes (MIRALAX) 17 g Pack Take 17 g by mouth every day.      levothyroxine (SYNTHROID) 50 MCG Tab Take 50 mcg by mouth every morning on an empty stomach. Pt takes 100 MCG only on Sunday  Al other days 50 MCG       Cyanocobalamin (VITAMIN B 12 PO) Take 1 tablet by mouth every day.      Ascorbic Acid (VITAMIN C PO) Take 1 tablet by mouth every day.      Probiotic Product (PROBIOTIC PO) Take 1 capsule by mouth every evening.       No current  facility-administered medications for this visit.          Current supplements as per medication list.     Allergies: Asa [aspirin]    Current social contact/activities: Pickleball, Golfing     She  reports that she has never smoked. She has never used smokeless tobacco. She reports that she does not currently use alcohol. She reports that she does not use drugs.  Counseling given: Not Answered      ROS:    Gait: Uses no assistive device  Ostomy: No  Other tubes: No  Amputations: No  Chronic oxygen use: No  Last eye exam: 11/20224  Wears hearing aids: Yes   : Denies any urinary leakage during the last 6 months    Screening:  ***  Depression Screening  Little interest or pleasure in doing things?  0 - not at all  Feeling down, depressed , or hopeless? 0 - not at all  Patient Health Questionnaire Score: 0     If depressive symptoms identified deferred to follow up visit unless specifically addressed in assessment and plan.    Interpretation of PHQ-9 Total Score   Score Severity   1-4 No Depression   5-9 Mild Depression   10-14 Moderate Depression   15-19 Moderately Severe Depression   20-27 Severe Depression    Screening for Cognitive Impairment  Do you or any of your friends or family members have any concern about your memory? No  Three Minute Recall (Village, Kitchen, Baby) 3/3    Madi clock face with all 12 numbers and set the hands to show 10 minutes past 11.  Yes    Cognitive concerns identified deferred for follow up unless specifically addressed in assessment and plan.    Fall Risk Assessment  Has the patient had two or more falls in the last year or any fall with injury in the last year?  No    Safety Assessment  Do you always wear your seatbelt?  No  Any changes to home needed to function safely? No  Difficulty hearing.  Yes  Patient counseled about all safety risks that were identified.    Functional Assessment ADLs  Are there any barriers preventing you from cooking for yourself or meeting nutritional  needs?  No.    Are there any barriers preventing you from driving safely or obtaining transportation?  No.    Are there any barriers preventing you from using a telephone or calling for help?  No    Are there any barriers preventing you from shopping?  No.    Are there any barriers preventing you from taking care of your own finances?  No    Are there any barriers preventing you from managing your medications?  No    Are there any barriers preventing you from showering, bathing or dressing yourself? No    Are there any barriers preventing you from doing housework or laundry? No  Are there any barriers preventing you from using the toilet?No  Are you currently engaging in any exercise or physical activity?  Yes. Pickle ball, golf    Self-Assessment of Health  What is your perception of your health? Excellent    Do you sleep more than six hours a night? Yes    In the past 7 days, how much did pain keep you from doing your normal work? None    Do you spend quality time with family or friends (virtually or in person)? Yes    Do you usually eat a heart healthy diet that constists of a variety of fruits, vegetables, whole grains and fiber? Yes    Do you eat foods high in fat and/or Fast Food more than three times per week? No    How concerned are you that your medical conditions are not being well managed? Not at all    Are you worried that in the next 2 months, you may not have stable housing that you own, rent, or stay in as part of a household? No      Advance Care Planning  Do you have an Advance Directive, Living Will, Durable Power of , or POLST? Yes  Advance Directive Living Will Durable Power of  POLST is not on file - instructed patient to bring in a copy to scan into their chart      Health Maintenance Summary            Current Care Gaps       Annual Wellness Visit (Yearly) Overdue since 3/25/2025      03/25/2024  Visit Dx: Medicare annual wellness visit, subsequent    03/06/2023  Visit Dx:  Medicare annual wellness visit, subsequent    02/08/2022  Visit Dx: Medicare annual wellness visit, subsequent    10/29/2020  Visit Dx: Medicare annual wellness visit, subsequent    10/03/2019  Visit Dx: Medicare annual wellness visit, subsequent     Only the first 5 history entries have been loaded, but more history exists.                    Upcoming       IMM DTaP/Tdap/Td Vaccine (2 - Td or Tdap) Next due on 9/7/2027 09/07/2017  Imm Admin: Tdap Vaccine    02/07/2012  Imm Admin: TD Vaccine              Bone Density Scan (Every 5 Years) Next due on 5/13/2029 05/13/2024  DS-BONE DENSITY STUDY (DEXA)    03/08/2022  DS-BONE DENSITY STUDY (DEXA)    09/26/2018  DS-BONE DENSITY STUDY (DEXA)    04/08/2015  DS-BONE DENSITY STUDY (DEXA)    03/12/2013  DS-BONE DENSITY STUDY (DEXA)      Only the first 5 history entries have been loaded, but more history exists.                      Completed or No Longer Recommended       Influenza Vaccine (Series Information) Completed      09/27/2024  Imm Admin: Influenza high-dose trivalent (PF)    09/13/2023  Imm Admin: Influenza Vaccine, Quadrivalent, Adjuvanted (Pf)    10/06/2022  Imm Admin: Influenza Vaccine Adult HD    09/15/2021  Imm Admin: Influenza Vaccine Adult HD    10/07/2020  Imm Admin: Influenza Vaccine Quad Inj (Pf)      Only the first 5 history entries have been loaded, but more history exists.              Zoster (Shingles) Vaccines (Series Information) Completed      01/07/2021  Imm Admin: Zoster Vaccine Recombinant (RZV) (SHINGRIX)    09/04/2020  Imm Admin: Zoster Vaccine Recombinant (RZV) (SHINGRIX)              COVID-19 Vaccine (Series Information) Completed      09/27/2024  Imm Admin: COVID-19, mRNA, LNP-S, PF, sandeep-sucrose, 30 mcg/0.3 mL    09/27/2021  Imm Admin: PFIZER PURPLE CAP SARS-COV-2 VACCINATION (12+)    02/18/2021  Imm Admin: PFIZER PURPLE CAP SARS-COV-2 VACCINATION (12+)    01/26/2021  Imm Admin: PFIZER PURPLE CAP SARS-COV-2 VACCINATION (12+)               Pneumococcal Vaccine: 50+ Years (Series Information) Completed      02/10/2017  Imm Admin: Pneumococcal Conjugate Vaccine (Prevnar/PCV-13)    01/19/2016  Imm Admin: Pneumococcal polysaccharide vaccine (PPSV-23)              Hepatitis A Vaccine (Hep A) (Series Information) Aged Out      No completion history exists for this topic.              Hepatitis B Vaccine (Hep B) (Series Information) Aged Out     No completion history exists for this topic.              HPV Vaccines (Series Information) Aged Out     No completion history exists for this topic.              Polio Vaccine (Inactivated Polio) (Series Information) Aged Out     No completion history exists for this topic.              Meningococcal Immunization (Series Information) Aged Out     No completion history exists for this topic.              Mammogram  Discontinued        Frequency changed to Never automatically (Topic No Longer Applies)    05/13/2024  MA-SCREENING MAMMO BILAT W/TOMOSYNTHESIS W/CAD    04/04/2023  MA-SCREENING MAMMO BILAT W/TOMOSYNTHESIS W/CAD    01/23/2020  MA-SCREENING MAMMO BILAT W/TOMOSYNTHESIS W/CAD    09/26/2018  MA-SCREENING MAMMO BILAT W/TOMOSYNTHESIS W/CAD     Only the first 5 history entries have been loaded, but more history exists.            Cervical Cancer Screening  Discontinued        Frequency changed to Never automatically (Topic No Longer Applies)    02/08/2010  PAP IG, RFX HPV ASCU              Colorectal Cancer Screening  Discontinued        Frequency changed to Never automatically (Topic No Longer Applies)    03/24/2021  REFERRAL TO GI FOR COLONOSCOPY    03/16/2016  AMB REFERRAL TO GI FOR COLONOSCOPY    02/13/2014  OCCULT BLOOD FECES IMMUNOASSAY    02/13/2013  OCCULT BLOOD FECES IMMUNOASSAY      Only the first 5 history entries have been loaded, but more history exists.              Hepatitis C Screening  Discontinued      No completion history exists for this topic.                            Patient Care  "Team:  Eron Flowers M.D. as PCP - General  Ramin Meadows M.D. as Consulting Physician (Ophthalmology)        Social History     Tobacco Use    Smoking status: Never    Smokeless tobacco: Never   Vaping Use    Vaping status: Never Used   Substance Use Topics    Alcohol use: Not Currently     Comment: a few a year    Drug use: Never     Family History   Problem Relation Age of Onset    Heart Disease Mother      She  has a past medical history of Allergic rhinitis (6/17/2009), Bowel habit changes, Disorder of thyroid, Dyslipidemia (6/17/2009), Hemorrhagic disorder (HCC), High cholesterol, Low sodium levels (2021), Pain, Preventative health care (6/17/2009), S/P parathyroidectomy (6/17/2009), and Sleep apnea.   Past Surgical History:   Procedure Laterality Date    PB LAMINOTOMY,LUMBAR DISK,1 INTRSP N/A 10/13/2021    Procedure: POSTERIOR LAMINECTOMY, SPINE, LUMBAR, - L4-5;  Surgeon: Cosmo Hyatt M.D.;  Location: [facility];  Service: Neurosurgery    FORAMINOTOMY N/A 10/13/2021    Procedure: FORAMINOTOMY, SPINE - L4-5, L5-S1 AND MEDIAL FACETECTOMY;  Surgeon: Cosmo Hyatt M.D.;  Location: [facility];  Service: Neurosurgery    NICOLE N/A 12/6/2019    Procedure: ECHOCARDIOGRAM, TRANSESOPHAGEAL;  Surgeon: Marion Dykes M.D.;  Location: [facility];  Service: Cardiac    INCISION AND DRAINAGE ORTHOPEDIC Right 11/15/2019    Procedure: INCISION AND DRAINAGE, RIGHT KNEE  BY ORTHOPEDICS;  Surgeon: Ramsey Navas M.D.;  Location: [facility];  Service: Orthopedics    CHOLECYSTECTOMY  2014       Exam:   /68   Pulse (!) 57   Temp 36.7 °C (98 °F) (Temporal)   Resp 12   Ht 1.626 m (5' 4\")   Wt 62.5 kg (137 lb 11.2 oz)   SpO2 96%  Body mass index is 23.64 kg/m².    Hearing {GOOD/FAIR/POOR/EXCELLENT:04120}.    Dentition {DENTITION:01753}  Alert, oriented in no acute distress.  Eye contact is good, speech goal directed, affect calm    Assessment and Plan. The " following treatment and monitoring plan is recommended:  ***  There are no diagnoses linked to this encounter.    Services suggested: { AWV COORDINATION OF SERVICES:20405}  Health Care Screening: Age-appropriate preventive services recommended by USPTF and ACIP covered by Medicare were discussed today. Services ordered if indicated and agreed upon by the patient.  Referrals offered: Community-based lifestyle interventions to reduce health risks and promote self-management and wellness, fall prevention, nutrition, physical activity, tobacco-use cessation, weight loss, and mental health services as per orders if indicated.    Discussion today about general wellness and lifestyle habits:    Prevent falls and reduce trip hazards; Cautioned about securing or removing rugs.  Have a working fire alarm and carbon monoxide detector;   Engage in regular physical activity and social activities     Follow-up: No follow-ups on file.

## 2025-04-05 ENCOUNTER — RESULTS FOLLOW-UP (OUTPATIENT)
Dept: MEDICAL GROUP | Facility: MEDICAL CENTER | Age: 80
End: 2025-04-05
Payer: MEDICARE

## 2025-04-05 LAB
APO B100 SERPL-MCNC: 80 MG/DL (ref 60–117)
LPA SERPL-MCNC: <6 MG/DL

## 2025-04-05 NOTE — TELEPHONE ENCOUNTER
Called the patient and left a message, please notify her that her test shows:  (1) her cholesterol total is 203, her HDL good cholesterol is 103 (goal is above 40), her bad cholesterol LDL is 91 (goal is less than 100), have her continue on the simvastatin cholesterol medication no changes  (2) her salt level is still good at 133, she can continue on the liquid IV  (3) her white blood cell count always run a bit low but this is stable and has not changed significantly going back at least 3 years, we will follow that annually  (4) her blood liver function, kidney function tests are normal  (5) her 90-day diabetes blood test is normal, indicating no diabetes, no prediabetes, have her continue to work on a good nutrition and exercise program

## 2025-04-09 ENCOUNTER — TELEPHONE (OUTPATIENT)
Dept: MEDICAL GROUP | Facility: MEDICAL CENTER | Age: 80
End: 2025-04-09
Payer: MEDICARE

## 2025-04-09 DIAGNOSIS — R35.1 NOCTURIA: ICD-10-CM

## 2025-04-11 RX ORDER — SIMVASTATIN 20 MG
20 TABLET ORAL EVERY EVENING
Qty: 100 TABLET | Refills: 2 | Status: SHIPPED | OUTPATIENT
Start: 2025-04-11

## 2025-04-14 ENCOUNTER — RESULTS FOLLOW-UP (OUTPATIENT)
Facility: MEDICAL CENTER | Age: 80
End: 2025-04-14

## 2025-04-14 ENCOUNTER — HOSPITAL ENCOUNTER (OUTPATIENT)
Facility: MEDICAL CENTER | Age: 80
End: 2025-04-14
Attending: INTERNAL MEDICINE
Payer: MEDICARE

## 2025-04-14 LAB
25(OH)D3 SERPL-MCNC: 70 NG/ML (ref 30–100)
ANION GAP SERPL CALC-SCNC: 9 MMOL/L (ref 7–16)
APPEARANCE UR: CLEAR
BACTERIA #/AREA URNS HPF: ABNORMAL /HPF
BILIRUB UR QL STRIP.AUTO: NEGATIVE
BUN SERPL-MCNC: 16 MG/DL (ref 8–22)
CALCIUM SERPL-MCNC: 10 MG/DL (ref 8.5–10.5)
CASTS URNS QL MICRO: ABNORMAL /LPF (ref 0–2)
CHLORIDE SERPL-SCNC: 95 MMOL/L (ref 96–112)
CO2 SERPL-SCNC: 26 MMOL/L (ref 20–33)
COLOR UR: YELLOW
CREAT SERPL-MCNC: 0.82 MG/DL (ref 0.5–1.4)
EPITHELIAL CELLS 1715: ABNORMAL /HPF (ref 0–5)
FASTING STATUS PATIENT QL REPORTED: NORMAL
GFR SERPLBLD CREATININE-BSD FMLA CKD-EPI: 72 ML/MIN/1.73 M 2
GLUCOSE SERPL-MCNC: 91 MG/DL (ref 65–99)
GLUCOSE UR STRIP.AUTO-MCNC: NEGATIVE MG/DL
KETONES UR STRIP.AUTO-MCNC: NEGATIVE MG/DL
LEUKOCYTE ESTERASE UR QL STRIP.AUTO: ABNORMAL
MICRO URNS: ABNORMAL
NITRITE UR QL STRIP.AUTO: NEGATIVE
PH UR STRIP.AUTO: 7.5 [PH] (ref 5–8)
POTASSIUM SERPL-SCNC: 4.3 MMOL/L (ref 3.6–5.5)
PROT UR QL STRIP: NEGATIVE MG/DL
RBC # URNS HPF: ABNORMAL /HPF
RBC UR QL AUTO: NEGATIVE
SODIUM SERPL-SCNC: 130 MMOL/L (ref 135–145)
SP GR UR STRIP.AUTO: 1.01
T3FREE SERPL-MCNC: 2.33 PG/ML (ref 2–4.4)
T4 FREE SERPL-MCNC: 1.43 NG/DL (ref 0.93–1.7)
TSH SERPL DL<=0.005 MIU/L-ACNC: 1.48 UIU/ML (ref 0.38–5.33)
UROBILINOGEN UR STRIP.AUTO-MCNC: 0.2 EU/DL
VIT B12 SERPL-MCNC: 1536 PG/ML (ref 211–911)
WBC #/AREA URNS HPF: ABNORMAL /HPF

## 2025-04-14 PROCEDURE — 84439 ASSAY OF FREE THYROXINE: CPT

## 2025-04-14 PROCEDURE — 84481 FREE ASSAY (FT-3): CPT

## 2025-04-14 PROCEDURE — 36415 COLL VENOUS BLD VENIPUNCTURE: CPT

## 2025-04-14 PROCEDURE — 80048 BASIC METABOLIC PNL TOTAL CA: CPT

## 2025-04-14 PROCEDURE — 84443 ASSAY THYROID STIM HORMONE: CPT

## 2025-04-14 PROCEDURE — 81001 URINALYSIS AUTO W/SCOPE: CPT

## 2025-04-14 PROCEDURE — 82607 VITAMIN B-12: CPT

## 2025-04-14 PROCEDURE — 82306 VITAMIN D 25 HYDROXY: CPT

## (undated) DEVICE — CANISTER SUCTION 3000ML MECHANICAL FILTER AUTO SHUTOFF MEDI-VAC NONSTERILE LF DISP  (40EA/CA)

## (undated) DEVICE — KIT ROOM DECONTAMINATION

## (undated) DEVICE — SUCTION INSTRUMENT YANKAUER BULBOUS TIP W/O VENT (50EA/CA)

## (undated) DEVICE — MIDAS LUBRICATOR DIFFUSER PACK (4EA/CA)

## (undated) DEVICE — GLOVE SZ 6.5 BIOGEL PI MICRO - PF LF (50PR/BX)

## (undated) DEVICE — SET EXTENSION WITH 2 PORTS (48EA/CA) ***PART #2C8610 IS A SUBSTITUTE*****

## (undated) DEVICE — TUBING CLEARLINK DUO-VENT - C-FLO (48EA/CA)

## (undated) DEVICE — LACTATED RINGERS INJ 1000 ML - (14EA/CA 60CA/PF)

## (undated) DEVICE — GLOVE BIOGEL INDICATOR SZ 7SURGICAL PF LTX - (50/BX 4BX/CA)

## (undated) DEVICE — SUTURE GENERAL

## (undated) DEVICE — PADDING CAST 4 IN STERILE - 4 X 4 YDS (24/CA)

## (undated) DEVICE — GLOVE, LITE (PAIR)

## (undated) DEVICE — BANDAGE ELASTIC 4 HONEYCOMB - 4"X5YD LF (20/CA)"

## (undated) DEVICE — DRAPE LOWER EXTREMETY - (6/CA)

## (undated) DEVICE — HEADREST PRONEVIEW LARGE - (10/CA)

## (undated) DEVICE — TOURNIQUET CUFF 24 X 4 ONE PORT - STERILE (10/BX)

## (undated) DEVICE — MASK ANESTHESIA ADULT  - (100/CA)

## (undated) DEVICE — ELECTRODE DUAL RETURN W/ CORD - (50/PK)

## (undated) DEVICE — SLEEVE, VASO, THIGH, MED

## (undated) DEVICE — BOVIE BLADE COATED &INSULATED - 25/PK

## (undated) DEVICE — SUTURE 0 VICRYL PLUS CT-1 - 8 X 18 INCH (12/BX)

## (undated) DEVICE — KIT ANESTHESIA W/CIRCUIT & 3/LT BAG W/FILTER (20EA/CA)

## (undated) DEVICE — GLOVE BIOGEL PI INDICATOR SZ 6.0 SURGICAL PF LF -(200PR/CA)

## (undated) DEVICE — CHLORAPREP 26 ML APPLICATOR - ORANGE TINT(25/CA)

## (undated) DEVICE — GLOVE BIOGEL SZ 8 SURGICAL PF LTX - (50PR/BX 4BX/CA)

## (undated) DEVICE — TUBE CONNECT SUCTION CLEAR 120 X 1/4" (50EA/CA)"

## (undated) DEVICE — KIT EVACUATER 3 SPRING PVC LF 1/8 DRAIN SIZE (10EA/CA)"

## (undated) DEVICE — SUTURE 2-0 VICRYL PLUS CT-1 - 8 X 18 INCH(12/BX)

## (undated) DEVICE — NEPTUNE 4 PORT MANIFOLD - (20/PK)

## (undated) DEVICE — KIT SURGIFLO W/OUT THROMBIN - (6EA/CA)

## (undated) DEVICE — SUTURE 4-0 MONOCRYL PLUS PS-1 - 27 INCH (36/BX)

## (undated) DEVICE — HANDPIECE 10FT INTPLS SCT PLS IRRIGATION HAND CONTROL SET (6/PK)

## (undated) DEVICE — ELECTRODE 850 FOAM ADHESIVE - HYDROGEL RADIOTRNSPRNT (50/PK)

## (undated) DEVICE — STAPLER SKIN DISP - (6/BX 10BX/CA) VISISTAT

## (undated) DEVICE — PENCIL ELECTSURG 10FT BTN SWH - (50/CA)

## (undated) DEVICE — ARMREST CRADLE FOAM - (2PR/PK 12PR/CA)

## (undated) DEVICE — SET LEADWIRE 5 LEAD BEDSIDE DISPOSABLE ECG (1SET OF 5/EA)

## (undated) DEVICE — SEALER BIPOLAR 2.3 AQUAMANTYS

## (undated) DEVICE — GLOVE BIOGEL PI INDICATOR SZ 7.5 SURGICAL PF LF -(50/BX 4BX/CA)

## (undated) DEVICE — GLOVE BIOGEL INDICATOR SZ 8 SURGICAL PF LTX - (50/BX 4BX/CA)

## (undated) DEVICE — DRAPE LARGE 3 QUARTER - (20/CA)

## (undated) DEVICE — GLOVE BIOGEL SZ 7.5 SURGICAL PF LTX - (50PR/BX 4BX/CA)

## (undated) DEVICE — FORCEPS IRRIGATING 8 X 1.5MM (5EA/BX)

## (undated) DEVICE — TUBING C&T SET FLYING LEADS DRAIN TUBING (10EA/BX)

## (undated) DEVICE — TOWEL STOP TIMEOUT SAFETY FLAG (40EA/CA)

## (undated) DEVICE — WATER IRRIG. STER. 1500 ML - (9/CA)

## (undated) DEVICE — PROTECTOR ULNA NERVE - (36PR/CA)

## (undated) DEVICE — FORCEPS ELECTROSURGICAL DISPOSABLE CODMAN 9IN 1.5MM

## (undated) DEVICE — SODIUM CHL. IRRIGATION 0.9% 3000ML (4EA/CA 65CA/PF)

## (undated) DEVICE — SUCTION INSTRUMENT YANKAUER OPEN TIP W/O VENT (50EA/CA)

## (undated) DEVICE — SYRINGE NON SAFETY 10 CC 20 GA X 1-1/2 IN (100/BX 4BX/CA)

## (undated) DEVICE — SODIUM CHL IRRIGATION 0.9% 1000ML (12EA/CA)

## (undated) DEVICE — LACTATED RINGERS INJ. 500 ML - (24EA/CA)

## (undated) DEVICE — GOWN WARMING STANDARD FLEX - (30/CA)

## (undated) DEVICE — GOWN SURGEONS X-LARGE - DISP. (30/CA)

## (undated) DEVICE — PACK MAJOR ORTHO - (2EA/CA)

## (undated) DEVICE — BLADE SURGICAL CLIPPER - (50EA/CA)

## (undated) DEVICE — DRAPE STRLE REG TOWEL 18X24 - (10/BX 4BX/CA)"

## (undated) DEVICE — PACK NEURO - (2EA/CA)

## (undated) DEVICE — DRAPE LAPAROTOMY T SHEET - (12EA/CA)

## (undated) DEVICE — TOOL DISSECTING BALL SYMMETRI MIDAS REX LEGEND 14CM 4MM

## (undated) DEVICE — HEAD HOLDER JUNIOR/ADULT

## (undated) DEVICE — SENSOR SPO2 NEO LNCS ADHESIVE (20/BX) SEE USER NOTES

## (undated) DEVICE — SUTURE 0 PDS CT-2 (36PK/BX)

## (undated) DEVICE — TIP INTPLS HFLO ML ORFC BTRY - (12/CS)  FOR SURGILAV

## (undated) DEVICE — DISSECT TOOL MIDAS 14BA50

## (undated) DEVICE — GLOVE SZ 7 BIOGEL PI MICRO - PF LF (50PR/BX 4BX/CA)